# Patient Record
Sex: FEMALE | Race: WHITE | NOT HISPANIC OR LATINO | Employment: UNEMPLOYED | URBAN - METROPOLITAN AREA
[De-identification: names, ages, dates, MRNs, and addresses within clinical notes are randomized per-mention and may not be internally consistent; named-entity substitution may affect disease eponyms.]

---

## 2019-08-06 ENCOUNTER — OFFICE VISIT CONVERTED (OUTPATIENT)
Dept: ORTHOPEDIC SURGERY | Facility: CLINIC | Age: 44
End: 2019-08-06
Attending: ORTHOPAEDIC SURGERY

## 2019-08-27 ENCOUNTER — OFFICE VISIT CONVERTED (OUTPATIENT)
Dept: ORTHOPEDIC SURGERY | Facility: CLINIC | Age: 44
End: 2019-08-27
Attending: ORTHOPAEDIC SURGERY

## 2021-05-15 VITALS — BODY MASS INDEX: 39.31 KG/M2 | WEIGHT: 250.5 LBS | HEIGHT: 67 IN | OXYGEN SATURATION: 99 % | HEART RATE: 121 BPM

## 2021-05-15 VITALS — BODY MASS INDEX: 38.84 KG/M2 | OXYGEN SATURATION: 98 % | WEIGHT: 247.5 LBS | HEART RATE: 115 BPM | HEIGHT: 67 IN

## 2023-02-01 NOTE — PROGRESS NOTES
"Chief Complaint  Gastritis     Lilian Pelaez is a 47 y.o. female who presents to [facility]- [facility] on referral from Shelly Cash MD for a gastroenterology evaluation of gastritis       History of Present Illness  New patient presents to the office for gastritis. Patient previously seeing Dr. Jeronimo for upper GI symptoms who concluded patient had opioid induced gastroparesis, prescribed Reglan. Patient struggles with chronic back pain. She struggles with heartburn, nausea, bloating, abdominal cramping, and constipation. Heartburn is mostly controlled with Protonix 40mg, Carafate QID, and Pepcid 40mg at night. Since starting Reglan her heartburn as improved. Patient has bowel movement about 2 times a week despite taking Colace. Denies melena and hematochezia. Denies family history of colon cancer.     CT abdomen 07/26/2022 - evidence of cholecystectomy, moderate stool burden in right colon    EGD 07/05/2022 by Dr. Jeronimo - mild reflux esophagitis, retained food in stomach, nonerosive gastritis, and normal duodenum. Biopsies consistent with reactive gastropathy    Social History     Social History Narrative   • Not on file       Immunization:  Immunization History   Administered Date(s) Administered   • COVID-19 (PFIZER) PURPLE CAP 08/19/2021, 09/09/2021        Objective     Vital Signs:   /85 (BP Location: Left arm, Patient Position: Sitting, Cuff Size: Adult)   Pulse 83   Ht 170.2 cm (67.01\")   Wt (!) 140 kg (309 lb 9.6 oz)   SpO2 97%   BMI 48.48 kg/m²       Physical Exam  Constitutional:       Appearance: Normal appearance.   HENT:      Head: Normocephalic.   Cardiovascular:      Rate and Rhythm: Normal rate and regular rhythm.      Heart sounds: Normal heart sounds.   Pulmonary:      Effort: Pulmonary effort is normal.      Breath sounds: Normal breath sounds.   Abdominal:      General: Bowel sounds are normal.      Palpations: Abdomen is soft.   Skin:     General: " Skin is warm and dry.   Neurological:      Mental Status: She is alert and oriented to person, place, and time. Mental status is at baseline.   Psychiatric:         Mood and Affect: Mood normal.         Behavior: Behavior normal.         Thought Content: Thought content normal.         Judgment: Judgment normal.         Result Review :                   Assessment and Plan    Diagnoses and all orders for this visit:    1. Gastroesophageal reflux disease, unspecified whether esophagitis present (Primary)  -     NM Gastric Emptying; Future    2. Nausea  -     NM Gastric Emptying; Future    3. Epigastric pain    4. Bloating  -     NM Gastric Emptying; Future    5. Constipation, unspecified constipation type    6. Gastroparesis  -     Ambulatory Referral to Gastroenterology    Other orders  -     PEG 3350-KCl-NaBcb-NaCl-NaSulf (Golytely) 227.1 g pack; Take 4,000 mL by mouth 1 (One) Time for 1 dose. Take as directed by the office for colon prep  Dispense: 1 each; Refill: 0  -     linaclotide (Linzess) 72 MCG capsule capsule; Take 1 capsule by mouth Every Morning Before Breakfast for 90 days.  Dispense: 90 capsule; Refill: 1    Gastric emptying study  Referral to motility clinic for gastroparesis. Gastroparesis information and diet provided in AVS.  Trial of Linzess 72, samples provided.   Cardiac clearance from Dr. Tommy Marie   COLONOSCOPY Surgical Risk and Benefits: Possible risk/complications, benefits, and alternatives to surgical or invasive procedure have been explained to patient and/or legal guardian. Risks include bleeding, infection, and perforation. Patient has been evaluated and can tolerate anesthesia and/or sedation. Risk, benefits, and alternatives to anesthesia and sedation have been explained to patient and/or legal guardian.     Follow Up   No follow-ups on file.  Patient was given instructions and counseling regarding her condition or for health maintenance advice. Please see specific information  pulled into the AVS if appropriate.

## 2023-02-02 ENCOUNTER — OFFICE VISIT (OUTPATIENT)
Dept: GASTROENTEROLOGY | Facility: CLINIC | Age: 48
End: 2023-02-02
Payer: COMMERCIAL

## 2023-02-02 ENCOUNTER — PREP FOR SURGERY (OUTPATIENT)
Dept: OTHER | Facility: HOSPITAL | Age: 48
End: 2023-02-02
Payer: COMMERCIAL

## 2023-02-02 VITALS
HEART RATE: 83 BPM | SYSTOLIC BLOOD PRESSURE: 148 MMHG | DIASTOLIC BLOOD PRESSURE: 85 MMHG | HEIGHT: 67 IN | BODY MASS INDEX: 45.99 KG/M2 | WEIGHT: 293 LBS | OXYGEN SATURATION: 97 %

## 2023-02-02 DIAGNOSIS — R10.13 EPIGASTRIC PAIN: ICD-10-CM

## 2023-02-02 DIAGNOSIS — R14.0 BLOATING: ICD-10-CM

## 2023-02-02 DIAGNOSIS — R11.0 NAUSEA: ICD-10-CM

## 2023-02-02 DIAGNOSIS — K21.9 GASTROESOPHAGEAL REFLUX DISEASE, UNSPECIFIED WHETHER ESOPHAGITIS PRESENT: Primary | ICD-10-CM

## 2023-02-02 DIAGNOSIS — K59.00 CONSTIPATION, UNSPECIFIED CONSTIPATION TYPE: ICD-10-CM

## 2023-02-02 DIAGNOSIS — K59.00 CONSTIPATION, UNSPECIFIED CONSTIPATION TYPE: Primary | ICD-10-CM

## 2023-02-02 DIAGNOSIS — K31.84 GASTROPARESIS: ICD-10-CM

## 2023-02-02 PROCEDURE — 99204 OFFICE O/P NEW MOD 45 MIN: CPT

## 2023-02-02 RX ORDER — PSEUDOEPHEDRINE HCL 30 MG
TABLET ORAL
COMMUNITY

## 2023-02-02 RX ORDER — SUCRALFATE 1 G/1
TABLET ORAL
COMMUNITY

## 2023-02-02 RX ORDER — FUROSEMIDE 20 MG/1
1 TABLET ORAL
COMMUNITY

## 2023-02-02 RX ORDER — SERTRALINE HYDROCHLORIDE 100 MG/1
TABLET, FILM COATED ORAL
COMMUNITY

## 2023-02-02 RX ORDER — ALPRAZOLAM 0.5 MG/1
TABLET ORAL
COMMUNITY

## 2023-02-02 RX ORDER — LOSARTAN POTASSIUM 100 MG/1
TABLET ORAL
COMMUNITY

## 2023-02-02 RX ORDER — METOPROLOL SUCCINATE 25 MG/1
TABLET, EXTENDED RELEASE ORAL
COMMUNITY

## 2023-02-02 RX ORDER — MEDROXYPROGESTERONE ACETATE 150 MG/ML
INJECTION, SUSPENSION INTRAMUSCULAR
COMMUNITY

## 2023-02-02 RX ORDER — AMLODIPINE BESYLATE 10 MG/1
TABLET ORAL
COMMUNITY

## 2023-02-02 RX ORDER — PANTOPRAZOLE SODIUM 40 MG/1
TABLET, DELAYED RELEASE ORAL
COMMUNITY

## 2023-02-02 RX ORDER — ALLOPURINOL 100 MG/1
TABLET ORAL
COMMUNITY

## 2023-02-02 RX ORDER — LAMOTRIGINE 25 MG/1
TABLET ORAL
COMMUNITY

## 2023-02-02 RX ORDER — METOCLOPRAMIDE 10 MG/1
TABLET ORAL
COMMUNITY

## 2023-02-02 RX ORDER — POLYETHYLENE GLYCOL 3350, SODIUM SULFATE ANHYDROUS, SODIUM BICARBONATE, SODIUM CHLORIDE, POTASSIUM CHLORIDE 227.1; 21.5; 6.36; 5.53; .754 G/L; G/L; G/L; G/L; G/L
4000 POWDER, FOR SOLUTION ORAL ONCE
Qty: 1 EACH | Refills: 0 | Status: SHIPPED | OUTPATIENT
Start: 2023-02-02 | End: 2023-02-02

## 2023-02-02 RX ORDER — KETOROLAC TROMETHAMINE 10 MG/1
TABLET, FILM COATED ORAL
COMMUNITY

## 2023-02-02 RX ORDER — HYDROCODONE BITARTRATE AND ACETAMINOPHEN 7.5; 325 MG/1; MG/1
TABLET ORAL
COMMUNITY

## 2023-02-02 NOTE — PATIENT INSTRUCTIONS
Gastroparesis  Gastroparesis is a condition in which food takes longer than normal to empty from the stomach. This condition is also known as delayed gastric emptying. It is usually a long-term (chronic) condition.  There is no cure, but there are treatments and things that you can do at home to help relieve symptoms. Treating the underlying condition that causes gastroparesis can also help relieve symptoms.  What are the causes?  In many cases, the cause of this condition is not known. Possible causes include:  A hormone (endocrine) disorder, such as hypothyroidism or diabetes.  A nervous system disease, such as Parkinson's disease or multiple sclerosis.  Cancer, infection, or surgery that affects the stomach or vagus nerve. The vagus nerve runs from your chest, through your neck, and to the lower part of your brain.  A connective tissue disorder, such as scleroderma.  Certain medicines.  What increases the risk?  You are more likely to develop this condition if:  You have certain disorders or diseases. These may include:  An endocrine disorder.  An eating disorder.  Amyloidosis.  Scleroderma.  Parkinson's disease.  Multiple sclerosis.  Cancer or infection of the stomach or the vagus nerve.  You have had surgery on your stomach or vagus nerve.  You take certain medicines.  You are female.  What are the signs or symptoms?  Symptoms of this condition include:  Feeling full after eating very little or a loss of appetite.  Nausea, vomiting, or heartburn.  Bloating of your abdomen.  Inconsistent blood sugar (glucose) levels on blood tests.  Unexplained weight loss.  Acid from the stomach coming up into the esophagus (gastroesophageal reflux).  Sudden tightening (spasm) of the stomach, which can be painful.  Symptoms may come and go. Some people may not notice any symptoms.  How is this diagnosed?  This condition is diagnosed with tests, such as:  Tests that check how long it takes food to move through the stomach and  intestines. These tests include:  Upper gastrointestinal (GI) series. For this test, you drink a liquid that shows up well on X-rays, and then X-rays are taken of your intestines.  Gastric emptying scintigraphy. For this test, you eat food that contains a small amount of radioactive material, and then scans are taken.  Wireless capsule GI monitoring system. For this test, you swallow a pill (capsule) that records information about how foods and fluid move through your stomach.  Gastric manometry. For this test, a tube is passed down your throat and into your stomach to measure electrical and muscular activity.  Endoscopy. For this test, a long, thin tube with a camera and light on the end is passed down your throat and into your stomach to check for problems in your stomach lining.  Ultrasound. This test uses sound waves to create images of the inside of your body. This can help rule out gallbladder disease or pancreatitis as a cause of your symptoms.  How is this treated?  There is no cure for this condition, but treatment and home care may relieve symptoms. Treatment may include:  Treating the underlying cause.  Managing your symptoms by making changes to your diet and exercise habits.  Taking medicines to control nausea and vomiting and to stimulate stomach muscles.  Getting food through a feeding tube in the hospital. This may be done in severe cases.  Having surgery to insert a device called a gastric electrical stimulator into your body. This device helps improve stomach emptying and control nausea and vomiting.  Follow these instructions at home:  Take over-the-counter and prescription medicines only as told by your health care provider.  Follow instructions from your health care provider about eating or drinking restrictions. Your health care provider may recommend that you:  Eat smaller meals more often.  Eat low-fat foods.  Eat low-fiber forms of high-fiber foods. For example, eat cooked vegetables instead  of raw vegetables.  Have only liquid foods instead of solid foods. Liquid foods are easier to digest.  Drink enough fluid to keep your urine pale yellow.  Exercise as often as told by your health care provider.  Keep all follow-up visits. This is important.  Contact a health care provider if you:  Notice that your symptoms do not improve with treatment.  Have new symptoms.  Get help right away if you:  Have severe pain in your abdomen that does not improve with treatment.  Have nausea that is severe or does not go away.  Vomit every time you drink fluids.  Summary  Gastroparesis is a long-term (chronic) condition in which food takes longer than normal to empty from the stomach.  Symptoms include nausea, vomiting, heartburn, bloating of your abdomen, and loss of appetite.  Eating smaller portions, low-fat foods, and low-fiber forms of high-fiber foods may help you manage your symptoms.  Get help right away if you have severe pain in your abdomen.  This information is not intended to replace advice given to you by your health care provider. Make sure you discuss any questions you have with your health care provider.  Document Revised: 04/26/2021 Document Reviewed: 04/26/2021  [email or web address] Patient Education © 2022 [employer]

## 2023-02-03 ENCOUNTER — TELEPHONE (OUTPATIENT)
Dept: GASTROENTEROLOGY | Facility: CLINIC | Age: 48
End: 2023-02-03
Payer: COMMERCIAL

## 2023-02-08 ENCOUNTER — PATIENT ROUNDING (BHMG ONLY) (OUTPATIENT)
Dept: GASTROENTEROLOGY | Facility: CLINIC | Age: 48
End: 2023-02-08
Payer: COMMERCIAL

## 2023-02-08 NOTE — PROGRESS NOTES
2/8/2023      Hello, may I speak with Lilian Pelaez     My name is Iris Wright, Clinical Coordinator   . I am calling from [facility]. I show that you had a recent visit with CHANTELLE Hull.    Before we get started may I verify your date of birth? 1975    I am calling to officially welcome you to our practice and ask about your recent visit. Is this a good time to talk? No, no answer, unable to leave message    Tell me about your visit with us. What things went well?         We're always looking for ways to make our patients' experiences even better. Do you have recommendations on ways we may improve?    Overall were you satisfied with your first visit to our practice?    I appreciate you taking the time to speak with me today. Is there anything else I can do for you?    I am glad to hear that you had a very good visit and I appreciate you taking the time to provide feedback on this call. We would greatly appreciate you filling out a survey if you receive one in the mail, email or text. This is a great opportunity to provide any additional feedback that you may think of after this call as well.       Thank you, and have a great day.

## 2023-02-21 ENCOUNTER — HOSPITAL ENCOUNTER (OUTPATIENT)
Dept: NUCLEAR MEDICINE | Facility: HOSPITAL | Age: 48
Discharge: HOME | End: 2023-02-21
Payer: COMMERCIAL

## 2023-02-21 DIAGNOSIS — R11.0 NAUSEA: ICD-10-CM

## 2023-02-21 DIAGNOSIS — K21.9 GASTROESOPHAGEAL REFLUX DISEASE, UNSPECIFIED WHETHER ESOPHAGITIS PRESENT: ICD-10-CM

## 2023-02-21 DIAGNOSIS — R14.0 BLOATING: ICD-10-CM

## 2023-02-21 PROCEDURE — A9541 TC99M SULFUR COLLOID: HCPCS

## 2023-02-21 PROCEDURE — 78264 GASTRIC EMPTYING IMG STUDY: CPT

## 2023-02-21 PROCEDURE — 0 TECHNETIUM SULFUR COLLOID

## 2023-02-21 RX ADMIN — TECHNETIUM TC 99M SULFUR COLLOID 1 DOSE: KIT at 07:45

## 2023-03-03 ENCOUNTER — TELEPHONE (OUTPATIENT)
Dept: GASTROENTEROLOGY | Facility: CLINIC | Age: 48
End: 2023-03-03
Payer: COMMERCIAL

## 2023-03-03 DIAGNOSIS — R10.11 RUQ PAIN: Primary | ICD-10-CM

## 2023-03-03 NOTE — TELEPHONE ENCOUNTER
S/w patient. Patient aware of results. Patient stated after eating she still having a lot of abdominal pain, nausea and bloating. Patient is taking relgan currently. Patient also complaining of linzess, states it is making her have 1 bowel movement a day and it is diarrhea.  Patient currently has a referral to motility clinic. S/w maite at motility clinic , she stated she will call the patient today to schedule her    ----- Message from CHANTELLE Martinez sent at 2/21/2023  2:33 PM EST -----  Normal gastric emptying.

## 2023-03-09 NOTE — TELEPHONE ENCOUNTER
If Linzess if causing too much diarrhea then we can switch to Amitiza 8mcg BID instead. If patient is still taking Reglan then GES not truly reflective of delay. Plan to proceed with referral to Motility Clinic. Please provide low FODMAP diet to patient for bloating symptoms. Continue small frequent meals, eat low-fat foods, and utilize meal replacement shakes that are low in sugar.

## 2023-03-13 NOTE — TELEPHONE ENCOUNTER
S/w patient. Patient has a phone call visit with motility clinic 03/22/2023 per patient. Patient stated she takes the linzess in the mornings and 20 minutes after her first meal she has diarrhea. Patient feels as if when she needs to go it completely empties her and makes her feel fatigue and sometimes dehydrated, this usually happens twice a day. Patient is currently still taking regaln twice a day, but had held it for 48 hours prior to GES. Patient stated she is having RUQ pain, it is daily and it comes with a burning sensation. Patient denies vomiting but has a lot of nausea and vomiting. I have [facility] patient [facility]

## 2023-03-13 NOTE — TELEPHONE ENCOUNTER
Called patient to check on SX'S and see if she wanted to switch to amitiza and to check on to see if patient has been scheduled with motility clinic. Patient did not answer. Left  and call back number

## 2023-03-14 RX ORDER — HYOSCYAMINE SULFATE 0.125 MG
0.12 TABLET ORAL
Qty: 120 TABLET | Refills: 3 | Status: SHIPPED | OUTPATIENT
Start: 2023-03-14

## 2023-03-14 NOTE — TELEPHONE ENCOUNTER
Patient can discontinue Linzess if she feel that this is too strong. Use Miralax and titrate as needed to provide a more regular bowel movement. I have ordered abdominal ultrasound due to RUQ pain. Trial of Levsin sent to pharmacy for management of RUQ pain.

## 2023-03-20 NOTE — TELEPHONE ENCOUNTER
S/w patient, patient would like to continue linzess. Patient aware of levsin and ultra sound ordered. Will call us with any questions or concerns.

## 2023-04-03 NOTE — PRE-PROCEDURE INSTRUCTIONS
Arrival-time of 0930.    Must have a  for transportation home post-procedure.    Education provided on laxative administration; bowel prep to be taken in two doses.  Reviewed diet instructions for day prior to procedure.  Only plain, unflavored water after midnight until two hours prior to arrival-time.    Bring all home medications to the hospital on the morning of the procedure.  Do not take any morning medications on the day of the procedure.    Resume medications as prescribed post-procedure.     Patient verbalized understanding of instructions.    Bel Elizalde RN

## 2023-04-04 ENCOUNTER — ANESTHESIA EVENT (OUTPATIENT)
Dept: GASTROENTEROLOGY | Facility: HOSPITAL | Age: 48
End: 2023-04-04
Payer: COMMERCIAL

## 2023-04-04 ENCOUNTER — ANESTHESIA (OUTPATIENT)
Dept: GASTROENTEROLOGY | Facility: HOSPITAL | Age: 48
End: 2023-04-04
Payer: COMMERCIAL

## 2023-04-04 ENCOUNTER — PREP FOR SURGERY (OUTPATIENT)
Dept: OTHER | Facility: HOSPITAL | Age: 48
End: 2023-04-04
Payer: COMMERCIAL

## 2023-04-04 ENCOUNTER — TELEPHONE (OUTPATIENT)
Dept: GASTROENTEROLOGY | Facility: CLINIC | Age: 48
End: 2023-04-04
Payer: COMMERCIAL

## 2023-04-04 ENCOUNTER — HOSPITAL ENCOUNTER (OUTPATIENT)
Facility: HOSPITAL | Age: 48
Setting detail: HOSPITAL OUTPATIENT SURGERY
Discharge: HOME | End: 2023-04-04
Attending: INTERNAL MEDICINE | Admitting: INTERNAL MEDICINE
Payer: COMMERCIAL

## 2023-04-04 VITALS
SYSTOLIC BLOOD PRESSURE: 101 MMHG | HEART RATE: 71 BPM | WEIGHT: 293 LBS | OXYGEN SATURATION: 98 % | TEMPERATURE: 98 F | RESPIRATION RATE: 16 BRPM | DIASTOLIC BLOOD PRESSURE: 67 MMHG | BODY MASS INDEX: 49.16 KG/M2

## 2023-04-04 DIAGNOSIS — K63.89 MASS OF COLON: Primary | ICD-10-CM

## 2023-04-04 DIAGNOSIS — K59.00 CONSTIPATION, UNSPECIFIED CONSTIPATION TYPE: ICD-10-CM

## 2023-04-04 DIAGNOSIS — C18.6 ADENOCARCINOMA OF DESCENDING COLON: Primary | ICD-10-CM

## 2023-04-04 DIAGNOSIS — K63.89 MASS OF COLON: ICD-10-CM

## 2023-04-04 LAB — B-HCG UR QL: NEGATIVE

## 2023-04-04 PROCEDURE — 45380 COLONOSCOPY AND BIOPSY: CPT | Performed by: INTERNAL MEDICINE

## 2023-04-04 PROCEDURE — 81025 URINE PREGNANCY TEST: CPT | Performed by: INTERNAL MEDICINE

## 2023-04-04 PROCEDURE — 25010000002 PROPOFOL 10 MG/ML EMULSION: Performed by: NURSE ANESTHETIST, CERTIFIED REGISTERED

## 2023-04-04 PROCEDURE — 25010000002 FENTANYL CITRATE (PF) 50 MCG/ML SOLUTION: Performed by: NURSE ANESTHETIST, CERTIFIED REGISTERED

## 2023-04-04 PROCEDURE — 45385 COLONOSCOPY W/LESION REMOVAL: CPT | Performed by: INTERNAL MEDICINE

## 2023-04-04 PROCEDURE — 88305 TISSUE EXAM BY PATHOLOGIST: CPT | Performed by: INTERNAL MEDICINE

## 2023-04-04 RX ORDER — PROPOFOL 10 MG/ML
VIAL (ML) INTRAVENOUS AS NEEDED
Status: DISCONTINUED | OUTPATIENT
Start: 2023-04-04 | End: 2023-04-04 | Stop reason: SURG

## 2023-04-04 RX ORDER — SODIUM CHLORIDE, SODIUM LACTATE, POTASSIUM CHLORIDE, CALCIUM CHLORIDE 600; 310; 30; 20 MG/100ML; MG/100ML; MG/100ML; MG/100ML
30 INJECTION, SOLUTION INTRAVENOUS CONTINUOUS
Status: DISCONTINUED | OUTPATIENT
Start: 2023-04-04 | End: 2023-04-04 | Stop reason: HOSPADM

## 2023-04-04 RX ORDER — FENTANYL CITRATE 50 UG/ML
INJECTION, SOLUTION INTRAMUSCULAR; INTRAVENOUS AS NEEDED
Status: DISCONTINUED | OUTPATIENT
Start: 2023-04-04 | End: 2023-04-04 | Stop reason: SURG

## 2023-04-04 RX ADMIN — FENTANYL CITRATE 50 MCG: 50 INJECTION, SOLUTION INTRAMUSCULAR; INTRAVENOUS at 11:03

## 2023-04-04 RX ADMIN — SODIUM CHLORIDE, POTASSIUM CHLORIDE, SODIUM LACTATE AND CALCIUM CHLORIDE 30 ML/HR: 600; 310; 30; 20 INJECTION, SOLUTION INTRAVENOUS at 10:31

## 2023-04-04 RX ADMIN — PROPOFOL 400 MG: 10 INJECTION, EMULSION INTRAVENOUS at 10:55

## 2023-04-04 RX ADMIN — FENTANYL CITRATE 50 MCG: 50 INJECTION, SOLUTION INTRAMUSCULAR; INTRAVENOUS at 10:58

## 2023-04-04 RX ADMIN — PROPOFOL 200 MCG/KG/MIN: 10 INJECTION, EMULSION INTRAVENOUS at 10:55

## 2023-04-04 NOTE — ANESTHESIA PREPROCEDURE EVALUATION
Anesthesia Evaluation     Patient summary reviewed and Nursing notes reviewed   no history of anesthetic complications:  NPO Solid Status: > 8 hours  NPO Liquid Status: > 2 hours           Airway   Mallampati: III  TM distance: >3 FB  Neck ROM: full  No difficulty expected  Dental      Pulmonary - negative pulmonary ROS and normal exam    breath sounds clear to auscultation  Cardiovascular - normal exam  Exercise tolerance: good (4-7 METS)    Rhythm: regular  Rate: normal    (+) hypertension,       Neuro/Psych- negative ROS  GI/Hepatic/Renal/Endo    (+) obesity,  GERD,  renal disease stones,     Musculoskeletal (-) negative ROS    Abdominal    Substance History - negative use     OB/GYN negative ob/gyn ROS         Other - negative ROS       ROS/Med Hx Other: PAT Nursing Notes unavailable.                   Anesthesia Plan    ASA 3     general and MAC     (Patient understands anesthesia not responsible for dental damage.)  intravenous induction     Anesthetic plan, risks, benefits, and alternatives have been provided, discussed and informed consent has been obtained with: patient.    Use of blood products discussed with patient .   Plan discussed with CRNA.        CODE STATUS:

## 2023-04-04 NOTE — H&P
Pre Procedure History & Physical    Chief Complaint:   Change in bowel habits    Subjective     HPI:   Change in bowel habits    Past Medical History:   Past Medical History:   Diagnosis Date   • GERD (gastroesophageal reflux disease)    • High blood pressure    • Kidney stones        Past Surgical History:  Past Surgical History:   Procedure Laterality Date   • KNEE SURGERY     • MASTECTOMY     • TONSILLECTOMY     • UPPER GASTROINTESTINAL ENDOSCOPY         Family History:  Family History   Problem Relation Age of Onset   • Colon cancer Neg Hx        Social History:   reports that she has never smoked. She has never been exposed to tobacco smoke. She has never used smokeless tobacco. She reports that she does not drink alcohol and does not use drugs.    Medications:   Medications Prior to Admission   Medication Sig Dispense Refill Last Dose   • allopurinol (ZYLOPRIM) 100 MG tablet allopurinol 100 mg tablet   Take 1 tablet every day by oral route for 90 days.      • ALPRAZolam (XANAX) 0.5 MG tablet alprazolam 0.5 mg tablet   Take 1 tablet twice a day by oral route for 30 days.      • amLODIPine (NORVASC) 10 MG tablet amlodipine 10 mg tablet   TAKE ONE TABLET BY MOUTH DAILY      • docusate sodium 100 MG capsule docusate sodium 100 mg capsule   TAKE ONE CAPSULE BY MOUTH DAILY      • estradiol cypionate (DEPO-ESTRADIOL) 5 MG/ML injection Inject  into the appropriate muscle as directed by prescriber.      • furosemide (LASIX) 20 MG tablet Take 1 tablet by mouth.      • HYDROcodone-acetaminophen (NORCO) 7.5-325 MG per tablet hydrocodone 7.5 mg-acetaminophen 325 mg tablet   Take by oral route for 30 days.      • hyoscyamine (ANASPAZ,LEVSIN) 0.125 MG tablet Take 1 tablet by mouth 4 (Four) Times a Day With Meals & at Bedtime. 120 tablet 3    • ketorolac (TORADOL) 10 MG tablet ketorolac 10 mg tablet   TAKE ONE TABLET BY MOUTH EVERY 6 HOURS      • lamoTRIgine (LaMICtal) 25 MG tablet lamotrigine 25 mg tablet      • linaclotide  (Linzess) 72 MCG capsule capsule Take 1 capsule by mouth Every Morning Before Breakfast for 90 days. 90 capsule 1    • losartan (COZAAR) 100 MG tablet losartan 100 mg tablet   TAKE ONE TABLET BY MOUTH DAILY      • medroxyPROGESTERone (DEPO-PROVERA) 150 MG/ML injection medroxyprogesterone 150 mg/mL intramuscular suspension   Inject 1 mL every 3 months by intramuscular route.      • metoclopramide (REGLAN) 10 MG tablet metoclopramide 10 mg tablet   Take by oral route for 30 days.      • metoprolol succinate XL (TOPROL-XL) 25 MG 24 hr tablet metoprolol succinate ER 25 mg tablet,extended release 24 hr   Take 1 tablet every day by oral route for 90 days.      • Mirabegron ER (MYRBETRIQ) 50 MG tablet sustained-release 24 hour 24 hr tablet Myrbetriq 50 mg tablet,extended release   Take by oral route for 90 days.      • pantoprazole (PROTONIX) 40 MG EC tablet pantoprazole 40 mg tablet,delayed release   Take by oral route for 30 days.      • sertraline (ZOLOFT) 100 MG tablet sertraline 100 mg tablet   Take 1 tablet every day by oral route for 30 days.      • sucralfate (CARAFATE) 1 g tablet sucralfate 1 gram tablet   TAKE ONE TABLET BY MOUTH Two TIMES A DAY          Allergies:  Hydromorphone, Morphine, Oxybutynin, Oxycodone-acetaminophen, Promethazine, Acetaminophen, Oxycodone, and Penicillins        Objective     Weight (!) 142 kg (313 lb 15 oz).    Physical Exam   Constitutional: Pt is oriented to person, place, and time and well-developed, well-nourished, and in no distress.   Mouth/Throat: Oropharynx is clear and moist.   Neck: Normal range of motion.   Cardiovascular: Normal rate, regular rhythm and normal heart sounds.    Pulmonary/Chest: Effort normal and breath sounds normal.   Abdominal: Soft. Nontender  Skin: Skin is warm and dry.   Psychiatric: Mood, memory, affect and judgment normal.     Assessment & Plan     Diagnosis:  Change in bowel habits    Anticipated Surgical Procedure:  Colonoscopy    The risks,  benefits, and alternatives of this procedure have been discussed with the patient or the responsible party- the patient understands and agrees to proceed.

## 2023-04-04 NOTE — ANESTHESIA POSTPROCEDURE EVALUATION
Patient: Lilian Pelaez    Procedure Summary     Date: 04/04/23 Room / Location: [facility] ENDOSCOPY 5 / [facility] ENDOSCOPY    Anesthesia Start: 1050 Anesthesia Stop: 1127    Procedure: COLONOSCOPY WITH POLYPECTOMY/SNARE/BIOPSIES/TATTOOING DESCENDING COLON MASS Diagnosis:       Constipation, unspecified constipation type      (Constipation, unspecified constipation type [K59.00])    Surgeons: Deniz Dowd MD Provider: Lacey Walton MD    Anesthesia Type: general, MAC ASA Status: 3          Anesthesia Type: general, MAC    Vitals  Vitals Value Taken Time   /67 04/04/23 1141   Temp 36.7 °C (98 °F) 04/04/23 1126   Pulse 80 04/04/23 1145   Resp 16 04/04/23 1141   SpO2 97 % 04/04/23 1145   Vitals shown include unvalidated device data.        Post Anesthesia Care and Evaluation    Patient location during evaluation: bedside  Patient participation: complete - patient participated  Level of consciousness: awake  Pain management: adequate    Airway patency: patent  PONV Status: none  Cardiovascular status: acceptable and stable  Respiratory status: acceptable  Hydration status: acceptable    Comments: An Anesthesiologist personally participated in the most demanding procedures (including induction and emergence if applicable) in the anesthesia plan, monitored the course of anesthesia administration at frequent intervals and remained physically present and available for immediate diagnosis and treatment of emergencies.

## 2023-04-05 NOTE — TELEPHONE ENCOUNTER
CT scheduled on 4/11/2023 @ [facility].   Spoke to pt; Educated on CT, Contrast and Referrals Placed. Educated pt on repeat Colonoscopy scheduled on 4/12/2023 and extended bowel prep with Miralax q day. Verified understanding.   Pt plans to  contrast from office on 4/10/2023. Pt has my direct line to contact for any issues or concerns.

## 2023-04-05 NOTE — TELEPHONE ENCOUNTER
Per Dr. Dowd Schedule Pt for CT Abd/Pelvis & CT Chest. Referral to [facility] Gen Surg Dr. Liu and [facility].

## 2023-04-11 ENCOUNTER — HOSPITAL ENCOUNTER (OUTPATIENT)
Dept: CT IMAGING | Facility: HOSPITAL | Age: 48
Discharge: HOME | End: 2023-04-11
Admitting: INTERNAL MEDICINE
Payer: COMMERCIAL

## 2023-04-11 DIAGNOSIS — K63.89 MASS OF COLON: ICD-10-CM

## 2023-04-11 DIAGNOSIS — C18.6 ADENOCARCINOMA OF DESCENDING COLON: ICD-10-CM

## 2023-04-11 LAB
CREAT BLDA-MCNC: 0.8 MG/DL
EGFRCR SERPLBLD CKD-EPI 2021: 91.6 ML/MIN/1.73

## 2023-04-11 PROCEDURE — 74177 CT ABD & PELVIS W/CONTRAST: CPT

## 2023-04-11 PROCEDURE — 82565 ASSAY OF CREATININE: CPT

## 2023-04-11 PROCEDURE — 25510000001 IOPAMIDOL PER 1 ML: Performed by: INTERNAL MEDICINE

## 2023-04-11 PROCEDURE — 71260 CT THORAX DX C+: CPT

## 2023-04-11 RX ADMIN — IOPAMIDOL 100 ML: 755 INJECTION, SOLUTION INTRAVENOUS at 14:48

## 2023-04-11 NOTE — PRE-PROCEDURE INSTRUCTIONS
Arrival-time of 0830 (agreed to come in earlier to cover for a cancellation).    Must have a  for transportation home post-procedure.    Education provided on laxative administration; bowel prep to be taken in two doses.  Reviewed diet instructions for day prior to procedure.  Only plain, unflavored water after midnight until two hours prior to arrival-time.  Confirmed new time to take second dose of bowel prep.    Do not take any morning medications on the day of the procedure.  Instead bring all prescribed medications and inhalers to the hospital on the morning of the procedure.     Patient verbalized understanding of instructions.    Bel Elizalde RN

## 2023-04-12 ENCOUNTER — HOSPITAL ENCOUNTER (OUTPATIENT)
Facility: HOSPITAL | Age: 48
Setting detail: HOSPITAL OUTPATIENT SURGERY
Discharge: HOME | End: 2023-04-12
Attending: INTERNAL MEDICINE | Admitting: INTERNAL MEDICINE
Payer: COMMERCIAL

## 2023-04-12 ENCOUNTER — ANESTHESIA (OUTPATIENT)
Dept: GASTROENTEROLOGY | Facility: HOSPITAL | Age: 48
End: 2023-04-12
Payer: COMMERCIAL

## 2023-04-12 ENCOUNTER — ANESTHESIA EVENT (OUTPATIENT)
Dept: GASTROENTEROLOGY | Facility: HOSPITAL | Age: 48
End: 2023-04-12
Payer: COMMERCIAL

## 2023-04-12 VITALS
BODY MASS INDEX: 44.41 KG/M2 | TEMPERATURE: 97.6 F | HEIGHT: 68 IN | WEIGHT: 293 LBS | DIASTOLIC BLOOD PRESSURE: 92 MMHG | RESPIRATION RATE: 14 BRPM | SYSTOLIC BLOOD PRESSURE: 132 MMHG | OXYGEN SATURATION: 100 % | HEART RATE: 89 BPM

## 2023-04-12 DIAGNOSIS — K63.89 MASS OF COLON: ICD-10-CM

## 2023-04-12 LAB — B-HCG UR QL: NEGATIVE

## 2023-04-12 PROCEDURE — 81025 URINE PREGNANCY TEST: CPT | Performed by: INTERNAL MEDICINE

## 2023-04-12 PROCEDURE — 25010000002 PROPOFOL 10 MG/ML EMULSION: Performed by: NURSE ANESTHETIST, CERTIFIED REGISTERED

## 2023-04-12 PROCEDURE — 88305 TISSUE EXAM BY PATHOLOGIST: CPT | Performed by: INTERNAL MEDICINE

## 2023-04-12 PROCEDURE — 45385 COLONOSCOPY W/LESION REMOVAL: CPT | Performed by: INTERNAL MEDICINE

## 2023-04-12 RX ORDER — PROPOFOL 10 MG/ML
VIAL (ML) INTRAVENOUS AS NEEDED
Status: DISCONTINUED | OUTPATIENT
Start: 2023-04-12 | End: 2023-04-12 | Stop reason: SURG

## 2023-04-12 RX ORDER — LIDOCAINE HYDROCHLORIDE 20 MG/ML
INJECTION, SOLUTION EPIDURAL; INFILTRATION; INTRACAUDAL; PERINEURAL AS NEEDED
Status: DISCONTINUED | OUTPATIENT
Start: 2023-04-12 | End: 2023-04-12 | Stop reason: SURG

## 2023-04-12 RX ORDER — SODIUM CHLORIDE, SODIUM LACTATE, POTASSIUM CHLORIDE, CALCIUM CHLORIDE 600; 310; 30; 20 MG/100ML; MG/100ML; MG/100ML; MG/100ML
30 INJECTION, SOLUTION INTRAVENOUS CONTINUOUS
Status: DISCONTINUED | OUTPATIENT
Start: 2023-04-12 | End: 2023-04-12 | Stop reason: HOSPADM

## 2023-04-12 RX ADMIN — SODIUM CHLORIDE, POTASSIUM CHLORIDE, SODIUM LACTATE AND CALCIUM CHLORIDE 30 ML/HR: 600; 310; 30; 20 INJECTION, SOLUTION INTRAVENOUS at 08:59

## 2023-04-12 RX ADMIN — PROPOFOL 150 MCG/KG/MIN: 10 INJECTION, EMULSION INTRAVENOUS at 09:28

## 2023-04-12 RX ADMIN — PROPOFOL 150 MG: 10 INJECTION, EMULSION INTRAVENOUS at 09:29

## 2023-04-12 RX ADMIN — PROPOFOL 50 MG: 10 INJECTION, EMULSION INTRAVENOUS at 09:37

## 2023-04-12 RX ADMIN — LIDOCAINE HYDROCHLORIDE 100 MG: 20 INJECTION, SOLUTION EPIDURAL; INFILTRATION; INTRACAUDAL; PERINEURAL at 09:28

## 2023-04-12 NOTE — ANESTHESIA PREPROCEDURE EVALUATION
Anesthesia Evaluation     Patient summary reviewed and Nursing notes reviewed                Airway   Mallampati: I  TM distance: >3 FB  Neck ROM: full  No difficulty expected  Dental      Pulmonary - negative pulmonary ROS and normal exam    breath sounds clear to auscultation  Cardiovascular - normal exam    Rhythm: regular  Rate: normal    (+) hypertension,       Neuro/Psych- negative ROS  GI/Hepatic/Renal/Endo    (+) morbid obesity, GERD,  renal disease,     Musculoskeletal (-) negative ROS    Abdominal    Substance History - negative use     OB/GYN negative ob/gyn ROS         Other                        Anesthesia Plan    ASA 3     general     intravenous induction     Anesthetic plan, risks, benefits, and alternatives have been provided, discussed and informed consent has been obtained with: patient.        CODE STATUS:

## 2023-04-12 NOTE — ANESTHESIA POSTPROCEDURE EVALUATION
Patient: Lilian Pelaez    Procedure Summary     Date: 04/12/23 Room / Location: [facility] ENDOSCOPY 4 / [facility] ENDOSCOPY    Anesthesia Start: 0925 Anesthesia Stop: 0952    Procedure: COLONOSCOPY with cold snare Diagnosis:       Mass of colon      (Mass of colon [K63.89])    Surgeons: Deniz Dowd MD Provider: Nash Andre MD    Anesthesia Type: general ASA Status: 3          Anesthesia Type: general    Vitals  Vitals Value Taken Time   /72 04/12/23 1017   Temp 36.4 °C (97.6 °F) 04/12/23 1010   Pulse 74 04/12/23 1018   Resp 14 04/12/23 1015   SpO2 99 % 04/12/23 1018   Vitals shown include unvalidated device data.        Post Anesthesia Care and Evaluation    Patient location during evaluation: bedside  Patient participation: complete - patient participated  Level of consciousness: awake  Pain management: adequate    Airway patency: patent  PONV Status: none  Cardiovascular status: acceptable and stable  Respiratory status: acceptable  Hydration status: acceptable    Comments: An Anesthesiologist personally participated in the most demanding procedures (including induction and emergence if applicable) in the anesthesia plan, monitored the course of anesthesia administration at frequent intervals and remained physically present and available for immediate diagnosis and treatment of emergencies.

## 2023-04-12 NOTE — H&P
Pre Procedure History & Physical    Chief Complaint:   Patient recently had a colonoscopy which showed a descending colon mass which was malignant but patient prep was poor    Subjective     HPI:   As above    Past Medical History:   Past Medical History:   Diagnosis Date   • Anxiety    • Depression    • GERD (gastroesophageal reflux disease)    • High blood pressure    • Kidney stones        Past Surgical History:  Past Surgical History:   Procedure Laterality Date   • CHOLECYSTECTOMY     • COLONOSCOPY N/A 04/04/2023    Procedure: COLONOSCOPY WITH POLYPECTOMY/SNARE/BIOPSIES/TATTOOING DESCENDING COLON MASS;  Surgeon: Deniz Dowd MD;  Location: [facility] ENDOSCOPY;  Service: Gastroenterology;  Laterality: N/A;  INCOMPLETE COLONOSCOPY  POOR BOWEL PREP  COLON POLYP  COLON MASS  DIVERTICULOSIS   • KNEE SURGERY     • MASTECTOMY      melonoma   • TONSILLECTOMY     • UPPER GASTROINTESTINAL ENDOSCOPY         Family History:  Family History   Problem Relation Age of Onset   • Colon cancer Neg Hx        Social History:   reports that she has never smoked. She has never been exposed to tobacco smoke. She has never used smokeless tobacco. She reports that she does not drink alcohol and does not use drugs.    Medications:   Medications Prior to Admission   Medication Sig Dispense Refill Last Dose   • allopurinol (ZYLOPRIM) 100 MG tablet allopurinol 100 mg tablet   Take 1 tablet every day by oral route for 90 days.   4/11/2023   • ALPRAZolam (XANAX) 0.5 MG tablet alprazolam 0.5 mg tablet   Take 1 tablet twice a day by oral route for 30 days.   Past Week   • amLODIPine (NORVASC) 10 MG tablet amlodipine 10 mg tablet   TAKE ONE TABLET BY MOUTH DAILY   4/11/2023   • docusate sodium 100 MG capsule docusate sodium 100 mg capsule   TAKE ONE CAPSULE BY MOUTH DAILY   4/11/2023   • furosemide (LASIX) 20 MG tablet Take 1 tablet by mouth.   4/11/2023   • HYDROcodone-acetaminophen (NORCO) 7.5-325 MG per tablet hydrocodone 7.5  mg-acetaminophen 325 mg tablet   Take by oral route for 30 days.   Past Week   • hyoscyamine (ANASPAZ,LEVSIN) 0.125 MG tablet Take 1 tablet by mouth 4 (Four) Times a Day With Meals & at Bedtime. 120 tablet 3 4/11/2023   • ketorolac (TORADOL) 10 MG tablet ketorolac 10 mg tablet   TAKE ONE TABLET BY MOUTH EVERY 6 HOURS   Past Week   • lamoTRIgine (LaMICtal) 25 MG tablet lamotrigine 25 mg tablet   4/11/2023   • linaclotide (Linzess) 72 MCG capsule capsule Take 1 capsule by mouth Every Morning Before Breakfast for 90 days. 90 capsule 1 4/11/2023   • losartan (COZAAR) 100 MG tablet losartan 100 mg tablet   TAKE ONE TABLET BY MOUTH DAILY   4/11/2023   • metoclopramide (REGLAN) 10 MG tablet metoclopramide 10 mg tablet   Take by oral route for 30 days.   4/11/2023   • metoprolol succinate XL (TOPROL-XL) 25 MG 24 hr tablet metoprolol succinate ER 25 mg tablet,extended release 24 hr   Take 1 tablet every day by oral route for 90 days.   4/11/2023   • Mirabegron ER (MYRBETRIQ) 50 MG tablet sustained-release 24 hour 24 hr tablet Myrbetriq 50 mg tablet,extended release   Take by oral route for 90 days.   4/11/2023   • pantoprazole (PROTONIX) 40 MG EC tablet pantoprazole 40 mg tablet,delayed release   Take by oral route for 30 days.   4/11/2023   • sertraline (ZOLOFT) 100 MG tablet sertraline 100 mg tablet   Take 1 tablet every day by oral route for 30 days.   Past Week   • sucralfate (CARAFATE) 1 g tablet sucralfate 1 gram tablet   TAKE ONE TABLET BY MOUTH Two TIMES A DAY   4/11/2023   • vitamin D3 125 MCG (5000 UT) capsule capsule Take 2,000 Units by mouth Daily.   4/11/2023   • estradiol cypionate (DEPO-ESTRADIOL) 5 MG/ML injection Inject  into the appropriate muscle as directed by prescriber.   More than a month   • medroxyPROGESTERone (DEPO-PROVERA) 150 MG/ML injection medroxyprogesterone 150 mg/mL intramuscular suspension   Inject 1 mL every 3 months by intramuscular route.   More than a month  "      Allergies:  Hydromorphone, Morphine, Oxybutynin, Oxycodone-acetaminophen, Promethazine, Acetaminophen, Oxycodone, and Penicillins        Objective     Blood pressure 159/80, pulse 100, temperature 98.5 °F (36.9 °C), temperature source Temporal, resp. rate 20, height 172.7 cm (68\"), weight (!) 139 kg (306 lb 7 oz), SpO2 96 %.    Physical Exam   Constitutional: Pt is oriented to person, place, and time and well-developed, well-nourished, and in no distress.   Mouth/Throat: Oropharynx is clear and moist.   Neck: Normal range of motion.   Cardiovascular: Normal rate, regular rhythm and normal heart sounds.    Pulmonary/Chest: Effort normal and breath sounds normal.   Abdominal: Soft. Nontender  Skin: Skin is warm and dry.   Psychiatric: Mood, memory, affect and judgment normal.     Assessment & Plan     Diagnosis:  Rule out any other lesions  in addition to what was seen on last recent colonoscopy    Anticipated Surgical Procedure:  Colonoscopy    The risks, benefits, and alternatives of this procedure have been discussed with the patient or the responsible party- the patient understands and agrees to proceed.            "

## 2023-04-13 ENCOUNTER — DOCUMENTATION (OUTPATIENT)
Dept: ONCOLOGY | Facility: HOSPITAL | Age: 48
End: 2023-04-13
Payer: COMMERCIAL

## 2023-04-13 ENCOUNTER — CONSULT (OUTPATIENT)
Dept: ONCOLOGY | Facility: HOSPITAL | Age: 48
End: 2023-04-13
Payer: COMMERCIAL

## 2023-04-13 VITALS
SYSTOLIC BLOOD PRESSURE: 148 MMHG | DIASTOLIC BLOOD PRESSURE: 87 MMHG | TEMPERATURE: 97.7 F | RESPIRATION RATE: 18 BRPM | OXYGEN SATURATION: 97 % | WEIGHT: 293 LBS | BODY MASS INDEX: 47.03 KG/M2 | HEART RATE: 93 BPM

## 2023-04-13 DIAGNOSIS — K63.89 MASS OF COLON: Primary | ICD-10-CM

## 2023-04-13 PROBLEM — R00.2 PALPITATIONS: Status: ACTIVE | Noted: 2020-03-10

## 2023-04-13 PROBLEM — N39.46 MIXED STRESS AND URGE URINARY INCONTINENCE: Status: ACTIVE | Noted: 2018-01-22

## 2023-04-13 PROBLEM — M54.12 CERVICAL RADICULOPATHY: Status: ACTIVE | Noted: 2018-08-29

## 2023-04-13 PROBLEM — R82.994 HYPERCALCIURIA: Status: ACTIVE | Noted: 2023-02-01

## 2023-04-13 PROBLEM — M10.9 GOUT: Status: ACTIVE | Noted: 2020-03-10

## 2023-04-13 PROBLEM — E66.01 MORBID OBESITY: Status: ACTIVE | Noted: 2018-01-22

## 2023-04-13 PROBLEM — M50.30 DDD (DEGENERATIVE DISC DISEASE), CERVICAL: Status: ACTIVE | Noted: 2017-09-27

## 2023-04-13 PROBLEM — I10 HYPERTENSION: Status: ACTIVE | Noted: 2020-03-10

## 2023-04-13 PROBLEM — I10 BENIGN ESSENTIAL HYPERTENSION: Status: ACTIVE | Noted: 2018-01-22

## 2023-04-13 PROBLEM — J30.2 SEASONAL ALLERGIC RHINITIS: Status: ACTIVE | Noted: 2023-04-13

## 2023-04-13 PROBLEM — R94.31 ELECTROCARDIOGRAM ABNORMAL: Status: ACTIVE | Noted: 2020-03-10

## 2023-04-13 PROBLEM — IMO0002 ULCERATIVE LESION: Status: ACTIVE | Noted: 2023-04-13

## 2023-04-13 PROBLEM — R06.02 SHORTNESS OF BREATH: Status: ACTIVE | Noted: 2023-04-13

## 2023-04-13 PROBLEM — R07.9 CHEST PAIN: Status: ACTIVE | Noted: 2020-03-10

## 2023-04-13 PROBLEM — R60.0 EDEMA OF LOWER EXTREMITY: Status: ACTIVE | Noted: 2023-03-07

## 2023-04-13 PROBLEM — C18.6: Status: ACTIVE | Noted: 2023-04-11

## 2023-04-13 PROBLEM — R42 DIZZINESS: Status: ACTIVE | Noted: 2020-03-10

## 2023-04-13 PROBLEM — N28.9 KIDNEY DISEASE: Status: ACTIVE | Noted: 2023-04-13

## 2023-04-13 PROBLEM — K29.50 CHRONIC GASTRITIS: Status: ACTIVE | Noted: 2022-11-17

## 2023-04-13 PROBLEM — M15.9 GENERALIZED OSTEOARTHRITIS: Status: ACTIVE | Noted: 2018-01-22

## 2023-04-13 PROBLEM — G25.81 RESTLESS LEGS: Status: ACTIVE | Noted: 2021-10-05

## 2023-04-13 PROBLEM — M79.89 LIMB SWELLING: Status: ACTIVE | Noted: 2023-04-13

## 2023-04-13 PROBLEM — R60.9 EDEMA: Status: ACTIVE | Noted: 2023-03-07

## 2023-04-13 PROBLEM — M51.26 DISPLACEMENT OF LUMBAR INTERVERTEBRAL DISC WITHOUT MYELOPATHY: Status: ACTIVE | Noted: 2018-01-22

## 2023-04-13 PROBLEM — G89.4 CHRONIC PAIN DISORDER: Status: ACTIVE | Noted: 2018-05-31

## 2023-04-13 PROBLEM — R68.89 INFLUENZA-LIKE SYMPTOMS: Status: ACTIVE | Noted: 2022-11-29

## 2023-04-13 PROBLEM — N32.9 BLADDER DISORDER: Status: ACTIVE | Noted: 2023-04-13

## 2023-04-13 PROBLEM — K31.84 GASTROPARESIS: Status: ACTIVE | Noted: 2022-07-06

## 2023-04-13 PROBLEM — C80.1 CANCER: Status: ACTIVE | Noted: 2023-04-13

## 2023-04-13 PROBLEM — M19.90 ARTHRITIS: Status: ACTIVE | Noted: 2023-04-13

## 2023-04-13 LAB
CYTO UR: NORMAL
CYTO UR: NORMAL
LAB AP CASE REPORT: NORMAL
LAB AP CASE REPORT: NORMAL
LAB AP CLINICAL INFORMATION: NORMAL
LAB AP CLINICAL INFORMATION: NORMAL
Lab: NORMAL
PATH REPORT.ADDENDUM SPEC: NORMAL
PATH REPORT.FINAL DX SPEC: NORMAL
PATH REPORT.FINAL DX SPEC: NORMAL
PATH REPORT.GROSS SPEC: NORMAL
PATH REPORT.GROSS SPEC: NORMAL

## 2023-04-13 PROCEDURE — 99204 OFFICE O/P NEW MOD 45 MIN: CPT | Performed by: INTERNAL MEDICINE

## 2023-04-13 PROCEDURE — G0463 HOSPITAL OUTPT CLINIC VISIT: HCPCS | Performed by: INTERNAL MEDICINE

## 2023-04-13 PROCEDURE — 3079F DIAST BP 80-89 MM HG: CPT | Performed by: INTERNAL MEDICINE

## 2023-04-13 PROCEDURE — 1125F AMNT PAIN NOTED PAIN PRSNT: CPT | Performed by: INTERNAL MEDICINE

## 2023-04-13 PROCEDURE — 3077F SYST BP >= 140 MM HG: CPT | Performed by: INTERNAL MEDICINE

## 2023-04-13 RX ORDER — ROPINIROLE 1 MG/1
TABLET, FILM COATED ORAL
COMMUNITY
Start: 2023-03-13

## 2023-04-13 RX ORDER — POLYETHYLENE GLYCOL-3350 AND ELECTROLYTES 236; 6.74; 5.86; 2.97; 22.74 G/274.31G; G/274.31G; G/274.31G; G/274.31G; G/274.31G
POWDER, FOR SOLUTION ORAL
COMMUNITY

## 2023-04-13 RX ORDER — MEDROXYPROGESTERONE ACETATE 150 MG/ML
1 INJECTION, SUSPENSION INTRAMUSCULAR
COMMUNITY

## 2023-04-13 RX ORDER — HYDROCHLOROTHIAZIDE 25 MG/1
TABLET ORAL
COMMUNITY

## 2023-04-13 NOTE — PROGRESS NOTES
Chief Complaint/Care Team   Adenocarcinoma of Descending Colon     Deniz Dowd MD Stephens, Cassandra, MD    History of Present Illness     Diagnosis: Colon Adenocarcinoma  from C-scope on 4/2023, [id number]    Current Treatment: Work-up in process  Previous Treatment: None    Lilian Pelaez is a 47 y.o. female who presents to [facility]EMATOLOGY [facility] ONCOLOGY for discussion of newly diagnosed colon adenocarcinoma seen on biopsy of colon mass in the descending colon during colonoscopy performed in April 2023.     Staging scans revealed:  CT abdomen/pelvis without any metastatic disease in abdomen/pelvis  CT chest no evidence of metastatic disease in chest.    Patient with upcoming appointment with general surgeon Dr. Liu on 4/17/2023 for consideration of resection of colon mass.    Patient is a former smoker.   Patient reports family history of several relatives with other forms of cancer cancer on her mother side of the family, but denies any known history of colon cancer in her family.    Patient reports noticing some blood mixed in with stool prior to cancer diagnosis.  She denies any other B symptoms today.    Review of Systems   Constitutional: Positive for fatigue.   Gastrointestinal: Positive for abdominal pain.   Psychiatric/Behavioral: The patient is nervous/anxious.    All other systems reviewed and are negative.       Oncology/Hematology History    No history exists.       Objective     Vitals:    04/13/23 0958   BP: 148/87   Pulse: 93   Resp: 18   Temp: 97.7 °F (36.5 °C)   TempSrc: Temporal   SpO2: 97%   Weight: (!) 140 kg (309 lb 4.9 oz)   PainSc:   5   PainLoc: Abdomen     ECOG score: 0         PHQ-9 Total Score:         Physical Exam  Exam conducted with a chaperone present.   Constitutional:       General: She is not in acute distress.     Appearance: Normal appearance.   HENT:      Head: Normocephalic and atraumatic.   Eyes:      Extraocular Movements: Extraocular movements  intact.      Conjunctiva/sclera: Conjunctivae normal.   Cardiovascular:      Pulses: Normal pulses.      Heart sounds: Normal heart sounds.   Pulmonary:      Effort: Pulmonary effort is normal.      Breath sounds: Normal breath sounds. No rhonchi or rales.   Abdominal:      General: Bowel sounds are normal. There is no distension.      Palpations: Abdomen is soft. There is no mass.      Tenderness: There is no abdominal tenderness.   Musculoskeletal:      Cervical back: Normal range of motion and neck supple.   Skin:     General: Skin is warm and dry.   Neurological:      Mental Status: She is oriented to person, place, and time.           Past Medical History     Past Medical History:   Diagnosis Date   • Anxiety    • Depression    • GERD (gastroesophageal reflux disease)    • High blood pressure    • Kidney stones      Current Outpatient Medications on File Prior to Visit   Medication Sig Dispense Refill   • allopurinol (ZYLOPRIM) 100 MG tablet allopurinol 100 mg tablet   Take 1 tablet every day by oral route for 90 days.     • ALPRAZolam (XANAX) 0.5 MG tablet alprazolam 0.5 mg tablet   Take 1 tablet twice a day by oral route for 30 days.     • amLODIPine (NORVASC) 10 MG tablet amlodipine 10 mg tablet   TAKE ONE TABLET BY MOUTH DAILY     • docusate sodium 100 MG capsule docusate sodium 100 mg capsule   TAKE ONE CAPSULE BY MOUTH DAILY     • estradiol cypionate (DEPO-ESTRADIOL) 5 MG/ML injection Inject  into the appropriate muscle as directed by prescriber.     • furosemide (LASIX) 20 MG tablet Take 1 tablet by mouth.     • hydroCHLOROthiazide (HYDRODIURIL) 25 MG tablet hydrochlorothiazide 25 mg tablet   Take 1 tablet every day by oral route for 90 days.     • HYDROcodone-acetaminophen (NORCO) 7.5-325 MG per tablet hydrocodone 7.5 mg-acetaminophen 325 mg tablet   Take by oral route for 30 days.     • hyoscyamine (ANASPAZ,LEVSIN) 0.125 MG tablet Take 1 tablet by mouth 4 (Four) Times a Day With Meals & at Bedtime. 120  tablet 3   • ketorolac (TORADOL) 10 MG tablet ketorolac 10 mg tablet   TAKE ONE TABLET BY MOUTH EVERY 6 HOURS     • lamoTRIgine (LaMICtal) 25 MG tablet lamotrigine 25 mg tablet     • linaclotide (Linzess) 72 MCG capsule capsule Take 1 capsule by mouth Every Morning Before Breakfast for 90 days. 90 capsule 1   • losartan (COZAAR) 100 MG tablet losartan 100 mg tablet   TAKE ONE TABLET BY MOUTH DAILY     • medroxyPROGESTERone (DEPO-PROVERA) 150 MG/ML injection medroxyprogesterone 150 mg/mL intramuscular suspension   Inject 1 mL every 3 months by intramuscular route.     • medroxyPROGESTERone (DEPO-PROVERA) 150 MG/ML injection 1 mL.     • metoclopramide (REGLAN) 10 MG tablet metoclopramide 10 mg tablet   Take by oral route for 30 days.     • metoprolol succinate XL (TOPROL-XL) 25 MG 24 hr tablet metoprolol succinate ER 25 mg tablet,extended release 24 hr   Take 1 tablet every day by oral route for 90 days.     • Mirabegron ER (MYRBETRIQ) 50 MG tablet sustained-release 24 hour 24 hr tablet Myrbetriq 50 mg tablet,extended release   Take by oral route for 90 days.     • pantoprazole (PROTONIX) 40 MG EC tablet pantoprazole 40 mg tablet,delayed release   Take by oral route for 30 days.     • polyethylene glycol (GaviLyte-G) 236 g solution GaviLyte-G 236 gram-22.74 gram-6.74 gram-5.86 gram oral solution     • rOPINIRole (REQUIP) 1 MG tablet      • sertraline (ZOLOFT) 100 MG tablet sertraline 100 mg tablet   Take 1 tablet every day by oral route for 30 days.     • sucralfate (CARAFATE) 1 g tablet sucralfate 1 gram tablet   TAKE ONE TABLET BY MOUTH Two TIMES A DAY     • vitamin D3 125 MCG (5000 UT) capsule capsule Take 2,000 Units by mouth Daily.       Current Facility-Administered Medications on File Prior to Visit   Medication Dose Route Frequency Provider Last Rate Last Admin   • [DISCONTINUED] lactated ringers infusion  30 mL/hr Intravenous Continuous Nash Andre MD 30 mL/hr at 04/12/23 0925 Currently Infusing at  04/12/23 0925      Allergies   Allergen Reactions   • Hydromorphone GI Intolerance, Hives and Itching     Cant take in higher dose than 7.5   • Morphine Hives   • Oxybutynin Angioedema and Swelling     Other reaction(s): Angioedema  Other reaction(s): Angioedema  Other reaction(s): Angioedema  Other reaction(s): Angioedema     • Oxycodone-Acetaminophen Nausea Only   • Promethazine Nausea Only and Other (See Comments)   • Acetaminophen Nausea Only   • Oxycodone Nausea Only and Other (See Comments)   • Penicillins Rash     Past Surgical History:   Procedure Laterality Date   • CHOLECYSTECTOMY     • COLONOSCOPY N/A 04/04/2023    Procedure: COLONOSCOPY WITH POLYPECTOMY/SNARE/BIOPSIES/TATTOOING DESCENDING COLON MASS;  Surgeon: Deniz Dowd MD;  Location: [facility] ENDOSCOPY;  Service: Gastroenterology;  Laterality: N/A;  INCOMPLETE COLONOSCOPY  POOR BOWEL PREP  COLON POLYP  COLON MASS  DIVERTICULOSIS   • COLONOSCOPY N/A 4/12/2023    Procedure: COLONOSCOPY with cold snare;  Surgeon: Deniz Dowd MD;  Location: [facility] ENDOSCOPY;  Service: Gastroenterology;  Laterality: N/A;  colon polyps, diverticulosis, colon mass, hemorrhoids     • KNEE SURGERY     • MASTECTOMY      melonoma   • TONSILLECTOMY     • UPPER GASTROINTESTINAL ENDOSCOPY       Social History     Socioeconomic History   • Marital status:    Tobacco Use   • Smoking status: Never     Passive exposure: Never   • Smokeless tobacco: Never   Vaping Use   • Vaping Use: Never used   Substance and Sexual Activity   • Alcohol use: Never   • Drug use: Never   • Sexual activity: Defer     Family History   Problem Relation Age of Onset   • Colon cancer Neg Hx        Results     Result Review   The following data was reviewed by: Brian Marroquin MD on 04/13/2023:  No results found for: HGB, HCT, MCV, PLT, WBC, NEUTROABS, LYMPHSABS, MONOSABS, EOSABS, BASOSABS  Lab Results   Component Value Date    CREATININE 0.80 04/11/2023     No results found for: MG, PHOS,  FREET4, TSH    Case Report   Surgical Pathology Report                         Case: [id number]                                   Authorizing Provider:  Deniz Dowd MD    Collected:           04/12/2023 09:41 AM           Ordering Location:     [facility] Received:            04/12/2023 11:41 AM                                  SUITES                                                                        Pathologist:           Niesha Torres DO                                                        Specimens:   1) - Large Intestine, Cecum, cecal polyp cold snare                                                  2) - Large Intestine, Sigmoid Colon, sigmoid colon polyp cold snare                        Clinical Information    Mass of colon   Final Diagnosis   1. Cecum polyp, biopsy:                            - Tubular adenoma        2. Sigmoid colon polyp, biopsy:                            - Tubular adenoma    Electronically signed by Niesha Torres DO on 4/13/2023 at 0840     Surgical Pathology Report                         Case: [id number]                                   Authorizing Provider:  Deniz Dowd MD    Collected:           04/04/2023 11:15 AM           Ordering Location:     [facility] Received:            04/04/2023 11:59 AM                                  SUITES                                                                        Pathologist:           Jennifer Mike MD                                                      Specimens:   1) - Large Intestine, Transverse Colon, TRANSVERSE COLON POLYP                                       2) - Large Intestine, Left / Descending Colon, DESCENDING COLON BIOPSIES                   Clinical Information    Constipation, unspecified constipation type   Final Diagnosis   1. Transverse colon polyp, biopsy:               - Fragments of tubular adenoma        2.  Descending colon, biopsy:                -Adenocarcinoma, moderately to poorly differentiated     Comment: Per policy, reflex testing has been ordered, and the results will be separately reported.     REMARKS: The above positive (malignant) diagnosis was called to April in Dr. Dowd's office at 09:09 EDT on 4/5/2023 by diane.          Electronically signed by Jennifer Mike MD on 4/5/2023 at 0935         CT Chest With Contrast Diagnostic    Result Date: 4/12/2023    1. No evidence of metastatic disease within the chest.  No acute cardiopulmonary disease identified.     KAREN MADRID MD       Electronically Signed and Approved By: KAREN MADRID MD on 4/12/2023 at 9:55             CT Abdomen Pelvis With Contrast    Result Date: 4/12/2023    1. No definite metastatic disease seen within the abdomen or pelvis. 2. Status post cholecystectomy 3. Non-obstructing right renal stone     KAREN MADRID MD       Electronically Signed and Approved By: KAREN MADRID MD on 4/12/2023 at 10:01             CT Chest With Contrast Diagnostic    Result Date: 4/12/2023  Impression:   1. No evidence of metastatic disease within the chest.  No acute cardiopulmonary disease identified.     KAREN MDARID MD       Electronically Signed and Approved By: KAREN MADRID MD on 4/12/2023 at 9:55             CT Abdomen Pelvis With Contrast    Result Date: 4/12/2023  Impression:   1. No definite metastatic disease seen within the abdomen or pelvis. 2. Status post cholecystectomy 3. Non-obstructing right renal stone     KAREN MADRID MD       Electronically Signed and Approved By: KAREN MADRID MD on 4/12/2023 at 10:01               Assessment & Plan     Diagnoses and all orders for this visit:    1. Mass of colon (Primary)  -     Ambulatory Referral to ONC Social Work  -     Ambulatory Referral to Genetic Counseling/Testing        Lilian Pelaez is a 47 y.o. female who presents to [facility]AT[facility] ONCOLOGY  for discussion of newly diagnosed colon  adenocarcinoma seen on biopsy of colon mass in the descending colon during colonoscopy performed in April 2023.  Reviewed imaging and pathology report with patient and family today.  Shared that given CT chest and CT abdomen did not reveal any evidence of metastatic disease recommend urgent evaluation by general surgeon to perform resection of colon mass to determine pathological stage of patient.  Discussed that if patient has evidence of lymph node invasion and other high risk features will likely offer systemic chemotherapy for either 3 or 6 months.  If patient determined to be stage I would likely observe patient for disease recurrence.    Patient has appointment already scheduled with Dr. Liu with general surgery on 4/17/2023.    Given the stress of this new cancer diagnosis recommend patient continue to follow-up with her therapist and also I will place referral to  Jacquelyn at our office who also spoke with patient today to assess if there are other concerns we could address to help decrease her stress level.    Plan of patient follow-up after surgery in approximately 3 to 4 weeks to discuss final pathological stage and treatment at that time.    Patient verbalized understanding and agreement plan.    Please note that portions of this note were completed with a voice recognition program.    Electronically signed by Brian Marroquin MD, 04/13/23, 12:02 PM EDT.          Follow Up     I spent 30 minutes caring for Lilian on this date of service. This time includes time spent by me in the following activities:preparing for the visit, reviewing tests, obtaining and/or reviewing a separately obtained history, performing a medically appropriate examination and/or evaluation , counseling and educating the patient/family/caregiver, ordering medications, tests, or procedures, documenting information in the medical record and care coordination.    This is an acute or chronic illness that poses a threat to life  or bodily function. The above treatment plan involves a high risk of complications and/or mortality of patient management.    The patient was seen and examined. Work by the provider also included review and/or ordering of lab tests, review and/or ordering of radiology tests, review and/or ordering of medicine tests, discussion with other physicians or providers, independent review of data, obtaining old records, review/summation of old records, and/or other review.    I have reviewed the family history, social history, and past medical history for this patient. Previous information and data has been reviewed and updated as needed. I have reviewed and verified the chief complaint, history, and other documentation. The patient was interviewed and examined in the clinic and the chart reviewed. The previous observations, recommendations, and conclusions were reviewed including those of other providers.     The plan was discussed with the patient and/or family. The patient was given time to ask questions and these questions were answered. At the conclusion of their visit they had no additional questions or concerns and all questions were answered to their satisfaction.    Patient was given instructions and counseling regarding her condition or for health maintenance advice. Please see specific information pulled into the AVS if appropriate.

## 2023-04-13 NOTE — PROGRESS NOTES
Reason for Referral: Rounding with patients in Med Onc clinic with high distress    Diagnosis: colon cancer    Distress Score: 5 indicated for pain, sleep, fatigue, memory or concentration, changes in eating, worry or anxiety, work, finances, insurance, transportation, and treatment decisions    Location of Distress Screening: MED ONC    PHQ Score: Not completed on this patient. Patient is under the care of a behavioral health professional in her community.    Current Treatment Plan: surgery and then will develop treatment plan    Previous Cancer TX: Patient stated this is her first cancer diagnosis.    Mental Status: Patient was very pleasant and easy to engage. Patient stated she has anxiety and depression that she is treated by an [facility] in her hometown. Patient stated the medications were helping but she had many thoughts of what would happen to her kids and how would her family move forward. Patient stated she has no thoughts of suicide or homicide as she is here today to fight for her life. OSW encouraged patient to communicate with her behavioral health provider that she has a cancer diagnosis in case she needs meds adjusted as she progresses through her treatment plan. Patient stated she is on behavioral health medications xanax, Lamictal, and zoloft. OSW provided emotional support by active listening, providing empathy, and normalizing and validating patient's thoughts and feelings. Patient expressed appreciation for OSW meeting with her today and supporting her.     Mental Health Treatment: Patient stated she is under the care of [facility] in [place]. Patient stated she takes medications that seem to help her a lot with depression and anxiety.     Substance Use/Tobacco Use: Patient denied any use of illegal substances, alcohol, or tobacco products.     Spirituality: Patient stated she prays and attends [employer].    Hobbies: Patient stated she enjoys being with her children, grandchildren, and  .    Support systems: Patient stated she has her , daughter, 3 sons (2 sons are underage 14 and 12), best friend, and cousin who lives with her all in her support system.     Residential status/Who lives in the home: Patient lives with her two younger sons,  and has a cousin who lives with her.     Transportation: Patient stated she plans to drive herself due to most of her support system works.     Financial Concerns: Patient stated her  works and she has SSI with [facility] Medicaid. Patient stated at this time she is able to meet her basic needs. Patient stated she had asked about receiving her treatment at [place] if she needed chemo but likes her doctor her at [facility]. OSW was granted permission to apply to [employer] and [employer] gas cards.     Home Care Needs: No in home care needs at this time.    Advanced Directive/Living Will: None at this time.    Insurance: [employer] Medicaid    Resources/Referrals: OSW provided her business card and an overview of oncology social work services. OSW received permission to apply to [employer] for transportation assistance and [employer] foundation gas cards.       Additional Comment: Patient's 14 year old son has behavioral health concerns. OSW discussed with patient ways to assist her children with calming their anxiety and worries. Patient acknowledged the children are more clinging and want to sleep next to her. Patient stated she has her older son in therapy and notified his provider. OSW encouraged patient to monitor her youngest son in case he needs to see a provider for counseling as she goes through treatment.

## 2023-04-16 NOTE — PROGRESS NOTES
Chief Complaint:  Mass    Primary Care Provider: Shelly Cash MD    Referring Provider: Deniz Dowd MD    History of Present Illness  Lliian Pelaez is a 47 y.o. female referred by Deniz Dowd MD after a colonoscopy was done on 4/4/2023 and a mass was identified at the descending colon.  The mass was biopsied and pathology showed adenocarcinoma, moderately to poorly differentiated.  Colon was tattooed just distal to the location of the mass.  The adequacy of the prep was not sufficient so the patient had a follow-up colonoscopy on 4/12/2023 and no additional concerning findings were identified.  CT chest and CT abdomen pelvis were done on 4/12/2023 and there was no evidence of metastatic disease in the chest, abdomen, or pelvis.  Patient was seen by Dr. Marroquin with medical oncology on 4/13/23.  Patient is scheduled to follow-up with Dr. Marroquin sometime after surgery to discuss final pathological strange and additional treatment that may be necessary.  Surgical history includes a tubal ligation.  Some type of complication occurred that the patient could not describe very well but resulted in the patient having a laparotomy shortly thereafter.  This was many years ago.  Patient has also had laparoscopic gallbladder removal.    Allergies: Hydromorphone, Morphine, Oxybutynin, Oxycodone-acetaminophen, Promethazine, Acetaminophen, Oxycodone, and Penicillins    Outpatient Medications Marked as Taking for the 4/17/23 encounter (Office Visit) with Rolando Liu MD   Medication Sig Dispense Refill   • allopurinol (ZYLOPRIM) 100 MG tablet allopurinol 100 mg tablet   Take 1 tablet every day by oral route for 90 days.     • ALPRAZolam (XANAX) 0.5 MG tablet alprazolam 0.5 mg tablet   Take 1 tablet twice a day by oral route for 30 days.     • amLODIPine (NORVASC) 10 MG tablet amlodipine 10 mg tablet   TAKE ONE TABLET BY MOUTH DAILY     • docusate sodium 100 MG capsule docusate sodium 100 mg capsule   TAKE  ONE CAPSULE BY MOUTH DAILY     • estradiol cypionate (DEPO-ESTRADIOL) 5 MG/ML injection Inject  into the appropriate muscle as directed by prescriber.     • furosemide (LASIX) 20 MG tablet Take 1 tablet by mouth.     • hydroCHLOROthiazide (HYDRODIURIL) 25 MG tablet hydrochlorothiazide 25 mg tablet   Take 1 tablet every day by oral route for 90 days.     • HYDROcodone-acetaminophen (NORCO) 7.5-325 MG per tablet hydrocodone 7.5 mg-acetaminophen 325 mg tablet   Take by oral route for 30 days.     • hyoscyamine (ANASPAZ,LEVSIN) 0.125 MG tablet Take 1 tablet by mouth 4 (Four) Times a Day With Meals & at Bedtime. 120 tablet 3   • ketorolac (TORADOL) 10 MG tablet ketorolac 10 mg tablet   TAKE ONE TABLET BY MOUTH EVERY 6 HOURS     • lamoTRIgine (LaMICtal) 25 MG tablet lamotrigine 25 mg tablet     • linaclotide (Linzess) 72 MCG capsule capsule Take 1 capsule by mouth Every Morning Before Breakfast for 90 days. 90 capsule 1   • losartan (COZAAR) 100 MG tablet losartan 100 mg tablet   TAKE ONE TABLET BY MOUTH DAILY     • medroxyPROGESTERone (DEPO-PROVERA) 150 MG/ML injection medroxyprogesterone 150 mg/mL intramuscular suspension   Inject 1 mL every 3 months by intramuscular route.     • medroxyPROGESTERone (DEPO-PROVERA) 150 MG/ML injection 1 mL.     • metoclopramide (REGLAN) 10 MG tablet metoclopramide 10 mg tablet   Take by oral route for 30 days.     • metoprolol succinate XL (TOPROL-XL) 25 MG 24 hr tablet metoprolol succinate ER 25 mg tablet,extended release 24 hr   Take 1 tablet every day by oral route for 90 days.     • Mirabegron ER (MYRBETRIQ) 50 MG tablet sustained-release 24 hour 24 hr tablet Myrbetriq 50 mg tablet,extended release   Take by oral route for 90 days.     • pantoprazole (PROTONIX) 40 MG EC tablet pantoprazole 40 mg tablet,delayed release   Take by oral route for 30 days.     • polyethylene glycol (GaviLyte-G) 236 g solution GaviLyte-G 236 gram-22.74 gram-6.74 gram-5.86 gram oral solution     •  "rOPINIRole (REQUIP) 1 MG tablet      • sertraline (ZOLOFT) 100 MG tablet sertraline 100 mg tablet   Take 1 tablet every day by oral route for 30 days.     • sucralfate (CARAFATE) 1 g tablet sucralfate 1 gram tablet   TAKE ONE TABLET BY MOUTH Two TIMES A DAY     • vitamin D3 125 MCG (5000 UT) capsule capsule Take 2,000 Units by mouth Daily.         Past Medical History:   • Anxiety   • Asthma    Bronchitis   • Colon cancer   • Depression   • Diverticulitis of colon    Scope was done in [facility]   • GERD (gastroesophageal reflux disease)   • High blood pressure   • Kidney stones        Past Surgical History:   • BREAST SURGERY    Removed 8lbs of tissue   • CHOLECYSTECTOMY   • COLONOSCOPY    Procedure: COLONOSCOPY WITH POLYPECTOMY/SNARE/BIOPSIES/TATTOOING DESCENDING COLON MASS;  Surgeon: Deniz Dowd MD;  Location: [facility] ENDOSCOPY;  Service: Gastroenterology;  Laterality: N/A;  INCOMPLETE COLONOSCOPY  POOR BOWEL PREP  COLON POLYP  COLON MASS  DIVERTICULOSIS   • COLONOSCOPY    Procedure: COLONOSCOPY with cold snare;  Surgeon: Deniz Dowd MD;  Location: [facility] ENDOSCOPY;  Service: Gastroenterology;  Laterality: N/A;  colon polyps, diverticulosis, colon mass, hemorrhoids     • FRACTURE SURGERY    Left knee   • KNEE SURGERY   • MASTECTOMY    melonoma   • REDUCTION MAMMAPLASTY    8lbs of tissue was removed   • TONSILLECTOMY   • UPPER GASTROINTESTINAL ENDOSCOPY       Family History:   Family History   Problem Relation Age of Onset   • Colon cancer Neg Hx         Social History:  Social History     Tobacco Use   • Smoking status: Never     Passive exposure: Never   • Smokeless tobacco: Never   Substance Use Topics   • Alcohol use: Never       Objective     Vital Signs:  Resp 16   Ht 172.7 cm (68\")   Wt (!) 139 kg (307 lb)   BMI 46.68 kg/m²   • Constitutional: alert, no acute distress, reliable historian  • HENT:  NCAT, no visible deformities or lesions  • Eyes:  sclerae clear, conjunctivae clear, " EOMI  • Neck:  normal appearance, no masses, trachea midline  • Respiratory:  breathing not labored, respiratory effort appears normal  • Cardiovascular:  heart regular rate  • Abdomen:  nondistended    • Skin and subcutaneous tissue:  no visible concerning rashes or lesions, no jaundice  • Musculoskeletal: moving all extremities symmetrically and purposefully  • Neurologic:  no obvious motor or sensory deficits, normal gait, able to stand without difficulty, cerebellar function without any obvious abnormalities, alert & oriented x 3, speech clear  • Psychiatric:  judgment and insight intact, mood normal, affect appropriate, cooperative      Assessment:  Diagnoses and all orders for this visit:    1. Malignant neoplasm of descending colon (Primary)        Plan:  Robotic left colectomy    Discussion: Indications, options, risks, benefits, and expected outcomes of planned surgery were discussed with the patient and she agrees to proceed.    Rolando Liu MD  04/17/2023    Electronically signed by Rolando Liu MD, 04/16/23, 3:14 PM EDT.

## 2023-04-16 NOTE — H&P (VIEW-ONLY)
Chief Complaint:  Mass    Primary Care Provider: Shelly Cash MD    Referring Provider: Deniz Dowd MD    History of Present Illness  Lilian Pelaez is a 47 y.o. female referred by Deniz Dowd MD after a colonoscopy was done on 4/4/2023 and a mass was identified at the descending colon.  The mass was biopsied and pathology showed adenocarcinoma, moderately to poorly differentiated.  Colon was tattooed just distal to the location of the mass.  The adequacy of the prep was not sufficient so the patient had a follow-up colonoscopy on 4/12/2023 and no additional concerning findings were identified.  CT chest and CT abdomen pelvis were done on 4/12/2023 and there was no evidence of metastatic disease in the chest, abdomen, or pelvis.  Patient was seen by Dr. Marroquin with medical oncology on 4/13/23.  Patient is scheduled to follow-up with Dr. Marroquin sometime after surgery to discuss final pathological strange and additional treatment that may be necessary.  Surgical history includes a tubal ligation.  Some type of complication occurred that the patient could not describe very well but resulted in the patient having a laparotomy shortly thereafter.  This was many years ago.  Patient has also had laparoscopic gallbladder removal.    Allergies: Hydromorphone, Morphine, Oxybutynin, Oxycodone-acetaminophen, Promethazine, Acetaminophen, Oxycodone, and Penicillins    Outpatient Medications Marked as Taking for the 4/17/23 encounter (Office Visit) with Rolando Liu MD   Medication Sig Dispense Refill   • allopurinol (ZYLOPRIM) 100 MG tablet allopurinol 100 mg tablet   Take 1 tablet every day by oral route for 90 days.     • ALPRAZolam (XANAX) 0.5 MG tablet alprazolam 0.5 mg tablet   Take 1 tablet twice a day by oral route for 30 days.     • amLODIPine (NORVASC) 10 MG tablet amlodipine 10 mg tablet   TAKE ONE TABLET BY MOUTH DAILY     • docusate sodium 100 MG capsule docusate sodium 100 mg capsule   TAKE  ONE CAPSULE BY MOUTH DAILY     • estradiol cypionate (DEPO-ESTRADIOL) 5 MG/ML injection Inject  into the appropriate muscle as directed by prescriber.     • furosemide (LASIX) 20 MG tablet Take 1 tablet by mouth.     • hydroCHLOROthiazide (HYDRODIURIL) 25 MG tablet hydrochlorothiazide 25 mg tablet   Take 1 tablet every day by oral route for 90 days.     • HYDROcodone-acetaminophen (NORCO) 7.5-325 MG per tablet hydrocodone 7.5 mg-acetaminophen 325 mg tablet   Take by oral route for 30 days.     • hyoscyamine (ANASPAZ,LEVSIN) 0.125 MG tablet Take 1 tablet by mouth 4 (Four) Times a Day With Meals & at Bedtime. 120 tablet 3   • ketorolac (TORADOL) 10 MG tablet ketorolac 10 mg tablet   TAKE ONE TABLET BY MOUTH EVERY 6 HOURS     • lamoTRIgine (LaMICtal) 25 MG tablet lamotrigine 25 mg tablet     • linaclotide (Linzess) 72 MCG capsule capsule Take 1 capsule by mouth Every Morning Before Breakfast for 90 days. 90 capsule 1   • losartan (COZAAR) 100 MG tablet losartan 100 mg tablet   TAKE ONE TABLET BY MOUTH DAILY     • medroxyPROGESTERone (DEPO-PROVERA) 150 MG/ML injection medroxyprogesterone 150 mg/mL intramuscular suspension   Inject 1 mL every 3 months by intramuscular route.     • medroxyPROGESTERone (DEPO-PROVERA) 150 MG/ML injection 1 mL.     • metoclopramide (REGLAN) 10 MG tablet metoclopramide 10 mg tablet   Take by oral route for 30 days.     • metoprolol succinate XL (TOPROL-XL) 25 MG 24 hr tablet metoprolol succinate ER 25 mg tablet,extended release 24 hr   Take 1 tablet every day by oral route for 90 days.     • Mirabegron ER (MYRBETRIQ) 50 MG tablet sustained-release 24 hour 24 hr tablet Myrbetriq 50 mg tablet,extended release   Take by oral route for 90 days.     • pantoprazole (PROTONIX) 40 MG EC tablet pantoprazole 40 mg tablet,delayed release   Take by oral route for 30 days.     • polyethylene glycol (GaviLyte-G) 236 g solution GaviLyte-G 236 gram-22.74 gram-6.74 gram-5.86 gram oral solution     •  "rOPINIRole (REQUIP) 1 MG tablet      • sertraline (ZOLOFT) 100 MG tablet sertraline 100 mg tablet   Take 1 tablet every day by oral route for 30 days.     • sucralfate (CARAFATE) 1 g tablet sucralfate 1 gram tablet   TAKE ONE TABLET BY MOUTH Two TIMES A DAY     • vitamin D3 125 MCG (5000 UT) capsule capsule Take 2,000 Units by mouth Daily.         Past Medical History:   • Anxiety   • Asthma    Bronchitis   • Colon cancer   • Depression   • Diverticulitis of colon    Scope was done in [facility]   • GERD (gastroesophageal reflux disease)   • High blood pressure   • Kidney stones        Past Surgical History:   • BREAST SURGERY    Removed 8lbs of tissue   • CHOLECYSTECTOMY   • COLONOSCOPY    Procedure: COLONOSCOPY WITH POLYPECTOMY/SNARE/BIOPSIES/TATTOOING DESCENDING COLON MASS;  Surgeon: Deniz Dowd MD;  Location: [facility] ENDOSCOPY;  Service: Gastroenterology;  Laterality: N/A;  INCOMPLETE COLONOSCOPY  POOR BOWEL PREP  COLON POLYP  COLON MASS  DIVERTICULOSIS   • COLONOSCOPY    Procedure: COLONOSCOPY with cold snare;  Surgeon: Deniz Dowd MD;  Location: [facility] ENDOSCOPY;  Service: Gastroenterology;  Laterality: N/A;  colon polyps, diverticulosis, colon mass, hemorrhoids     • FRACTURE SURGERY    Left knee   • KNEE SURGERY   • MASTECTOMY    melonoma   • REDUCTION MAMMAPLASTY    8lbs of tissue was removed   • TONSILLECTOMY   • UPPER GASTROINTESTINAL ENDOSCOPY       Family History:   Family History   Problem Relation Age of Onset   • Colon cancer Neg Hx         Social History:  Social History     Tobacco Use   • Smoking status: Never     Passive exposure: Never   • Smokeless tobacco: Never   Substance Use Topics   • Alcohol use: Never       Objective     Vital Signs:  Resp 16   Ht 172.7 cm (68\")   Wt (!) 139 kg (307 lb)   BMI 46.68 kg/m²   • Constitutional: alert, no acute distress, reliable historian  • HENT:  NCAT, no visible deformities or lesions  • Eyes:  sclerae clear, conjunctivae clear, " EOMI  • Neck:  normal appearance, no masses, trachea midline  • Respiratory:  breathing not labored, respiratory effort appears normal  • Cardiovascular:  heart regular rate  • Abdomen:  nondistended    • Skin and subcutaneous tissue:  no visible concerning rashes or lesions, no jaundice  • Musculoskeletal: moving all extremities symmetrically and purposefully  • Neurologic:  no obvious motor or sensory deficits, normal gait, able to stand without difficulty, cerebellar function without any obvious abnormalities, alert & oriented x 3, speech clear  • Psychiatric:  judgment and insight intact, mood normal, affect appropriate, cooperative      Assessment:  Diagnoses and all orders for this visit:    1. Malignant neoplasm of descending colon (Primary)        Plan:  Robotic left colectomy    Discussion: Indications, options, risks, benefits, and expected outcomes of planned surgery were discussed with the patient and she agrees to proceed.    Rolando Liu MD  04/17/2023    Electronically signed by Rolando Liu MD, 04/16/23, 3:14 PM EDT.

## 2023-04-17 ENCOUNTER — PREP FOR SURGERY (OUTPATIENT)
Dept: OTHER | Facility: HOSPITAL | Age: 48
End: 2023-04-17
Payer: COMMERCIAL

## 2023-04-17 ENCOUNTER — OFFICE VISIT (OUTPATIENT)
Dept: SURGERY | Facility: CLINIC | Age: 48
End: 2023-04-17
Payer: COMMERCIAL

## 2023-04-17 VITALS — RESPIRATION RATE: 16 BRPM | WEIGHT: 293 LBS | BODY MASS INDEX: 44.41 KG/M2 | HEIGHT: 68 IN

## 2023-04-17 DIAGNOSIS — C18.6 MALIGNANT NEOPLASM OF DESCENDING COLON: Primary | ICD-10-CM

## 2023-04-17 PROBLEM — C18.9 COLON CANCER: Status: ACTIVE | Noted: 2023-04-17

## 2023-04-17 PROCEDURE — 99203 OFFICE O/P NEW LOW 30 MIN: CPT | Performed by: SURGERY

## 2023-04-17 PROCEDURE — 1159F MED LIST DOCD IN RCRD: CPT | Performed by: SURGERY

## 2023-04-17 PROCEDURE — 1160F RVW MEDS BY RX/DR IN RCRD: CPT | Performed by: SURGERY

## 2023-04-17 RX ORDER — ALVIMOPAN 12 MG/1
12 CAPSULE ORAL
OUTPATIENT
Start: 2023-04-17 | End: 2023-04-24

## 2023-04-17 RX ORDER — METRONIDAZOLE 500 MG/100ML
500 INJECTION, SOLUTION INTRAVENOUS ONCE
OUTPATIENT
Start: 2023-04-17 | End: 2023-04-17

## 2023-04-17 RX ORDER — CEFAZOLIN SODIUM 2 G/100ML
2 INJECTION, SOLUTION INTRAVENOUS ONCE
OUTPATIENT
Start: 2023-04-17 | End: 2023-04-17

## 2023-04-17 RX ORDER — SODIUM CHLORIDE, SODIUM LACTATE, POTASSIUM CHLORIDE, CALCIUM CHLORIDE 600; 310; 30; 20 MG/100ML; MG/100ML; MG/100ML; MG/100ML
50 INJECTION, SOLUTION INTRAVENOUS CONTINUOUS
OUTPATIENT
Start: 2023-04-17

## 2023-04-17 RX ORDER — ENOXAPARIN SODIUM 100 MG/ML
40 INJECTION SUBCUTANEOUS
OUTPATIENT
Start: 2023-04-17

## 2023-04-26 ENCOUNTER — PRE-ADMISSION TESTING (OUTPATIENT)
Dept: PREADMISSION TESTING | Facility: HOSPITAL | Age: 48
End: 2023-04-26
Payer: COMMERCIAL

## 2023-04-26 VITALS
HEIGHT: 68 IN | RESPIRATION RATE: 18 BRPM | HEART RATE: 77 BPM | TEMPERATURE: 99.2 F | OXYGEN SATURATION: 96 % | WEIGHT: 293 LBS | DIASTOLIC BLOOD PRESSURE: 92 MMHG | BODY MASS INDEX: 44.41 KG/M2 | SYSTOLIC BLOOD PRESSURE: 140 MMHG

## 2023-04-26 DIAGNOSIS — Z01.818 ENCOUNTER FOR PREADMISSION TESTING: Primary | ICD-10-CM

## 2023-04-26 LAB
ABO GROUP BLD: NORMAL
ALBUMIN SERPL-MCNC: 4.4 G/DL (ref 3.5–5.2)
ALBUMIN/GLOB SERPL: 1.3 G/DL
ALP SERPL-CCNC: 92 U/L (ref 39–117)
ALT SERPL W P-5'-P-CCNC: 19 U/L (ref 1–33)
ANION GAP SERPL CALCULATED.3IONS-SCNC: 11.4 MMOL/L (ref 5–15)
AST SERPL-CCNC: 17 U/L (ref 1–32)
BASOPHILS # BLD AUTO: 0.04 10*3/MM3 (ref 0–0.2)
BASOPHILS NFR BLD AUTO: 0.5 % (ref 0–1.5)
BILIRUB SERPL-MCNC: 0.5 MG/DL (ref 0–1.2)
BUN SERPL-MCNC: 16 MG/DL (ref 6–20)
BUN/CREAT SERPL: 21.9 (ref 7–25)
CALCIUM SPEC-SCNC: 9.6 MG/DL (ref 8.6–10.5)
CHLORIDE SERPL-SCNC: 107 MMOL/L (ref 98–107)
CO2 SERPL-SCNC: 20.6 MMOL/L (ref 22–29)
CREAT SERPL-MCNC: 0.73 MG/DL (ref 0.57–1)
DEPRECATED RDW RBC AUTO: 36.2 FL (ref 37–54)
EGFRCR SERPLBLD CKD-EPI 2021: 102.2 ML/MIN/1.73
EOSINOPHIL # BLD AUTO: 0.1 10*3/MM3 (ref 0–0.4)
EOSINOPHIL NFR BLD AUTO: 1.2 % (ref 0.3–6.2)
ERYTHROCYTE [DISTWIDTH] IN BLOOD BY AUTOMATED COUNT: 11.9 % (ref 12.3–15.4)
GLOBULIN UR ELPH-MCNC: 3.4 GM/DL
GLUCOSE SERPL-MCNC: 101 MG/DL (ref 65–99)
HCT VFR BLD AUTO: 43.6 % (ref 34–46.6)
HGB BLD-MCNC: 15.7 G/DL (ref 12–15.9)
IMM GRANULOCYTES # BLD AUTO: 0.03 10*3/MM3 (ref 0–0.05)
IMM GRANULOCYTES NFR BLD AUTO: 0.4 % (ref 0–0.5)
LYMPHOCYTES # BLD AUTO: 2.45 10*3/MM3 (ref 0.7–3.1)
LYMPHOCYTES NFR BLD AUTO: 28.7 % (ref 19.6–45.3)
MCH RBC QN AUTO: 29.8 PG (ref 26.6–33)
MCHC RBC AUTO-ENTMCNC: 36 G/DL (ref 31.5–35.7)
MCV RBC AUTO: 82.9 FL (ref 79–97)
MONOCYTES # BLD AUTO: 0.69 10*3/MM3 (ref 0.1–0.9)
MONOCYTES NFR BLD AUTO: 8.1 % (ref 5–12)
NEUTROPHILS NFR BLD AUTO: 5.22 10*3/MM3 (ref 1.7–7)
NEUTROPHILS NFR BLD AUTO: 61.1 % (ref 42.7–76)
NRBC BLD AUTO-RTO: 0 /100 WBC (ref 0–0.2)
PLATELET # BLD AUTO: 251 10*3/MM3 (ref 140–450)
PMV BLD AUTO: 9.8 FL (ref 6–12)
POTASSIUM SERPL-SCNC: 4.2 MMOL/L (ref 3.5–5.2)
PROT SERPL-MCNC: 7.8 G/DL (ref 6–8.5)
QT INTERVAL: 369 MS
RBC # BLD AUTO: 5.26 10*6/MM3 (ref 3.77–5.28)
RH BLD: POSITIVE
SODIUM SERPL-SCNC: 139 MMOL/L (ref 136–145)
WBC NRBC COR # BLD: 8.53 10*3/MM3 (ref 3.4–10.8)

## 2023-04-26 PROCEDURE — 36415 COLL VENOUS BLD VENIPUNCTURE: CPT

## 2023-04-26 PROCEDURE — 86901 BLOOD TYPING SEROLOGIC RH(D): CPT

## 2023-04-26 PROCEDURE — 85025 COMPLETE CBC W/AUTO DIFF WBC: CPT

## 2023-04-26 PROCEDURE — 86900 BLOOD TYPING SEROLOGIC ABO: CPT

## 2023-04-26 PROCEDURE — 80053 COMPREHEN METABOLIC PANEL: CPT

## 2023-04-26 PROCEDURE — 93005 ELECTROCARDIOGRAM TRACING: CPT

## 2023-04-26 NOTE — DISCHARGE INSTRUCTIONS
IMPORTANT INSTRUCTIONS - PRE-ADMISSION TESTING  DO NOT EAT OR CHEW anything after midnight the night before your procedure.    You may have CLEAR liquids up to 3 hours prior to ARRIVAL time.   Take the following medications the morning of your procedure with JUST A SIP OF WATER:  Metoprolol, Alprazolam, Sertraline, Sucralfate, Pantoprazole, Allopurinol    DO NOT BRING your medications to the hospital with you, UNLESS something has changed since your PRE-Admission Testing appointment.  Hold all vitamins, supplements, and NSAIDS (Non- steroidal anti-inflammatory meds) for one week prior to surgery (you MAY take Tylenol or Acetaminophen).  Make sure you have a ride home and have someone who will stay with you the day of your procedure after you go home.  If you have any questions, please call your Pre-Admission Testing Nurse, Doris at [phone].   You will receive a call the day before surgery with an arrival time.                Clear Liquid Diet        Find out when you need to start a clear liquid diet.   Think of “clear liquids” as anything you could read a newspaper through. This includes things like water, broth, sports drinks, or tea WITHOUT any kind of milk or cream.           Once you are told to start a clear liquid diet, only drink these things until 2 hours before arrival to the hospital or when the hospital says to stop. Total volume limitation: 8 oz.       Clear liquids you CAN drink:   Water   Clear broth: beef, chicken, vegetable, or bone broth with nothing in it   Gatorade   Lemonade or Remi-aid   Soda   Tea, coffee (NO cream or honey)   Jell-O (without fruit)   Popsicles (without fruit or cream)   Italian ices   Juice without pulp: apple, white, grape   You may use salt, pepper, and sugar    Do NOT drink:   Milk or cream   Soy milk, almond milk, coconut milk, or other non-dairy drinks and   creamers   Milkshakes or smoothies   Tomato juice   Orange juice   Grapefruit juice   Cream soups or any  other than broth         Clear Liquid Diet:  Do NOT eat any solid food.  Do NOT eat or suck on mints or candy.  Do NOT chew gum.  Do NOT drink thick liquids like milk or juice with pulp in it.  Do NOT add milk, cream, or anything like soy milk or almond milk to coffee or tea.

## 2023-04-28 ENCOUNTER — TELEPHONE (OUTPATIENT)
Dept: GASTROENTEROLOGY | Facility: CLINIC | Age: 48
End: 2023-04-28
Payer: COMMERCIAL

## 2023-04-28 NOTE — TELEPHONE ENCOUNTER
Called pt to follow up on overdue results, no answer but left detailed message okay per vr for pt to call office.

## 2023-05-01 ENCOUNTER — ANESTHESIA EVENT (OUTPATIENT)
Dept: PERIOP | Facility: HOSPITAL | Age: 48
DRG: 331 | End: 2023-05-01
Payer: COMMERCIAL

## 2023-05-02 ENCOUNTER — TELEPHONE (OUTPATIENT)
Dept: SURGERY | Facility: CLINIC | Age: 48
End: 2023-05-02
Payer: COMMERCIAL

## 2023-05-02 NOTE — TELEPHONE ENCOUNTER
Okay, I put in a modified case request order.  (Attached to Liu's orders) are you able to call OR scheduling so that they can make sure I am on the board and she gets on my schedule?  I put the procedure as cystoscopy, left ureteral injection, possible stent insertion.    Thank you

## 2023-05-02 NOTE — TELEPHONE ENCOUNTER
Dr. Liu needs ICG L ureter for upcoming Robotic L Colectomy on 5-5-23. This is scheduled as a first case. Thank you.

## 2023-05-03 NOTE — TELEPHONE ENCOUNTER
I called and spoke to Arcelia in surgery scheduling, she said she can see where Dr. Gallegos is going to do her surgery first.

## 2023-05-05 ENCOUNTER — ANESTHESIA (OUTPATIENT)
Dept: PERIOP | Facility: HOSPITAL | Age: 48
DRG: 331 | End: 2023-05-05
Payer: COMMERCIAL

## 2023-05-05 ENCOUNTER — HOSPITAL ENCOUNTER (INPATIENT)
Facility: HOSPITAL | Age: 48
LOS: 4 days | Discharge: HOME | DRG: 331 | End: 2023-05-09
Attending: SURGERY | Admitting: SURGERY
Payer: COMMERCIAL

## 2023-05-05 DIAGNOSIS — M25.50 CHRONIC JOINT PAIN: ICD-10-CM

## 2023-05-05 DIAGNOSIS — R26.2 DIFFICULTY WALKING: ICD-10-CM

## 2023-05-05 DIAGNOSIS — M50.30 DDD (DEGENERATIVE DISC DISEASE), CERVICAL: ICD-10-CM

## 2023-05-05 DIAGNOSIS — M51.26 DISPLACEMENT OF LUMBAR INTERVERTEBRAL DISC WITHOUT MYELOPATHY: ICD-10-CM

## 2023-05-05 DIAGNOSIS — G89.29 CHRONIC JOINT PAIN: ICD-10-CM

## 2023-05-05 DIAGNOSIS — E66.01 MORBID OBESITY: ICD-10-CM

## 2023-05-05 DIAGNOSIS — Z90.49 S/P LEFT COLECTOMY: Primary | ICD-10-CM

## 2023-05-05 DIAGNOSIS — C18.6 MALIGNANT NEOPLASM OF DESCENDING COLON: ICD-10-CM

## 2023-05-05 DIAGNOSIS — Z78.9 DECREASED ACTIVITIES OF DAILY LIVING (ADL): ICD-10-CM

## 2023-05-05 PROBLEM — C18.9 COLON CANCER: Status: RESOLVED | Noted: 2023-04-17 | Resolved: 2023-05-05

## 2023-05-05 LAB
ABO GROUP BLD: NORMAL
B-HCG UR QL: NEGATIVE
BLD GP AB SCN SERPL QL: NEGATIVE
RH BLD: POSITIVE
T&S EXPIRATION DATE: NORMAL

## 2023-05-05 PROCEDURE — 25010000002 CEFAZOLIN IN DEXTROSE 2-4 GM/100ML-% SOLUTION: Performed by: SURGERY

## 2023-05-05 PROCEDURE — 25010000002 MIDAZOLAM PER 1MG: Performed by: ANESTHESIOLOGY

## 2023-05-05 PROCEDURE — 25010000002 ONDANSETRON PER 1 MG: Performed by: NURSE ANESTHETIST, CERTIFIED REGISTERED

## 2023-05-05 PROCEDURE — 86850 RBC ANTIBODY SCREEN: CPT | Performed by: SURGERY

## 2023-05-05 PROCEDURE — 25010000002 FENTANYL CITRATE (PF) 50 MCG/ML SOLUTION: Performed by: NURSE ANESTHETIST, CERTIFIED REGISTERED

## 2023-05-05 PROCEDURE — 25010000002 KETOROLAC TROMETHAMINE PER 15 MG: Performed by: ANESTHESIOLOGY

## 2023-05-05 PROCEDURE — 88309 TISSUE EXAM BY PATHOLOGIST: CPT | Performed by: SURGERY

## 2023-05-05 PROCEDURE — 25010000002 PROPOFOL 10 MG/ML EMULSION: Performed by: NURSE ANESTHETIST, CERTIFIED REGISTERED

## 2023-05-05 PROCEDURE — 44204 LAPARO PARTIAL COLECTOMY: CPT | Performed by: SPECIALIST/TECHNOLOGIST, OTHER

## 2023-05-05 PROCEDURE — 86900 BLOOD TYPING SEROLOGIC ABO: CPT | Performed by: SURGERY

## 2023-05-05 PROCEDURE — 25010000002 SUGAMMADEX 200 MG/2ML SOLUTION: Performed by: NURSE ANESTHETIST, CERTIFIED REGISTERED

## 2023-05-05 PROCEDURE — 86901 BLOOD TYPING SEROLOGIC RH(D): CPT | Performed by: SURGERY

## 2023-05-05 PROCEDURE — 81025 URINE PREGNANCY TEST: CPT | Performed by: ANESTHESIOLOGY

## 2023-05-05 PROCEDURE — 25010000002 DEXAMETHASONE PER 1 MG: Performed by: NURSE ANESTHETIST, CERTIFIED REGISTERED

## 2023-05-05 PROCEDURE — 25010000002 FENTANYL CITRATE (PF) 50 MCG/ML SOLUTION: Performed by: ANESTHESIOLOGY

## 2023-05-05 PROCEDURE — C1758 CATHETER, URETERAL: HCPCS | Performed by: SURGERY

## 2023-05-05 PROCEDURE — 44213 LAP MOBIL SPLENIC FL ADD-ON: CPT | Performed by: SPECIALIST/TECHNOLOGIST, OTHER

## 2023-05-05 PROCEDURE — 0DTM0ZZ RESECTION OF DESCENDING COLON, OPEN APPROACH: ICD-10-PCS | Performed by: SURGERY

## 2023-05-05 PROCEDURE — 25010000002 KETOROLAC TROMETHAMINE PER 15 MG: Performed by: NURSE PRACTITIONER

## 2023-05-05 PROCEDURE — 25010000002 CEFOXITIN PER 1 G: Performed by: SURGERY

## 2023-05-05 PROCEDURE — 25010000002 INDOCYANINE GREEN 25 MG RECONSTITUTED SOLUTION: Performed by: UROLOGY

## 2023-05-05 PROCEDURE — 0DBL0ZZ EXCISION OF TRANSVERSE COLON, OPEN APPROACH: ICD-10-PCS | Performed by: SURGERY

## 2023-05-05 PROCEDURE — 44213 LAP MOBIL SPLENIC FL ADD-ON: CPT | Performed by: SURGERY

## 2023-05-05 PROCEDURE — S2900 ROBOTIC SURGICAL SYSTEM: HCPCS | Performed by: SPECIALIST/TECHNOLOGIST, OTHER

## 2023-05-05 PROCEDURE — S2900 ROBOTIC SURGICAL SYSTEM: HCPCS | Performed by: SURGERY

## 2023-05-05 PROCEDURE — 44204 LAPARO PARTIAL COLECTOMY: CPT | Performed by: SURGERY

## 2023-05-05 PROCEDURE — 52005 CYSTO W/URTRL CATHJ: CPT | Performed by: UROLOGY

## 2023-05-05 PROCEDURE — 0T778DZ DILATION OF LEFT URETER WITH INTRALUMINAL DEVICE, VIA NATURAL OR ARTIFICIAL OPENING ENDOSCOPIC: ICD-10-PCS | Performed by: UROLOGY

## 2023-05-05 PROCEDURE — 0 MEPERIDINE PER 100 MG: Performed by: NURSE ANESTHETIST, CERTIFIED REGISTERED

## 2023-05-05 PROCEDURE — 25010000002 ENOXAPARIN PER 10 MG: Performed by: SURGERY

## 2023-05-05 DEVICE — STAPLER 60 RELOAD BLUE
Type: IMPLANTABLE DEVICE | Site: ABDOMEN | Status: FUNCTIONAL
Brand: SUREFORM

## 2023-05-05 DEVICE — ABSORBABLE WOUND CLOSURE DEVICE
Type: IMPLANTABLE DEVICE | Site: ABDOMEN | Status: FUNCTIONAL
Brand: V-LOC 180

## 2023-05-05 RX ORDER — NALOXONE HCL 0.4 MG/ML
0.4 VIAL (ML) INJECTION
Status: CANCELLED | OUTPATIENT
Start: 2023-05-05

## 2023-05-05 RX ORDER — AMLODIPINE BESYLATE 5 MG/1
10 TABLET ORAL NIGHTLY
Status: DISCONTINUED | OUTPATIENT
Start: 2023-05-05 | End: 2023-05-09 | Stop reason: HOSPADM

## 2023-05-05 RX ORDER — PROPOFOL 10 MG/ML
VIAL (ML) INTRAVENOUS AS NEEDED
Status: DISCONTINUED | OUTPATIENT
Start: 2023-05-05 | End: 2023-05-05 | Stop reason: SURG

## 2023-05-05 RX ORDER — ONDANSETRON 4 MG/1
4 TABLET, FILM COATED ORAL EVERY 6 HOURS PRN
Status: DISCONTINUED | OUTPATIENT
Start: 2023-05-05 | End: 2023-05-09 | Stop reason: HOSPADM

## 2023-05-05 RX ORDER — KETOROLAC TROMETHAMINE 30 MG/ML
30 INJECTION, SOLUTION INTRAMUSCULAR; INTRAVENOUS ONCE
Status: COMPLETED | OUTPATIENT
Start: 2023-05-05 | End: 2023-05-05

## 2023-05-05 RX ORDER — ONDANSETRON 2 MG/ML
4 INJECTION INTRAMUSCULAR; INTRAVENOUS ONCE AS NEEDED
Status: DISCONTINUED | OUTPATIENT
Start: 2023-05-05 | End: 2023-05-05

## 2023-05-05 RX ORDER — MAGNESIUM HYDROXIDE 1200 MG/15ML
LIQUID ORAL AS NEEDED
Status: DISCONTINUED | OUTPATIENT
Start: 2023-05-05 | End: 2023-05-05 | Stop reason: HOSPADM

## 2023-05-05 RX ORDER — ALVIMOPAN 12 MG/1
12 CAPSULE ORAL
Status: COMPLETED | OUTPATIENT
Start: 2023-05-05 | End: 2023-05-05

## 2023-05-05 RX ORDER — ROPINIROLE 1 MG/1
1 TABLET, FILM COATED ORAL NIGHTLY
Status: DISCONTINUED | OUTPATIENT
Start: 2023-05-05 | End: 2023-05-09 | Stop reason: HOSPADM

## 2023-05-05 RX ORDER — PROCHLORPERAZINE EDISYLATE 5 MG/ML
5 INJECTION INTRAMUSCULAR; INTRAVENOUS EVERY 6 HOURS PRN
Status: DISCONTINUED | OUTPATIENT
Start: 2023-05-05 | End: 2023-05-05 | Stop reason: SDUPTHER

## 2023-05-05 RX ORDER — BUPIVACAINE HYDROCHLORIDE 2.5 MG/ML
INJECTION, SOLUTION EPIDURAL; INFILTRATION; INTRACAUDAL AS NEEDED
Status: DISCONTINUED | OUTPATIENT
Start: 2023-05-05 | End: 2023-05-05 | Stop reason: HOSPADM

## 2023-05-05 RX ORDER — METRONIDAZOLE 500 MG/100ML
500 INJECTION, SOLUTION INTRAVENOUS ONCE
Status: COMPLETED | OUTPATIENT
Start: 2023-05-05 | End: 2023-05-05

## 2023-05-05 RX ORDER — HYDROCODONE BITARTRATE AND ACETAMINOPHEN 5; 325 MG/1; MG/1
1 TABLET ORAL ONCE AS NEEDED
Status: COMPLETED | OUTPATIENT
Start: 2023-05-05 | End: 2023-05-05

## 2023-05-05 RX ORDER — ONDANSETRON 2 MG/ML
INJECTION INTRAMUSCULAR; INTRAVENOUS AS NEEDED
Status: DISCONTINUED | OUTPATIENT
Start: 2023-05-05 | End: 2023-05-05 | Stop reason: SURG

## 2023-05-05 RX ORDER — PANTOPRAZOLE SODIUM 40 MG/1
40 TABLET, DELAYED RELEASE ORAL 2 TIMES DAILY PRN
Status: DISCONTINUED | OUTPATIENT
Start: 2023-05-05 | End: 2023-05-08

## 2023-05-05 RX ORDER — DIPHENHYDRAMINE HCL 25 MG
25 CAPSULE ORAL EVERY 8 HOURS PRN
Status: DISCONTINUED | OUTPATIENT
Start: 2023-05-05 | End: 2023-05-09 | Stop reason: HOSPADM

## 2023-05-05 RX ORDER — FENTANYL CITRATE 50 UG/ML
INJECTION, SOLUTION INTRAMUSCULAR; INTRAVENOUS AS NEEDED
Status: DISCONTINUED | OUTPATIENT
Start: 2023-05-05 | End: 2023-05-05 | Stop reason: SURG

## 2023-05-05 RX ORDER — ENOXAPARIN SODIUM 100 MG/ML
40 INJECTION SUBCUTANEOUS
Status: COMPLETED | OUTPATIENT
Start: 2023-05-05 | End: 2023-05-05

## 2023-05-05 RX ORDER — KETOROLAC TROMETHAMINE 30 MG/ML
15 INJECTION, SOLUTION INTRAMUSCULAR; INTRAVENOUS EVERY 6 HOURS PRN
Status: DISCONTINUED | OUTPATIENT
Start: 2023-05-05 | End: 2023-05-08

## 2023-05-05 RX ORDER — ENOXAPARIN SODIUM 100 MG/ML
40 INJECTION SUBCUTANEOUS DAILY
Status: DISCONTINUED | OUTPATIENT
Start: 2023-05-06 | End: 2023-05-09 | Stop reason: HOSPADM

## 2023-05-05 RX ORDER — MIRABEGRON 25 MG/1
25 TABLET, FILM COATED, EXTENDED RELEASE ORAL EVERY EVENING
COMMUNITY
Start: 2023-04-26

## 2023-05-05 RX ORDER — ENOXAPARIN SODIUM 100 MG/ML
40 INJECTION SUBCUTANEOUS EVERY 24 HOURS
Status: DISCONTINUED | OUTPATIENT
Start: 2023-05-05 | End: 2023-05-05 | Stop reason: SDUPTHER

## 2023-05-05 RX ORDER — FENTANYL CITRATE 50 UG/ML
25 INJECTION, SOLUTION INTRAMUSCULAR; INTRAVENOUS EVERY 4 HOURS PRN
Status: DISCONTINUED | OUTPATIENT
Start: 2023-05-05 | End: 2023-05-05

## 2023-05-05 RX ORDER — PHENYLEPHRINE HCL IN 0.9% NACL 1 MG/10 ML
SYRINGE (ML) INTRAVENOUS AS NEEDED
Status: DISCONTINUED | OUTPATIENT
Start: 2023-05-05 | End: 2023-05-05 | Stop reason: SURG

## 2023-05-05 RX ORDER — LIDOCAINE HYDROCHLORIDE 20 MG/ML
INJECTION, SOLUTION EPIDURAL; INFILTRATION; INTRACAUDAL; PERINEURAL AS NEEDED
Status: DISCONTINUED | OUTPATIENT
Start: 2023-05-05 | End: 2023-05-05 | Stop reason: SURG

## 2023-05-05 RX ORDER — ACETAMINOPHEN 500 MG
500 TABLET ORAL EVERY 4 HOURS PRN
Status: DISCONTINUED | OUTPATIENT
Start: 2023-05-05 | End: 2023-05-09 | Stop reason: HOSPADM

## 2023-05-05 RX ORDER — ESMOLOL HYDROCHLORIDE 10 MG/ML
INJECTION INTRAVENOUS AS NEEDED
Status: DISCONTINUED | OUTPATIENT
Start: 2023-05-05 | End: 2023-05-05 | Stop reason: SURG

## 2023-05-05 RX ORDER — ONDANSETRON 2 MG/ML
4 INJECTION INTRAMUSCULAR; INTRAVENOUS EVERY 6 HOURS PRN
Status: DISCONTINUED | OUTPATIENT
Start: 2023-05-05 | End: 2023-05-09 | Stop reason: HOSPADM

## 2023-05-05 RX ORDER — INDOCYANINE GREEN AND WATER 25 MG
KIT INJECTION AS NEEDED
Status: DISCONTINUED | OUTPATIENT
Start: 2023-05-05 | End: 2023-05-05 | Stop reason: HOSPADM

## 2023-05-05 RX ORDER — METRONIDAZOLE 500 MG/100ML
500 INJECTION, SOLUTION INTRAVENOUS EVERY 8 HOURS
Status: DISCONTINUED | OUTPATIENT
Start: 2023-05-05 | End: 2023-05-06

## 2023-05-05 RX ORDER — HYDROCODONE BITARTRATE AND ACETAMINOPHEN 7.5; 325 MG/1; MG/1
1 TABLET ORAL EVERY 4 HOURS PRN
Status: DISCONTINUED | OUTPATIENT
Start: 2023-05-05 | End: 2023-05-05 | Stop reason: SDUPTHER

## 2023-05-05 RX ORDER — HYDROCODONE BITARTRATE AND ACETAMINOPHEN 7.5; 325 MG/1; MG/1
1 TABLET ORAL EVERY 4 HOURS PRN
Status: DISCONTINUED | OUTPATIENT
Start: 2023-05-05 | End: 2023-05-09 | Stop reason: HOSPADM

## 2023-05-05 RX ORDER — FENTANYL CITRATE 50 UG/ML
50 INJECTION, SOLUTION INTRAMUSCULAR; INTRAVENOUS
Status: DISCONTINUED | OUTPATIENT
Start: 2023-05-05 | End: 2023-05-05

## 2023-05-05 RX ORDER — PROCHLORPERAZINE EDISYLATE 5 MG/ML
5 INJECTION INTRAMUSCULAR; INTRAVENOUS EVERY 6 HOURS PRN
Status: DISCONTINUED | OUTPATIENT
Start: 2023-05-05 | End: 2023-05-09 | Stop reason: HOSPADM

## 2023-05-05 RX ORDER — PROMETHAZINE HYDROCHLORIDE 12.5 MG/1
25 TABLET ORAL ONCE AS NEEDED
Status: DISCONTINUED | OUTPATIENT
Start: 2023-05-05 | End: 2023-05-05

## 2023-05-05 RX ORDER — SODIUM CHLORIDE, SODIUM LACTATE, POTASSIUM CHLORIDE, CALCIUM CHLORIDE 600; 310; 30; 20 MG/100ML; MG/100ML; MG/100ML; MG/100ML
50 INJECTION, SOLUTION INTRAVENOUS CONTINUOUS
Status: DISCONTINUED | OUTPATIENT
Start: 2023-05-05 | End: 2023-05-08

## 2023-05-05 RX ORDER — FENTANYL CITRATE 50 UG/ML
25 INJECTION, SOLUTION INTRAMUSCULAR; INTRAVENOUS
Status: DISCONTINUED | OUTPATIENT
Start: 2023-05-05 | End: 2023-05-05 | Stop reason: SDUPTHER

## 2023-05-05 RX ORDER — CEFAZOLIN SODIUM 2 G/100ML
2 INJECTION, SOLUTION INTRAVENOUS ONCE
Status: COMPLETED | OUTPATIENT
Start: 2023-05-05 | End: 2023-05-05

## 2023-05-05 RX ORDER — SODIUM CHLORIDE 9 MG/ML
INJECTION, SOLUTION INTRAVENOUS CONTINUOUS PRN
Status: DISCONTINUED | OUTPATIENT
Start: 2023-05-05 | End: 2023-05-05 | Stop reason: SURG

## 2023-05-05 RX ORDER — ALBUTEROL SULFATE 2.5 MG/3ML
SOLUTION RESPIRATORY (INHALATION) AS NEEDED
Status: DISCONTINUED | OUTPATIENT
Start: 2023-05-05 | End: 2023-05-05 | Stop reason: SURG

## 2023-05-05 RX ORDER — SODIUM CHLORIDE, SODIUM LACTATE, POTASSIUM CHLORIDE, CALCIUM CHLORIDE 600; 310; 30; 20 MG/100ML; MG/100ML; MG/100ML; MG/100ML
50 INJECTION, SOLUTION INTRAVENOUS CONTINUOUS
Status: DISCONTINUED | OUTPATIENT
Start: 2023-05-05 | End: 2023-05-05

## 2023-05-05 RX ORDER — SODIUM CHLORIDE, SODIUM LACTATE, POTASSIUM CHLORIDE, CALCIUM CHLORIDE 600; 310; 30; 20 MG/100ML; MG/100ML; MG/100ML; MG/100ML
9 INJECTION, SOLUTION INTRAVENOUS CONTINUOUS PRN
Status: DISCONTINUED | OUTPATIENT
Start: 2023-05-05 | End: 2023-05-05

## 2023-05-05 RX ORDER — DEXMEDETOMIDINE HYDROCHLORIDE 100 UG/ML
INJECTION, SOLUTION INTRAVENOUS AS NEEDED
Status: DISCONTINUED | OUTPATIENT
Start: 2023-05-05 | End: 2023-05-05 | Stop reason: SURG

## 2023-05-05 RX ORDER — PROMETHAZINE HYDROCHLORIDE 25 MG/1
25 SUPPOSITORY RECTAL ONCE AS NEEDED
Status: DISCONTINUED | OUTPATIENT
Start: 2023-05-05 | End: 2023-05-05

## 2023-05-05 RX ORDER — MIDAZOLAM HYDROCHLORIDE 2 MG/2ML
2 INJECTION, SOLUTION INTRAMUSCULAR; INTRAVENOUS ONCE
Status: COMPLETED | OUTPATIENT
Start: 2023-05-05 | End: 2023-05-05

## 2023-05-05 RX ORDER — PANTOPRAZOLE SODIUM 40 MG/10ML
40 INJECTION, POWDER, LYOPHILIZED, FOR SOLUTION INTRAVENOUS
Status: DISCONTINUED | OUTPATIENT
Start: 2023-05-06 | End: 2023-05-09 | Stop reason: HOSPADM

## 2023-05-05 RX ORDER — ONDANSETRON 2 MG/ML
4 INJECTION INTRAMUSCULAR; INTRAVENOUS EVERY 6 HOURS PRN
Status: DISCONTINUED | OUTPATIENT
Start: 2023-05-05 | End: 2023-05-05 | Stop reason: SDUPTHER

## 2023-05-05 RX ORDER — ROCURONIUM BROMIDE 10 MG/ML
INJECTION, SOLUTION INTRAVENOUS AS NEEDED
Status: DISCONTINUED | OUTPATIENT
Start: 2023-05-05 | End: 2023-05-05 | Stop reason: SURG

## 2023-05-05 RX ORDER — KETOROLAC TROMETHAMINE 15 MG/ML
15 INJECTION, SOLUTION INTRAMUSCULAR; INTRAVENOUS EVERY 6 HOURS
Status: DISPENSED | OUTPATIENT
Start: 2023-05-05 | End: 2023-05-07

## 2023-05-05 RX ORDER — DEXAMETHASONE SODIUM PHOSPHATE 4 MG/ML
INJECTION, SOLUTION INTRA-ARTICULAR; INTRALESIONAL; INTRAMUSCULAR; INTRAVENOUS; SOFT TISSUE AS NEEDED
Status: DISCONTINUED | OUTPATIENT
Start: 2023-05-05 | End: 2023-05-05 | Stop reason: SURG

## 2023-05-05 RX ORDER — SODIUM CHLORIDE, SODIUM LACTATE, POTASSIUM CHLORIDE, CALCIUM CHLORIDE 600; 310; 30; 20 MG/100ML; MG/100ML; MG/100ML; MG/100ML
100 INJECTION, SOLUTION INTRAVENOUS CONTINUOUS
Status: DISCONTINUED | OUTPATIENT
Start: 2023-05-05 | End: 2023-05-05 | Stop reason: SDUPTHER

## 2023-05-05 RX ORDER — SUCCINYLCHOLINE/SOD CL,ISO/PF 100 MG/5ML
SYRINGE (ML) INTRAVENOUS AS NEEDED
Status: DISCONTINUED | OUTPATIENT
Start: 2023-05-05 | End: 2023-05-05 | Stop reason: SURG

## 2023-05-05 RX ORDER — MEPERIDINE HYDROCHLORIDE 25 MG/ML
12.5 INJECTION INTRAMUSCULAR; INTRAVENOUS; SUBCUTANEOUS
Status: DISCONTINUED | OUTPATIENT
Start: 2023-05-05 | End: 2023-05-05

## 2023-05-05 RX ORDER — HYOSCYAMINE SULFATE 0.125 MG
0.12 TABLET ORAL 4 TIMES DAILY
COMMUNITY

## 2023-05-05 RX ORDER — METOPROLOL TARTRATE 5 MG/5ML
INJECTION INTRAVENOUS AS NEEDED
Status: DISCONTINUED | OUTPATIENT
Start: 2023-05-05 | End: 2023-05-05 | Stop reason: SURG

## 2023-05-05 RX ADMIN — DEXMEDETOMIDINE HYDROCHLORIDE 10 MCG: 100 INJECTION, SOLUTION, CONCENTRATE INTRAVENOUS at 12:19

## 2023-05-05 RX ADMIN — ONDANSETRON 4 MG: 2 INJECTION INTRAMUSCULAR; INTRAVENOUS at 14:47

## 2023-05-05 RX ADMIN — SODIUM CHLORIDE 2 G: 900 INJECTION INTRAVENOUS at 21:12

## 2023-05-05 RX ADMIN — ROCURONIUM BROMIDE 30 MG: 10 INJECTION, SOLUTION INTRAVENOUS at 09:46

## 2023-05-05 RX ADMIN — FENTANYL CITRATE 25 MCG: 50 INJECTION, SOLUTION INTRAMUSCULAR; INTRAVENOUS at 10:47

## 2023-05-05 RX ADMIN — ROCURONIUM BROMIDE 30 MG: 10 INJECTION, SOLUTION INTRAVENOUS at 10:45

## 2023-05-05 RX ADMIN — ESMOLOL HYDROCHLORIDE 10 MG: 10 INJECTION INTRAVENOUS at 09:53

## 2023-05-05 RX ADMIN — FENTANYL CITRATE 50 MCG: 50 INJECTION, SOLUTION INTRAMUSCULAR; INTRAVENOUS at 16:47

## 2023-05-05 RX ADMIN — ROPINIROLE HYDROCHLORIDE 1 MG: 1 TABLET, FILM COATED ORAL at 21:28

## 2023-05-05 RX ADMIN — CEFAZOLIN SODIUM 2 G: 2 INJECTION, SOLUTION INTRAVENOUS at 07:45

## 2023-05-05 RX ADMIN — FENTANYL CITRATE 50 MCG: 50 INJECTION, SOLUTION INTRAMUSCULAR; INTRAVENOUS at 07:52

## 2023-05-05 RX ADMIN — METRONIDAZOLE 500 MG: 500 INJECTION, SOLUTION INTRAVENOUS at 07:46

## 2023-05-05 RX ADMIN — FENTANYL CITRATE 50 MCG: 50 INJECTION, SOLUTION INTRAMUSCULAR; INTRAVENOUS at 07:50

## 2023-05-05 RX ADMIN — SODIUM CHLORIDE, POTASSIUM CHLORIDE, SODIUM LACTATE AND CALCIUM CHLORIDE 100 ML/HR: 600; 310; 30; 20 INJECTION, SOLUTION INTRAVENOUS at 23:41

## 2023-05-05 RX ADMIN — FENTANYL CITRATE 50 MCG: 50 INJECTION, SOLUTION INTRAMUSCULAR; INTRAVENOUS at 09:57

## 2023-05-05 RX ADMIN — ROCURONIUM BROMIDE 30 MG: 10 INJECTION, SOLUTION INTRAVENOUS at 13:28

## 2023-05-05 RX ADMIN — ONDANSETRON 4 MG: 2 INJECTION INTRAMUSCULAR; INTRAVENOUS at 11:01

## 2023-05-05 RX ADMIN — DEXMEDETOMIDINE HYDROCHLORIDE 10 MCG: 100 INJECTION, SOLUTION, CONCENTRATE INTRAVENOUS at 14:49

## 2023-05-05 RX ADMIN — ROCURONIUM BROMIDE 20 MG: 10 INJECTION, SOLUTION INTRAVENOUS at 08:48

## 2023-05-05 RX ADMIN — SODIUM CHLORIDE, POTASSIUM CHLORIDE, SODIUM LACTATE AND CALCIUM CHLORIDE 100 ML/HR: 600; 310; 30; 20 INJECTION, SOLUTION INTRAVENOUS at 18:31

## 2023-05-05 RX ADMIN — Medication 100 MG: at 07:52

## 2023-05-05 RX ADMIN — Medication 100 MCG: at 08:15

## 2023-05-05 RX ADMIN — Medication 200 MCG: at 08:20

## 2023-05-05 RX ADMIN — DEXMEDETOMIDINE HYDROCHLORIDE 10 MCG: 100 INJECTION, SOLUTION, CONCENTRATE INTRAVENOUS at 14:25

## 2023-05-05 RX ADMIN — DEXAMETHASONE SODIUM PHOSPHATE 8 MG: 4 INJECTION, SOLUTION INTRA-ARTICULAR; INTRALESIONAL; INTRAMUSCULAR; INTRAVENOUS; SOFT TISSUE at 08:26

## 2023-05-05 RX ADMIN — Medication 100 MCG: at 09:00

## 2023-05-05 RX ADMIN — ALBUTEROL SULFATE 2.5 MG: 2.5 SOLUTION RESPIRATORY (INHALATION) at 08:35

## 2023-05-05 RX ADMIN — FENTANYL CITRATE 50 MCG: 50 INJECTION, SOLUTION INTRAMUSCULAR; INTRAVENOUS at 12:06

## 2023-05-05 RX ADMIN — Medication 200 MCG: at 08:05

## 2023-05-05 RX ADMIN — PROPOFOL 250 MG: 10 INJECTION, EMULSION INTRAVENOUS at 07:52

## 2023-05-05 RX ADMIN — FENTANYL CITRATE 25 MCG: 50 INJECTION, SOLUTION INTRAMUSCULAR; INTRAVENOUS at 15:21

## 2023-05-05 RX ADMIN — MIDAZOLAM HYDROCHLORIDE 2 MG: 1 INJECTION, SOLUTION INTRAMUSCULAR; INTRAVENOUS at 07:15

## 2023-05-05 RX ADMIN — FENTANYL CITRATE 50 MCG: 50 INJECTION, SOLUTION INTRAMUSCULAR; INTRAVENOUS at 16:19

## 2023-05-05 RX ADMIN — METOPROLOL TARTRATE 1 MG: 5 INJECTION INTRAVENOUS at 10:37

## 2023-05-05 RX ADMIN — Medication 200 MCG: at 08:00

## 2023-05-05 RX ADMIN — DEXMEDETOMIDINE HYDROCHLORIDE 10 MCG: 100 INJECTION, SOLUTION, CONCENTRATE INTRAVENOUS at 13:35

## 2023-05-05 RX ADMIN — SODIUM CHLORIDE, POTASSIUM CHLORIDE, SODIUM LACTATE AND CALCIUM CHLORIDE: 600; 310; 30; 20 INJECTION, SOLUTION INTRAVENOUS at 10:40

## 2023-05-05 RX ADMIN — ROCURONIUM BROMIDE 10 MG: 10 INJECTION, SOLUTION INTRAVENOUS at 07:52

## 2023-05-05 RX ADMIN — Medication 100 MCG: at 09:25

## 2023-05-05 RX ADMIN — AMLODIPINE BESYLATE 10 MG: 5 TABLET ORAL at 21:28

## 2023-05-05 RX ADMIN — SODIUM CHLORIDE: 9 INJECTION, SOLUTION INTRAVENOUS at 07:52

## 2023-05-05 RX ADMIN — Medication 200 MCG: at 09:15

## 2023-05-05 RX ADMIN — DEXMEDETOMIDINE HYDROCHLORIDE 10 MCG: 100 INJECTION, SOLUTION, CONCENTRATE INTRAVENOUS at 08:30

## 2023-05-05 RX ADMIN — DEXMEDETOMIDINE HYDROCHLORIDE 10 MCG: 100 INJECTION, SOLUTION, CONCENTRATE INTRAVENOUS at 07:46

## 2023-05-05 RX ADMIN — KETOROLAC TROMETHAMINE 30 MG: 30 INJECTION, SOLUTION INTRAMUSCULAR; INTRAVENOUS at 16:21

## 2023-05-05 RX ADMIN — CEFAZOLIN SODIUM 2 G: 2 INJECTION, SOLUTION INTRAVENOUS at 11:56

## 2023-05-05 RX ADMIN — ESMOLOL HYDROCHLORIDE 10 MG: 10 INJECTION INTRAVENOUS at 10:23

## 2023-05-05 RX ADMIN — ROCURONIUM BROMIDE 20 MG: 10 INJECTION, SOLUTION INTRAVENOUS at 11:56

## 2023-05-05 RX ADMIN — DEXMEDETOMIDINE HYDROCHLORIDE 10 MCG: 100 INJECTION, SOLUTION, CONCENTRATE INTRAVENOUS at 10:31

## 2023-05-05 RX ADMIN — DEXMEDETOMIDINE HYDROCHLORIDE 10 MCG: 100 INJECTION, SOLUTION, CONCENTRATE INTRAVENOUS at 09:00

## 2023-05-05 RX ADMIN — DEXMEDETOMIDINE HYDROCHLORIDE 10 MCG: 100 INJECTION, SOLUTION, CONCENTRATE INTRAVENOUS at 09:55

## 2023-05-05 RX ADMIN — METOPROLOL TARTRATE 1 MG: 5 INJECTION INTRAVENOUS at 10:25

## 2023-05-05 RX ADMIN — ESMOLOL HYDROCHLORIDE 10 MG: 10 INJECTION INTRAVENOUS at 09:57

## 2023-05-05 RX ADMIN — ALVIMOPAN 12 MG: 12 CAPSULE ORAL at 06:41

## 2023-05-05 RX ADMIN — SODIUM CHLORIDE, POTASSIUM CHLORIDE, SODIUM LACTATE AND CALCIUM CHLORIDE: 600; 310; 30; 20 INJECTION, SOLUTION INTRAVENOUS at 15:11

## 2023-05-05 RX ADMIN — ALBUTEROL SULFATE 2.5 MG: 2.5 SOLUTION RESPIRATORY (INHALATION) at 14:56

## 2023-05-05 RX ADMIN — DEXMEDETOMIDINE HYDROCHLORIDE 10 MCG: 100 INJECTION, SOLUTION, CONCENTRATE INTRAVENOUS at 08:55

## 2023-05-05 RX ADMIN — LIDOCAINE HYDROCHLORIDE 100 MG: 20 INJECTION, SOLUTION EPIDURAL; INFILTRATION; INTRACAUDAL; PERINEURAL at 07:51

## 2023-05-05 RX ADMIN — KETOROLAC TROMETHAMINE 15 MG: 15 INJECTION, SOLUTION INTRAMUSCULAR; INTRAVENOUS at 21:28

## 2023-05-05 RX ADMIN — SODIUM CHLORIDE, POTASSIUM CHLORIDE, SODIUM LACTATE AND CALCIUM CHLORIDE 9 ML/HR: 600; 310; 30; 20 INJECTION, SOLUTION INTRAVENOUS at 06:41

## 2023-05-05 RX ADMIN — ROCURONIUM BROMIDE 50 MG: 10 INJECTION, SOLUTION INTRAVENOUS at 07:57

## 2023-05-05 RX ADMIN — Medication 200 MCG: at 10:17

## 2023-05-05 RX ADMIN — ROCURONIUM BROMIDE 30 MG: 10 INJECTION, SOLUTION INTRAVENOUS at 14:33

## 2023-05-05 RX ADMIN — HYDROCODONE BITARTRATE AND ACETAMINOPHEN 1 TABLET: 5; 325 TABLET ORAL at 16:34

## 2023-05-05 RX ADMIN — FENTANYL CITRATE 50 MCG: 50 INJECTION, SOLUTION INTRAMUSCULAR; INTRAVENOUS at 10:23

## 2023-05-05 RX ADMIN — HYDROCODONE BITARTRATE AND ACETAMINOPHEN 1 TABLET: 7.5; 325 TABLET ORAL at 22:24

## 2023-05-05 RX ADMIN — METRONIDAZOLE 500 MG: 5 INJECTION, SOLUTION INTRAVENOUS at 20:17

## 2023-05-05 RX ADMIN — Medication 200 MCG: at 10:15

## 2023-05-05 RX ADMIN — ESMOLOL HYDROCHLORIDE 10 MG: 10 INJECTION INTRAVENOUS at 10:58

## 2023-05-05 RX ADMIN — MEPERIDINE HYDROCHLORIDE 12.5 MG: 25 INJECTION INTRAMUSCULAR; INTRAVENOUS; SUBCUTANEOUS at 15:47

## 2023-05-05 RX ADMIN — ROCURONIUM BROMIDE 10 MG: 10 INJECTION, SOLUTION INTRAVENOUS at 11:25

## 2023-05-05 RX ADMIN — DEXMEDETOMIDINE HYDROCHLORIDE 10 MCG: 100 INJECTION, SOLUTION, CONCENTRATE INTRAVENOUS at 12:15

## 2023-05-05 RX ADMIN — SUGAMMADEX 200 MG: 100 INJECTION, SOLUTION INTRAVENOUS at 15:09

## 2023-05-05 RX ADMIN — Medication 200 MCG: at 13:55

## 2023-05-05 RX ADMIN — SODIUM CHLORIDE, POTASSIUM CHLORIDE, SODIUM LACTATE AND CALCIUM CHLORIDE: 600; 310; 30; 20 INJECTION, SOLUTION INTRAVENOUS at 14:33

## 2023-05-05 RX ADMIN — DEXMEDETOMIDINE HYDROCHLORIDE 10 MCG: 100 INJECTION, SOLUTION, CONCENTRATE INTRAVENOUS at 10:40

## 2023-05-05 RX ADMIN — DICLOFENAC SODIUM 4 G: 10 GEL TOPICAL at 21:13

## 2023-05-05 RX ADMIN — DEXMEDETOMIDINE HYDROCHLORIDE 10 MCG: 100 INJECTION, SOLUTION, CONCENTRATE INTRAVENOUS at 07:50

## 2023-05-05 RX ADMIN — ENOXAPARIN SODIUM 40 MG: 100 INJECTION SUBCUTANEOUS at 07:10

## 2023-05-05 NOTE — ANESTHESIA POSTPROCEDURE EVALUATION
Patient: Lilian Pelaez    Procedure Summary     Date: 05/05/23 Room / Location: [facility] OR  / [facility]    Anesthesia Start: 0745 Anesthesia Stop: 1546    Procedures:       RESECTION OF DESCENDING COLON AND SPLENIC FLEXURE TAKEDOWN WITH DAVINCI ROBOT (Left: Abdomen)      Cystoscopy, left ureteral injection, (Left) Diagnosis:       Malignant neoplasm of descending colon      (Malignant neoplasm of descending colon [C18.6])    Surgeons: Rolando Liu MD; Manjula Randolph MD Provider: Reyes, Mirabelle, DO    Anesthesia Type: general ASA Status: 3          Anesthesia Type: general    Vitals  Vitals Value Taken Time   /67 05/05/23 1600   Temp     Pulse 104 05/05/23 1604   Resp     SpO2 100 % 05/05/23 1604   Vitals shown include unvalidated device data.        Post Anesthesia Care and Evaluation    Patient location during evaluation: bedside  Patient participation: complete - patient participated  Level of consciousness: awake and alert  Pain score: 4  Pain management: satisfactory to patient    Airway patency: patent  Anesthetic complications: No anesthetic complications  PONV Status: none  Cardiovascular status: acceptable  Respiratory status: acceptable  Hydration status: acceptable    Comments: An Anesthesiologist personally participated in the most demanding procedures (including induction and emergence if applicable) in the anesthesia plan, monitored the course of anesthesia administration at frequent intervals and remained physically present and available for immediate diagnosis and treatment of emergencies.

## 2023-05-05 NOTE — PROGRESS NOTES
Plan to assist Dr. Liu in OR via cystoscopy, left ureteral injection, possible stent insertion.  This was discussed with patient including the risks, benefits, and alternatives of this portion of the procedure.  Risks discussed included bleeding, infection, and need for further urologic work-up and procedures contingent upon findings.  Aware Figueroa catheter will be placed and that catheter is managed by the primary surgeon.  All questions addressed.  Patient is agreeable.

## 2023-05-05 NOTE — OP NOTE
Preoperative diagnosis  Colon cancer    Postoperative diagnosis  Colon cancer    Procedure performed  Cystoscopy, left ureteral catheter placement, ureteral injection    Attending surgeon  Manjula Randolph MD     Anesthesia  General    EBL  0 mL    Complications  None    Specimen  None    Findings  Cystoscopy without abnormality; left Pollack ureteral catheter placed, injection of 10 mL ICG  16 [place] catheter inserted without difficulty    Indications  47 y.o. female agreed to undergo the above named procedure after discussion of the alternatives, risks and benefits.   Informed consent was obtained.      Procedure  After informed consent was obtained.  The patient was taken back to the operating suite where general anesthesia was administered.  Once patient was adequately anesthetized she was placed in the dorsolithotomy position.  Her genitals were prepped and draped in the normal sterile fashion.  A rigid cystoscope was inserted gently into the urethral meatus and passed atraumatically into the bladder.  Pan cystoscopy was performed and a 360 degree manner.  No abnormalities were noted.  There were no diverticula, trabeculations, lesions, or tumors.  Patient had bilateral orthotopic ureters.    The left ureteral orifice was identified and a 5 Moz[place]ican Pollack ureteral catheter was placed until gentle resistance was met.  Approximately 10 cc (indocyanine green) ICG was injected through the ureteral catheter and allowed to dwell for approximately 2 minutes.  Ureteral catheter was then flushed with approximately 3 cc flushed with normal saline.  The Pollick was noted to be patent.  Pollick was then removed, ICG noted to be effluxing from orifice.  Cystoscope was removed.  Figueroa catheter was inserted successfully into the bladder with yield of clear irrigant and then secured. This portion of this procedure was then concluded and the case was passed to the general surgery service, Dr. Liu.  Please refer to  subsequent op note for further details.          Signed:  Manjula Randolph MD  05/05/23  08:40 EDT

## 2023-05-05 NOTE — PAYOR COMM NOTE
Lilian Pelaez (47 y.o. Female)      OP NOTES BELOW:      DOS: 2023     AUTH: [id number]    PATIENT INFORMATION  Name:  Lilian Pelaez  MRN#:     [id number]  :  1975     INSURANCE MEMBER ID:     [id number]        ADMISSION INFORMATION  CLASS: Inpatient   DOS:  2023      CURRENT ATTENDING PROVIDER INFORMATION  Name/NPI Rolando Liu MD [[id number]]  Phone  Phone: [phone]      RENDERING FACILITY  Name:  [facility]   NPI:  [id number]  TID:  [id number]  Address:      [facility] [address] [place] [address]  Phone  [phone]      CASE MANAGEMENT CONTACT INFORMATION  Phone:      [phone]  Fax:           [phone]      HOSPITAL PROBLEM LIST and ADMISSION DIAGNOSES    Problems Addressed this Visit        Other    Malignant neoplasm of descending colon    Relevant Medications    ceFAZolin in dextrose (ANCEF) IVPB solution 2 g (Completed)    metroNIDAZOLE (FLAGYL) IVPB 500 mg (Completed)    alvimopan (ENTEREG) capsule 12 mg (Completed)    Enoxaparin Sodium (LOVENOX) syringe 40 mg (Completed)    Other Relevant Orders    Tissue Pathology Exam   Diagnoses       Codes Comments    Malignant neoplasm of descending colon     ICD-10-CM: C18.6  ICD-9-CM: 153.2                OP NOTES BELOW:     DOS: 2023    AUTH: [id number]    Clinical OP NOTES below:     Date of Birth   1975    Social Security Number       Address   [address] [place] [address]    Home Phone   [phone]    MRN   [id number]       Jain   Presybeterian    Marital Status                               Admission Date   23    Admission Type   Elective    Admitting Provider   Rolando Liu MD    Attending Provider   Rolando Liu MD    Department, Room/Bed   [facility] 5TH FLOOR SURGICAL TELEMETRY UNIT, 515/1       Discharge Date       Discharge Disposition       Discharge Destination                               Attending Provider: Rolando Liu MD    Allergies:  "Hydromorphone, Morphine, Oxybutynin, Oxycodone-acetaminophen, Penicillins, Promethazine, Tylenol With Codeine #3 [Acetaminophen-codeine]    Isolation: None   Infection: None   Code Status: CPR    Ht: 172.7 cm (68\")   Wt: 139 kg (306 lb 14.1 oz)    Admission Cmt: None   Principal Problem: Colon cancer [C18.9]                 Active Insurance as of 5/5/2023     Primary Coverage     Payor Plan Insurance Group Employer/Plan Group    [employer] [place] [employer] [place]ICA[place]      Payor Plan Address Payor Plan Phone Number Payor Plan Fax Number Effective Dates    [address] [address] [phone]  11/21/2022 - None Entered    [facility] [address]       Subscriber Name Subscriber Birth Date Member ID       LILIAN PELAEZ 1975 [id number]                 Emergency Contacts      (Rel.) Home Phone Work Phone Mobile Phone    DORIE KHAN (Daughter) -- -- [phone]            [place]: NPI [id number] Tax ID [id number]     History & Physical      Rolando Liu MD at 05/05/23 0708          H&P reviewed. The patient was examined and there are no changes to the H&P.          Electronically signed by Rolando Liu MD at 05/05/23 0708   Source Note          Chief Complaint:  Mass    Primary Care Provider: Shelly Cash MD    Referring Provider: Deniz oDwd MD    History of Present Illness  Lilian Pelaez is a 47 y.o. female referred by Deniz Dowd MD after a colonoscopy was done on 4/4/2023 and a mass was identified at the descending colon.  The mass was biopsied and pathology showed adenocarcinoma, moderately to poorly differentiated.  Colon was tattooed just distal to the location of the mass.  The adequacy of the prep was not sufficient so the patient had a follow-up colonoscopy on 4/12/2023 and no additional concerning findings were identified.  CT chest and CT abdomen pelvis were done on 4/12/2023 and there was no evidence of metastatic disease in the chest, abdomen, or pelvis.  Patient was seen by Dr." Cara with medical oncology on 4/13/23.  Patient is scheduled to follow-up with Dr. Marroquin sometime after surgery to discuss final pathological strange and additional treatment that may be necessary.  Surgical history includes a tubal ligation.  Some type of complication occurred that the patient could not describe very well but resulted in the patient having a laparotomy shortly thereafter.  This was many years ago.  Patient has also had laparoscopic gallbladder removal.    Allergies: Hydromorphone, Morphine, Oxybutynin, Oxycodone-acetaminophen, Promethazine, Acetaminophen, Oxycodone, and Penicillins    Outpatient Medications Marked as Taking for the 4/17/23 encounter (Office Visit) with Rolando Liu MD   Medication Sig Dispense Refill   • allopurinol (ZYLOPRIM) 100 MG tablet allopurinol 100 mg tablet   Take 1 tablet every day by oral route for 90 days.     • ALPRAZolam (XANAX) 0.5 MG tablet alprazolam 0.5 mg tablet   Take 1 tablet twice a day by oral route for 30 days.     • amLODIPine (NORVASC) 10 MG tablet amlodipine 10 mg tablet   TAKE ONE TABLET BY MOUTH DAILY     • docusate sodium 100 MG capsule docusate sodium 100 mg capsule   TAKE ONE CAPSULE BY MOUTH DAILY     • estradiol cypionate (DEPO-ESTRADIOL) 5 MG/ML injection Inject  into the appropriate muscle as directed by prescriber.     • furosemide (LASIX) 20 MG tablet Take 1 tablet by mouth.     • hydroCHLOROthiazide (HYDRODIURIL) 25 MG tablet hydrochlorothiazide 25 mg tablet   Take 1 tablet every day by oral route for 90 days.     • HYDROcodone-acetaminophen (NORCO) 7.5-325 MG per tablet hydrocodone 7.5 mg-acetaminophen 325 mg tablet   Take by oral route for 30 days.     • hyoscyamine (ANASPAZ,LEVSIN) 0.125 MG tablet Take 1 tablet by mouth 4 (Four) Times a Day With Meals & at Bedtime. 120 tablet 3   • ketorolac (TORADOL) 10 MG tablet ketorolac 10 mg tablet   TAKE ONE TABLET BY MOUTH EVERY 6 HOURS     • lamoTRIgine (LaMICtal) 25 MG tablet lamotrigine 25  mg tablet     • linaclotide (Linzess) 72 MCG capsule capsule Take 1 capsule by mouth Every Morning Before Breakfast for 90 days. 90 capsule 1   • losartan (COZAAR) 100 MG tablet losartan 100 mg tablet   TAKE ONE TABLET BY MOUTH DAILY     • medroxyPROGESTERone (DEPO-PROVERA) 150 MG/ML injection medroxyprogesterone 150 mg/mL intramuscular suspension   Inject 1 mL every 3 months by intramuscular route.     • medroxyPROGESTERone (DEPO-PROVERA) 150 MG/ML injection 1 mL.     • metoclopramide (REGLAN) 10 MG tablet metoclopramide 10 mg tablet   Take by oral route for 30 days.     • metoprolol succinate XL (TOPROL-XL) 25 MG 24 hr tablet metoprolol succinate ER 25 mg tablet,extended release 24 hr   Take 1 tablet every day by oral route for 90 days.     • Mirabegron ER (MYRBETRIQ) 50 MG tablet sustained-release 24 hour 24 hr tablet Myrbetriq 50 mg tablet,extended release   Take by oral route for 90 days.     • pantoprazole (PROTONIX) 40 MG EC tablet pantoprazole 40 mg tablet,delayed release   Take by oral route for 30 days.     • polyethylene glycol (GaviLyte-G) 236 g solution GaviLyte-G 236 gram-22.74 gram-6.74 gram-5.86 gram oral solution     • rOPINIRole (REQUIP) 1 MG tablet      • sertraline (ZOLOFT) 100 MG tablet sertraline 100 mg tablet   Take 1 tablet every day by oral route for 30 days.     • sucralfate (CARAFATE) 1 g tablet sucralfate 1 gram tablet   TAKE ONE TABLET BY MOUTH Two TIMES A DAY     • vitamin D3 125 MCG (5000 UT) capsule capsule Take 2,000 Units by mouth Daily.         Past Medical History:   • Anxiety   • Asthma    Bronchitis   • Colon cancer   • Depression   • Diverticulitis of colon    Scope was done in [facility]   • GERD (gastroesophageal reflux disease)   • High blood pressure   • Kidney stones        Past Surgical History:   • BREAST SURGERY    Removed 8lbs of tissue   • CHOLECYSTECTOMY   • COLONOSCOPY    Procedure: COLONOSCOPY WITH POLYPECTOMY/SNARE/BIOPSIES/TATTOOING DESCENDING COLON MASS;   "Surgeon: Deniz Dowd MD;  Location: [facility] ENDOSCOPY;  Service: Gastroenterology;  Laterality: N/A;  INCOMPLETE COLONOSCOPY  POOR BOWEL PREP  COLON POLYP  COLON MASS  DIVERTICULOSIS   • COLONOSCOPY    Procedure: COLONOSCOPY with cold snare;  Surgeon: Deniz Dowd MD;  Location: [facility] ENDOSCOPY;  Service: Gastroenterology;  Laterality: N/A;  colon polyps, diverticulosis, colon mass, hemorrhoids     • FRACTURE SURGERY    Left knee   • KNEE SURGERY   • MASTECTOMY    melonoma   • REDUCTION MAMMAPLASTY    8lbs of tissue was removed   • TONSILLECTOMY   • UPPER GASTROINTESTINAL ENDOSCOPY       Family History:   Family History   Problem Relation Age of Onset   • Colon cancer Neg Hx         Social History:  Social History     Tobacco Use   • Smoking status: Never     Passive exposure: Never   • Smokeless tobacco: Never   Substance Use Topics   • Alcohol use: Never       Objective      Vital Signs:  Resp 16   Ht 172.7 cm (68\")   Wt (!) 139 kg (307 lb)   BMI 46.68 kg/m²   • Constitutional: alert, no acute distress, reliable historian  • HENT:  NCAT, no visible deformities or lesions  • Eyes:  sclerae clear, conjunctivae clear, EOMI  • Neck:  normal appearance, no masses, trachea midline  • Respiratory:  breathing not labored, respiratory effort appears normal  • Cardiovascular:  heart regular rate  • Abdomen:  nondistended    • Skin and subcutaneous tissue:  no visible concerning rashes or lesions, no jaundice  • Musculoskeletal: moving all extremities symmetrically and purposefully  • Neurologic:  no obvious motor or sensory deficits, normal gait, able to stand without difficulty, cerebellar function without any obvious abnormalities, alert & oriented x 3, speech clear  • Psychiatric:  judgment and insight intact, mood normal, affect appropriate, cooperative      Assessment:  Diagnoses and all orders for this visit:    1. Malignant neoplasm of descending colon (Primary)        Plan:  Robotic left " colectomy    Discussion: Indications, options, risks, benefits, and expected outcomes of planned surgery were discussed with the patient and she agrees to proceed.    Rolando Liu MD  04/17/2023    Electronically signed by Rolando Liu MD, 04/16/23, 3:14 PM EDT.        Electronically signed by Rolando Liu MD at 04/17/23 6415             Rolando Liu MD at 04/17/23 1315          Chief Complaint:  Mass    Primary Care Provider: Shelly Cash MD    Referring Provider: Deniz Dowd MD    History of Present Illness  Lilian Pelaez is a 47 y.o. female referred by Deniz Dowd MD after a colonoscopy was done on 4/4/2023 and a mass was identified at the descending colon.  The mass was biopsied and pathology showed adenocarcinoma, moderately to poorly differentiated.  Colon was tattooed just distal to the location of the mass.  The adequacy of the prep was not sufficient so the patient had a follow-up colonoscopy on 4/12/2023 and no additional concerning findings were identified.  CT chest and CT abdomen pelvis were done on 4/12/2023 and there was no evidence of metastatic disease in the chest, abdomen, or pelvis.  Patient was seen by Dr. Marroquin with medical oncology on 4/13/23.  Patient is scheduled to follow-up with Dr. Marroquin sometime after surgery to discuss final pathological strange and additional treatment that may be necessary.  Surgical history includes a tubal ligation.  Some type of complication occurred that the patient could not describe very well but resulted in the patient having a laparotomy shortly thereafter.  This was many years ago.  Patient has also had laparoscopic gallbladder removal.    Allergies: Hydromorphone, Morphine, Oxybutynin, Oxycodone-acetaminophen, Promethazine, Acetaminophen, Oxycodone, and Penicillins    Outpatient Medications Marked as Taking for the 4/17/23 encounter (Office Visit) with Rolando Liu MD   Medication Sig Dispense Refill   • allopurinol  (ZYLOPRIM) 100 MG tablet allopurinol 100 mg tablet   Take 1 tablet every day by oral route for 90 days.     • ALPRAZolam (XANAX) 0.5 MG tablet alprazolam 0.5 mg tablet   Take 1 tablet twice a day by oral route for 30 days.     • amLODIPine (NORVASC) 10 MG tablet amlodipine 10 mg tablet   TAKE ONE TABLET BY MOUTH DAILY     • docusate sodium 100 MG capsule docusate sodium 100 mg capsule   TAKE ONE CAPSULE BY MOUTH DAILY     • estradiol cypionate (DEPO-ESTRADIOL) 5 MG/ML injection Inject  into the appropriate muscle as directed by prescriber.     • furosemide (LASIX) 20 MG tablet Take 1 tablet by mouth.     • hydroCHLOROthiazide (HYDRODIURIL) 25 MG tablet hydrochlorothiazide 25 mg tablet   Take 1 tablet every day by oral route for 90 days.     • HYDROcodone-acetaminophen (NORCO) 7.5-325 MG per tablet hydrocodone 7.5 mg-acetaminophen 325 mg tablet   Take by oral route for 30 days.     • hyoscyamine (ANASPAZ,LEVSIN) 0.125 MG tablet Take 1 tablet by mouth 4 (Four) Times a Day With Meals & at Bedtime. 120 tablet 3   • ketorolac (TORADOL) 10 MG tablet ketorolac 10 mg tablet   TAKE ONE TABLET BY MOUTH EVERY 6 HOURS     • lamoTRIgine (LaMICtal) 25 MG tablet lamotrigine 25 mg tablet     • linaclotide (Linzess) 72 MCG capsule capsule Take 1 capsule by mouth Every Morning Before Breakfast for 90 days. 90 capsule 1   • losartan (COZAAR) 100 MG tablet losartan 100 mg tablet   TAKE ONE TABLET BY MOUTH DAILY     • medroxyPROGESTERone (DEPO-PROVERA) 150 MG/ML injection medroxyprogesterone 150 mg/mL intramuscular suspension   Inject 1 mL every 3 months by intramuscular route.     • medroxyPROGESTERone (DEPO-PROVERA) 150 MG/ML injection 1 mL.     • metoclopramide (REGLAN) 10 MG tablet metoclopramide 10 mg tablet   Take by oral route for 30 days.     • metoprolol succinate XL (TOPROL-XL) 25 MG 24 hr tablet metoprolol succinate ER 25 mg tablet,extended release 24 hr   Take 1 tablet every day by oral route for 90 days.     • Mirabegron ER  (MYRBETRIQ) 50 MG tablet sustained-release 24 hour 24 hr tablet Myrbetriq 50 mg tablet,extended release   Take by oral route for 90 days.     • pantoprazole (PROTONIX) 40 MG EC tablet pantoprazole 40 mg tablet,delayed release   Take by oral route for 30 days.     • polyethylene glycol (GaviLyte-G) 236 g solution GaviLyte-G 236 gram-22.74 gram-6.74 gram-5.86 gram oral solution     • rOPINIRole (REQUIP) 1 MG tablet      • sertraline (ZOLOFT) 100 MG tablet sertraline 100 mg tablet   Take 1 tablet every day by oral route for 30 days.     • sucralfate (CARAFATE) 1 g tablet sucralfate 1 gram tablet   TAKE ONE TABLET BY MOUTH Two TIMES A DAY     • vitamin D3 125 MCG (5000 UT) capsule capsule Take 2,000 Units by mouth Daily.         Past Medical History:   • Anxiety   • Asthma    Bronchitis   • Colon cancer   • Depression   • Diverticulitis of colon    Scope was done in [facility]   • GERD (gastroesophageal reflux disease)   • High blood pressure   • Kidney stones        Past Surgical History:   • BREAST SURGERY    Removed 8lbs of tissue   • CHOLECYSTECTOMY   • COLONOSCOPY    Procedure: COLONOSCOPY WITH POLYPECTOMY/SNARE/BIOPSIES/TATTOOING DESCENDING COLON MASS;  Surgeon: Deniz Dowd MD;  Location: [facility] ENDOSCOPY;  Service: Gastroenterology;  Laterality: N/A;  INCOMPLETE COLONOSCOPY  POOR BOWEL PREP  COLON POLYP  COLON MASS  DIVERTICULOSIS   • COLONOSCOPY    Procedure: COLONOSCOPY with cold snare;  Surgeon: Deniz Dowd MD;  Location: [facility] ENDOSCOPY;  Service: Gastroenterology;  Laterality: N/A;  colon polyps, diverticulosis, colon mass, hemorrhoids     • FRACTURE SURGERY    Left knee   • KNEE SURGERY   • MASTECTOMY    melonoma   • REDUCTION MAMMAPLASTY    8lbs of tissue was removed   • TONSILLECTOMY   • UPPER GASTROINTESTINAL ENDOSCOPY       Family History:   Family History   Problem Relation Age of Onset   • Colon cancer Neg Hx         Social History:  Social History     Tobacco Use   • Smoking status:  "Never     Passive exposure: Never   • Smokeless tobacco: Never   Substance Use Topics   • Alcohol use: Never       Objective      Vital Signs:  Resp 16   Ht 172.7 cm (68\")   Wt (!) 139 kg (307 lb)   BMI 46.68 kg/m²   • Constitutional: alert, no acute distress, reliable historian  • HENT:  NCAT, no visible deformities or lesions  • Eyes:  sclerae clear, conjunctivae clear, EOMI  • Neck:  normal appearance, no masses, trachea midline  • Respiratory:  breathing not labored, respiratory effort appears normal  • Cardiovascular:  heart regular rate  • Abdomen:  nondistended    • Skin and subcutaneous tissue:  no visible concerning rashes or lesions, no jaundice  • Musculoskeletal: moving all extremities symmetrically and purposefully  • Neurologic:  no obvious motor or sensory deficits, normal gait, able to stand without difficulty, cerebellar function without any obvious abnormalities, alert & oriented x 3, speech clear  • Psychiatric:  judgment and insight intact, mood normal, affect appropriate, cooperative      Assessment:  Diagnoses and all orders for this visit:    1. Malignant neoplasm of descending colon (Primary)        Plan:  Robotic left colectomy    Discussion: Indications, options, risks, benefits, and expected outcomes of planned surgery were discussed with the patient and she agrees to proceed.    Rolando Liu MD  04/17/2023    Electronically signed by Rolando Liu MD, 04/16/23, 3:14 PM EDT.        Electronically signed by Rolando Liu MD at 04/17/23 1659          Operative/Procedure Notes (last 24 hours)      Rolando Liu MD at 05/05/23 0816          Operative Report    Patient Name:  Lilian Pelaez  YOB: 1975    Date of Surgery:  5/5/2023     Pre-op Diagnosis:   Malignant neoplasm of descending colon [C18.6]       Post-op Diagnosis:   Post-Op Diagnosis Codes:     * Malignant neoplasm of descending colon [C18.6]    Procedure(s):  RESECTION OF DESCENDING COLON and SPLENIC " FLEXURE TAKEDOWN WITH DAVINCI ROBOT  Cystoscopy, left ureteral injection (see Dr. Leyva's separate note)    Staff:  Surgeon(s):  Rolando Liu MD O'Bryan, Brittany, MD    Assistant: Ike Peraza CSA    Anesthesia: General    Estimated Blood Loss: 50 mL    Complications:  None    Drains:  None    Packing:  None    Implants:    Implant Name Type Inv. Item Serial No.  Lot No. LRB No. Used Action   DEV CLS WND VLOC/180 ELLA ABS 1/2CIR SZ3/0 17MM 15CM GRN - [id number] Implant DEV CLS WND VLOC/180 ELLA ABS 1/2CIR SZ3/0 17MM 15CM GRN  COVIDIEN [id number] Left 2 Implanted   RELOAD STPLR SUREFORM 60 DAVINCI/X/XI 6ROW 3.5 MORENITA 1P/U - [id number] Implant RELOAD STPLR SUREFORM 60 DAVINCI/X/XI 6ROW 3.5 MORENITA 1P/U  INTUITIVE SURGICAL [id number] Left 2 Implanted   RELOAD STPLR SUREFORM 60 DAVINCI/X/XI 6ROW 3.5 MORENITA 1P/U - [id number] Implant RELOAD STPLR SUREFORM 60 DAVINCI/X/XI 6ROW 3.5 MORENITA 1P/U  INTUITIVE SURGICAL [id number] Left 1 Implanted       Specimen:          Specimens     ID Source Type Tests Collected By Collected At Frozen?    A Large Intestine, Left / Descending Colon Tissue · TISSUE PATHOLOGY EXAM   Rolando Liu MD 5/5/23 6453     Description: left colon          Colon Resection  Operation performed with curative intent Yes   Tumor Location (select all that apply) Descending colon   Extent of colon and vascular resection  (select all that apply) Splenic flexure resection - middle and ascending left colic        Indications: 47-year-old female who recently had a colonoscopy and colon cancer was identified at the descending colon.  The colon was tattooed just distal to the cancer.  Metastatic work-up was negative.     Findings: Tattooed area of the colon was at about the mid descending colon.  Splenic flexure takedown was required to complete the procedure.  Partial colectomy was performed with appropriate margins proximal and distal to the tattooed area.  Mesentery was divided at the left branch  of the middle colic artery and at ascending left colic artery.    Side-to-side antiperistaltic stapled anastomosis performed with the common enterotomy closed with staples.     Description of Procedure: The patient was taken the operating placed supine on the procedure table.  Timeout was performed.  General esthesia was administered.  The abdomen and perineum was prepped and draped in usual fashion.  Dr. Randolph injected ICG into the left ureter.  See her separate note for details.  A Figueroa catheter was placed.  A Veress needle was used at the left upper quadrant to establish pneumoperitoneum and then three 8 mm robotic cannulas, 1 12mm robotic cannula, and one 8 mm accessory cannula was placed in a line at the right abdomen.    Robotic arms were docked but there was difficulty rotating the boom and targeting the robot.  Ultimately, a phone call was made to the intuitive rep and the situation was troubleshooting over the telephone without any complication but this did delay the procedure by about 45 minutes.  Once the aforementioned issue was resolved, the robotic arms were docked and the robotic instruments were inserted after the patient was positioned head down rotated toward the right side.  The small bowel was mobilized to the patient's left side.  The tattooed area of the colon was identified at approximately the sigmoid colon.    There was a large amount of intra-abdominal fat and the greater omentum was very large in a technically difficult surgery.    The transverse colon was lifted toward the anterior abdominal wall and then a retroperitoneal plane was developed under the inferior mesenteric vein at the ligament of Treitz.  The inferior mesenteric vein was divided with the vessel sealer and then the plane was further developed working superiorly and staying anterior to the pancreas until the lesser sac was reached.  Plane was then developed laterally toward the splenic flexure and the descending colon.  I  then identified the takeoff of the inferior mesenteric artery and the colon mesentery and developed a retroperitoneal plane here working laterally and superiorly until the ascending branch of the left colic artery was identified and it was divided at its takeoff using the vessel sealer.  ICG green was used multiple times to look for the ureter.  It was identified and protected from harm.  The retroperitoneal plane continued until I reached the previously dissected area superiorly that was developed under the IMV.  The greater omentum was divided at an area chosen for proximal division and this was at about the transverse colon.  I then divided the gastrocolic attachments entering the lesser sac from above the transverse colon and then working toward the splenic flexure releasing the attachments of the splenic flexure of the colon.  I then divided the distal mesentery the descending colon just distal to the tattoo as the area for distal division of the colon.  I then took down the lateral attachments of the descending colon working superiorly and then took down additional attachments to the splenic flexure.  This was very difficult all throughout the procedure because of the large amount of intra-abdominal fat and difficulty keeping the small bowel mobilized to the right side of the abdomen.  Once the attachments of the targeted segment of the colon and the mesentery of the targeted segment of the colon were divided, ICG green was used to check for blood cyst flow at the nose and proximal and distal margins and blood flow was sufficient and a robotic stapler was then used to divide the colon proximally at the mid transverse colon and distally at the descending colon.  There were few additional attachments of the targeted segment of colon which I released and then the specimen was completely freed and moved to the right lower quadrant.  I then released the greater omentum from the transverse colon beginning at the  staple line and approximately about 10 cm to provide additional length for the transverse colon staple line to reach to the mid descending colon staple line for anastomosis.  I also freed some lateral attachments of the main descending colon to further mobilize the distal staple line.  After this was done, the proximal and distal staple lines reached without undue tension and the blood supply was noted to be adequate.  This was checked again with ICG.  A side-to-side antiperistaltic anastomosis was then created using the stapler to create the anastomosis and 2 layers of absorbable V-Loc suture to close the common enterotomy.  There was minimal blood loss during the procedure.  I checked to ensure hemostasis.  Sponge needle and instrument counts were verified as correct x2.  The robotic arms were undocked and the robot was moved away from the operating table.  Pneumoperitoneum was released.  The 12 mm robotic cannula site was then extended to accommodate an Diego wound retractor.  The Diego wound retractor was placed and then the specimen was removed from the peritoneal cavity and sent to pathology.  The fascial incision at the specimen extraction site was closed with running PDS suture.  The skin incisions were all then closed with buried absorbable suture followed by appropriate dressings.  Patient did very well during the procedure.  She was transported to the recovery area in stable condition.  There were no complications.  Sponge, needl,e and instrument counts were verified as correct again at the end of the procedure.    Assistant: Ike Peraza CSA  was responsible for performing the following activities: Closing and Placing Dressing, docking robot and exchanging robotic instruments, and his skilled assistance was necessary for the success of this case.    Rolando Liu MD     Date: 5/5/2023  Time: 17:25 EDT    Electronically signed by Rolando Liu MD, 05/05/23, 5:25 PM EDT.                 Electronically signed by Rolando Liu MD at 05/05/23 5104     Manjula Randolph MD at 05/05/23 0917        Preoperative diagnosis  Colon cancer    Postoperative diagnosis  Colon cancer    Procedure performed  Cystoscopy, left ureteral catheter placement, ureteral injection    Attending surgeon  Manjula Randolph MD     Anesthesia  General    EBL  0 mL    Complications  None    Specimen  None    Findings  Cystoscopy without abnormality; left Pollack ureteral catheter placed, injection of 10 mL ICG  16 Libyan catheter inserted without difficulty    Indications  47 y.o. female agreed to undergo the above named procedure after discussion of the alternatives, risks and benefits.   Informed consent was obtained.      Procedure  After informed consent was obtained.  The patient was taken back to the operating suite where general anesthesia was administered.  Once patient was adequately anesthetized she was placed in the dorsolithotomy position.  Her genitals were prepped and draped in the normal sterile fashion.  A rigid cystoscope was inserted gently into the urethral meatus and passed atraumatically into the bladder.  Pan cystoscopy was performed and a 360 degree manner.  No abnormalities were noted.  There were no diverticula, trabeculations, lesions, or tumors.  Patient had bilateral orthotopic ureters.    The left ureteral orifice was identified and a 5 Libyan Pollack ureteral catheter was placed until gentle resistance was met.  Approximately 10 cc (indocyanine green) ICG was injected through the ureteral catheter and allowed to dwell for approximately 2 minutes.  Ureteral catheter was then flushed with approximately 3 cc flushed with normal saline.  The Pollick was noted to be patent.  Pollick was then removed, ICG noted to be effluxing from orifice.  Cystoscope was removed.  Figueroa catheter was inserted successfully into the bladder with yield of clear irrigant and then secured. This portion of this  procedure was then concluded and the case was passed to the general surgery service, Dr. Liu.  Please refer to subsequent op note for further details.          Signed:  Manjula Randolph MD  05/05/23  08:40 EDT        Electronically signed by Manjula Randolph MD at 05/05/23 2733

## 2023-05-05 NOTE — ANESTHESIA PREPROCEDURE EVALUATION
Anesthesia Evaluation     Patient summary reviewed and Nursing notes reviewed   no history of anesthetic complications:  NPO Solid Status: > 8 hours  NPO Liquid Status: > 2 hours           Airway   Mallampati: III  TM distance: >3 FB  No difficulty expected  Comment: Neck ROM less with flexion  Dental - normal exam     Pulmonary - normal exam    breath sounds clear to auscultation  (+) asthma,shortness of breath,   Cardiovascular - normal exam  Exercise tolerance: poor (<4 METS)    ECG reviewed  Patient on routine beta blocker and Beta blocker given within 24 hours of surgery  Rhythm: regular  Rate: normal    (+) hypertension well controlled 2 medications or greater, VERMA,     ROS comment: Sinus rhythm  Inferior infarct, old  Consider anterior infarct  Nonspecific T abnormalities, lateral leads    Neuro/Psych  (+) headaches, dizziness/light headedness, numbness, psychiatric history Anxiety and Depression,    GI/Hepatic/Renal/Endo    (+) morbid obesity, GERD,  renal disease stones,     Musculoskeletal     (+) back pain, neck pain,       ROS comment: DDD  Abdominal    Substance History - negative use     OB/GYN negative ob/gyn ROS         Other   arthritis,    history of cancer (colon mass) active    ROS/Med Hx Other: <4METS, SOAE, HX ASTHMA, PALPITATIONS, CARDS OV 5/2/22 HOLTER SB WITH PAUSES, ECHO EF 55-60%, MILD TO MOD LT ATRIAL ENLARGEMENT. KT                   Anesthesia Plan    ASA 3     general     (Patient understands anesthesia not responsible for dental damage.)  intravenous induction     Anesthetic plan, risks, benefits, and alternatives have been provided, discussed and informed consent has been obtained with: patient.  Pre-procedure education provided  Use of blood products discussed with patient  Consented to blood products.   Plan discussed with CRNA.        CODE STATUS:

## 2023-05-06 PROBLEM — G89.29 CHRONIC JOINT PAIN: Status: ACTIVE | Noted: 2023-05-06

## 2023-05-06 PROBLEM — M25.50 CHRONIC JOINT PAIN: Status: RESOLVED | Noted: 2023-05-06 | Resolved: 2023-05-06

## 2023-05-06 PROBLEM — M25.50 CHRONIC JOINT PAIN: Status: ACTIVE | Noted: 2023-05-06

## 2023-05-06 PROBLEM — G89.29 CHRONIC JOINT PAIN: Status: RESOLVED | Noted: 2023-05-06 | Resolved: 2023-05-06

## 2023-05-06 PROBLEM — C18.6 MALIGNANT NEOPLASM OF DESCENDING COLON: Status: ACTIVE | Noted: 2023-05-06

## 2023-05-06 PROBLEM — Z90.49 S/P LEFT COLECTOMY: Status: ACTIVE | Noted: 2023-05-06

## 2023-05-06 LAB
ANION GAP SERPL CALCULATED.3IONS-SCNC: 7.8 MMOL/L (ref 5–15)
BASOPHILS # BLD AUTO: 0.02 10*3/MM3 (ref 0–0.2)
BASOPHILS NFR BLD AUTO: 0.2 % (ref 0–1.5)
BUN SERPL-MCNC: 10 MG/DL (ref 6–20)
BUN/CREAT SERPL: 12.8 (ref 7–25)
CALCIUM SPEC-SCNC: 9 MG/DL (ref 8.6–10.5)
CHLORIDE SERPL-SCNC: 107 MMOL/L (ref 98–107)
CO2 SERPL-SCNC: 24.2 MMOL/L (ref 22–29)
CREAT SERPL-MCNC: 0.78 MG/DL (ref 0.57–1)
DEPRECATED RDW RBC AUTO: 36.3 FL (ref 37–54)
EGFRCR SERPLBLD CKD-EPI 2021: 94.4 ML/MIN/1.73
EOSINOPHIL # BLD AUTO: 0 10*3/MM3 (ref 0–0.4)
EOSINOPHIL NFR BLD AUTO: 0 % (ref 0.3–6.2)
ERYTHROCYTE [DISTWIDTH] IN BLOOD BY AUTOMATED COUNT: 12 % (ref 12.3–15.4)
GLUCOSE SERPL-MCNC: 112 MG/DL (ref 65–99)
HCT VFR BLD AUTO: 35.2 % (ref 34–46.6)
HGB BLD-MCNC: 12.5 G/DL (ref 12–15.9)
IMM GRANULOCYTES # BLD AUTO: 0.04 10*3/MM3 (ref 0–0.05)
IMM GRANULOCYTES NFR BLD AUTO: 0.4 % (ref 0–0.5)
LYMPHOCYTES # BLD AUTO: 1.26 10*3/MM3 (ref 0.7–3.1)
LYMPHOCYTES NFR BLD AUTO: 11.3 % (ref 19.6–45.3)
MAGNESIUM SERPL-MCNC: 1.8 MG/DL (ref 1.6–2.6)
MCH RBC QN AUTO: 29.6 PG (ref 26.6–33)
MCHC RBC AUTO-ENTMCNC: 35.5 G/DL (ref 31.5–35.7)
MCV RBC AUTO: 83.2 FL (ref 79–97)
MONOCYTES # BLD AUTO: 1.12 10*3/MM3 (ref 0.1–0.9)
MONOCYTES NFR BLD AUTO: 10 % (ref 5–12)
NEUTROPHILS NFR BLD AUTO: 78.1 % (ref 42.7–76)
NEUTROPHILS NFR BLD AUTO: 8.71 10*3/MM3 (ref 1.7–7)
NRBC BLD AUTO-RTO: 0 /100 WBC (ref 0–0.2)
PHOSPHATE SERPL-MCNC: 3.4 MG/DL (ref 2.5–4.5)
PLATELET # BLD AUTO: 179 10*3/MM3 (ref 140–450)
PMV BLD AUTO: 9.9 FL (ref 6–12)
POTASSIUM SERPL-SCNC: 4.1 MMOL/L (ref 3.5–5.2)
RBC # BLD AUTO: 4.23 10*6/MM3 (ref 3.77–5.28)
SODIUM SERPL-SCNC: 139 MMOL/L (ref 136–145)
WBC NRBC COR # BLD: 11.15 10*3/MM3 (ref 3.4–10.8)

## 2023-05-06 PROCEDURE — 84100 ASSAY OF PHOSPHORUS: CPT | Performed by: SURGERY

## 2023-05-06 PROCEDURE — 25010000002 ENOXAPARIN PER 10 MG: Performed by: NURSE PRACTITIONER

## 2023-05-06 PROCEDURE — 94799 UNLISTED PULMONARY SVC/PX: CPT

## 2023-05-06 PROCEDURE — 25010000002 CEFOXITIN PER 1 G: Performed by: SURGERY

## 2023-05-06 PROCEDURE — 25010000002 KETOROLAC TROMETHAMINE PER 15 MG: Performed by: SURGERY

## 2023-05-06 PROCEDURE — 25010000002 KETOROLAC TROMETHAMINE PER 15 MG: Performed by: NURSE PRACTITIONER

## 2023-05-06 PROCEDURE — 83735 ASSAY OF MAGNESIUM: CPT | Performed by: SURGERY

## 2023-05-06 PROCEDURE — 80048 BASIC METABOLIC PNL TOTAL CA: CPT | Performed by: SURGERY

## 2023-05-06 PROCEDURE — 99024 POSTOP FOLLOW-UP VISIT: CPT | Performed by: SURGERY

## 2023-05-06 PROCEDURE — 85025 COMPLETE CBC W/AUTO DIFF WBC: CPT | Performed by: NURSE PRACTITIONER

## 2023-05-06 RX ORDER — METRONIDAZOLE 500 MG/100ML
500 INJECTION, SOLUTION INTRAVENOUS EVERY 8 HOURS
Status: COMPLETED | OUTPATIENT
Start: 2023-05-06 | End: 2023-05-08

## 2023-05-06 RX ORDER — LOSARTAN POTASSIUM 50 MG/1
100 TABLET ORAL
Status: DISCONTINUED | OUTPATIENT
Start: 2023-05-06 | End: 2023-05-09 | Stop reason: HOSPADM

## 2023-05-06 RX ADMIN — METRONIDAZOLE 500 MG: 5 INJECTION, SOLUTION INTRAVENOUS at 11:41

## 2023-05-06 RX ADMIN — KETOROLAC TROMETHAMINE 15 MG: 15 INJECTION, SOLUTION INTRAMUSCULAR; INTRAVENOUS at 15:06

## 2023-05-06 RX ADMIN — METRONIDAZOLE 500 MG: 5 INJECTION, SOLUTION INTRAVENOUS at 03:50

## 2023-05-06 RX ADMIN — SODIUM CHLORIDE, POTASSIUM CHLORIDE, SODIUM LACTATE AND CALCIUM CHLORIDE 100 ML/HR: 600; 310; 30; 20 INJECTION, SOLUTION INTRAVENOUS at 10:34

## 2023-05-06 RX ADMIN — SODIUM CHLORIDE 2 G: 900 INJECTION INTRAVENOUS at 05:09

## 2023-05-06 RX ADMIN — SODIUM CHLORIDE 2 G: 900 INJECTION INTRAVENOUS at 12:23

## 2023-05-06 RX ADMIN — HYDROCODONE BITARTRATE AND ACETAMINOPHEN 1 TABLET: 7.5; 325 TABLET ORAL at 11:41

## 2023-05-06 RX ADMIN — METRONIDAZOLE 500 MG: 5 INJECTION, SOLUTION INTRAVENOUS at 20:19

## 2023-05-06 RX ADMIN — KETOROLAC TROMETHAMINE 15 MG: 30 INJECTION, SOLUTION INTRAMUSCULAR; INTRAVENOUS at 21:05

## 2023-05-06 RX ADMIN — SODIUM CHLORIDE, POTASSIUM CHLORIDE, SODIUM LACTATE AND CALCIUM CHLORIDE 100 ML/HR: 600; 310; 30; 20 INJECTION, SOLUTION INTRAVENOUS at 21:00

## 2023-05-06 RX ADMIN — PANTOPRAZOLE SODIUM 40 MG: 40 INJECTION, POWDER, FOR SOLUTION INTRAVENOUS at 05:09

## 2023-05-06 RX ADMIN — KETOROLAC TROMETHAMINE 15 MG: 15 INJECTION, SOLUTION INTRAMUSCULAR; INTRAVENOUS at 09:00

## 2023-05-06 RX ADMIN — DICLOFENAC SODIUM 4 G: 10 GEL TOPICAL at 04:02

## 2023-05-06 RX ADMIN — ENOXAPARIN SODIUM 40 MG: 100 INJECTION SUBCUTANEOUS at 08:49

## 2023-05-06 RX ADMIN — KETOROLAC TROMETHAMINE 15 MG: 15 INJECTION, SOLUTION INTRAMUSCULAR; INTRAVENOUS at 03:41

## 2023-05-06 RX ADMIN — HYDROCODONE BITARTRATE AND ACETAMINOPHEN 1 TABLET: 7.5; 325 TABLET ORAL at 20:27

## 2023-05-06 RX ADMIN — ROPINIROLE HYDROCHLORIDE 1 MG: 1 TABLET, FILM COATED ORAL at 20:18

## 2023-05-06 RX ADMIN — LOSARTAN POTASSIUM 100 MG: 50 TABLET, FILM COATED ORAL at 10:33

## 2023-05-06 RX ADMIN — HYDROCODONE BITARTRATE AND ACETAMINOPHEN 1 TABLET: 7.5; 325 TABLET ORAL at 04:02

## 2023-05-06 RX ADMIN — DICLOFENAC SODIUM 4 G: 10 GEL TOPICAL at 20:21

## 2023-05-06 RX ADMIN — AMLODIPINE BESYLATE 10 MG: 5 TABLET ORAL at 20:18

## 2023-05-06 NOTE — PLAN OF CARE
Goal Outcome Evaluation:  Plan of Care Reviewed With: patient        Progress: no change  Outcome Evaluation: Medicated patient with PRN [place] and scheduled Toradol. Voltaren applied as needed per MAR. Sites dry and clean. Vital signs stable, will continue to monitor.

## 2023-05-06 NOTE — PLAN OF CARE
Problem: Adult Inpatient Plan of Care  Goal: Plan of Care Review  Outcome: Ongoing, Progressing  Flowsheets (Taken 5/6/2023 1544)  Plan of Care Reviewed With: patient  Outcome Evaluation: VSS. Pt able to ambulate in room with SBA. Figueroa pulled this morning, pt voiding. Pt medicated for pain, see MAR. Family involved in care.  Goal: Patient-Specific Goal (Individualized)  Outcome: Ongoing, Progressing  Goal: Absence of Hospital-Acquired Illness or Injury  Outcome: Ongoing, Progressing  Intervention: Identify and Manage Fall Risk  Description: Perform standard risk assessment on admission using a validated tool or comprehensive approach appropriate to the patient; reassess fall risk frequently, with change in status or transfer to another level of care.  Communicate fall injury risk to interprofessional healthcare team.  Determine need for increased observation, equipment and environmental modification, such as low bed, signage and supportive, nonskid footwear.  Adjust safety measures to individual developmental age, stage and identified risk factors.  Reinforce the importance of safety and physical activity with patient and family.  Perform regular intentional rounding to assess need for position change, pain assessment and personal needs, including assistance with toileting.  Recent Flowsheet Documentation  Taken 5/6/2023 0800 by Rosa Elam RNA  Safety Promotion/Fall Prevention: safety round/check completed  Intervention: Prevent Skin Injury  Description: Perform a screening for skin injury risk, such as pressure or moisture associated skin damage on admission and at regular intervals throughout hospital stay.  Keep all areas of skin (especially folds) clean and dry.  Maintain adequate skin hydration.  Relieve and redistribute pressure and protect bony prominences; implement measures based on patient-specific risk factors.  Match turning and repositioning schedule to clinical condition.  Encourage weight  shift frequently; assist with reposition if unable to complete independently.  Float heels off bed; avoid pressure on the Achilles tendon.  Keep skin free from extended contact with medical devices.  Encourage functional activity and mobility, as early as tolerated.  Use aids (e.g., slide boards, mechanical lift) during transfer.  Recent Flowsheet Documentation  Taken 5/6/2023 0800 by Rosa Elam RNA  Body Position: position changed independently  Intervention: Prevent and Manage VTE (Venous Thromboembolism) Risk  Description: Assess for VTE (venous thromboembolism) risk.  Encourage and assist with early ambulation.  Initiate and maintain compression or other therapy, as indicated, based on identified risk in accordance with organizational protocol and provider order.  Encourage both active and passive leg exercises while in bed, if unable to ambulate.  Recent Flowsheet Documentation  Taken 5/6/2023 0800 by Rosa Elam, RNA  Activity Management: up in chair  Range of Motion: active ROM (range of motion) encouraged  Goal: Optimal Comfort and Wellbeing  Outcome: Ongoing, Progressing  Intervention: Monitor Pain and Promote Comfort  Description: Assess pain level, treatment efficacy and patient response at regular intervals using a consistent pain scale.  Consider the presence and impact of preexisting chronic pain.  Encourage patient and caregiver involvement in pain assessment, interventions and safety measures.  Recent Flowsheet Documentation  Taken 5/6/2023 0800 by Rosa Elam RNA  Pain Management Interventions:   see MAR   care clustered  Intervention: Provide Person-Centered Care  Description: Use a family-focused approach to care.  Develop trust and rapport by proactively providing information, encouraging questions, addressing concerns and offering reassurance.  Acknowledge emotional response to hospitalization.  Recognize and utilize personal coping strategies.  Honor spiritual and cultural  preferences.  Recent Flowsheet Documentation  Taken 5/6/2023 0800 by Rosa Elam RNA  Trust Relationship/Rapport:   care explained   choices provided   emotional support provided   empathic listening provided   questions encouraged   questions answered   reassurance provided   thoughts/feelings acknowledged  Goal: Readiness for Transition of Care  Outcome: Ongoing, Progressing   Goal Outcome Evaluation:  Plan of Care Reviewed With: patient           Outcome Evaluation: VSS. Pt able to ambulate in room with SBA. Figueroa pulled this morning, pt voiding. Pt medicated for pain, see MAR. Family involved in care.

## 2023-05-06 NOTE — PROGRESS NOTES
[facility]     Surgery Progress Note    Patient Name: Lilian Pelaez  :    1975  MRN:    [id number]  Date of admission:  2023  Length of Stay: 1 days    Subjective   No much abd pain.   No n/v.  No flatus.  Only complaint is right hip and right shoulder pain.  These are chronic issues and patient thinks was exacerbated from positioning during surgery and sleeping in hospital bed.    Objective       Current Diet:    Dietary Orders (From admission, onward)     Start     Ordered    23 0856  Diet: Liquid Diets; Clear Liquid; Texture: Regular Texture (IDDSI 7); Fluid Consistency: Thin (IDDSI 0)  Diet Effective Now        References:    Diet Order Crosswalk   Question Answer Comment   Diets: Liquid Diets    Liquid Diet: Clear Liquid    Texture: Regular Texture (IDDSI 7)    Fluid Consistency: Thin (IDDSI 0)        23 0855                Vitals:   Temp:  [97 °F (36.1 °C)-99.4 °F (37.4 °C)] 98.4 °F (36.9 °C)  Heart Rate:  [] 100  Resp:  [18-26] 18  BP: (116-173)/(40-88) 173/86  Flow (L/min):  [2-8] 2  FiO2 (%):  [0.6 %-0.8 %] 0.6 %  Physical Exam   • Constitutional: alert, no acute distress, sitting up in chair  • Respiratory:  breathing not labored, respiratory effort appears normal  • Cardiovascular:  heart regular rate and rhythm  • Abdomen:  soft, nondistended, approp tenderness  • Skin and subcutaneous tissue:  Warm and dry  • Musculoskeletal: moving all extremities symmetrically and purposefully  • Neurologic:  no obvious motor or sensory deficits, alert & oriented x 3, speech clear    LABS:  CBC        2023    10:40 2023    04:55   CBC   WBC 8.53   11.15     RBC 5.26   4.23     Hemoglobin 15.7   12.5     Hematocrit 43.6   35.2     MCV 82.9   83.2     MCH 29.8   29.6     MCHC 36.0   35.5     RDW 11.9   12.0     Platelets 251   179       CMP        2023    14:47 2023    10:40 2023    04:55   CMP   Glucose  101   112     BUN  16   10     Creatinine 0.80   0.73   0.78      EGFR 91.6   102.2   94.4     Sodium  139   139     Potassium  4.2   4.1     Chloride  107   107     Calcium  9.6   9.0     Total Protein  7.8      Albumin  4.4      Globulin  3.4      Total Bilirubin  0.5      Alkaline Phosphatase  92      AST (SGOT)  17      ALT (SGPT)  19      Albumin/Globulin Ratio  1.3      BUN/Creatinine Ratio  21.9   12.8     Anion Gap  11.4   7.8           Lab Results (last 24 hours)     Magnesium [[id number]]  (Normal) Collected: 05/06/23 0455     Magnesium 1.8 mg/dL     Phosphorus [[id number]]  (Normal) Collected: 05/06/23 0455     Phosphorus 3.4 mg/dL             Assessment / Plan   Assessment:   s/p Robotic left colectomy 5/5/23  Chronic hip and shoulder pain  HTN    Plan:    d/c dupont  clear liq diet  Ambulate  PT/OT  Add a few home meds for HTN and chronic joint pain & use heating pad as well     Electronically signed by Rolando Liu MD, 05/06/23, 9:30 AM EDT.

## 2023-05-07 LAB
ANION GAP SERPL CALCULATED.3IONS-SCNC: 9.3 MMOL/L (ref 5–15)
BUN SERPL-MCNC: 8 MG/DL (ref 6–20)
BUN/CREAT SERPL: 12.9 (ref 7–25)
CALCIUM SPEC-SCNC: 8.8 MG/DL (ref 8.6–10.5)
CHLORIDE SERPL-SCNC: 107 MMOL/L (ref 98–107)
CO2 SERPL-SCNC: 22.7 MMOL/L (ref 22–29)
CREAT SERPL-MCNC: 0.62 MG/DL (ref 0.57–1)
DEPRECATED RDW RBC AUTO: 37.2 FL (ref 37–54)
EGFRCR SERPLBLD CKD-EPI 2021: 110.7 ML/MIN/1.73
ERYTHROCYTE [DISTWIDTH] IN BLOOD BY AUTOMATED COUNT: 12 % (ref 12.3–15.4)
GLUCOSE SERPL-MCNC: 96 MG/DL (ref 65–99)
HCT VFR BLD AUTO: 35.7 % (ref 34–46.6)
HGB BLD-MCNC: 12.3 G/DL (ref 12–15.9)
MAGNESIUM SERPL-MCNC: 1.8 MG/DL (ref 1.6–2.6)
MCH RBC QN AUTO: 29.5 PG (ref 26.6–33)
MCHC RBC AUTO-ENTMCNC: 34.5 G/DL (ref 31.5–35.7)
MCV RBC AUTO: 85.6 FL (ref 79–97)
PHOSPHATE SERPL-MCNC: 1.6 MG/DL (ref 2.5–4.5)
PLATELET # BLD AUTO: 159 10*3/MM3 (ref 140–450)
PMV BLD AUTO: 10.1 FL (ref 6–12)
POTASSIUM SERPL-SCNC: 3.6 MMOL/L (ref 3.5–5.2)
RBC # BLD AUTO: 4.17 10*6/MM3 (ref 3.77–5.28)
SODIUM SERPL-SCNC: 139 MMOL/L (ref 136–145)
WBC NRBC COR # BLD: 10.18 10*3/MM3 (ref 3.4–10.8)

## 2023-05-07 PROCEDURE — 84100 ASSAY OF PHOSPHORUS: CPT | Performed by: SURGERY

## 2023-05-07 PROCEDURE — 25010000002 ENOXAPARIN PER 10 MG: Performed by: NURSE PRACTITIONER

## 2023-05-07 PROCEDURE — 25010000002 KETOROLAC TROMETHAMINE PER 15 MG: Performed by: NURSE PRACTITIONER

## 2023-05-07 PROCEDURE — 80048 BASIC METABOLIC PNL TOTAL CA: CPT | Performed by: SURGERY

## 2023-05-07 PROCEDURE — 94799 UNLISTED PULMONARY SVC/PX: CPT

## 2023-05-07 PROCEDURE — 97165 OT EVAL LOW COMPLEX 30 MIN: CPT

## 2023-05-07 PROCEDURE — 25010000002 KETOROLAC TROMETHAMINE PER 15 MG: Performed by: SURGERY

## 2023-05-07 PROCEDURE — 99024 POSTOP FOLLOW-UP VISIT: CPT | Performed by: SURGERY

## 2023-05-07 PROCEDURE — 85027 COMPLETE CBC AUTOMATED: CPT | Performed by: SURGERY

## 2023-05-07 PROCEDURE — 83735 ASSAY OF MAGNESIUM: CPT | Performed by: SURGERY

## 2023-05-07 RX ORDER — SODIUM PHOSPHATE IN D5W 15MMOL/250
15 PLASTIC BAG, INJECTION (ML) INTRAVENOUS ONCE
Status: COMPLETED | OUTPATIENT
Start: 2023-05-07 | End: 2023-05-07

## 2023-05-07 RX ADMIN — KETOROLAC TROMETHAMINE 15 MG: 15 INJECTION, SOLUTION INTRAMUSCULAR; INTRAVENOUS at 03:09

## 2023-05-07 RX ADMIN — METRONIDAZOLE 500 MG: 5 INJECTION, SOLUTION INTRAVENOUS at 11:14

## 2023-05-07 RX ADMIN — METRONIDAZOLE 500 MG: 5 INJECTION, SOLUTION INTRAVENOUS at 03:09

## 2023-05-07 RX ADMIN — METRONIDAZOLE 500 MG: 5 INJECTION, SOLUTION INTRAVENOUS at 20:58

## 2023-05-07 RX ADMIN — ENOXAPARIN SODIUM 40 MG: 100 INJECTION SUBCUTANEOUS at 09:04

## 2023-05-07 RX ADMIN — LOSARTAN POTASSIUM 100 MG: 50 TABLET, FILM COATED ORAL at 09:04

## 2023-05-07 RX ADMIN — ROPINIROLE HYDROCHLORIDE 1 MG: 1 TABLET, FILM COATED ORAL at 20:59

## 2023-05-07 RX ADMIN — PANTOPRAZOLE SODIUM 40 MG: 40 INJECTION, POWDER, FOR SOLUTION INTRAVENOUS at 06:06

## 2023-05-07 RX ADMIN — KETOROLAC TROMETHAMINE 15 MG: 15 INJECTION, SOLUTION INTRAMUSCULAR; INTRAVENOUS at 15:02

## 2023-05-07 RX ADMIN — AMLODIPINE BESYLATE 10 MG: 5 TABLET ORAL at 20:59

## 2023-05-07 RX ADMIN — KETOROLAC TROMETHAMINE 15 MG: 30 INJECTION, SOLUTION INTRAMUSCULAR; INTRAVENOUS at 20:58

## 2023-05-07 RX ADMIN — SODIUM CHLORIDE, POTASSIUM CHLORIDE, SODIUM LACTATE AND CALCIUM CHLORIDE 100 ML/HR: 600; 310; 30; 20 INJECTION, SOLUTION INTRAVENOUS at 06:06

## 2023-05-07 RX ADMIN — KETOROLAC TROMETHAMINE 15 MG: 15 INJECTION, SOLUTION INTRAMUSCULAR; INTRAVENOUS at 09:04

## 2023-05-07 RX ADMIN — SODIUM PHOSPHATE, MONOBASIC, MONOHYDRATE 15 MMOL: 276; 142 INJECTION, SOLUTION INTRAVENOUS at 11:58

## 2023-05-07 NOTE — PLAN OF CARE
Problem: Adult Inpatient Plan of Care  Goal: Plan of Care Review  Outcome: Ongoing, Progressing  Flowsheets (Taken 5/7/2023 1516)  Plan of Care Reviewed With: patient  Outcome Evaluation: VSS. Pt pain controlled with scheduled toradol. IV abx. Sodium phosphate adminstered, per order. Pt ambulated in room with SBA, pt states she has had 2 very small BM and is passing flatus. Family involved in care.  Goal: Patient-Specific Goal (Individualized)  Outcome: Ongoing, Progressing  Goal: Absence of Hospital-Acquired Illness or Injury  Outcome: Ongoing, Progressing  Intervention: Identify and Manage Fall Risk  Description: Perform standard risk assessment on admission using a validated tool or comprehensive approach appropriate to the patient; reassess fall risk frequently, with change in status or transfer to another level of care.  Communicate fall injury risk to interprofessional healthcare team.  Determine need for increased observation, equipment and environmental modification, such as low bed, signage and supportive, nonskid footwear.  Adjust safety measures to individual developmental age, stage and identified risk factors.  Reinforce the importance of safety and physical activity with patient and family.  Perform regular intentional rounding to assess need for position change, pain assessment and personal needs, including assistance with toileting.  Recent Flowsheet Documentation  Taken 5/7/2023 0715 by Rosa Elam RNA  Safety Promotion/Fall Prevention: safety round/check completed  Intervention: Prevent Skin Injury  Description: Perform a screening for skin injury risk, such as pressure or moisture associated skin damage on admission and at regular intervals throughout hospital stay.  Keep all areas of skin (especially folds) clean and dry.  Maintain adequate skin hydration.  Relieve and redistribute pressure and protect bony prominences; implement measures based on patient-specific risk factors.  Match  turning and repositioning schedule to clinical condition.  Encourage weight shift frequently; assist with reposition if unable to complete independently.  Float heels off bed; avoid pressure on the Achilles tendon.  Keep skin free from extended contact with medical devices.  Encourage functional activity and mobility, as early as tolerated.  Use aids (e.g., slide boards, mechanical lift) during transfer.  Recent Flowsheet Documentation  Taken 5/7/2023 0715 by Rosa Elam, RNA  Body Position: position changed independently  Intervention: Prevent and Manage VTE (Venous Thromboembolism) Risk  Description: Assess for VTE (venous thromboembolism) risk.  Encourage and assist with early ambulation.  Initiate and maintain compression or other therapy, as indicated, based on identified risk in accordance with organizational protocol and provider order.  Encourage both active and passive leg exercises while in bed, if unable to ambulate.  Recent Flowsheet Documentation  Taken 5/7/2023 0715 by Rosa Elam, RNA  Range of Motion: active ROM (range of motion) encouraged  Goal: Optimal Comfort and Wellbeing  Outcome: Ongoing, Progressing  Intervention: Provide Person-Centered Care  Description: Use a family-focused approach to care.  Develop trust and rapport by proactively providing information, encouraging questions, addressing concerns and offering reassurance.  Acknowledge emotional response to hospitalization.  Recognize and utilize personal coping strategies.  Honor spiritual and cultural preferences.  Recent Flowsheet Documentation  Taken 5/7/2023 0715 by Rosa Elam, RNA  Trust Relationship/Rapport:   care explained   choices provided   emotional support provided   empathic listening provided   questions answered   questions encouraged   reassurance provided   thoughts/feelings acknowledged  Goal: Readiness for Transition of Care  Outcome: Ongoing, Progressing   Goal Outcome Evaluation:  Plan of Care Reviewed  With: patient           Outcome Evaluation: VSS. Pt pain controlled with scheduled toradol. IV abx. Sodium phosphate adminstered, per order. Pt ambulated in room with SBA, pt states she has had 2 very small BM and is passing flatus. Family involved in care.

## 2023-05-07 NOTE — PROGRESS NOTES
[facility]     Surgery Progress Note    Patient Name: Lilian Pelaez  :    1975  MRN:    [id number]  Date of admission:  2023  Length of Stay: 2 days    Subjective   Patient feels better than she did yesterday.  She has passing some gas.  She has some incisional pain but her main issue is still related to her chronic hip and back pain.  She has been walking in the room.  No nausea or vomiting.  No concerns reported by nursing staff.    Objective       Current Diet:    Dietary Orders (From admission, onward)     Start     Ordered    23 0856  Diet: Liquid Diets; Clear Liquid; Texture: Regular Texture (IDDSI 7); Fluid Consistency: Thin (IDDSI 0)  Diet Effective Now        References:    Diet Order Crosswalk   Question Answer Comment   Diets: Liquid Diets    Liquid Diet: Clear Liquid    Texture: Regular Texture (IDDSI 7)    Fluid Consistency: Thin (IDDSI 0)        23 0855                Vitals:   Temp:  [97.9 °F (36.6 °C)-99.2 °F (37.3 °C)] 98.3 °F (36.8 °C)  Heart Rate:  [82-95] 95  Resp:  [18] 18  BP: (100-154)/(56-81) 126/81  Physical Exam   • Constitutional: alert, no acute distress, sitting up in a chair  • Respiratory:  breathing not labored, respiratory effort appears normal  • Cardiovascular:  heart regular rate and rhythm  • Abdomen:  soft, nondistended, approp tenderness, incisions without any signs infection  • Musculoskeletal: moving all extremities symmetrically and purposefully  • Neurologic:  no obvious motor or sensory deficits, alert & oriented, speech clear  • Psychiatric:  mood normal      LABS:  CBC        2023    10:40 2023    04:55 2023    04:13   CBC   WBC 8.53   11.15   10.18     RBC 5.26   4.23   4.17     Hemoglobin 15.7   12.5   12.3     Hematocrit 43.6   35.2   35.7     MCV 82.9   83.2   85.6     MCH 29.8   29.6   29.5     MCHC 36.0   35.5   34.5     RDW 11.9   12.0   12.0     Platelets 251   179   159       CMP        2023    10:40 2023    04:55  5/7/2023    04:13   CMP   Glucose 101   112   96     BUN 16   10   8     Creatinine 0.73   0.78   0.62     EGFR 102.2   94.4   110.7     Sodium 139   139   139     Potassium 4.2   4.1   3.6     Chloride 107   107   107     Calcium 9.6   9.0   8.8     Total Protein 7.8       Albumin 4.4       Globulin 3.4       Total Bilirubin 0.5       Alkaline Phosphatase 92       AST (SGOT) 17       ALT (SGPT) 19       Albumin/Globulin Ratio 1.3       BUN/Creatinine Ratio 21.9   12.8   12.9     Anion Gap 11.4   7.8   9.3         Assessment / Plan   Assessment:   s/p Robotic left colectomy 5/5/23  Chronic hip and shoulder pain  HTN  Satisfactory postop progress thus far     Plan:    Start full liquids tonight if continues to do well  Ambulate  PT/OT  Appropriate home meds for HTN and chronic joint pain.  Heating pad ordered as well.     Electronically signed by Rolando Liu MD, 05/07/23, 11:09 AM EDT.

## 2023-05-07 NOTE — THERAPY EVALUATION
Patient Name: Lilian Pelaez  : 1975    MRN: [id number]                              Today's Date: 2023       Admit Date: 2023    Visit Dx:     ICD-10-CM ICD-9-CM   1. Decreased activities of daily living (ADL)  Z78.9 V49.89   2. Malignant neoplasm of descending colon  C18.6 153.2     Patient Active Problem List   Diagnosis   • Constipation   • Mass of colon   • Arthritis   • Benign essential hypertension   • Cancer   • Cervical radiculopathy   • Chest pain   • Chronic gastritis   • Chronic pain disorder   • Chronic post-traumatic stress disorder   • DDD (degenerative disc disease), cervical   • Displacement of lumbar intervertebral disc without myelopathy   • Dizziness   • Edema   • Edema of lower extremity   • Electrocardiogram abnormal   • Bladder disorder   • Gastroparesis   • Generalized osteoarthritis   • Gout   • Hypercalciuria   • Hypertension   • Influenza-like symptoms   • Kidney disease   • Limb swelling   • Migraine without aura   • Mild recurrent major depression   • Mixed stress and urge urinary incontinence   • Morbid obesity   • Palpitations   • Primary adenocarcinoma of descending colon   • Restless legs   • Seasonal allergic rhinitis   • Shortness of breath   • Ulcerative lesion   • S/P left colectomy   • Malignant neoplasm of descending colon     Past Medical History:   Diagnosis Date   • Anesthesia     B/P bottomed out   • Anxiety    • Asthma     seasonal   • Bilateral shoulder pain    • Colon cancer 2023   • DDD (degenerative disc disease), cervical    • DDD (degenerative disc disease), lumbar    • Depression    • Diverticulitis of colon     Scope was done in [facility]   • GERD (gastroesophageal reflux disease)    • Hypertension    • Kidney stones    • Melanoma     removed   • Prolapse of female bladder    • SOBOE (shortness of breath on exertion)      Past Surgical History:   Procedure Laterality Date   • BREAST SURGERY      Removed 8lbs of tissue, 2016   •  CHOLECYSTECTOMY     • COLONOSCOPY N/A 04/04/2023    Procedure: COLONOSCOPY WITH POLYPECTOMY/SNARE/BIOPSIES/TATTOOING DESCENDING COLON MASS;  Surgeon: Deniz Dowd MD;  Location: [facility] ENDOSCOPY;  Service: Gastroenterology;  Laterality: N/A;  INCOMPLETE COLONOSCOPY  POOR BOWEL PREP  COLON POLYP  COLON MASS  DIVERTICULOSIS   • COLONOSCOPY N/A 04/12/2023    Procedure: COLONOSCOPY with cold snare;  Surgeon: Deniz Dowd MD;  Location: [facility] ENDOSCOPY;  Service: Gastroenterology;  Laterality: N/A;  colon polyps, diverticulosis, colon mass, hemorrhoids     • FRACTURE SURGERY  2001    Left knee   • KNEE SURGERY Left     x4   • LAPAROSCOPIC TUBAL LIGATION  1995   • LAPAROSCOPIC TUBAL LIGATION      Reversed   • REDUCTION MAMMAPLASTY  2009    8lbs of tissue was removed   • TONSILLECTOMY     • UPPER GASTROINTESTINAL ENDOSCOPY        General Information     Row Name 05/07/23 1012          OT Time and Intention    Document Type evaluation  -     Mode of Treatment individual therapy;occupational therapy  -     Row Name 05/07/23 1012          General Information    Patient Profile Reviewed yes  -AC     Prior Level of Function independent:;all household mobility;community mobility;ADL's  patient states she used a walker or cane prn due to chronic hip pain, has walk in shower with seat and BSC.  (I) with adls.  -     Existing Precautions/Restrictions fall  abdominal surgery  -     Barriers to Rehab none identified  -     Row Name 05/07/23 1012          Occupational Profile    Reason for Services/Referral (Occupational Profile) Pt. is a 47year old female admitted for the above diagnosis status post cystoscopy on 5/5/2023. Pt. referred to OT services to assess independence with ADLs and adl transfers/fx'l mobility. No previous OT services for current condition.  -     Row Name 05/07/23 1012          Living Environment    People in Home child(neal), dependent;spouse  -     Row Name 05/07/23 1012           Cognition    Orientation Status (Cognition) oriented x 4  -AC     Row Name 05/07/23 1012          Safety Issues, Functional Mobility    Impairments Affecting Function (Mobility) balance;endurance/activity tolerance;pain  -           User Key  (r) = Recorded By, (t) = Taken By, (c) = Cosigned By    Initials Name Provider Type    Carolina Linton OT Occupational Therapist                 Mobility/ADL's     Row Name 05/07/23 1017          Bed Mobility    Bed Mobility bed mobility (all) activities;supine-sit-supine  -     All Activities, [place] (Bed Mobility) standby assist  -     Bed Mobility, Safety Issues decreased use of arms for pushing/pulling  -     Assistive Device (Bed Mobility) head of bed elevated;bed rails  -     Comment, (Bed Mobility) patient instructed in log roll as well.  -     Row Name 05/07/23 1017          Transfers    Transfers sit-stand transfer;bed-chair transfer  -     Row Name 05/07/23 1017          Bed-Chair Transfer    Bed-Chair [place] (Transfers) standby assist;contact guard;1 person assist  -AC     Comment, (Bed-Chair Transfer) us of IV pole  -     Row Name 05/07/23 1017          Sit-Stand Transfer    Sit-Stand [place] (Transfers) standby assist;contact guard;1 person assist  -AC     Comment, (Sit-Stand Transfer) use of IV pole  -AC     Row Name 05/07/23 1017          Activities of Daily Living    BADL Assessment/Intervention --  patient is setup for upper body bathing/dressing, setup for grooming, setup for self-feeding, mod A for lower body bahting/dressing, min A for toileting.  -           User Key  (r) = Recorded By, (t) = Taken By, (c) = Cosigned By    Initials Name Provider Type    Carolina Linton OT Occupational Therapist               Obj/Interventions     Row Name 05/07/23 1020          Sensory Assessment (Somatosensory)    Sensory Assessment (Somatosensory) UE sensation intact  -     Sensory Assessment patient complaining of numbness in BLE   -[facility] Name 05/07/23 1020          Vision Assessment/Intervention    Visual Impairment/Limitations WFL  -[facility] Name 05/07/23 1020          Range of Motion Comprehensive    General Range of Motion bilateral upper extremity ROM WNL  -[facility] Name 05/07/23 1020          Strength Comprehensive (MMT)    General Manual Muscle Testing (MMT) Assessment no strength deficits identified  -[facility] Name 05/07/23 1020          Motor Skills    Motor Skills coordination;functional endurance  -AC     Coordination WFL  -AC     Functional Endurance fair  -AC     Row Name 05/07/23 1020          Balance    Balance Assessment standing dynamic balance  -AC     Dynamic Standing Balance contact guard;standby assist;verbal cues;1-person assist  -AC     Position/Device Used, Standing Balance supported  -AC           User Key  (r) = Recorded By, (t) = Taken By, (c) = Cosigned By    Initials Name Provider Type    Carolina Linton OT Occupational Therapist               Goals/Plan     Row Name 05/07/23 1023          Transfer Goal 1 (OT)    Activity/Assistive Device (Transfer Goal 1, OT) transfers, all  -AC     [place] Level/Cues Needed (Transfer Goal 1, OT) modified independence  -AC     Time Frame (Transfer Goal 1, OT) long term goal (LTG);10 days  -AC     Row Name 05/07/23 1023          Bathing Goal 1 (OT)    Activity/Device (Bathing Goal 1, OT) bathing skills, all  -AC     [place] Level/Cues Needed (Bathing Goal 1, OT) modified independence  -AC     Time Frame (Bathing Goal 1, OT) long term goal (LTG);10 days  -AC     Row Name 05/07/23 1023          Dressing Goal 1 (OT)    Activity/Device (Dressing Goal 1, OT) dressing skills, all  -AC     [place]/Cues Needed (Dressing Goal 1, OT) modified independence  -AC     Time Frame (Dressing Goal 1, OT) long term goal (LTG);10 days  -AC     [facility] Name 05/07/23 1023          Toileting Goal 1 (OT)    Activity/Device (Toileting Goal 1, OT) toileting skills, all  -AC      [place] Level/Cues Needed (Toileting Goal 1, OT) modified independence  -AC     Time Frame (Toileting Goal 1, OT) long term goal (LTG);10 days  -     Row Name 05/07/23 1023          Grooming Goal 1 (OT)    Activity/Device (Grooming Goal 1, OT) grooming skills, all  -AC     [place] (Grooming Goal 1, OT) modified independence  -AC     Time Frame (Grooming Goal 1, OT) long term goal (LTG);10 days  -     Row Name 05/07/23 1023          Problem Specific Goal 1 (OT)    Problem Specific Goal 1 (OT) patient will improve endurance to fair plus for adls.  -AC     Time Frame (Problem Specific Goal 1, OT) long term goal (LTG);10 days  -     Row Name 05/07/23 1023          Therapy Assessment/Plan (OT)    Planned Therapy Interventions (OT) activity tolerance training;functional balance retraining;occupation/activity based interventions;patient/caregiver education/training;transfer/mobility retraining;ROM/therapeutic exercise;BADL retraining  -           User Key  (r) = Recorded By, (t) = Taken By, (c) = Cosigned By    Initials Name Provider Type    AC Carolina Keith OT Occupational Therapist               Clinical Impression     Row Name 05/07/23 1022          Pain Assessment    Pretreatment Pain Rating 1/10  -     Posttreatment Pain Rating 1/10  -     Pain Location - foot;abdomen  -     Row Name 05/07/23 1022          Plan of Care Review    Plan of Care Reviewed With patient  -     Progress no change  -     Outcome Evaluation Patient presents with limitations that impede his/her ability to perform ADLS. The skills of a therapist are necessary to maximize independence with ADLs.  Recommend home with assist following hospital discharge.  -     Row Name 05/07/23 1022          Therapy Assessment/Plan (OT)    Patient/Family Therapy Goal Statement (OT) none stated  -     Rehab Potential (OT) good, to achieve stated therapy goals  -     Criteria for Skilled Therapeutic Interventions Met (OT)  yes;skilled treatment is necessary;meets criteria  -     Therapy Frequency (OT) 5 times/wk  -     Row Name 05/07/23 1022          Therapy Plan Review/Discharge Plan (OT)    Equipment Needs Upon Discharge (OT) walker, rolling  -AC     Anticipated Discharge Disposition (OT) home with assist  -     Row Name 05/07/23 1022          Positioning and Restraints    Pre-Treatment Position in bed  -AC     Post Treatment Position chair  -AC     In Chair call light within reach;encouraged to call for assist;reclined  -           User Key  (r) = Recorded By, (t) = Taken By, (c) = Cosigned By    Initials Name Provider Type    AC Carolina Keith OT Occupational Therapist               Outcome Measures     Row Name 05/07/23 1024          How much help from another is currently needed...    Putting on and taking off regular lower body clothing? 3  -AC     Bathing (including washing, rinsing, and drying) 3  -AC     Toileting (which includes using toilet bed pan or urinal) 3  -AC     Putting on and taking off regular upper body clothing 4  -AC     Taking care of personal grooming (such as brushing teeth) 4  -AC     Eating meals 4  -AC     AM-PAC 6 Clicks Score (OT) 21  -AC     Row Name 05/07/23 1024          Functional Assessment    Outcome Measure Options AM-PAC 6 Clicks Daily Activity (OT);Optimal Instrument  -     Row Name 05/07/23 1024          Optimal Instrument    Optimal Instrument Optimal - 3  -AC     Bending/Stooping 3  -AC     Standing 2  -AC     Reaching 1  -AC     From the list, choose the 3 activities you would most like to be able to do without any difficulty Bending/stooping;Standing;Reaching  -AC     Total Score Optimal - 3 6  -AC           User Key  (r) = Recorded By, (t) = Taken By, (c) = Cosigned By    Initials Name Provider Type    Carolina Linton OT Occupational Therapist                Occupational Therapy Education     Title: PT OT SLP Therapies (Done)     Topic: Occupational Therapy (Done)     Point:  ADL training (Done)     Description:   Instruct learner(s) on proper safety adaptation and remediation techniques during self care or transfers.   Instruct in proper use of assistive devices.              Learning Progress Summary           Patient Acceptance, E, VU by  at 5/7/2023 1025                   Point: Home exercise program (Done)     Description:   Instruct learner(s) on appropriate technique for monitoring, assisting and/or progressing therapeutic exercises/activities.              Learning Progress Summary           Patient Acceptance, E, VU by  at 5/7/2023 1025                   Point: Precautions (Done)     Description:   Instruct learner(s) on prescribed precautions during self-care and functional transfers.              Learning Progress Summary           Patient Acceptance, E, VU by  at 5/7/2023 1025                   Point: Body mechanics (Done)     Description:   Instruct learner(s) on proper positioning and spine alignment during self-care, functional mobility activities and/or exercises.              Learning Progress Summary           Patient Acceptance, E, VU by  at 5/7/2023 1025                               User Key     Initials Effective Dates Name Provider Type Discipline     06/16/21 -  Carolina Keith OT Occupational Therapist OT              OT Recommendation and Plan  Planned Therapy Interventions (OT): activity tolerance training, functional balance retraining, occupation/activity based interventions, patient/caregiver education/training, transfer/mobility retraining, ROM/therapeutic exercise, BADL retraining  Therapy Frequency (OT): 5 times/wk  Plan of Care Review  Plan of Care Reviewed With: patient  Progress: no change  Outcome Evaluation: Patient presents with limitations that impede his/her ability to perform ADLS. The skills of a therapist are necessary to maximize independence with ADLs.  Recommend home with assist following hospital discharge.     Time Calculation:     Time Calculation- OT     Row Name 05/07/23 1026             Time Calculation- OT    OT Received On 05/07/23  -AC      OT Goal Re-Cert Due Date 05/16/23  -AC         Untimed Charges    OT Eval/Re-eval Minutes 32  -AC         Total Minutes    Untimed Charges Total Minutes 32  -AC       Total Minutes 32  -AC            User Key  (r) = Recorded By, (t) = Taken By, (c) = Cosigned By    Initials Name Provider Type    AC Carolina Keith OT Occupational Therapist              Therapy Charges for Today     Code Description Service Date Service Provider Modifiers Qty    [id number] HC OT EVAL LOW COMPLEXITY 3 5/7/2023 Carolina Keith OT GO 1               Carolina Keith OT  5/7/2023

## 2023-05-07 NOTE — PLAN OF CARE
Goal Outcome Evaluation:  Plan of Care Reviewed With: patient        Progress: no change  Outcome Evaluation: Patient presents with limitations that impede his/her ability to perform ADLS. The skills of a therapist are necessary to maximize independence with ADLs.  Recommend home with assist following hospital discharge.

## 2023-05-08 LAB
ANION GAP SERPL CALCULATED.3IONS-SCNC: 5.8 MMOL/L (ref 5–15)
BUN SERPL-MCNC: 7 MG/DL (ref 6–20)
BUN/CREAT SERPL: 11.5 (ref 7–25)
CALCIUM SPEC-SCNC: 8.9 MG/DL (ref 8.6–10.5)
CHLORIDE SERPL-SCNC: 108 MMOL/L (ref 98–107)
CO2 SERPL-SCNC: 25.2 MMOL/L (ref 22–29)
CREAT SERPL-MCNC: 0.61 MG/DL (ref 0.57–1)
EGFRCR SERPLBLD CKD-EPI 2021: 111.1 ML/MIN/1.73
GLUCOSE SERPL-MCNC: 97 MG/DL (ref 65–99)
MAGNESIUM SERPL-MCNC: 1.7 MG/DL (ref 1.6–2.6)
PHOSPHATE SERPL-MCNC: 3.3 MG/DL (ref 2.5–4.5)
POTASSIUM SERPL-SCNC: 3.6 MMOL/L (ref 3.5–5.2)
SODIUM SERPL-SCNC: 139 MMOL/L (ref 136–145)

## 2023-05-08 PROCEDURE — 25010000002 KETOROLAC TROMETHAMINE PER 15 MG: Performed by: SURGERY

## 2023-05-08 PROCEDURE — 94799 UNLISTED PULMONARY SVC/PX: CPT

## 2023-05-08 PROCEDURE — 84100 ASSAY OF PHOSPHORUS: CPT | Performed by: SURGERY

## 2023-05-08 PROCEDURE — 80048 BASIC METABOLIC PNL TOTAL CA: CPT | Performed by: SURGERY

## 2023-05-08 PROCEDURE — 83735 ASSAY OF MAGNESIUM: CPT | Performed by: SURGERY

## 2023-05-08 PROCEDURE — 25010000002 KETOROLAC TROMETHAMINE PER 15 MG: Performed by: NURSE PRACTITIONER

## 2023-05-08 PROCEDURE — 97161 PT EVAL LOW COMPLEX 20 MIN: CPT

## 2023-05-08 PROCEDURE — 99024 POSTOP FOLLOW-UP VISIT: CPT | Performed by: NURSE PRACTITIONER

## 2023-05-08 PROCEDURE — 25010000002 ENOXAPARIN PER 10 MG: Performed by: NURSE PRACTITIONER

## 2023-05-08 RX ORDER — KETOROLAC TROMETHAMINE 30 MG/ML
15 INJECTION, SOLUTION INTRAMUSCULAR; INTRAVENOUS EVERY 6 HOURS
Status: DISCONTINUED | OUTPATIENT
Start: 2023-05-08 | End: 2023-05-09 | Stop reason: HOSPADM

## 2023-05-08 RX ADMIN — KETOROLAC TROMETHAMINE 15 MG: 30 INJECTION, SOLUTION INTRAMUSCULAR; INTRAVENOUS at 10:53

## 2023-05-08 RX ADMIN — SODIUM CHLORIDE, POTASSIUM CHLORIDE, SODIUM LACTATE AND CALCIUM CHLORIDE 50 ML/HR: 600; 310; 30; 20 INJECTION, SOLUTION INTRAVENOUS at 04:02

## 2023-05-08 RX ADMIN — ACETAMINOPHEN 500 MG: 500 TABLET ORAL at 09:08

## 2023-05-08 RX ADMIN — METRONIDAZOLE 500 MG: 5 INJECTION, SOLUTION INTRAVENOUS at 11:02

## 2023-05-08 RX ADMIN — PANTOPRAZOLE SODIUM 40 MG: 40 INJECTION, POWDER, FOR SOLUTION INTRAVENOUS at 05:11

## 2023-05-08 RX ADMIN — LOSARTAN POTASSIUM 100 MG: 50 TABLET, FILM COATED ORAL at 09:08

## 2023-05-08 RX ADMIN — HYDROCODONE BITARTRATE AND ACETAMINOPHEN 1 TABLET: 7.5; 325 TABLET ORAL at 21:05

## 2023-05-08 RX ADMIN — AMLODIPINE BESYLATE 10 MG: 5 TABLET ORAL at 21:02

## 2023-05-08 RX ADMIN — KETOROLAC TROMETHAMINE 15 MG: 30 INJECTION, SOLUTION INTRAMUSCULAR; INTRAVENOUS at 04:01

## 2023-05-08 RX ADMIN — HYDROCODONE BITARTRATE AND ACETAMINOPHEN 1 TABLET: 7.5; 325 TABLET ORAL at 01:44

## 2023-05-08 RX ADMIN — ROPINIROLE HYDROCHLORIDE 1 MG: 1 TABLET, FILM COATED ORAL at 21:02

## 2023-05-08 RX ADMIN — ENOXAPARIN SODIUM 40 MG: 100 INJECTION SUBCUTANEOUS at 09:09

## 2023-05-08 RX ADMIN — KETOROLAC TROMETHAMINE 15 MG: 30 INJECTION, SOLUTION INTRAMUSCULAR; INTRAVENOUS at 17:56

## 2023-05-08 RX ADMIN — METRONIDAZOLE 500 MG: 5 INJECTION, SOLUTION INTRAVENOUS at 04:01

## 2023-05-08 RX ADMIN — MAGNESIUM HYDROXIDE 15 ML: 2400 SUSPENSION ORAL at 14:47

## 2023-05-08 NOTE — PROGRESS NOTES
"POST OP PROGRESS NOTE     Patient Name:  Lilian Pelaez  YOB: 1975  [id number]   LOS: 3 days   3 Days Post-Op  Patient Care Team:  Shelly Cash MD as PCP - General (Family Medicine)  Ramiro Marie MD as Consulting Physician (Cardiology)  Ya Preston APRN as Nurse Practitioner (Gastroenterology)  Rolando Liu MD as Consulting Physician (General Surgery)          Subjective     Interval History:  VSS, afebrile, reports some nausea after breakfast, reports increased incisional pain today , +Flatus and BM      Review of Systems:    All complete review of systems was performed and all are negative except what is documented in the HPI.       Objective     Constitutuional:  well nourished, no acute distress, appear stated age /67 (BP Location: Right arm, Patient Position: Lying)   Pulse 80   Temp 99 °F (37.2 °C) (Oral)   Resp 18   Ht 172.7 cm (68\")   Wt (!) 139 kg (306 lb 14.1 oz)   LMP 04/26/2023   SpO2 95%   BMI 46.66 kg/m²    Eyes:  anicteric sclerae, moist conjunctivae, no lid lag, PERRLA  ENMT:  oropharynx clear, moist mucous membranes, good dentition  Neck:   full ROM, trachea midline, no thyromegaly  Cardiovascular: RRR, S1 and S2 present, no MRG's, heart rate 80, no pedal edema  Respiratory: lungs CTA, respirations even and unlabored  GI:  Abdomen soft, approp tender, incision sites intact,  nondistended, no HSM     Skin:  warm and dry, normal turgor, no rashes  Psychiatric:  alert and oriented x3, intact judgement and insight, cooperative          Results Review:       I reviewed the patient's new clinical results including  CBC and BMP.     WBC   Date Value Ref Range Status   05/07/2023 10.18 3.40 - 10.80 10*3/mm3 Final     RBC   Date Value Ref Range Status   05/07/2023 4.17 3.77 - 5.28 10*6/mm3 Final     Hemoglobin   Date Value Ref Range Status   05/07/2023 12.3 12.0 - 15.9 g/dL Final     Hematocrit   Date Value Ref Range Status   05/07/2023 35.7 34.0 - " 46.6 % Final     MCV   Date Value Ref Range Status   05/07/2023 85.6 79.0 - 97.0 fL Final     MCH   Date Value Ref Range Status   05/07/2023 29.5 26.6 - 33.0 pg Final     MCHC   Date Value Ref Range Status   05/07/2023 34.5 31.5 - 35.7 g/dL Final     RDW   Date Value Ref Range Status   05/07/2023 12.0 (L) 12.3 - 15.4 % Final     RDW-SD   Date Value Ref Range Status   05/07/2023 37.2 37.0 - 54.0 fl Final     MPV   Date Value Ref Range Status   05/07/2023 10.1 6.0 - 12.0 fL Final     Platelets   Date Value Ref Range Status   05/07/2023 159 140 - 450 10*3/mm3 Final     Neutrophil %   Date Value Ref Range Status   05/06/2023 78.1 (H) 42.7 - 76.0 % Final     Lymphocyte %   Date Value Ref Range Status   05/06/2023 11.3 (L) 19.6 - 45.3 % Final     Monocyte %   Date Value Ref Range Status   05/06/2023 10.0 5.0 - 12.0 % Final     Eosinophil %   Date Value Ref Range Status   05/06/2023 0.0 (L) 0.3 - 6.2 % Final     Basophil %   Date Value Ref Range Status   05/06/2023 0.2 0.0 - 1.5 % Final     Immature Grans %   Date Value Ref Range Status   05/06/2023 0.4 0.0 - 0.5 % Final     Neutrophils, Absolute   Date Value Ref Range Status   05/06/2023 8.71 (H) 1.70 - 7.00 10*3/mm3 Final     Lymphocytes, Absolute   Date Value Ref Range Status   05/06/2023 1.26 0.70 - 3.10 10*3/mm3 Final     Monocytes, Absolute   Date Value Ref Range Status   05/06/2023 1.12 (H) 0.10 - 0.90 10*3/mm3 Final     Eosinophils, Absolute   Date Value Ref Range Status   05/06/2023 0.00 0.00 - 0.40 10*3/mm3 Final     Basophils, Absolute   Date Value Ref Range Status   05/06/2023 0.02 0.00 - 0.20 10*3/mm3 Final     Immature Grans, Absolute   Date Value Ref Range Status   05/06/2023 0.04 0.00 - 0.05 10*3/mm3 Final     nRBC   Date Value Ref Range Status   05/06/2023 0.0 0.0 - 0.2 /100 WBC Final         Basic Metabolic Panel    Sodium Sodium   Date Value Ref Range Status   05/08/2023 139 136 - 145 mmol/L Final   05/07/2023 139 136 - 145 mmol/L Final   05/06/2023 139  136 - 145 mmol/L Final      Potassium Potassium   Date Value Ref Range Status   05/08/2023 3.6 3.5 - 5.2 mmol/L Final   05/07/2023 3.6 3.5 - 5.2 mmol/L Final   05/06/2023 4.1 3.5 - 5.2 mmol/L Final      Chloride Chloride   Date Value Ref Range Status   05/08/2023 108 (H) 98 - 107 mmol/L Final   05/07/2023 107 98 - 107 mmol/L Final   05/06/2023 107 98 - 107 mmol/L Final      Bicarbonate No results found for: PLASMABICARB   BUN BUN   Date Value Ref Range Status   05/08/2023 7 6 - 20 mg/dL Final   05/07/2023 8 6 - 20 mg/dL Final   05/06/2023 10 6 - 20 mg/dL Final      Creatinine Creatinine   Date Value Ref Range Status   05/08/2023 0.61 0.57 - 1.00 mg/dL Final   05/07/2023 0.62 0.57 - 1.00 mg/dL Final   05/06/2023 0.78 0.57 - 1.00 mg/dL Final      Calcium Calcium   Date Value Ref Range Status   05/08/2023 8.9 8.6 - 10.5 mg/dL Final   05/07/2023 8.8 8.6 - 10.5 mg/dL Final   05/06/2023 9.0 8.6 - 10.5 mg/dL Final      Glucose      No components found for: GLUCOSE.*       Lab Results   Component Value Date    GLUCOSE 97 05/08/2023    BUN 7 05/08/2023    CREATININE 0.61 05/08/2023    EGFR 111.1 05/08/2023    BCR 11.5 05/08/2023    K 3.6 05/08/2023    CO2 25.2 05/08/2023    CALCIUM 8.9 05/08/2023    ALBUMIN 4.4 04/26/2023    BILITOT 0.5 04/26/2023    AST 17 04/26/2023    ALT 19 04/26/2023       IMAGING:  Imaging Results (Last 72 Hours)     ** No results found for the last 72 hours. **          Medications:    Current Facility-Administered Medications:   •  acetaminophen (TYLENOL) tablet 500 mg, 500 mg, Oral, Q4H PRN, Rolando Liu MD  •  amLODIPine (NORVASC) tablet 10 mg, 10 mg, Oral, Nightly, Rolando Liu MD, 10 mg at 05/07/23 2059  •  Diclofenac Sodium (VOLTAREN) 1 % gel 4 g, 4 g, Topical, 4x Daily PRN, Rolando Liu MD, 4 g at 05/06/23 2021  •  diphenhydrAMINE (BENADRYL) capsule 25 mg, 25 mg, Oral, Q8H PRN, Rolando Liu MD  •  Enoxaparin Sodium (LOVENOX) syringe 40 mg, 40 mg, Subcutaneous, Daily, [place],  Vani BERMUDEZ APRN, 40 mg at 05/07/23 0904  •  HYDROcodone-acetaminophen (NORCO) 7.5-325 MG per tablet 1 tablet, 1 tablet, Oral, Q4H PRN, Rolando Liu MD, 1 tablet at 05/08/23 0144  •  ketorolac (TORADOL) injection 15 mg, 15 mg, Intravenous, Q6H, Vani Harrison APRN  •  losartan (COZAAR) tablet 100 mg, 100 mg, Oral, Q24H, Rolando Liu MD, 100 mg at 05/07/23 0904  •  metoprolol tartrate (LOPRESSOR) injection 2.5 mg, 2.5 mg, Intravenous, TID PRN, Rolando Liu MD  •  metroNIDAZOLE (FLAGYL) IVPB 500 mg, 500 mg, Intravenous, Q8H, Rolando Liu MD, 500 mg at 05/08/23 0401  •  ondansetron (ZOFRAN) tablet 4 mg, 4 mg, Oral, Q6H PRN **OR** ondansetron (ZOFRAN) injection 4 mg, 4 mg, Intravenous, Q6H PRN, Vani Harrison APRYENI  •  pantoprazole (PROTONIX) injection 40 mg, 40 mg, Intravenous, Q AM, Vani Harrison APRN, 40 mg at 05/08/23 0511  •  prochlorperazine (COMPAZINE) injection 5 mg, 5 mg, Intravenous, Q6H PRN, Vani Harrison APRN  •  rOPINIRole (REQUIP) tablet 1 mg, 1 mg, Oral, Nightly, Rolando Liu MD, 1 mg at 05/07/23 2059    Assessment & Plan       S/P left colectomy    Malignant neoplasm of descending colon   Saline lock iv   May shower   Regular diet   Possible DC  Home tomorrow          Electronically signed by CHANTELLE Luis, 05/08/23, 9:02 AM EDT.

## 2023-05-08 NOTE — PLAN OF CARE
Goal Outcome Evaluation:  Plan of Care Reviewed With: patient           Outcome Evaluation: Pt is able to complete all transfers and ambulate ad kary in her room.She does not need inpatient PT services. She had a RW to use at home and will need a bariatric BSC.

## 2023-05-08 NOTE — SIGNIFICANT NOTE
05/08/23 0826   Plan   Plan Patient sitting up in recliner, reports lives with spouse and teenage sons.  Reports good support from spouse and adult daughter if needed.  Patient provides own IADL's.  Reports having a shower chair but will also need a BSC and new walker as her's is very old and unsure of it's location.  CM ordered DME through [employer].  Will continue to monitor for possible needs.  Family will provide transportation home.

## 2023-05-08 NOTE — PLAN OF CARE
Goal Outcome Evaluation:   Patient tolerating advancing diet to regular, VSS, pain well controlled per pt, ambulating, voiding and passing liquid stool.

## 2023-05-08 NOTE — THERAPY EVALUATION
Acute Care - Physical Therapy Initial Evaluation   Joanna     Patient Name: Lilian Pelaez  : 1975  MRN: [id number]  Today's Date: 2023      Visit Dx: Admit date: 2023     Referring Physician: Rolando Liu MD     Surgery Date:2023   Procedure(s) (LRB):  RESECTION OF DESCENDING COLON AND SPLENIC FLEXURE TAKEDOWN WITH DAVINCI ROBOT (Left)  Cystoscopy, left ureteral injection, (Left)         ICD-10-CM ICD-9-CM   1. Decreased activities of daily living (ADL)  Z78.9 V49.89   2. Malignant neoplasm of descending colon  C18.6 153.2   3. Difficulty walking  R26.2 719.7     Patient Active Problem List   Diagnosis   • Constipation   • Mass of colon   • Arthritis   • Benign essential hypertension   • Cancer   • Cervical radiculopathy   • Chest pain   • Chronic gastritis   • Chronic pain disorder   • Chronic post-traumatic stress disorder   • DDD (degenerative disc disease), cervical   • Displacement of lumbar intervertebral disc without myelopathy   • Dizziness   • Edema   • Edema of lower extremity   • Electrocardiogram abnormal   • Bladder disorder   • Gastroparesis   • Generalized osteoarthritis   • Gout   • Hypercalciuria   • Hypertension   • Influenza-like symptoms   • Kidney disease   • Limb swelling   • Migraine without aura   • Mild recurrent major depression   • Mixed stress and urge urinary incontinence   • Morbid obesity   • Palpitations   • Primary adenocarcinoma of descending colon   • Restless legs   • Seasonal allergic rhinitis   • Shortness of breath   • Ulcerative lesion   • S/P left colectomy   • Malignant neoplasm of descending colon     Past Medical History:   Diagnosis Date   • Anesthesia     B/P bottomed out   • Anxiety    • Asthma     seasonal   • Bilateral shoulder pain    • Colon cancer 2023   • DDD (degenerative disc disease), cervical    • DDD (degenerative disc disease), lumbar    • Depression    • Diverticulitis of colon     Scope was done in [facility]   • GERD  (gastroesophageal reflux disease)    • Hypertension    • Kidney stones    • Melanoma     removed   • Prolapse of female bladder    • SOBOE (shortness of breath on exertion)      Past Surgical History:   Procedure Laterality Date   • BREAST SURGERY  2016    Removed 8lbs of tissue, 2016   • CHOLECYSTECTOMY     • COLON RESECTION Left 5/5/2023    Procedure: RESECTION OF DESCENDING COLON AND SPLENIC FLEXURE TAKEDOWN WITH DAVINCI ROBOT;  Surgeon: Rolando Liu MD;  Location: [facility];  Service: Robotics - DaVinci;  Laterality: Left;   • COLONOSCOPY N/A 04/04/2023    Procedure: COLONOSCOPY WITH POLYPECTOMY/SNARE/BIOPSIES/TATTOOING DESCENDING COLON MASS;  Surgeon: Deniz Dowd MD;  Location: [facility] ENDOSCOPY;  Service: Gastroenterology;  Laterality: N/A;  INCOMPLETE COLONOSCOPY  POOR BOWEL PREP  COLON POLYP  COLON MASS  DIVERTICULOSIS   • COLONOSCOPY N/A 04/12/2023    Procedure: COLONOSCOPY with cold snare;  Surgeon: Deniz Dowd MD;  Location: [facility] ENDOSCOPY;  Service: Gastroenterology;  Laterality: N/A;  colon polyps, diverticulosis, colon mass, hemorrhoids     • CYSTOSCOPY W/ URETERAL STENT PLACEMENT Left 5/5/2023    Procedure: Cystoscopy, left ureteral injection,;  Surgeon: Manjula Randolph MD;  Location: [facility];  Service: Urology;  Laterality: Left;   • FRACTURE SURGERY  2001    Left knee   • KNEE SURGERY Left     x4   • LAPAROSCOPIC TUBAL LIGATION  1995   • LAPAROSCOPIC TUBAL LIGATION      Reversed   • REDUCTION MAMMAPLASTY  2009    8lbs of tissue was removed   • TONSILLECTOMY     • UPPER GASTROINTESTINAL ENDOSCOPY       PT Assessment (last 12 hours)     PT Evaluation and Treatment     Row Name 05/08/23 1200          Physical Therapy Time and Intention    Subjective Information complains of;pain  generalized body  -DP     Document Type evaluation  -DP     Mode of Treatment individual therapy;physical therapy  -DP     Patient Effort good  -DP     Row Name 05/08/23 1200           General Information    Patient Profile Reviewed yes  -DP     Patient Observations alert;cooperative;agree to therapy  -DP     Prior Level of Function independent:;gait;transfer;bed mobility;ADL's  -DP     Equipment Currently Used at Home cane, straight  Has a rolling walker to use if needed has a rolling walker to use as needed  -DP     Existing Precautions/Restrictions no known precautions/restrictions  -DP     Barriers to Rehab none identified  -DP     Row Name 05/08/23 1200          Living Environment    Current Living Arrangements home  -DP     Home Accessibility stairs to enter home  -DP     People in Home child(neal), dependent;spouse  -DP     Row Name 05/08/23 1200          Home Main Entrance    Number of Stairs, Main Entrance none  -DP     Row Name 05/08/23 1200          Range of Motion (ROM)    Range of Motion bilateral lower extremities;ROM is WFL  -DP     Row Name 05/08/23 1200          Strength (Manual Muscle Testing)    Strength (Manual Muscle Testing) bilateral lower extremities;strength is WFL  -DP     Row Name 05/08/23 1200          Bed Mobility    Bed Mobility bed mobility (all) activities  -DP     All Activities, [place] (Bed Mobility) independent  -DP     Row Name 05/08/23 1200          Transfers    Transfers sit-stand transfer  -DP     Row Name 05/08/23 1200          Sit-Stand Transfer    Sit-Stand [place] (Transfers) independent  -DP     Row Name 05/08/23 1200          Gait/Stairs (Locomotion)    Comment, (Gait/Stairs) Pt has been ambulating independently inthe hallway and in her room with RW  -DP     Row Name 05/08/23 1200          Balance    Dynamic Standing Balance independent  -DP     Row Name             Wound 05/05/23 0847 Right abdomen Incision    Wound - Properties Group Placement Date: 05/05/23  -AZ Placement Time: 0847 -AZ Present on Hospital Admission: N  -AZ Side: Right  -AZ Location: abdomen  -AZ Primary Wound Type: Incision  -AZ, 5 trocar sites     Retired Wound -  Properties Group Placement Date: 05/05/23  -AZ Placement Time: 0847  -AZ Present on Hospital Admission: N  -AZ Side: Right  -AZ Location: abdomen  -AZ Primary Wound Type: Incision  -AZ, 5 trocar sites     Retired Wound - Properties Group Date first assessed: 05/05/23  -AZ Time first assessed: 0847  -AZ Present on Hospital Admission: N  -AZ Side: Right  -AZ Location: abdomen  -AZ Primary Wound Type: Incision  -AZ, 5 trocar sites     Row Name             Wound 05/05/23 1457 Right lower abdomen Incision    Wound - Properties Group Placement Date: 05/05/23  -AZ Placement Time: 1457  -AZ Present on Hospital Admission: N  -AZ Side: Right  -AZ Orientation: lower  -AZ Location: abdomen  -AZ Primary Wound Type: Incision  -AZ, SPECIMEN PORT     Retired Wound - Properties Group Placement Date: 05/05/23  -AZ Placement Time: 1457  -AZ Present on Hospital Admission: N  -AZ Side: Right  -AZ Orientation: lower  -AZ Location: abdomen  -AZ Primary Wound Type: Incision  -AZ, SPECIMEN PORT     Retired Wound - Properties Group Date first assessed: 05/05/23  -AZ Time first assessed: 1457  -AZ Present on Hospital Admission: N  -AZ Side: Right  -AZ Location: abdomen  -AZ Primary Wound Type: Incision  -AZ, SPECIMEN PORT     Row Name 05/08/23 1200          Plan of Care Review    Plan of Care Reviewed With patient  -DP     Outcome Evaluation Pt is able to complete all transfers and ambulate ad kary in her room.She does not need inpatient PT services. She had a RW to use at home and will need a bariatric BSC.  -DP     Row Name 05/08/23 1200          Therapy Assessment/Plan (PT)    Criteria for Skilled Interventions Met (PT) no problems identified which require skilled intervention  -DP     Therapy Frequency (PT) evaluation only  -DP     Row Name 05/08/23 1200          PT Evaluation Complexity    History, PT Evaluation Complexity 1-2 personal factors and/or comorbidities  -DP     Examination of Body Systems (PT Eval Complexity) total of 3 or  more elements  -DP     Clinical Presentation (PT Evaluation Complexity) stable  -DP     Clinical Decision Making (PT Evaluation Complexity) low complexity  -DP     Overall Complexity (PT Evaluation Complexity) low complexity  -DP           User Key  (r) = Recorded By, (t) = Taken By, (c) = Cosigned By    Initials Name Provider Type    Meryl Mast, RN Registered Nurse    Jeffery Kelley, PT Physical Therapist                  PT Recommendation and Plan  Anticipated Discharge Disposition (PT): home with assist  Therapy Frequency (PT): evaluation only  Plan of Care Reviewed With: patient  Outcome Evaluation: Pt is able to complete all transfers and ambulate ad kary in her room.She does not need inpatient PT services. She had a RW to use at home and will need a bariatric BSC.   Outcome Measures     Row Name 05/08/23 1200             How much help from another person do you currently need...    Turning from your back to your side while in flat bed without using bedrails? 4  -DP      Moving from lying on back to sitting on the side of a flat bed without bedrails? 4  -DP      Moving to and from a bed to a chair (including a wheelchair)? 4  -DP      Standing up from a chair using your arms (e.g., wheelchair, bedside chair)? 4  -DP      Climbing 3-5 steps with a railing? 4  -DP      To walk in hospital room? 4  -DP      AM-PAC 6 Clicks Score (PT) 24  -DP         Functional Assessment    Outcome Measure Options AM-PAC 6 Clicks Basic Mobility (PT)  -DP            User Key  (r) = Recorded By, (t) = Taken By, (c) = Cosigned By    Initials Name Provider Type    Jeffery Kelley, PT Physical Therapist                 Time Calculation:    PT Charges     Row Name 05/08/23 1243             Time Calculation    PT Received On 05/08/23  -DP         Untimed Charges    PT Eval/Re-eval Minutes 38  -DP         Total Minutes    Untimed Charges Total Minutes 38  -DP       Total Minutes 38  -DP            User Key  (r) = Recorded By,  (t) = Taken By, (c) = Cosigned By    Initials Name Provider Type    DP Jeffery Martines, PT Physical Therapist              Therapy Charges for Today     Code Description Service Date Service Provider Modifiers Qty    33385330584 HC PT EVAL LOW COMPLEXITY 3 5/8/2023 Jeffery Martines, PT GP 1          PT G-Codes  Outcome Measure Options: AM-PAC 6 Clicks Basic Mobility (PT)  AM-PAC 6 Clicks Score (PT): 24  AM-PAC 6 Clicks Score (OT): 21    Jeffery Martines PT  5/8/2023

## 2023-05-09 ENCOUNTER — READMISSION MANAGEMENT (OUTPATIENT)
Dept: CALL CENTER | Facility: HOSPITAL | Age: 48
End: 2023-05-09
Payer: COMMERCIAL

## 2023-05-09 VITALS
HEART RATE: 80 BPM | HEIGHT: 68 IN | RESPIRATION RATE: 16 BRPM | DIASTOLIC BLOOD PRESSURE: 76 MMHG | TEMPERATURE: 98.3 F | OXYGEN SATURATION: 97 % | BODY MASS INDEX: 44.41 KG/M2 | WEIGHT: 293 LBS | SYSTOLIC BLOOD PRESSURE: 132 MMHG

## 2023-05-09 PROCEDURE — 99024 POSTOP FOLLOW-UP VISIT: CPT | Performed by: NURSE PRACTITIONER

## 2023-05-09 RX ORDER — HYDROCODONE BITARTRATE AND ACETAMINOPHEN 7.5; 325 MG/1; MG/1
1 TABLET ORAL EVERY 4 HOURS PRN
Qty: 18 TABLET | Refills: 0 | Status: SHIPPED | OUTPATIENT
Start: 2023-05-09 | End: 2023-05-12

## 2023-05-09 RX ADMIN — HYDROCODONE BITARTRATE AND ACETAMINOPHEN 1 TABLET: 7.5; 325 TABLET ORAL at 08:54

## 2023-05-09 RX ADMIN — LOSARTAN POTASSIUM 100 MG: 50 TABLET, FILM COATED ORAL at 08:54

## 2023-05-09 NOTE — PLAN OF CARE
Goal Outcome Evaluation:      Patient with loss of IV access overnight, attempted to restart x2 then patient refusal. MD aware, tolerating regular diet with no complaints of nausea, VSS, pain control per MAR. Possible DC home today. Ivania Bell RN

## 2023-05-09 NOTE — OUTREACH NOTE
Prep Survey    Flowsheet Row Responses   [facility] facility patient discharged from? [facility]   Is LACE score < 7 ? No   Eligibility Readm Mgmt   Discharge diagnosis S/P left colectomy   Does the patient have one of the following disease processes/diagnoses(primary or secondary)? General Surgery   Does the patient have Home health ordered? No   Is there a DME ordered? Yes   What DME was ordered? [facility]    Prep survey completed? Yes          Shiloh ELIAS - Registered Nurse

## 2023-05-09 NOTE — PLAN OF CARE
Goal Outcome Evaluation:  Plan of Care Reviewed With: patient        Progress: improving          VSS, room air.  Up adlib. C/o generalized abdominal pain, tolerable with PO pain medication per MAR. Passing gas, had BM today. Tolerating diet, no c/o nausea. Plan to d/c home today. Patient agreeable to plan. Education and written material provided. Will CTM and provide care.

## 2023-05-09 NOTE — PROGRESS NOTES
"POST OP PROGRESS NOTE     Patient Name:  Lilian Pelaez  YOB: 1975  [id number]   LOS: 4 days   4 Days Post-Op  Patient Care Team:  Shelly Cash MD as PCP - General (Family Medicine)  Ramiro Marie MD as Consulting Physician (Cardiology)  Ya Preston APRN as Nurse Practitioner (Gastroenterology)  Rolando Liu MD as Consulting Physician (General Surgery)          Subjective     Interval History:  VSS, afebrile, tolerating regular diet. +flatus and BMs, wants to go home today       Review of Systems:    All complete review of systems was performed and all are negative except what is documented in the HPI.       Objective     Constitutuional:  well nourished, no acute distress, appear stated age /76 (BP Location: Right arm, Patient Position: Lying)   Pulse 80   Temp 98.3 °F (36.8 °C) (Oral)   Resp 16   Ht 172.7 cm (68\")   Wt (!) 139 kg (306 lb 14.1 oz)   LMP 04/26/2023   SpO2 97%   BMI 46.66 kg/m²    Eyes:  anicteric sclerae, moist conjunctivae, no lid lag, PERRLA  ENMT:  oropharynx clear, moist mucous membranes, good dentition  Neck:   full ROM, trachea midline, no thyromegaly  Cardiovascular: RRR, S1 and S2 present, no MRG's, heart rate 80, no pedal edema  Respiratory: lungs CTA, respirations even and unlabored  GI:  Abdomen soft, appropriately tender, nondistended, no HSM     Skin:  warm and dry, normal turgor, no rashes  Psychiatric:  alert and oriented x3, intact judgement and insight, cooperative          Results Review:       I reviewed the patient's new clinical results including  No new labs.     WBC   Date Value Ref Range Status   05/07/2023 10.18 3.40 - 10.80 10*3/mm3 Final     RBC   Date Value Ref Range Status   05/07/2023 4.17 3.77 - 5.28 10*6/mm3 Final     Hemoglobin   Date Value Ref Range Status   05/07/2023 12.3 12.0 - 15.9 g/dL Final     Hematocrit   Date Value Ref Range Status   05/07/2023 35.7 34.0 - 46.6 % Final     MCV   Date Value Ref Range " Status   05/07/2023 85.6 79.0 - 97.0 fL Final     MCH   Date Value Ref Range Status   05/07/2023 29.5 26.6 - 33.0 pg Final     MCHC   Date Value Ref Range Status   05/07/2023 34.5 31.5 - 35.7 g/dL Final     RDW   Date Value Ref Range Status   05/07/2023 12.0 (L) 12.3 - 15.4 % Final     RDW-SD   Date Value Ref Range Status   05/07/2023 37.2 37.0 - 54.0 fl Final     MPV   Date Value Ref Range Status   05/07/2023 10.1 6.0 - 12.0 fL Final     Platelets   Date Value Ref Range Status   05/07/2023 159 140 - 450 10*3/mm3 Final         Basic Metabolic Panel    Sodium Sodium   Date Value Ref Range Status   05/08/2023 139 136 - 145 mmol/L Final   05/07/2023 139 136 - 145 mmol/L Final      Potassium Potassium   Date Value Ref Range Status   05/08/2023 3.6 3.5 - 5.2 mmol/L Final   05/07/2023 3.6 3.5 - 5.2 mmol/L Final      Chloride Chloride   Date Value Ref Range Status   05/08/2023 108 (H) 98 - 107 mmol/L Final   05/07/2023 107 98 - 107 mmol/L Final      Bicarbonate No results found for: PLASMABICARB   BUN BUN   Date Value Ref Range Status   05/08/2023 7 6 - 20 mg/dL Final   05/07/2023 8 6 - 20 mg/dL Final      Creatinine Creatinine   Date Value Ref Range Status   05/08/2023 0.61 0.57 - 1.00 mg/dL Final   05/07/2023 0.62 0.57 - 1.00 mg/dL Final      Calcium Calcium   Date Value Ref Range Status   05/08/2023 8.9 8.6 - 10.5 mg/dL Final   05/07/2023 8.8 8.6 - 10.5 mg/dL Final      Glucose      No components found for: GLUCOSE.*       Lab Results   Component Value Date    GLUCOSE 97 05/08/2023    BUN 7 05/08/2023    CREATININE 0.61 05/08/2023    EGFR 111.1 05/08/2023    BCR 11.5 05/08/2023    K 3.6 05/08/2023    CO2 25.2 05/08/2023    CALCIUM 8.9 05/08/2023    ALBUMIN 4.4 04/26/2023    BILITOT 0.5 04/26/2023    AST 17 04/26/2023    ALT 19 04/26/2023       IMAGING:  Imaging Results (Last 72 Hours)     ** No results found for the last 72 hours. **          Medications:    Current Facility-Administered Medications:   •  acetaminophen  (TYLENOL) tablet 500 mg, 500 mg, Oral, Q4H PRN, Rolando Liu MD, 500 mg at 05/08/23 0908  •  amLODIPine (NORVASC) tablet 10 mg, 10 mg, Oral, Nightly, Rolando Liu MD, 10 mg at 05/08/23 2102  •  Diclofenac Sodium (VOLTAREN) 1 % gel 4 g, 4 g, Topical, 4x Daily PRN, Rolando Liu MD, 4 g at 05/06/23 2021  •  diphenhydrAMINE (BENADRYL) capsule 25 mg, 25 mg, Oral, Q8H PRN, Rolando Liu MD  •  Enoxaparin Sodium (LOVENOX) syringe 40 mg, 40 mg, Subcutaneous, Daily, Vani Harrison, APRN, 40 mg at 05/08/23 0909  •  HYDROcodone-acetaminophen (NORCO) 7.5-325 MG per tablet 1 tablet, 1 tablet, Oral, Q4H PRN, Rolando Liu MD, 1 tablet at 05/09/23 0854  •  ketorolac (TORADOL) injection 15 mg, 15 mg, Intravenous, Q6H, Vani Harrison, APRN, 15 mg at 05/08/23 1756  •  losartan (COZAAR) tablet 100 mg, 100 mg, Oral, Q24H, Rolando Liu MD, 100 mg at 05/09/23 0854  •  metoprolol tartrate (LOPRESSOR) injection 2.5 mg, 2.5 mg, Intravenous, TID PRN, Rolando Liu MD  •  ondansetron (ZOFRAN) tablet 4 mg, 4 mg, Oral, Q6H PRN **OR** ondansetron (ZOFRAN) injection 4 mg, 4 mg, Intravenous, Q6H PRN, Vani Harrison, APRN  •  pantoprazole (PROTONIX) injection 40 mg, 40 mg, Intravenous, Q AM, Vani Harrison, APRN, 40 mg at 05/08/23 0511  •  prochlorperazine (COMPAZINE) injection 5 mg, 5 mg, Intravenous, Q6H PRN, Vani Harrison APRN  •  rOPINIRole (REQUIP) tablet 1 mg, 1 mg, Oral, Nightly, Rolando Liu MD, 1 mg at 05/08/23 2102    Assessment & Plan       S/P left colectomy    Malignant neoplasm of descending colon   DC home          Electronically signed by CHANTELLE Luis, 05/09/23, 9:42 AM EDT.

## 2023-05-09 NOTE — DISCHARGE SUMMARY
Date of Admission:  5/5/2023      Date of Discharge:  5/9/2023    Discharge Diagnosis:   Malignant neoplasm of descending colon [C18.6]  Colon cancer [C18.9]  Post-Op Diagnosis Codes:     * Malignant neoplasm of descending colon [C18.6]       Presenting Problem/History of Present Illness  Active Hospital Problems    Diagnosis  POA   • **S/P left colectomy [Z90.49]  Not Applicable   • Malignant neoplasm of descending colon [C18.6]  Yes      Resolved Hospital Problems    Diagnosis Date Resolved POA   • Chronic joint pain [M25.50, G89.29] 05/06/2023 Unknown   • Colon cancer [C18.9] 05/05/2023 Unknown        Hospital Course  Lilian Pelaez is a 47 y.o. female presented to [facility] on 5/5/2023 for a planned robotic left colectomy for colon cancer.  She was admitted after the procedure for routine postoperative care.  Upon discharge to home she is tolerating a regular diet, her pain is controlled, and she is having bowel movements.       Procedures Performed    Procedure(s):  RESECTION OF DESCENDING COLON AND SPLENIC FLEXURE TAKEDOWN WITH DAVINCI ROBOT  Cystoscopy, left ureteral injection,  -------------------       Consults:   Consults     No orders found from 4/6/2023 to 5/6/2023.          Condition on Discharge:  stable    Vital Signs  Temp:  [98.2 °F (36.8 °C)-98.6 °F (37 °C)] 98.3 °F (36.8 °C)  Heart Rate:  [73-92] 80  Resp:  [16] 16  BP: (121-137)/(66-92) 132/76    Physical Exam:     Discharge Disposition  Home or Self Care    Discharge Medications     Discharge Medications      Changes to Medications      Instructions Start Date   HYDROcodone-acetaminophen 7.5-325 MG per tablet  Commonly known as: NORCO  What changed: Another medication with the same name was added. Make sure you understand how and when to take each.   Take 1 tablet by mouth Every 8 (Eight) Hours As Needed.      HYDROcodone-acetaminophen 7.5-325 MG per tablet  Commonly known as: NORCO  What changed: You were already taking a medication with the same name,  and this prescription was added. Make sure you understand how and when to take each.   1 tablet, Oral, Every 4 Hours PRN         Continue These Medications      Instructions Start Date   allopurinol 100 MG tablet  Commonly known as: ZYLOPRIM   Take 1 tablet by mouth Daily.      ALPRAZolam 0.5 MG tablet  Commonly known as: XANAX   Take 1 tablet by mouth 3 (Three) Times a Day As Needed.      amLODIPine 10 MG tablet  Commonly known as: NORVASC   Take 1 tablet by mouth Every Night.      docusate sodium 100 MG capsule   Take 1 capsule by mouth Daily.      furosemide 20 MG tablet  Commonly known as: LASIX   1 tablet, Oral, Daily      hyoscyamine 0.125 MG tablet  Commonly known as: ANASPAZ,LEVSIN   0.125 mg, Oral, 4 Times Daily      ketorolac 10 MG tablet  Commonly known as: TORADOL   ketorolac 10 mg tablet   TAKE ONE TABLET BY MOUTH EVERY 6 HOURS      lamoTRIgine 25 MG tablet  Commonly known as: LaMICtal   Take 1 tablet by mouth 2 (Two) Times a Day. Only takes at night      losartan 100 MG tablet  Commonly known as: COZAAR   Take 1 tablet by mouth Every Night.      medroxyPROGESTERone 150 MG/ML injection  Commonly known as: DEPO-PROVERA   1 mL      metoclopramide 10 MG tablet  Commonly known as: REGLAN   Take 1 tablet by mouth 3 (Three) Times a Day.      metoprolol succinate XL 25 MG 24 hr tablet  Commonly known as: TOPROL-XL   Take 1 tablet by mouth Daily.      Myrbetriq 25 MG tablet sustained-release 24 hour 24 hr tablet  Generic drug: Mirabegron ER   25 mg, Oral, Every Evening      pantoprazole 40 MG EC tablet  Commonly known as: PROTONIX   Take 1 tablet by mouth 2 (Two) Times a Day.      rOPINIRole 1 MG tablet  Commonly known as: REQUIP   1 mg, Oral, Nightly      sertraline 100 MG tablet  Commonly known as: ZOLOFT   sertraline 100 mg tablet   Take 1 tablet every day by oral route for 30 days.      sucralfate 1 g tablet  Commonly known as: CARAFATE   Take 1 tablet by mouth 2 (Two) Times a Day.      vitamin D3 125 MCG  (5000 UT) capsule capsule   2,000 Units, Oral, Daily           Bed side commode for home as patient is confine to single room without a bathroom.   Discharge Diet:   Diet Instructions     Diet: Regular/House Diet; Regular Texture (IDDSI 7); Thin (IDDSI 0)      Discharge Diet: Regular/House Diet    Texture: Regular Texture (IDDSI 7)    Fluid Consistency: Thin (IDDSI 0)          Activity at Discharge:   Activity Instructions     Activity as Tolerated      Bathing Restrictions      Ok to shower, no swimming in lake/pool/hot tub, no tub bath for 2 weeks    Type of Restriction: Bathing    Bathing Restrictions: No Tub Bath    Driving Restrictions      Type of Restriction: Driving    Driving Restrictions: No Driving While Taking Narcotics    Gradually Increase Activity Until at Pre-Hospitalization Level      Lifting Restrictions      Type of Restriction: Lifting    Lifting Restrictions: Lifting Restriction (Indicate Limit)    Weight Limit (Pounds): 10    Length of Lifting Restriction: 4 weeks          Follow-up Appointments  Future Appointments   Date Time Provider Department Center   10/9/2023 10:00 AM Bessy Moore  [facility] None     Additional Instructions for the Follow-ups that You Need to Schedule     Call MD With Problems / Concerns   As directed      Instructions: call with any problems or concerns    Order Comments: Instructions: call with any problems or concerns          Discharge Follow-up with Specialty: Dr Liu in 2 weeks   As directed      Specialty: Dr Liu in 2 weeks               Test Results Pending at Discharge  Pending Labs     Order Current Status    Tissue Pathology Exam In process           CHANTELLE Luis  05/09/23  09:47 EDT          Electronically signed by CHANTELLE Luis, 05/09/23, 9:47 AM EDT.

## 2023-05-10 ENCOUNTER — READMISSION MANAGEMENT (OUTPATIENT)
Dept: CALL CENTER | Facility: HOSPITAL | Age: 48
End: 2023-05-10
Payer: COMMERCIAL

## 2023-05-10 NOTE — OUTREACH NOTE
General Surgery Week 1 Survey    Flowsheet Row Responses   [facility] patient discharged from? [facility]   Does the patient have one of the following disease processes/diagnoses(primary or secondary)? General Surgery   Week 1 attempt successful? Yes   Call start time 1226   Call end time 1230   Discharge diagnosis S/P left colectomy   Is patient permission given to speak with other caregiver? Yes   Person spoke with today (if not patient) and relationship Kristie   Meds reviewed with patient/caregiver? Yes   Is the patient having any side effects they believe may be caused by any medication additions or changes? No   Does the patient have all medications related to this admission filled (includes all antibiotics, pain medications, etc.) Yes   Is the patient taking all medications as directed (includes completed medication regime)? Yes   Does the patient have a follow up appointment scheduled with their surgeon? Yes   Has the patient kept scheduled appointments due by today? N/A   Has home health visited the patient within 72 hours of discharge? N/A   What DME was ordered? REJI MOSES    Has all DME been delivered? Yes   Psychosocial issues? No   Did the patient receive a copy of their discharge instructions? Yes   Nursing interventions Reviewed instructions with patient   What is the patient's perception of their health status since discharge? Improving   Nursing interventions Nurse provided patient education   Is the patient /caregiver able to teach back basic post-op care? Continue use of incentive spirometry at least 1 week post discharge, Practice 'cough and deep breath', Drive as instructed by MD in discharge instructions, Lifting as instructed by MD in discharge instructions, Keep incision areas clean,dry and protected   Is the patient/caregiver able to teach back signs and symptoms of incisional infection? Increased redness, swelling or pain at the incisonal site, Increased drainage or bleeding,  Incisional warmth, Pus or odor from incision, Fever   Is the patient/caregiver able to teach back steps to recovery at home? Make a list of questions for surgeon's appointment, Eat a well-balance diet, Weigh daily, Rest and rebuild strength, gradually increase activity, Set small, achievable goals for return to baseline health   If the patient is a current smoker, are they able to teach back resources for cessation? Not a smoker   Is the patient/caregiver able to teach back the hierarchy of who to call/visit for symptoms/problems? PCP, Specialist, Home health nurse, Urgent Care, ED, 911 Yes   Additional teach back comments She sees the surgeon on the 23rd. Encouraged PCP appt.   Week 1 call completed? Yes   Is the patient interested in additional calls from an ambulatory ?  NOTE:  applies to high risk patients requiring additional follow-up. No   Wrap up additional comments No issues or questions today, she got her boys up and dressed today she says.          Eliz MERCADO - Registered Nurse

## 2023-05-11 LAB
CYTO UR: NORMAL
LAB AP CASE REPORT: NORMAL
LAB AP CLINICAL INFORMATION: NORMAL
LAB AP SYNOPTIC CHECKLIST: NORMAL
PATH REPORT.FINAL DX SPEC: NORMAL
PATH REPORT.GROSS SPEC: NORMAL

## 2023-05-15 ENCOUNTER — TELEPHONE (OUTPATIENT)
Dept: SURGERY | Facility: CLINIC | Age: 48
End: 2023-05-15
Payer: COMMERCIAL

## 2023-05-15 NOTE — TELEPHONE ENCOUNTER
Provider:HARMON  Caller: CORY MONTEZ    Relationship to Patient: SELF    Phone Number: [phone]  PT CAN BE REACHED AT ANYTIME, IF NO ANSWER A DETAILED MSG CAN BE LEFT    Reason for Call: PT IS REQUESTIONG A SOONER APPT DUE TO SEEING RESULTS ON MYCHART      PT IS ASKING FOR DR ELMORE OR M.KATY TO CALL [facility] TO DISCUSS TEST RESULTS.

## 2023-05-16 ENCOUNTER — OFFICE VISIT (OUTPATIENT)
Dept: SURGERY | Facility: CLINIC | Age: 48
End: 2023-05-16
Payer: COMMERCIAL

## 2023-05-16 VITALS — RESPIRATION RATE: 16 BRPM | BODY MASS INDEX: 45.99 KG/M2 | WEIGHT: 293 LBS | HEIGHT: 67 IN

## 2023-05-16 DIAGNOSIS — C18.6 MALIGNANT NEOPLASM OF DESCENDING COLON: Primary | ICD-10-CM

## 2023-05-16 LAB — QT INTERVAL: 369 MS

## 2023-05-19 ENCOUNTER — READMISSION MANAGEMENT (OUTPATIENT)
Dept: CALL CENTER | Facility: HOSPITAL | Age: 48
End: 2023-05-19
Payer: COMMERCIAL

## 2023-05-19 NOTE — OUTREACH NOTE
General Surgery Week 2 Survey    Flowsheet Row Responses   [facility] patient discharged from? [facility]   Does the patient have one of the following disease processes/diagnoses(primary or secondary)? General Surgery   Week 2 attempt successful? Yes   Call start time 0849   Call end time 0854   Discharge diagnosis S/P left colectomy   Meds reviewed with patient/caregiver? Yes   Is the patient having any side effects they believe may be caused by any medication additions or changes? No   Does the patient have all medications related to this admission filled (includes all antibiotics, pain medications, etc.) Yes   Is the patient taking all medications as directed (includes completed medication regime)? Yes   Medication comments Pt only took Hyrdocodone for about 3 days post op   Does the patient have a follow up appointment scheduled with their surgeon? Yes  [Pt has been seen by surgery post op]   Has the patient kept scheduled appointments due by today? Yes   Comments Plans for Oncology appt on 5/31/23 and PAC insertion on 6/5/23   Has home health visited the patient within 72 hours of discharge? N/A   Has all DME been delivered? Yes   Psychosocial issues? No   Did the patient receive a copy of their discharge instructions? Yes   Nursing interventions Reviewed instructions with patient   What is the patient's perception of their health status since discharge? Improving  [Pt doing well with no siginificant pain, taking care of her family.  POC established with PAC to be inserted and chemo to be initiated.]   Nursing interventions Nurse provided patient education   Is the patient /caregiver able to teach back basic post-op care? No tub bath, swimming, or hot tub until instructed by MD, Keep incision areas clean,dry and protected, Lifting as instructed by MD in discharge instructions, Practice 'cough and deep breath'  [Reports she is under surgical precautions for 2 months]   Is the patient/caregiver able to teach  back signs and symptoms of incisional infection? Increased redness, swelling or pain at the incisonal site, Increased drainage or bleeding, Incisional warmth, Pus or odor from incision, Fever  [No issues with incisions]   Is the patient/caregiver able to teach back steps to recovery at home? Eat a well-balance diet, Rest and rebuild strength, gradually increase activity   If the patient is a current smoker, are they able to teach back resources for cessation? Not a smoker   Is the patient/caregiver able to teach back the hierarchy of who to call/visit for symptoms/problems? PCP, Specialist, Home health nurse, Urgent Care, ED, 911 Yes   Week 2 call completed? Yes          CORY ROSE - Registered Nurse

## 2023-05-23 ENCOUNTER — PREP FOR SURGERY (OUTPATIENT)
Dept: OTHER | Facility: HOSPITAL | Age: 48
End: 2023-05-23
Payer: COMMERCIAL

## 2023-05-23 DIAGNOSIS — C18.6 CANCER OF DESCENDING COLON METASTATIC TO INTRA-ABDOMINAL LYMPH NODE: ICD-10-CM

## 2023-05-23 DIAGNOSIS — C18.6 MALIGNANT NEOPLASM OF DESCENDING COLON: Primary | ICD-10-CM

## 2023-05-23 DIAGNOSIS — C77.2 CANCER OF DESCENDING COLON METASTATIC TO INTRA-ABDOMINAL LYMPH NODE: ICD-10-CM

## 2023-05-23 RX ORDER — CEFAZOLIN SODIUM IN 0.9 % NACL 3 G/100 ML
3 INTRAVENOUS SOLUTION, PIGGYBACK (ML) INTRAVENOUS
OUTPATIENT
Start: 2023-05-23

## 2023-05-23 NOTE — H&P
Chief Complaint:  No chief complaint on file.    Primary Care Provider: Shelly Cash MD    Referring Provider: No ref. provider found    History of Present Illness  Lilian Pelaez is a 47 y.o. female known to me as I performed robotic partial colectomy.  Thee lymph nodes were positive for cancer.  The patient is going to receive intravenous therapy and needs to have a portacatheter placed.    Allergies: Hydromorphone, Morphine, Oxybutynin, Oxycodone-acetaminophen, Penicillins, Promethazine, and Tylenol with codeine #3 [acetaminophen-codeine]    Home Medicines:    t allopurinol (ZYLOPRIM) 100 MG table allopurinol 100 mg tablet   Take 1 tablet every day by oral route for 90 days.      • ALPRAZolam (XANAX) 0.5 MG tablet alprazolam 0.5 mg tablet   Take 1 tablet twice a day by oral route for 30 days.       • amLODIPine (NORVASC) 10 MG tablet amlodipine 10 mg tablet   TAKE ONE TABLET BY MOUTH DAILY       • docusate sodium 100 MG capsule docusate sodium 100 mg capsule   TAKE ONE CAPSULE BY MOUTH DAILY       • estradiol cypionate (DEPO-ESTRADIOL) 5 MG/ML injection Inject  into the appropriate muscle as directed by prescriber.       • furosemide (LASIX) 20 MG tablet Take 1 tablet by mouth.       • hydroCHLOROthiazide (HYDRODIURIL) 25 MG tablet hydrochlorothiazide 25 mg tablet   Take 1 tablet every day by oral route for 90 days.       • HYDROcodone-acetaminophen (NORCO) 7.5-325 MG per tablet hydrocodone 7.5 mg-acetaminophen 325 mg tablet   Take by oral route for 30 days.       • hyoscyamine (ANASPAZ,LEVSIN) 0.125 MG tablet Take 1 tablet by mouth 4 (Four) Times a Day With Meals & at Bedtime. 120 tablet 3   • ketorolac (TORADOL) 10 MG tablet ketorolac 10 mg tablet   TAKE ONE TABLET BY MOUTH EVERY 6 HOURS       • lamoTRIgine (LaMICtal) 25 MG tablet lamotrigine 25 mg tablet       • linaclotide (Linzess) 72 MCG capsule capsule Take 1 capsule by mouth Every Morning Before Breakfast for 90 days. 90 capsule 1   • losartan  (COZAAR) 100 MG tablet losartan 100 mg tablet   TAKE ONE TABLET BY MOUTH DAILY       • medroxyPROGESTERone (DEPO-PROVERA) 150 MG/ML injection medroxyprogesterone 150 mg/mL intramuscular suspension   Inject 1 mL every 3 months by intramuscular route.       • medroxyPROGESTERone (DEPO-PROVERA) 150 MG/ML injection 1 mL.       • metoclopramide (REGLAN) 10 MG tablet metoclopramide 10 mg tablet   Take by oral route for 30 days.       • metoprolol succinate XL (TOPROL-XL) 25 MG 24 hr tablet metoprolol succinate ER 25 mg tablet,extended release 24 hr   Take 1 tablet every day by oral route for 90 days.       • Mirabegron ER (MYRBETRIQ) 50 MG tablet sustained-release 24 hour 24 hr tablet Myrbetriq 50 mg tablet,extended release   Take by oral route for 90 days.       • pantoprazole (PROTONIX) 40 MG EC tablet pantoprazole 40 mg tablet,delayed release   Take by oral route for 30 days.       • polyethylene glycol (GaviLyte-G) 236 g solution GaviLyte-G 236 gram-22.74 gram-6.74 gram-5.86 gram oral solution       • rOPINIRole (REQUIP) 1 MG tablet         • sertraline (ZOLOFT) 100 MG tablet sertraline 100 mg tablet   Take 1 tablet every day by oral route for 30 days.       • sucralfate (CARAFATE) 1 g tablet sucralfate 1 gram tablet   TAKE ONE TABLET BY MOUTH Two TIMES A DAY       • vitamin D3 125 MCG (5000 UT) capsule capsule Take 2,000 Units by mouth Daily.               Past Medical History:   • Anesthesia    B/P bottomed out   • Anxiety   • Asthma    seasonal   • Bilateral shoulder pain   • Colon cancer   • DDD (degenerative disc disease), cervical   • DDD (degenerative disc disease), lumbar   • Depression   • Diverticulitis of colon    Scope was done in [facility]   • GERD (gastroesophageal reflux disease)   • Hypertension   • Kidney stones   • Melanoma    removed   • Prolapse of female bladder   • SOBOE (shortness of breath on exertion)        Past Surgical History:   • BREAST SURGERY    Removed 8lbs of tissue, 2016   •  CHOLECYSTECTOMY   • COLON RESECTION    Procedure: RESECTION OF DESCENDING COLON AND SPLENIC FLEXURE TAKEDOWN WITH DAVINCI ROBOT;  Surgeon: oRlando Liu MD;  [place] [facility] [place] [facility] [place]  Service: Robotics - DaVinci;  Laterality: Left;   • COLONOSCOPY    Procedure: COLONOSCOPY WITH POLYPECTOMY/SNARE/BIOPSIES/TATTOOING DESCENDING COLON MASS;  Surgeon: Deniz Dowd MD;  Location: [facility] ENDOSCOPY;  Service: Gastroenterology;  Laterality: N/A;  INCOMPLETE COLONOSCOPY  POOR BOWEL PREP  COLON POLYP  COLON MASS  DIVERTICULOSIS   • COLONOSCOPY    Procedure: COLONOSCOPY with cold snare;  Surgeon: Deniz Dowd MD;  Location: [facility] ENDOSCOPY;  Service: Gastroenterology;  Laterality: N/A;  colon polyps, diverticulosis, colon mass, hemorrhoids     • CYSTOSCOPY W/ URETERAL STENT PLACEMENT    Procedure: Cystoscopy, left ureteral injection,;  Surgeon: Manjula Randolph MD;  [place] [facility] [place] [facility] [place]  Service: Urology;  Laterality: Left;   • FRACTURE SURGERY    Left knee   • KNEE SURGERY    x4   • LAPAROSCOPIC TUBAL LIGATION   • LAPAROSCOPIC TUBAL LIGATION    Reversed   • REDUCTION MAMMAPLASTY    8lbs of tissue was removed   • TONSILLECTOMY   • UPPER GASTROINTESTINAL ENDOSCOPY       Family History:   Family History   Problem Relation Age of Onset   • Colon cancer Neg Hx         Social History:  Social History     Tobacco Use   • Smoking status: Never     Passive exposure: Never   • Smokeless tobacco: Never   Substance Use Topics   • Alcohol use: Never       Objective     Vital Signs:  There were no vitals taken for this visit.  • Respiratory:  breathing not labored, respiratory effort appears normal  • Cardiovascular:  heart regular rate  • Skin and subcutaneous tissue:  Warm and dry  • Musculoskeletal: moving all extremities symmetrically and purposefully  • Neurologic:  no obvious motor or sensory deficits, speech clear      Assessment:  Metastatic colon cancer    Plan:  Port-a-catheter placement    Discussion:  Indications, options, risks, benefits, and expected outcomes of planned surgery were discussed with the patient and she agrees to proceed.    Rolando Liu MD  05/23/2023    Electronically signed by Rolando Liu MD, 05/23/23, 11:57 AM EDT.

## 2023-05-31 ENCOUNTER — DOCUMENTATION (OUTPATIENT)
Dept: ONCOLOGY | Facility: HOSPITAL | Age: 48
End: 2023-05-31

## 2023-05-31 ENCOUNTER — OFFICE VISIT (OUTPATIENT)
Dept: ONCOLOGY | Facility: HOSPITAL | Age: 48
End: 2023-05-31

## 2023-05-31 ENCOUNTER — LAB (OUTPATIENT)
Dept: ONCOLOGY | Facility: HOSPITAL | Age: 48
End: 2023-05-31

## 2023-05-31 ENCOUNTER — DOCUMENTATION (OUTPATIENT)
Dept: NUTRITION | Facility: HOSPITAL | Age: 48
End: 2023-05-31

## 2023-05-31 VITALS
HEIGHT: 67 IN | HEART RATE: 91 BPM | WEIGHT: 293 LBS | SYSTOLIC BLOOD PRESSURE: 123 MMHG | BODY MASS INDEX: 45.99 KG/M2 | DIASTOLIC BLOOD PRESSURE: 79 MMHG | OXYGEN SATURATION: 98 % | TEMPERATURE: 97.5 F | RESPIRATION RATE: 18 BRPM

## 2023-05-31 DIAGNOSIS — R63.4 UNINTENDED WEIGHT LOSS: Primary | ICD-10-CM

## 2023-05-31 DIAGNOSIS — C18.6 PRIMARY ADENOCARCINOMA OF DESCENDING COLON: Primary | ICD-10-CM

## 2023-05-31 DIAGNOSIS — C18.6 PRIMARY ADENOCARCINOMA OF DESCENDING COLON: ICD-10-CM

## 2023-05-31 LAB
ALBUMIN SERPL-MCNC: 4.6 G/DL (ref 3.5–5.2)
ALBUMIN/GLOB SERPL: 1.5 G/DL
ALP SERPL-CCNC: 109 U/L (ref 39–117)
ALT SERPL W P-5'-P-CCNC: 21 U/L (ref 1–33)
ANION GAP SERPL CALCULATED.3IONS-SCNC: 14 MMOL/L (ref 5–15)
AST SERPL-CCNC: 18 U/L (ref 1–32)
BASOPHILS # BLD AUTO: 0.05 10*3/MM3 (ref 0–0.2)
BASOPHILS NFR BLD AUTO: 0.6 % (ref 0–1.5)
BILIRUB SERPL-MCNC: 0.7 MG/DL (ref 0–1.2)
BUN SERPL-MCNC: 13 MG/DL (ref 6–20)
BUN/CREAT SERPL: 15.9 (ref 7–25)
CALCIUM SPEC-SCNC: 9.6 MG/DL (ref 8.6–10.5)
CEA SERPL-MCNC: 1.32 NG/ML
CHLORIDE SERPL-SCNC: 104 MMOL/L (ref 98–107)
CO2 SERPL-SCNC: 23 MMOL/L (ref 22–29)
CREAT SERPL-MCNC: 0.82 MG/DL (ref 0.57–1)
DEPRECATED RDW RBC AUTO: 39.8 FL (ref 37–54)
EGFRCR SERPLBLD CKD-EPI 2021: 88.9 ML/MIN/1.73
EOSINOPHIL # BLD AUTO: 0.26 10*3/MM3 (ref 0–0.4)
EOSINOPHIL NFR BLD AUTO: 3 % (ref 0.3–6.2)
ERYTHROCYTE [DISTWIDTH] IN BLOOD BY AUTOMATED COUNT: 12.7 % (ref 12.3–15.4)
FERRITIN SERPL-MCNC: 131.9 NG/ML (ref 13–150)
GLOBULIN UR ELPH-MCNC: 3 GM/DL
GLUCOSE SERPL-MCNC: 81 MG/DL (ref 65–99)
HCT VFR BLD AUTO: 45.4 % (ref 34–46.6)
HGB BLD-MCNC: 15.5 G/DL (ref 12–15.9)
IMM GRANULOCYTES # BLD AUTO: 0.03 10*3/MM3 (ref 0–0.05)
IMM GRANULOCYTES NFR BLD AUTO: 0.3 % (ref 0–0.5)
IRON 24H UR-MRATE: 77 MCG/DL (ref 37–145)
IRON SATN MFR SERPL: 19 % (ref 20–50)
LYMPHOCYTES # BLD AUTO: 2.84 10*3/MM3 (ref 0.7–3.1)
LYMPHOCYTES NFR BLD AUTO: 32.4 % (ref 19.6–45.3)
MCH RBC QN AUTO: 29.4 PG (ref 26.6–33)
MCHC RBC AUTO-ENTMCNC: 34.1 G/DL (ref 31.5–35.7)
MCV RBC AUTO: 86.1 FL (ref 79–97)
MONOCYTES # BLD AUTO: 0.77 10*3/MM3 (ref 0.1–0.9)
MONOCYTES NFR BLD AUTO: 8.8 % (ref 5–12)
NEUTROPHILS NFR BLD AUTO: 4.82 10*3/MM3 (ref 1.7–7)
NEUTROPHILS NFR BLD AUTO: 54.9 % (ref 42.7–76)
NRBC BLD AUTO-RTO: 0 /100 WBC (ref 0–0.2)
PLATELET # BLD AUTO: 294 10*3/MM3 (ref 140–450)
PMV BLD AUTO: 10 FL (ref 6–12)
POTASSIUM SERPL-SCNC: 3.7 MMOL/L (ref 3.5–5.2)
PROT SERPL-MCNC: 7.6 G/DL (ref 6–8.5)
RBC # BLD AUTO: 5.27 10*6/MM3 (ref 3.77–5.28)
SODIUM SERPL-SCNC: 141 MMOL/L (ref 136–145)
TIBC SERPL-MCNC: 407 MCG/DL (ref 298–536)
TRANSFERRIN SERPL-MCNC: 273 MG/DL (ref 200–360)
WBC NRBC COR # BLD: 8.77 10*3/MM3 (ref 3.4–10.8)

## 2023-05-31 PROCEDURE — 83540 ASSAY OF IRON: CPT

## 2023-05-31 PROCEDURE — 85025 COMPLETE CBC W/AUTO DIFF WBC: CPT

## 2023-05-31 PROCEDURE — 82378 CARCINOEMBRYONIC ANTIGEN: CPT

## 2023-05-31 PROCEDURE — G0463 HOSPITAL OUTPT CLINIC VISIT: HCPCS | Performed by: INTERNAL MEDICINE

## 2023-05-31 PROCEDURE — 82728 ASSAY OF FERRITIN: CPT

## 2023-05-31 PROCEDURE — 84466 ASSAY OF TRANSFERRIN: CPT

## 2023-05-31 PROCEDURE — 36415 COLL VENOUS BLD VENIPUNCTURE: CPT

## 2023-05-31 PROCEDURE — 80053 COMPREHEN METABOLIC PANEL: CPT

## 2023-05-31 NOTE — PROGRESS NOTES
Diagnosis: Colon Cancer    Reason for Referral: Clinical staff referred patient for transportation assistance.     Content of Visit: OSW met with patient to determine if she needed assistance with mode of transportation or assistance with gas. Patient stated she has people who can bring her but she needs gas money to put in her car for them to drive. OSW offered to apply for travel to [employer], and [facility] [employer] assistance. Patient granted permission for applications to be completed to Travel to [place] and [facility].  Patient stated she was talking with her behavioral health provider more and ready to start treating her cancer aggressively. Patient was appropriately tearful but less anxious than her initial visit. Patient's supportive  was present at today's visit.     Resources/Referrals Provided:  travel to [employer], and [facility] [employer] assistance

## 2023-05-31 NOTE — PROGRESS NOTES
Outpatient Nutrition Oncology Assessment    Patient Name: Lilian Pelaez  YOB: 1975  MRN: [id number]  Assessment Date: 5/31/2023     Recommendations: Ensure Enlive BID    CLINICAL NUTRITION ASSESSMENT  Dx: Colon Adenocarcinoma      Type of Cancer Treatment  (planning underway) - s/p left colectomy       CLINICAL NUTRITION ASSESSMENT      Reason for Assessment  Physician consult     H&P:    Past Medical History:   Diagnosis Date   • Anesthesia     B/P bottomed out   • Anxiety    • Asthma 1996    seasonal   • Bilateral shoulder pain    • Colon cancer 04/17/2023   • DDD (degenerative disc disease), cervical    • DDD (degenerative disc disease), lumbar    • Depression    • Diverticulitis of colon 2005    Scope was done in [facility]   • GERD (gastroesophageal reflux disease)    • Hypertension    • Kidney stones    • Melanoma     removed   • Prolapse of female bladder    • SOBOE (shortness of breath on exertion)         Current Problems:   Patient Active Problem List   Diagnosis Code   • Constipation K59.00   • Mass of colon K63.89   • Arthritis M19.90   • Benign essential hypertension I10   • Cancer C80.1   • Cervical radiculopathy M54.12   • Chest pain R07.9   • Chronic gastritis K29.50   • Chronic pain disorder G89.4   • Chronic post-traumatic stress disorder F43.12   • DDD (degenerative disc disease), cervical M50.30   • Displacement of lumbar intervertebral disc without myelopathy M51.26   • Dizziness R42   • Edema R60.9   • Edema of lower extremity R60.0   • Electrocardiogram abnormal R94.31   • Bladder disorder N32.9   • Gastroparesis K31.84   • Generalized osteoarthritis M15.9   • Gout M10.9   • Hypercalciuria R82.994   • Hypertension I10   • Influenza-like symptoms R68.89   • Kidney disease N28.9   • Limb swelling M79.89   • Migraine without aura G43.009   • Mild recurrent major depression F33.0   • Mixed stress and urge urinary incontinence N39.46   • Morbid obesity E66.01   • Palpitations R00.2   •  Primary adenocarcinoma of descending colon C18.6   • Restless legs G25.81   • Seasonal allergic rhinitis J30.2   • Shortness of breath R06.02   • Ulcerative lesion [id number]   • S/P left colectomy Z90.49   • Malignant neoplasm of descending colon C18.6         Anthropometrics     Row Name 05/31/23 1527          Anthropometrics    Weight for Calculation 135 kg (297 lb 9.9 oz)                 BMI kg/m2   There is no height or weight on file to calculate BMI.    Weight Hx  Wt Readings from Last 30 Encounters:   05/31/23 1351 135 kg (297 lb 13.5 oz)   05/16/23 0832 136 kg (300 lb)   05/05/23 0604 (!) 139 kg (306 lb 14.1 oz)   04/26/23 1131 (!) 140 kg (308 lb 3.3 oz)   04/17/23 1252 (!) 139 kg (307 lb)   04/13/23 0958 (!) 140 kg (309 lb 4.9 oz)   04/12/23 0825 (!) 139 kg (306 lb 7 oz)   04/04/23 0843 (!) 142 kg (313 lb 15 oz)   02/02/23 1004 (!) 140 kg (309 lb 9.6 oz)   08/27/19 0000 112 kg (247 lb 8 oz)   08/06/19 0000 114 kg (250 lb 8 oz)         Estimated/Assessed Needs - Anthropometrics     Row Name 05/31/23 1527          Anthropometrics    Weight for Calculation 135 kg (297 lb 9.9 oz)        Estimated/Assessed Needs    Additional Documentation KCAL/KG (Group);Protein Requirements (Group);Fluid Requirements (Group)        KCAL/KG    KCAL/KG 20 Kcal/Kg (kcal);25 Kcal/Kg (kcal)     20 Kcal/Kg (kcal) 2700     25 Kcal/Kg (kcal) 3375        Protein Requirements    Weight Used For Protein Calculations 61.4 kg (135 lb 5.8 oz)     Est Protein Requirement Amount (gms/kg) 2.0 gm protein     Estimated Protein Requirements (gms/day) 122.8                  Labs/Medications        Pertinent Labs Reviewed.         Invalid input(s): LABALBU, PROT      No results found for: COVID19  No results found for: HGBA1C      Pertinent Medications ALPRAZolam, HYDROcodone-acetaminophen, Mirabegron ER, allopurinol, amLODIPine, docusate sodium, furosemide, hyoscyamine, ketorolac, lamoTRIgine, losartan, medroxyPROGESTERone, metoclopramide,  metoprolol succinate XL, pantoprazole, rOPINIRole, sertraline, sucralfate, and vitamin D3     Physical Findings            Current Nutrition Orders & Evaluation of Intake       Oral Nutrition     Current PO Diet 2 meals/day, some high protein foods (eggs, chicken, some yogurt)   Supplement Equate brand of Ensure - reports supplement has 20gm of protein per bottle and 260kcal     Enteral Nutrition     Current Formula    Schedule      Parenteral Nutrition     Current Prescription    Schedule      Nutrition Diagnosis        Nutrition Dx Problem 1 Increased nutrient needs related to increased nutrient needs due to catabolic disease as evidenced by physiological causes increasing nutrient needs.       Nutrition Intervention       RD Action Nutritional Counseling  High Protein Food list provided  Rec: Ensure Enlive BID  Nutrition F/U      Monitor/Evaluation       Monitor Per oncology nutrition protocol.     Comments:  MD consult received to speak with pt s/p surgery, with increased protein needs to aid healing. Pt with UWL of 5% X 2 months. Pt reports she has been able to tolerate some foods s/p surgery, but surgical wound is still not healed. She was advised to consume Ensure / Boost BID with high protein to aid healing. Pt consumes 2 meals/day with some snacks; consumes moderate protein intake. Discussed with pt high protein foods to include throughout the day, including through ONS. Pt indicates ONS recommended by MD is expensive and difficult to afford. Inquired if insurance may provide coverage. Given pt's UWL, need for wound healing, and increased nutritional needs, would recommend Ensure Enlive BID as this is higher in protein as well as adequate in kcal to promote anabolic state. Will send referral to [facility]. Explained referral process to pt as well as expected time frame. Pt v/u. Provided oncology RD contact information and encouraged pt to reach out with any nutrition related questions or concerns.   Pt denies  any N/V/D/C.     Electronically signed by:  Yary López RD  05/31/23 15:28 EDT

## 2023-06-01 ENCOUNTER — DOCUMENTATION (OUTPATIENT)
Dept: NUTRITION | Facility: HOSPITAL | Age: 48
End: 2023-06-01

## 2023-06-01 NOTE — PRE-PROCEDURE INSTRUCTIONS
IMPORTANT INSTRUCTIONS - PRE-ADMISSION TESTING  1. DO NOT EAT OR CHEW anything after midnight the night before your procedure.    2. You may have CLEAR liquids up to _2 hours prior to ARRIVAL time.   3. Take the following medications the morning of your procedure with JUST A SIP OF WATER: REGLAN, METOPROLOL, HYOSCYAMINE, LAMICTAL, ZOLOFT, PANTOPRAZOLE, ALLOPURINOL, ALPRAZOLAM, AND NORCO  IF NEEDED.    4. DO NOT BRING your medications to the hospital with you, UNLESS something has changed since your PRE-Admission Testing appointment.  5. Hold all vitamins, supplements, and NSAIDS (Non- steroidal anti-inflammatory meds) for one week prior to surgery (you MAY take Tylenol or Acetaminophen).  6. If you are diabetic, check your blood sugar the morning of your procedure. If it is less than 70 or if you are feeling symptomatic, call the following number for further instructions: [phone]  7. Use your inhalers/nebulizers as usual, the morning of your procedure. BRING YOUR INHALERS with you.   8. Bring your CPAP or BIPAP to hospital, ONLY IF YOU WILL BE SPENDING THE NIGHT.   9. Make sure you have a ride home and have someone who will stay with you the day of your procedure after you go home.  10. If you have any questions, please call your Pre-Admission Testing Nurse, EMILY at [phone]_.   Per anesthesia request, do not smoke for 24 hours before your procedure or as instructed by your surgeon.  ••••••Clear Liquid Diet        Find out when you need to start a clear liquid diet.   Think of “clear liquids” as anything you could read a newspaper through. This includes things like water, broth, sports drinks, or tea WITHOUT any kind of milk or cream.           Once you are told to start a clear liquid diet, only drink these things until 2 hours before arrival to the hospital or when the hospital says to stop. Total volume limitation: 8 oz.       Clear liquids you CAN drink:   Water   Clear broth: beef, chicken,  vegetable, or bone broth with nothing in it   Gatorade   Lemonade or Remi-aid   Soda   Tea, coffee (NO cream or honey)   Jell-O (without fruit)   Popsicles (without fruit or cream)   Italian ices   Juice without pulp: apple, white, grape   You may use salt, pepper, and sugar    Do NOT drink:   Milk or cream   Soy milk, almond milk, coconut milk, or other non-dairy drinks and   creamers   Milkshakes or smoothies   Tomato juice   Orange juice   Grapefruit juice   Cream soups or any other than broth         Clear Liquid Diet:  Do NOT eat any solid food.  Do NOT eat or suck on mints or candy.  Do NOT chew gum.  Do NOT drink thick liquids like milk or juice with pulp in it.  Do NOT add milk, cream, or anything like soy milk or almond milk to coffee or tea.   11.

## 2023-06-01 NOTE — PROGRESS NOTES
Reason: ONS Referral    Vendor: [facility]    Note:   Faxed signed ONS referral / order to [facility]. Fax confirmation received. Will continue to f/u per protocol.     Electronically signed by:  Yary López RD  06/01/23 09:09 EDT

## 2023-06-02 ENCOUNTER — OFFICE VISIT (OUTPATIENT)
Dept: SURGERY | Facility: CLINIC | Age: 48
End: 2023-06-02

## 2023-06-02 ENCOUNTER — TELEPHONE (OUTPATIENT)
Dept: ONCOLOGY | Facility: HOSPITAL | Age: 48
End: 2023-06-02

## 2023-06-02 VITALS — BODY MASS INDEX: 45.99 KG/M2 | WEIGHT: 293 LBS | RESPIRATION RATE: 16 BRPM | HEIGHT: 67 IN

## 2023-06-02 DIAGNOSIS — Z09 ENCOUNTER FOR FOLLOW-UP: Primary | ICD-10-CM

## 2023-06-02 DIAGNOSIS — C18.6 PRIMARY ADENOCARCINOMA OF DESCENDING COLON: Primary | ICD-10-CM

## 2023-06-02 PROCEDURE — 99024 POSTOP FOLLOW-UP VISIT: CPT | Performed by: SURGERY

## 2023-06-02 RX ORDER — FERROUS SULFATE 325(65) MG
325 TABLET ORAL
Qty: 30 TABLET | Refills: 5 | Status: SHIPPED | OUTPATIENT
Start: 2023-06-02

## 2023-06-02 NOTE — TELEPHONE ENCOUNTER
----- Message from Brian Marroquin MD sent at 6/1/2023  8:06 AM EDT -----  Please inform pt that her CBC and CMP were normal, but iron studies show she is beginning to be iron deficient so I recommend she take oral iron tablet once daily, please prescribe ferrous sulfate 325mg PO QD ( it can can cause constipation and make her stools darker in color). Recommend she take daily stool softener if she develops constipation. Thanks.

## 2023-06-02 NOTE — PROGRESS NOTES
Patient is here because she has a possible seroma.  She had some brownish colored fluid drained from her incision.  She brought this to the attention of Dr. Marroquin and Dr. Marroquin mention to me and I had the patient scheduled to see me today.      Says the drainage has slowed down.  Any pain at the area has improved.  The area is the incision where the colon was removed from the patient's abdomen.  On exam, there is no erythema or any detectable abnormality.  There is no drainage from the wound today.    My assessment is the patient likely had a hematoma/seroma and has drained or is draining satisfactorily on its own and no further treatment or evaluation is necessary at this time.

## 2023-06-02 NOTE — TELEPHONE ENCOUNTER
Spoke with patient, advised that labs from most recent visit are normal, with the exception of her iron which she needs a supplement for. Instructed patient that script is being sent to [facility] on file. Advised that this supplement can cause constipation and to take a stool softener as needed, advised that it can also cause dark stools but they should not be black or tarry. Instructed to maintain all follow up appointments and contact the office with any questions or concerns. Patient verbalized understanding of all information discussed.

## 2023-06-05 ENCOUNTER — APPOINTMENT (OUTPATIENT)
Dept: GENERAL RADIOLOGY | Facility: HOSPITAL | Age: 48
End: 2023-06-05
Payer: COMMERCIAL

## 2023-06-05 ENCOUNTER — ANESTHESIA EVENT (OUTPATIENT)
Dept: PERIOP | Facility: HOSPITAL | Age: 48
End: 2023-06-05
Payer: COMMERCIAL

## 2023-06-05 ENCOUNTER — HOSPITAL ENCOUNTER (OUTPATIENT)
Facility: HOSPITAL | Age: 48
Setting detail: HOSPITAL OUTPATIENT SURGERY
Discharge: HOME | End: 2023-06-05
Attending: SURGERY | Admitting: SURGERY
Payer: COMMERCIAL

## 2023-06-05 ENCOUNTER — ANESTHESIA (OUTPATIENT)
Dept: PERIOP | Facility: HOSPITAL | Age: 48
End: 2023-06-05
Payer: COMMERCIAL

## 2023-06-05 VITALS
DIASTOLIC BLOOD PRESSURE: 73 MMHG | HEIGHT: 67 IN | TEMPERATURE: 97.3 F | WEIGHT: 293 LBS | SYSTOLIC BLOOD PRESSURE: 106 MMHG | OXYGEN SATURATION: 94 % | HEART RATE: 69 BPM | RESPIRATION RATE: 20 BRPM | BODY MASS INDEX: 45.99 KG/M2

## 2023-06-05 DIAGNOSIS — C18.6 MALIGNANT NEOPLASM OF DESCENDING COLON: Primary | ICD-10-CM

## 2023-06-05 DIAGNOSIS — C18.6 CANCER OF DESCENDING COLON METASTATIC TO INTRA-ABDOMINAL LYMPH NODE: ICD-10-CM

## 2023-06-05 DIAGNOSIS — C77.2 CANCER OF DESCENDING COLON METASTATIC TO INTRA-ABDOMINAL LYMPH NODE: ICD-10-CM

## 2023-06-05 LAB — B-HCG UR QL: NEGATIVE

## 2023-06-05 PROCEDURE — 25010000002 METOCLOPRAMIDE PER 10 MG: Performed by: ANESTHESIOLOGY

## 2023-06-05 PROCEDURE — 25010000002 HEPARIN (PORCINE) PER 1000 UNITS: Performed by: SURGERY

## 2023-06-05 PROCEDURE — 25010000002 DEXAMETHASONE PER 1 MG: Performed by: NURSE ANESTHETIST, CERTIFIED REGISTERED

## 2023-06-05 PROCEDURE — 25010000002 PROPOFOL 10 MG/ML EMULSION: Performed by: NURSE ANESTHETIST, CERTIFIED REGISTERED

## 2023-06-05 PROCEDURE — 25010000002 KETOROLAC TROMETHAMINE PER 15 MG: Performed by: ANESTHESIOLOGY

## 2023-06-05 PROCEDURE — 81025 URINE PREGNANCY TEST: CPT | Performed by: SURGERY

## 2023-06-05 PROCEDURE — 36561 INSERT TUNNELED CV CATH: CPT | Performed by: SURGERY

## 2023-06-05 PROCEDURE — 25010000002 FENTANYL CITRATE (PF) 50 MCG/ML SOLUTION: Performed by: NURSE ANESTHETIST, CERTIFIED REGISTERED

## 2023-06-05 PROCEDURE — 25010000002 ONDANSETRON PER 1 MG: Performed by: NURSE ANESTHETIST, CERTIFIED REGISTERED

## 2023-06-05 PROCEDURE — C1788 PORT, INDWELLING, IMP: HCPCS | Performed by: SURGERY

## 2023-06-05 PROCEDURE — 25010000002 MIDAZOLAM PER 1MG: Performed by: ANESTHESIOLOGY

## 2023-06-05 PROCEDURE — 25010000002 CEFAZOLIN PER 500 MG: Performed by: SURGERY

## 2023-06-05 PROCEDURE — 71045 X-RAY EXAM CHEST 1 VIEW: CPT

## 2023-06-05 PROCEDURE — 76000 FLUOROSCOPY <1 HR PHYS/QHP: CPT

## 2023-06-05 PROCEDURE — 77001 FLUOROGUIDE FOR VEIN DEVICE: CPT | Performed by: SURGERY

## 2023-06-05 DEVICE — PRT INTRO VASC/INTERV VORTEX FILL/HL DETACH/POLYURET/CATH 8F: Type: IMPLANTABLE DEVICE | Site: INTERNAL JUGULAR | Status: FUNCTIONAL

## 2023-06-05 RX ORDER — SODIUM CHLORIDE, SODIUM LACTATE, POTASSIUM CHLORIDE, CALCIUM CHLORIDE 600; 310; 30; 20 MG/100ML; MG/100ML; MG/100ML; MG/100ML
9 INJECTION, SOLUTION INTRAVENOUS CONTINUOUS PRN
Status: DISCONTINUED | OUTPATIENT
Start: 2023-06-05 | End: 2023-06-05 | Stop reason: HOSPADM

## 2023-06-05 RX ORDER — PROMETHAZINE HYDROCHLORIDE 12.5 MG/1
25 TABLET ORAL ONCE AS NEEDED
Status: DISCONTINUED | OUTPATIENT
Start: 2023-06-05 | End: 2023-06-05 | Stop reason: HOSPADM

## 2023-06-05 RX ORDER — PROPOFOL 10 MG/ML
VIAL (ML) INTRAVENOUS AS NEEDED
Status: DISCONTINUED | OUTPATIENT
Start: 2023-06-05 | End: 2023-06-05 | Stop reason: SURG

## 2023-06-05 RX ORDER — MAGNESIUM HYDROXIDE 1200 MG/15ML
LIQUID ORAL AS NEEDED
Status: DISCONTINUED | OUTPATIENT
Start: 2023-06-05 | End: 2023-06-05 | Stop reason: HOSPADM

## 2023-06-05 RX ORDER — KETOROLAC TROMETHAMINE 30 MG/ML
30 INJECTION, SOLUTION INTRAMUSCULAR; INTRAVENOUS ONCE
Status: COMPLETED | OUTPATIENT
Start: 2023-06-05 | End: 2023-06-05

## 2023-06-05 RX ORDER — LIDOCAINE HYDROCHLORIDE 20 MG/ML
INJECTION, SOLUTION EPIDURAL; INFILTRATION; INTRACAUDAL; PERINEURAL AS NEEDED
Status: DISCONTINUED | OUTPATIENT
Start: 2023-06-05 | End: 2023-06-05 | Stop reason: SURG

## 2023-06-05 RX ORDER — ONDANSETRON 2 MG/ML
INJECTION INTRAMUSCULAR; INTRAVENOUS AS NEEDED
Status: DISCONTINUED | OUTPATIENT
Start: 2023-06-05 | End: 2023-06-05 | Stop reason: SURG

## 2023-06-05 RX ORDER — HYDROCODONE BITARTRATE AND ACETAMINOPHEN 5; 325 MG/1; MG/1
1 TABLET ORAL ONCE AS NEEDED
Status: DISCONTINUED | OUTPATIENT
Start: 2023-06-05 | End: 2023-06-05 | Stop reason: HOSPADM

## 2023-06-05 RX ORDER — ACETAMINOPHEN 500 MG
1000 TABLET ORAL ONCE
Status: COMPLETED | OUTPATIENT
Start: 2023-06-05 | End: 2023-06-05

## 2023-06-05 RX ORDER — DEXAMETHASONE SODIUM PHOSPHATE 4 MG/ML
INJECTION, SOLUTION INTRA-ARTICULAR; INTRALESIONAL; INTRAMUSCULAR; INTRAVENOUS; SOFT TISSUE AS NEEDED
Status: DISCONTINUED | OUTPATIENT
Start: 2023-06-05 | End: 2023-06-05 | Stop reason: SURG

## 2023-06-05 RX ORDER — TRAMADOL HYDROCHLORIDE 50 MG/1
50 TABLET ORAL EVERY 6 HOURS PRN
Qty: 5 TABLET | Refills: 0 | Status: SHIPPED | OUTPATIENT
Start: 2023-06-05 | End: 2024-06-04

## 2023-06-05 RX ORDER — MEPERIDINE HYDROCHLORIDE 25 MG/ML
12.5 INJECTION INTRAMUSCULAR; INTRAVENOUS; SUBCUTANEOUS
Status: DISCONTINUED | OUTPATIENT
Start: 2023-06-05 | End: 2023-06-05 | Stop reason: HOSPADM

## 2023-06-05 RX ORDER — DEXMEDETOMIDINE HYDROCHLORIDE 100 UG/ML
INJECTION, SOLUTION INTRAVENOUS AS NEEDED
Status: DISCONTINUED | OUTPATIENT
Start: 2023-06-05 | End: 2023-06-05 | Stop reason: SURG

## 2023-06-05 RX ORDER — PROMETHAZINE HYDROCHLORIDE 25 MG/1
25 SUPPOSITORY RECTAL ONCE AS NEEDED
Status: DISCONTINUED | OUTPATIENT
Start: 2023-06-05 | End: 2023-06-05 | Stop reason: HOSPADM

## 2023-06-05 RX ORDER — BUPIVACAINE HYDROCHLORIDE 2.5 MG/ML
INJECTION, SOLUTION EPIDURAL; INFILTRATION; INTRACAUDAL AS NEEDED
Status: DISCONTINUED | OUTPATIENT
Start: 2023-06-05 | End: 2023-06-05 | Stop reason: HOSPADM

## 2023-06-05 RX ORDER — FENTANYL CITRATE 50 UG/ML
25 INJECTION, SOLUTION INTRAMUSCULAR; INTRAVENOUS
Status: DISCONTINUED | OUTPATIENT
Start: 2023-06-05 | End: 2023-06-05 | Stop reason: HOSPADM

## 2023-06-05 RX ORDER — ONDANSETRON 2 MG/ML
4 INJECTION INTRAMUSCULAR; INTRAVENOUS ONCE AS NEEDED
Status: DISCONTINUED | OUTPATIENT
Start: 2023-06-05 | End: 2023-06-05 | Stop reason: HOSPADM

## 2023-06-05 RX ORDER — METOCLOPRAMIDE HYDROCHLORIDE 5 MG/ML
10 INJECTION INTRAMUSCULAR; INTRAVENOUS ONCE AS NEEDED
Status: COMPLETED | OUTPATIENT
Start: 2023-06-05 | End: 2023-06-05

## 2023-06-05 RX ORDER — CEFAZOLIN SODIUM IN 0.9 % NACL 3 G/100 ML
3 INTRAVENOUS SOLUTION, PIGGYBACK (ML) INTRAVENOUS
Status: COMPLETED | OUTPATIENT
Start: 2023-06-05 | End: 2023-06-05

## 2023-06-05 RX ORDER — MIDAZOLAM HYDROCHLORIDE 2 MG/2ML
2 INJECTION, SOLUTION INTRAMUSCULAR; INTRAVENOUS ONCE
Status: COMPLETED | OUTPATIENT
Start: 2023-06-05 | End: 2023-06-05

## 2023-06-05 RX ADMIN — ONDANSETRON 4 MG: 2 INJECTION INTRAMUSCULAR; INTRAVENOUS at 11:08

## 2023-06-05 RX ADMIN — MIDAZOLAM HYDROCHLORIDE 2 MG: 1 INJECTION, SOLUTION INTRAMUSCULAR; INTRAVENOUS at 09:36

## 2023-06-05 RX ADMIN — ACETAMINOPHEN 1000 MG: 500 TABLET ORAL at 09:35

## 2023-06-05 RX ADMIN — FENTANYL CITRATE 25 MCG: 50 INJECTION, SOLUTION INTRAMUSCULAR; INTRAVENOUS at 10:18

## 2023-06-05 RX ADMIN — LIDOCAINE HYDROCHLORIDE 100 MG: 20 INJECTION, SOLUTION EPIDURAL; INFILTRATION; INTRACAUDAL; PERINEURAL at 09:52

## 2023-06-05 RX ADMIN — FENTANYL CITRATE 50 MCG: 50 INJECTION, SOLUTION INTRAMUSCULAR; INTRAVENOUS at 09:55

## 2023-06-05 RX ADMIN — KETOROLAC TROMETHAMINE 30 MG: 30 INJECTION, SOLUTION INTRAMUSCULAR; INTRAVENOUS at 11:28

## 2023-06-05 RX ADMIN — FENTANYL CITRATE 25 MCG: 50 INJECTION, SOLUTION INTRAMUSCULAR; INTRAVENOUS at 10:11

## 2023-06-05 RX ADMIN — FENTANYL CITRATE 25 MCG: 50 INJECTION, SOLUTION INTRAMUSCULAR; INTRAVENOUS at 11:10

## 2023-06-05 RX ADMIN — DEXMEDETOMIDINE HYDROCHLORIDE 10 MCG: 100 INJECTION, SOLUTION, CONCENTRATE INTRAVENOUS at 10:20

## 2023-06-05 RX ADMIN — Medication 3 G: at 09:43

## 2023-06-05 RX ADMIN — DEXAMETHASONE SODIUM PHOSPHATE 4 MG: 4 INJECTION, SOLUTION INTRA-ARTICULAR; INTRALESIONAL; INTRAMUSCULAR; INTRAVENOUS; SOFT TISSUE at 10:20

## 2023-06-05 RX ADMIN — DEXMEDETOMIDINE HYDROCHLORIDE 10 MCG: 100 INJECTION, SOLUTION, CONCENTRATE INTRAVENOUS at 10:11

## 2023-06-05 RX ADMIN — METOCLOPRAMIDE HYDROCHLORIDE 10 MG: 5 INJECTION INTRAMUSCULAR; INTRAVENOUS at 09:36

## 2023-06-05 RX ADMIN — DEXMEDETOMIDINE HYDROCHLORIDE 10 MCG: 100 INJECTION, SOLUTION, CONCENTRATE INTRAVENOUS at 09:47

## 2023-06-05 RX ADMIN — DEXMEDETOMIDINE HYDROCHLORIDE 10 MCG: 100 INJECTION, SOLUTION, CONCENTRATE INTRAVENOUS at 10:04

## 2023-06-05 RX ADMIN — LIDOCAINE HYDROCHLORIDE 300 MG: 20 INJECTION, SOLUTION EPIDURAL; INFILTRATION; INTRACAUDAL; PERINEURAL at 09:55

## 2023-06-05 RX ADMIN — DEXMEDETOMIDINE HYDROCHLORIDE 10 MCG: 100 INJECTION, SOLUTION, CONCENTRATE INTRAVENOUS at 09:55

## 2023-06-05 RX ADMIN — SODIUM CHLORIDE, POTASSIUM CHLORIDE, SODIUM LACTATE AND CALCIUM CHLORIDE 9 ML/HR: 600; 310; 30; 20 INJECTION, SOLUTION INTRAVENOUS at 09:35

## 2023-06-05 RX ADMIN — PROPOFOL 300 MG: 10 INJECTION, EMULSION INTRAVENOUS at 09:55

## 2023-06-05 RX ADMIN — ONDANSETRON 4 MG: 2 INJECTION INTRAMUSCULAR; INTRAVENOUS at 10:20

## 2023-06-05 NOTE — OP NOTE
INSERTION VENOUS ACCESS DEVICE  Procedure Report    Patient Name:  Lilian Pelaez  YOB: 1975    Date of Surgery:  6/5/2023     Pre-op Diagnosis:   Malignant neoplasm of descending colon [C18.6]    Pre-Op Diagnosis Codes:     * Malignant neoplasm of descending colon [C18.6]       Post-op Diagnosis:   Post-Op Diagnosis Codes:     * Malignant neoplasm of descending colon [C18.6]    Procedure:  Port-a-catheter placement (CPT 06738)    Staff:  Surgeon(s):  Rolando Liu MD    Anesthesia: Monitored Anesthesia Care    Estimated Blood Loss: Minimal    Complications:  None    Drains:  None    Packing:  None    Implants:    Implant Name Type Inv. Item Serial No.  Lot No. LRB No. Used Action   PRT INTRO VASC/INTERV VORTEX FILL/HL DETACH/POLYURET/CATH 8F - [id number] Implant PRT INTRO VASC/INTERV VORTEX FILL/HL DETACH/POLYURET/CATH 8F  [employer] [id number] Right 1 Implanted     Specimen: None    Indications:  See my preop H&P note.     Findings:  Angiodynamics Smartport placed via right internal jugular vein.    Description of Procedure: Patient was taken to the operating room and placed supine on the operating table.  Timeout verification was performed. MAC anesthesia was administered.  Patient was prepped and draped in usual fashion.  An empty syringe attached to the needle was used to make one pass under the right clavicle but I was unsuccessful at obtaining venous access so I moved to the right neck.  Using ultrasound, the right internal jugular vein was identified and then accessed with a needle.  A guidewire was placed through the needle and the needle was withdrawn.  Fluoroscopy was used to confirm the guidewire was positioned appropriately in the superior vena cava.  A subcutaneous pocket was created at the right upper chest to accommodate the port.  The inner dilator was placed inside of the tear-away sheath and both were placed over the guidewire into the right internal jugular vein.  The  inner guidewire and dilator were removed.  The catheter was placed through the tear-away sheath and the sheath was withdrawn.  The tunneler trocar was used to tunnel the catheter to the subcutaneous pocket.  Then under direct visualization with fluoroscopy, the catheter was pulled back until the tip was positioned appropriately in the superior vena cava.  The catheter was cut to the appropriate length and the locking cap was placed onto the catheter.   The catheter was connected to the port, the locking cap was secured, and the port was placed within the subcutaneous pocket.  I accessed the port with a Monte needle.  I could easily aspirate venous blood.  The port was then flushed with heparinized solution.  The wound was closed appropriately with buried absorbable suture followed by appropriate dressings.  No complications.  Sponge needle and instrument counts were correct.  Patient was transported to the recovery area in stable condition.      Rolando Liu MD     Date: 6/5/2023  Time: 10:37 EDT    Electronically signed by Rolando Liu MD, 06/05/23, 10:37 AM EDT.

## 2023-06-05 NOTE — DISCHARGE INSTRUCTIONS
.  Dr. Liu's Instructions          DIET  Resume your regular diet.     ACTIVITY & RETURN TO WORK  You are excused from work for one week but may return anytime before then should you feel able to do so.  Do not lift anything greater than 15 pounds with the arm on the same side the port was placed for one week.  After one week, you have no activity restrictions and may gradually increase your activities using common sense.     WOUND CARE & SHOWERING/BATHING  Your incisions are covered with a plastic type material that will flake off on its own in the next few weeks.  If the plastic type material has not flaked off on its own by 2 weeks after your surgery then use tweezers to pull it off.  You have sutures in your incisions but they are placed below the level of the skin and they will slowly dissolve (they do not need to be removed).  The skin around your incisions will likely have some bruising.  This is normal.  You can shower beginning tomorrow but wait two weeks after your surgery before taking any tub baths.  Also, do not get in hot tubs, swimming pools, or lake water for two weeks.     HOME MEDICATIONS  Resume all of your normal home medications.     PAIN CONTROL  You will receive a narcotic pain medicine prescription before you are discharged home.  Be sure to take the narcotic pain medication with some food so as not to upset your stomach.  Do not drive while you are taking the prescription pain medication.  Take Tylenol or Motrin for mild pain.     BOWEL MOVEMENTS  It is not unusual for narcotic pain medications to cause constipation.  Also, the medications and anesthesia you received for your surgery can have a constipating effect.  To help avoid constipation, drink at least four eight-ounce glasses of water each day and use over-the-counter laxatives/stool softeners (dulcolax, milk of magnesia, senokot, etc.).  I recommend drinking the aforementioned amount of water daily and taking 30 ml of milk of  magnesia two times each day while you are using the prescribed narcotic pain medication.  If your bowel movements become too loose or too frequent, then simply stop following these recommendations unless you start to feel constipated.     FOLLOW-UP VISIT & QUESTIONS/CONCERNS  Call Dr. Diane office at [phone] and schedule a follow-up appointment for about two weeks after your surgery date.  However, if you are doing fine and don´t have any concerns then simply cancel the follow-up appointment.  Should you have any questions or concerns, please call Dr. Diane office.

## 2023-06-05 NOTE — ANESTHESIA PREPROCEDURE EVALUATION
Anesthesia Evaluation     Patient summary reviewed and Nursing notes reviewed   no history of anesthetic complications:   NPO Solid Status: > 8 hours  NPO Liquid Status: > 2 hours           Airway   Mallampati: III  TM distance: >3 FB  Neck ROM: full  No difficulty expected  Dental      Pulmonary - normal exam    breath sounds clear to auscultation  (+) asthma,shortness of breath  Cardiovascular - normal exam  Exercise tolerance: good (4-7 METS)    Rhythm: regular  Rate: normal    (+) hypertension      Neuro/Psych  (+) headaches, dizziness/light headedness, numbness, psychiatric history  GI/Hepatic/Renal/Endo    (+) GERD well controlled, renal disease stones    Musculoskeletal     Abdominal    Substance History - negative use     OB/GYN negative ob/gyn ROS         Other   arthritis,   history of cancer active    ROS/Med Hx Other: >4METS, HX PALPITATIONS,HTN,SEASONAL ASTHMA,COLON CA (DX 5/23). PREV. CARDS CLEARANCE FOR COLON RESECTION. STATES B/P DROPPED DURING PROCEDURE. ANESTHESIA RECORD BOOKMARKED.  KT                   Anesthesia Plan    ASA 3     general     (Patient understands anesthesia not responsible for dental damage.)  intravenous induction     Anesthetic plan, risks, benefits, and alternatives have been provided, discussed and informed consent has been obtained with: patient.    Use of blood products discussed with patient .    Plan discussed with CRNA.      CODE STATUS:

## 2023-06-05 NOTE — ANESTHESIA POSTPROCEDURE EVALUATION
Patient: Lilian Pelaez    Procedure Summary       Date: 06/05/23 Room / Location: [facility]  /  [facility]    Anesthesia Start: 0943 Anesthesia Stop: 1045    Procedure: INSERTION VENOUS ACCESS DEVICE Diagnosis:       Malignant neoplasm of descending colon      (Malignant neoplasm of descending colon [C18.6])    Surgeons: Rolando Liu MD Provider: Aakash Thornton MD    Anesthesia Type: general ASA Status: 3            Anesthesia Type: general    Vitals  Vitals Value Taken Time   /67 06/05/23 1103   Temp 36.6 °C (97.8 °F) 06/05/23 1043   Pulse 65 06/05/23 1112   Resp 16 06/05/23 1055   SpO2 94 % 06/05/23 1112   Vitals shown include unvalidated device data.        Post Anesthesia Care and Evaluation    Patient location during evaluation: bedside  Patient participation: complete - patient participated  Level of consciousness: awake  Pain score: 2  Pain management: adequate    Airway patency: patent  PONV Status: none  Cardiovascular status: acceptable and stable  Respiratory status: acceptable  Hydration status: acceptable    Comments: An Anesthesiologist personally participated in the most demanding procedures (including induction and emergence if applicable) in the anesthesia plan, monitored the course of anesthesia administration at frequent intervals and remained physically present and available for immediate diagnosis and treatment of emergencies.

## 2023-06-08 ENCOUNTER — TELEPHONE (OUTPATIENT)
Dept: ONCOLOGY | Facility: HOSPITAL | Age: 48
End: 2023-06-08

## 2023-06-08 DIAGNOSIS — C18.6 MALIGNANT NEOPLASM OF DESCENDING COLON: Primary | ICD-10-CM

## 2023-06-08 NOTE — TELEPHONE ENCOUNTER
Caller: Lilian Pelaez    Relationship: Self    Best call back number: [phone]    What was the call regarding: LILIAN CALLED FOR AN UPDATE ON IF HER CHEMO TREATMENTS HAVE BEEN APPROVED BY HER INSURANCE.

## 2023-06-09 RX ORDER — ONDANSETRON HYDROCHLORIDE 8 MG/1
8 TABLET, FILM COATED ORAL 3 TIMES DAILY PRN
Qty: 30 TABLET | Refills: 5 | Status: SHIPPED | OUTPATIENT
Start: 2023-06-09

## 2023-06-13 ENCOUNTER — OFFICE VISIT (OUTPATIENT)
Dept: ONCOLOGY | Facility: HOSPITAL | Age: 48
End: 2023-06-13
Payer: COMMERCIAL

## 2023-06-13 VITALS
RESPIRATION RATE: 20 BRPM | TEMPERATURE: 98.4 F | DIASTOLIC BLOOD PRESSURE: 86 MMHG | OXYGEN SATURATION: 98 % | HEART RATE: 100 BPM | WEIGHT: 293 LBS | HEIGHT: 67 IN | BODY MASS INDEX: 45.99 KG/M2 | SYSTOLIC BLOOD PRESSURE: 142 MMHG

## 2023-06-13 DIAGNOSIS — Z71.9 ENCOUNTER FOR EDUCATION: ICD-10-CM

## 2023-06-13 DIAGNOSIS — C18.6 PRIMARY ADENOCARCINOMA OF DESCENDING COLON: Primary | ICD-10-CM

## 2023-06-13 PROBLEM — Z45.2 ENCOUNTER FOR ADJUSTMENT OR MANAGEMENT OF VASCULAR ACCESS DEVICE: Status: ACTIVE | Noted: 2023-06-13

## 2023-06-13 PROCEDURE — G0463 HOSPITAL OUTPT CLINIC VISIT: HCPCS | Performed by: NURSE PRACTITIONER

## 2023-06-13 RX ORDER — HEPARIN SODIUM (PORCINE) LOCK FLUSH IV SOLN 100 UNIT/ML 100 UNIT/ML
500 SOLUTION INTRAVENOUS AS NEEDED
Status: CANCELLED | OUTPATIENT
Start: 2023-06-13

## 2023-06-13 RX ORDER — SODIUM CHLORIDE 0.9 % (FLUSH) 0.9 %
20 SYRINGE (ML) INJECTION AS NEEDED
Status: CANCELLED | OUTPATIENT
Start: 2023-06-13

## 2023-06-13 NOTE — PROGRESS NOTES
CHEMOTHERAPY PREPARATION    Lilian Pelaez  [id number]  1975    Chief Complaint:   Chemo Education    History of present illness:  Lilian Pelaez is a 47 y.o. year old female who is here today for chemotherapy preparation and needs assessment.  The patient has been diagnosed with  stage III colon cancer and is scheduled to begin treatment with Oxaliplatin and 5 FU home infusion every 14 days x 12 cycles.     Oncology History:    Oncology/Hematology History   Malignant neoplasm of descending colon   5/6/2023 Initial Diagnosis    Malignant neoplasm of descending colon     5/31/2023 Cancer Staged    Staging form: Colon And Rectum, AJCC 8th Edition  - Pathologic: Stage IIIB (pT3, pN1b, cM0) - Signed by Brian Marroquin MD on 5/31/2023 6/14/2023 -  Chemotherapy    OP COLON mFOLFOX6 OXALIplatin / Leucovorin / Fluorouracil           The current medication list and allergy list were reviewed and reconciled.     Past Medical History, Past Surgical History, Social History, Family History have been reviewed and are without significant changes except as mentioned.    Review of Systems:    Review of Systems   Respiratory:  Positive for shortness of breath. Negative for chest tightness.    Cardiovascular:  Positive for chest pain (Port pain).   Gastrointestinal:  Positive for abdominal pain (5/10) and constipation.   All other systems reviewed and are negative.    Physical Exam:    Physical Exam     General: well appearing, in no acute distress  Cardiovascular: regular rate and rhythm, no murmurs noted  Lungs: clear to auscultation bilaterally, respirations even and unlabored  Extremities: no lower extremity edema  Skin: no rashes, lesions, bruising, or petechiae  Psych: Mood is stable    Vitals:    06/13/23 1457   BP: 142/86   Pulse: 100   Resp: 20   Temp: 98.4 °F (36.9 °C)   SpO2: 98%     Vitals:    06/13/23 1457   PainSc:   5   PainLoc: Abdomen          ECOG: {findings; ecog performance status:95563            NEEDS  ASSESSMENTS    VAD Assessment  The patient and I discussed planned intravenous chemotherapy as well as other IV treatments that are often needed throughout the course of treatment. These may include, but are not limited to blood transfusions, antibiotics, and IV hydration. The vasculature does not appear to be adequate for multiple peripheral IVs throughout their treatment course. Discussed risks and benefits of VADs. The patient would like to pursue Port-A-Cath insertion prior to initiation of treatment.       Palliative Care  The patient and I discussed palliative care services. Palliative care is not the same as Hospice care. This is specialized medical care for people living with serious illness with the goal of improving quality of life for the patient and their family. [facility] has partnered with [facility] [place] to offer our patients outpatient palliative care early along with their treatment to assist in coordination of care, symptom management, pain management, and medical decision making.  Oncology criteria for palliative care referral is not met at this time. The patient is not interested in a palliative care consultation.     Additional Referral needs  none      CHEMOTHERAPY EDUCATION    Booklets Given: Chemotherapy and You [x]  Eating Hints [x]    Sexuality/Fertility Books []      Chemotherapy/Biotherapy Education Sheets: (list all that apply)  nausea management, acid reflux management, diarrhea management, Cancer resourse contacts information, skin and mouth care, and vaccination information                                                                                                                                                                 Chemotherapy Regimen:   Treatment Plans       Name Type Plan Dates Plan Provider         Active    OP COLON mFOLFOX6 OXALIplatin / Leucovorin / Fluorouracil ONCOLOGY TREATMENT  5/30/2023 - Present Brian Marroquin MD                      TOPICS EDUCATION PROVIDED  COMMENTS   ANEMIA:  role of RBC, cause, s/s, ways to manage, role of transfusion [x]    THROMBOCYTOPENIA:  role of platelet, cause, s/s, ways to prevent bleeding, things to avoid, when to seek help [x]    NEUTROPENIA:  role of WBC, cause, infection precautions, s/s of infection, when to call MD [x]    NUTRITION & APPETITE CHANGES:  importance of maintaining healthy diet & weight, ways to manage to improve intake, dietary consult, exercise regimen [x]    DIARRHEA:  causes, s/s of dehydration, ways to manage, dietary changes, when to call MD [x]    CONSTIPATION:  causes, ways to manage, dietary changes, when to call MD [x]    NAUSEA & VOMITING:  cause, use of antiemetics, dietary changes, when to call MD [x]    MOUTH SORES:  causes, oral care, ways to manage [x]    ALOPECIA:  cause, ways to manage, resources [x]    INFERTILITY & SEXUALITY:  causes, fertility preservation options, sexuality changes, ways to manage, importance of birth control [x]    NERVOUS SYSTEM CHANGES:  causes, s/s, neuropathies, cognitive changes, ways to manage [x]    PAIN:  causes, ways to manage [x]    SKIN & NAIL CHANGES:  cause, s/s, ways to manage [x]    ORGAN TOXICITIES:  cause, s/s, need for diagnostic tests, labs, when to notify MD [x]    SURVIVORSHIP:  distress, distress assessment, secondary malignancies, early/late effects, follow-up, social issues, social support [x]    HOME CARE:  use of spill kits, storing of PO chemo, how to manage bodily fluids [x]    MISCELLANEOUS:  drug interactions, administration, vesicant, et [x]        Assessment and Plan:    Diagnoses and all orders for this visit:    1. Primary adenocarcinoma of descending colon (Primary)    2. Encounter for education    Other orders  -     SCANNED - LABS      Start treatment with cycle 1 on 6/14/2023 at 0800. Arrive at 0745.     Instructed to  Zofran from pharmacy.       The patient and I have reviewed their cancer diagnosis and scheduled treatment plan. Needs  assessment was completed including genetics, psychosocial needs, barriers to care, VAD evaluation, advanced care planning, and palliative care services. Referrals have been ordered as appropriate based upon our evaluation and patient desires.     Chemotherapy teaching was also completed today as documented above. Adequate time was given to answer all questions to her satisfaction. Patient and family are aware of their care team members and contact information if they have questions or problems throughout the treatment course. The patient is adequately prepared to begin treatment as scheduled.     Reviewed with patient education regarding EMLA cream, dexamethasone and Zofran prescriptions sent to pharmacy.       I advised the patient that she can take Tylenol as needed for aches/pains related to cancer/treatment.  I also advised that her can use Senokot or MiraLAX as needed for constipation or Imodium as needed for diarrhea.       I reviewed with the patient the care team members. I also reviewed the option of the urgent care clinic through our oncology office for evaluation and management of symptoms related to treatment.    I spent 40 minutes caring for Lilian on this date of service. This time includes time spent by me in the following activities: preparing for the visit, reviewing tests, obtaining and/or reviewing a separately obtained history, performing a medically appropriate examination and/or evaluation, counseling and educating the patient/family/caregiver, ordering medications, tests, or procedures, referring and communicating with other health care professionals, documenting information in the medical record, independently interpreting results and communicating that information with the patient/family/caregiver, and care coordination.     Mireya Lama, APRN

## 2023-06-14 ENCOUNTER — HOSPITAL ENCOUNTER (OUTPATIENT)
Dept: ONCOLOGY | Facility: HOSPITAL | Age: 48
Discharge: HOME | End: 2023-06-14
Admitting: INTERNAL MEDICINE
Payer: COMMERCIAL

## 2023-06-14 ENCOUNTER — DOCUMENTATION (OUTPATIENT)
Dept: ONCOLOGY | Facility: HOSPITAL | Age: 48
End: 2023-06-14
Payer: COMMERCIAL

## 2023-06-14 ENCOUNTER — OFFICE VISIT (OUTPATIENT)
Dept: ONCOLOGY | Facility: HOSPITAL | Age: 48
End: 2023-06-14
Payer: COMMERCIAL

## 2023-06-14 VITALS
SYSTOLIC BLOOD PRESSURE: 125 MMHG | WEIGHT: 293 LBS | OXYGEN SATURATION: 98 % | HEART RATE: 98 BPM | BODY MASS INDEX: 48.82 KG/M2 | RESPIRATION RATE: 18 BRPM | DIASTOLIC BLOOD PRESSURE: 79 MMHG | TEMPERATURE: 98 F | HEIGHT: 65 IN

## 2023-06-14 VITALS
HEIGHT: 65 IN | TEMPERATURE: 98 F | BODY MASS INDEX: 48.82 KG/M2 | WEIGHT: 293 LBS | RESPIRATION RATE: 18 BRPM | DIASTOLIC BLOOD PRESSURE: 79 MMHG | SYSTOLIC BLOOD PRESSURE: 125 MMHG | HEART RATE: 98 BPM

## 2023-06-14 DIAGNOSIS — C18.6 MALIGNANT NEOPLASM OF DESCENDING COLON: Primary | ICD-10-CM

## 2023-06-14 DIAGNOSIS — Z45.2 ENCOUNTER FOR ADJUSTMENT OR MANAGEMENT OF VASCULAR ACCESS DEVICE: ICD-10-CM

## 2023-06-14 LAB
ALBUMIN SERPL-MCNC: 4.1 G/DL (ref 3.5–5.2)
ALBUMIN/GLOB SERPL: 1.4 G/DL
ALP SERPL-CCNC: 85 U/L (ref 39–117)
ALT SERPL W P-5'-P-CCNC: 12 U/L (ref 1–33)
ANION GAP SERPL CALCULATED.3IONS-SCNC: 12.7 MMOL/L (ref 5–15)
AST SERPL-CCNC: 12 U/L (ref 1–32)
B-HCG UR QL: NEGATIVE
BASOPHILS # BLD AUTO: 0.02 10*3/MM3 (ref 0–0.2)
BASOPHILS NFR BLD AUTO: 0.2 % (ref 0–1.5)
BILIRUB SERPL-MCNC: 0.5 MG/DL (ref 0–1.2)
BUN SERPL-MCNC: 19 MG/DL (ref 6–20)
BUN/CREAT SERPL: 25.3 (ref 7–25)
CALCIUM SPEC-SCNC: 9.5 MG/DL (ref 8.6–10.5)
CHLORIDE SERPL-SCNC: 105 MMOL/L (ref 98–107)
CO2 SERPL-SCNC: 22.3 MMOL/L (ref 22–29)
CREAT SERPL-MCNC: 0.75 MG/DL (ref 0.57–1)
DEPRECATED RDW RBC AUTO: 38.8 FL (ref 37–54)
EGFRCR SERPLBLD CKD-EPI 2021: 99 ML/MIN/1.73
EOSINOPHIL # BLD AUTO: 0.17 10*3/MM3 (ref 0–0.4)
EOSINOPHIL NFR BLD AUTO: 2.1 % (ref 0.3–6.2)
ERYTHROCYTE [DISTWIDTH] IN BLOOD BY AUTOMATED COUNT: 12.1 % (ref 12.3–15.4)
GLOBULIN UR ELPH-MCNC: 2.9 GM/DL
GLUCOSE SERPL-MCNC: 113 MG/DL (ref 65–99)
HCT VFR BLD AUTO: 39.2 % (ref 34–46.6)
HGB BLD-MCNC: 13.3 G/DL (ref 12–15.9)
IMM GRANULOCYTES # BLD AUTO: 0.01 10*3/MM3 (ref 0–0.05)
IMM GRANULOCYTES NFR BLD AUTO: 0.1 % (ref 0–0.5)
LYMPHOCYTES # BLD AUTO: 2.05 10*3/MM3 (ref 0.7–3.1)
LYMPHOCYTES NFR BLD AUTO: 25 % (ref 19.6–45.3)
MCH RBC QN AUTO: 29.4 PG (ref 26.6–33)
MCHC RBC AUTO-ENTMCNC: 33.9 G/DL (ref 31.5–35.7)
MCV RBC AUTO: 86.5 FL (ref 79–97)
MONOCYTES # BLD AUTO: 0.95 10*3/MM3 (ref 0.1–0.9)
MONOCYTES NFR BLD AUTO: 11.6 % (ref 5–12)
NEUTROPHILS NFR BLD AUTO: 5 10*3/MM3 (ref 1.7–7)
NEUTROPHILS NFR BLD AUTO: 61 % (ref 42.7–76)
PLATELET # BLD AUTO: 232 10*3/MM3 (ref 140–450)
PMV BLD AUTO: 9.9 FL (ref 6–12)
POTASSIUM SERPL-SCNC: 3.7 MMOL/L (ref 3.5–5.2)
PROT SERPL-MCNC: 7 G/DL (ref 6–8.5)
RBC # BLD AUTO: 4.53 10*6/MM3 (ref 3.77–5.28)
SODIUM SERPL-SCNC: 140 MMOL/L (ref 136–145)
WBC NRBC COR # BLD: 8.2 10*3/MM3 (ref 3.4–10.8)

## 2023-06-14 PROCEDURE — 96411 CHEMO IV PUSH ADDL DRUG: CPT

## 2023-06-14 PROCEDURE — 25010000002 DEXAMETHASONE SODIUM PHOSPHATE 120 MG/30ML SOLUTION: Performed by: INTERNAL MEDICINE

## 2023-06-14 PROCEDURE — 96413 CHEMO IV INFUSION 1 HR: CPT

## 2023-06-14 PROCEDURE — 81025 URINE PREGNANCY TEST: CPT | Performed by: INTERNAL MEDICINE

## 2023-06-14 PROCEDURE — 96415 CHEMO IV INFUSION ADDL HR: CPT

## 2023-06-14 PROCEDURE — 96409 CHEMO IV PUSH SNGL DRUG: CPT

## 2023-06-14 PROCEDURE — G0498 CHEMO EXTEND IV INFUS W/PUMP: HCPCS

## 2023-06-14 PROCEDURE — G0463 HOSPITAL OUTPT CLINIC VISIT: HCPCS | Performed by: INTERNAL MEDICINE

## 2023-06-14 PROCEDURE — 80053 COMPREHEN METABOLIC PANEL: CPT | Performed by: INTERNAL MEDICINE

## 2023-06-14 PROCEDURE — 25010000002 OXALIPLATIN PER 0.5 MG: Performed by: INTERNAL MEDICINE

## 2023-06-14 PROCEDURE — 25010000002 LEUCOVORIN CALCIUM PER 50 MG: Performed by: INTERNAL MEDICINE

## 2023-06-14 PROCEDURE — 25010000002 PALONOSETRON PER 25 MCG: Performed by: INTERNAL MEDICINE

## 2023-06-14 PROCEDURE — 96375 TX/PRO/DX INJ NEW DRUG ADDON: CPT

## 2023-06-14 PROCEDURE — 25010000002 FLUOROURACIL PER 500 MG: Performed by: INTERNAL MEDICINE

## 2023-06-14 PROCEDURE — 96368 THER/DIAG CONCURRENT INF: CPT

## 2023-06-14 PROCEDURE — 85025 COMPLETE CBC W/AUTO DIFF WBC: CPT | Performed by: INTERNAL MEDICINE

## 2023-06-14 RX ORDER — SODIUM CHLORIDE 0.9 % (FLUSH) 0.9 %
20 SYRINGE (ML) INJECTION AS NEEDED
OUTPATIENT
Start: 2023-06-14

## 2023-06-14 RX ORDER — PALONOSETRON 0.05 MG/ML
0.25 INJECTION, SOLUTION INTRAVENOUS ONCE
Status: COMPLETED | OUTPATIENT
Start: 2023-06-14 | End: 2023-06-14

## 2023-06-14 RX ORDER — HEPARIN SODIUM (PORCINE) LOCK FLUSH IV SOLN 100 UNIT/ML 100 UNIT/ML
500 SOLUTION INTRAVENOUS AS NEEDED
OUTPATIENT
Start: 2023-06-14

## 2023-06-14 RX ORDER — HEPARIN SODIUM (PORCINE) LOCK FLUSH IV SOLN 100 UNIT/ML 100 UNIT/ML
500 SOLUTION INTRAVENOUS AS NEEDED
Status: DISCONTINUED | OUTPATIENT
Start: 2023-06-14 | End: 2023-06-15 | Stop reason: HOSPADM

## 2023-06-14 RX ORDER — FLUOROURACIL 50 MG/ML
950 INJECTION, SOLUTION INTRAVENOUS ONCE
Status: COMPLETED | OUTPATIENT
Start: 2023-06-14 | End: 2023-06-14

## 2023-06-14 RX ORDER — LIDOCAINE AND PRILOCAINE 25; 25 MG/G; MG/G
1 CREAM TOPICAL
Qty: 30 G | Refills: 1 | Status: SHIPPED | OUTPATIENT
Start: 2023-06-14

## 2023-06-14 RX ORDER — DEXTROSE MONOHYDRATE 50 MG/ML
250 INJECTION, SOLUTION INTRAVENOUS ONCE
Status: CANCELLED | OUTPATIENT
Start: 2023-06-14

## 2023-06-14 RX ORDER — DEXTROSE MONOHYDRATE 50 MG/ML
250 INJECTION, SOLUTION INTRAVENOUS ONCE
Status: COMPLETED | OUTPATIENT
Start: 2023-06-14 | End: 2023-06-14

## 2023-06-14 RX ORDER — FLUOROURACIL 50 MG/ML
400 INJECTION, SOLUTION INTRAVENOUS ONCE
Status: CANCELLED | OUTPATIENT
Start: 2023-06-14

## 2023-06-14 RX ORDER — DIPHENHYDRAMINE HYDROCHLORIDE 50 MG/ML
50 INJECTION INTRAMUSCULAR; INTRAVENOUS AS NEEDED
OUTPATIENT
Start: 2023-06-14

## 2023-06-14 RX ORDER — SODIUM CHLORIDE 0.9 % (FLUSH) 0.9 %
20 SYRINGE (ML) INJECTION AS NEEDED
Status: DISCONTINUED | OUTPATIENT
Start: 2023-06-14 | End: 2023-06-15 | Stop reason: HOSPADM

## 2023-06-14 RX ORDER — FAMOTIDINE 10 MG/ML
20 INJECTION, SOLUTION INTRAVENOUS AS NEEDED
OUTPATIENT
Start: 2023-06-14

## 2023-06-14 RX ORDER — PALONOSETRON 0.05 MG/ML
0.25 INJECTION, SOLUTION INTRAVENOUS ONCE
Status: CANCELLED | OUTPATIENT
Start: 2023-06-14

## 2023-06-14 RX ADMIN — PALONOSETRON 0.25 MG: 0.05 INJECTION, SOLUTION INTRAVENOUS at 09:55

## 2023-06-14 RX ADMIN — FLUOROURACIL 5750 MG: 50 INJECTION, SOLUTION INTRAVENOUS at 13:00

## 2023-06-14 RX ADMIN — DEXAMETHASONE SODIUM PHOSPHATE 12 MG: 4 INJECTION, SOLUTION INTRA-ARTICULAR; INTRALESIONAL; INTRAMUSCULAR; INTRAVENOUS; SOFT TISSUE at 09:57

## 2023-06-14 RX ADMIN — LEUCOVORIN CALCIUM 950 MG: 350 INJECTION, POWDER, LYOPHILIZED, FOR SOLUTION INTRAMUSCULAR; INTRAVENOUS at 10:34

## 2023-06-14 RX ADMIN — OXALIPLATIN 205 MG: 50 INJECTION, SOLUTION, CONCENTRATE INTRAVENOUS at 10:34

## 2023-06-14 RX ADMIN — FLUOROURACIL 950 MG: 50 INJECTION, SOLUTION INTRAVENOUS at 12:49

## 2023-06-14 RX ADMIN — DEXTROSE MONOHYDRATE 250 ML: 50 INJECTION, SOLUTION INTRAVENOUS at 09:51

## 2023-06-14 NOTE — PROGRESS NOTES
Chief Complaint/Care Team   Primary adenocarcinoma of descending colon    Shelly Cash MD Stephens, Cassandra, MD    History of Present Illness     Diagnosis: Colon Adenocarcinoma S/p Left colectomy on 5/5/23, stage after resection [id number], stage III    Current Treatment: FOLFOX IV [id number] x 6 months, cycle 1 on 6/14/2023    Previous Treatment: c-scope on 4/2023 biopsy revealed colon adenocarcinoma    Lilian Pelaez is a 47 y.o. female who presents to [facility] for discussion of newly diagnosed colon adenocarcinoma seen on biopsy of colon mass in the descending colon during colonoscopy performed in April 2023.     Staging scans revealed on 4/11/2023:  CT abdomen/pelvis without any metastatic disease in abdomen/pelvis  CT chest no evidence of metastatic disease in chest.    Patient underwent robotic resection of descending colon and splenic flexure by Dr. Rolando Liu on 5/5/2023.    Patient is a former smoker.   Patient reports family history of several relatives with other forms of cancer cancer on her mother side of the family, but denies any known history of colon cancer in her family.    Patient reports noticing some blood mixed in with stool prior to cancer diagnosis.      Interval history: Patient here prior to cycle 1 of chemotherapy with FOLFOX. She reports feeling well, no report of fever, chills, SOA, cough, n/v/d. Pt has picked up zofran antiemetic.     Review of Systems   Constitutional:  Positive for fatigue.   Gastrointestinal:  Positive for abdominal pain.   Psychiatric/Behavioral:  The patient is nervous/anxious.    All other systems reviewed and are negative.     Oncology/Hematology History   Malignant neoplasm of descending colon   5/6/2023 Initial Diagnosis    Malignant neoplasm of descending colon     5/31/2023 Cancer Staged    Staging form: Colon And Rectum, AJCC 8th Edition  - Pathologic: Stage IIIB (pT3, pN1b, cM0) - Signed by Brian Marroquin MD  "on 5/31/2023 6/14/2023 -  Chemotherapy    OP COLON mFOLFOX6 OXALIplatin / Leucovorin / Fluorouracil           Objective     Vitals:    06/14/23 0806   BP: 125/79   Pulse: 98   Resp: 18   Temp: 98 °F (36.7 °C)   TempSrc: Temporal   SpO2: 98%   Weight: 134 kg (295 lb 6.7 oz)   Height: 164 cm (64.57\")   PainSc: Comment: NO VALUE     ECOG score: 0         PHQ-9 Total Score:         Physical Exam  Exam conducted with a chaperone present.   Constitutional:       General: She is not in acute distress.     Appearance: Normal appearance.   HENT:      Head: Normocephalic and atraumatic.   Eyes:      Extraocular Movements: Extraocular movements intact.      Conjunctiva/sclera: Conjunctivae normal.   Cardiovascular:      Pulses: Normal pulses.      Heart sounds: Normal heart sounds.      Comments: Chemo port in place, clean dry and intact  Pulmonary:      Effort: Pulmonary effort is normal.      Breath sounds: Normal breath sounds. No rhonchi or rales.   Abdominal:      General: Bowel sounds are normal.      Palpations: Abdomen is soft.      Comments: Well healed surgical incisions from abdominal surgery   Musculoskeletal:      Cervical back: Normal range of motion and neck supple.   Skin:     General: Skin is warm and dry.   Neurological:      Mental Status: She is oriented to person, place, and time.         Past Medical History     Past Medical History:   Diagnosis Date    Anesthesia     B/P bottomed out/UNSURE ON THE DATE    Anxiety     Asthma 1996    seasonal/NO INHALERS    Colon cancer 04/17/2023    DDD (degenerative disc disease), cervical     DDD (degenerative disc disease), lumbar     Depression     Diverticulitis of colon 2005    Scope was done in [facility]    GERD (gastroesophageal reflux disease)     Hypertension     Kidney stones     Melanoma     removed    Palpitation     FOLLOWS W/DR. ANTONIO  Q6 MONTHS, NO CP OR SOA    Prolapse of female bladder      Current Outpatient Medications on File Prior to Visit "   Medication Sig Dispense Refill    allopurinol (ZYLOPRIM) 100 MG tablet Take 1 tablet by mouth Daily.      ALPRAZolam (XANAX) 0.5 MG tablet Take 1 tablet by mouth 3 (Three) Times a Day As Needed.      amLODIPine (NORVASC) 10 MG tablet Take 1 tablet by mouth Every Night.      Diclofenac Sodium (VOLTAREN) 1 % gel gel Apply 4 g topically to the appropriate area as directed 4 (Four) Times a Day As Needed.      docusate sodium 100 MG capsule Take 1 capsule by mouth Daily.      ferrous sulfate 325 (65 FE) MG tablet Take 1 tablet by mouth Daily With Breakfast. 30 tablet 5    furosemide (LASIX) 20 MG tablet Take 1 tablet by mouth Daily.      HYDROcodone-acetaminophen (NORCO) 7.5-325 MG per tablet Take 1 tablet by mouth Every 8 (Eight) Hours As Needed.      hyoscyamine (ANASPAZ,LEVSIN) 0.125 MG tablet Take 1 tablet by mouth 4 (Four) Times a Day.      ketorolac (TORADOL) 10 MG tablet Take 1 tablet by mouth Every 6 (Six) Hours As Needed.      lamoTRIgine (LaMICtal) 25 MG tablet Take 1 tablet by mouth 2 (Two) Times a Day. Only takes at night      losartan (COZAAR) 100 MG tablet Take 1 tablet by mouth Every Night.      medroxyPROGESTERone (DEPO-PROVERA) 150 MG/ML injection 1 mL.      metoclopramide (REGLAN) 10 MG tablet Take 1 tablet by mouth 3 (Three) Times a Day.      metoprolol succinate XL (TOPROL-XL) 25 MG 24 hr tablet Take 1 tablet by mouth Daily.      Multiple Vitamins-Minerals (b complex-C-E-zinc) tablet Take 1 tablet by mouth Daily.      Myrbetriq 25 MG tablet sustained-release 24 hour 24 hr tablet Take 1 tablet by mouth Every Evening.      ondansetron (ZOFRAN) 8 MG tablet Take 1 tablet by mouth 3 (Three) Times a Day As Needed for Nausea or Vomiting. 30 tablet 5    pantoprazole (PROTONIX) 40 MG EC tablet Take 1 tablet by mouth 2 (Two) Times a Day.      rOPINIRole (REQUIP) 1 MG tablet Take 1 tablet by mouth Every Night.      sertraline (ZOLOFT) 100 MG tablet sertraline 100 mg tablet   Take 1 tablet every day by oral  route for 30 days.      sucralfate (CARAFATE) 1 g tablet Take 1 tablet by mouth 2 (Two) Times a Day.      traMADol (Ultram) 50 MG tablet Take 1 tablet by mouth Every 6 (Six) Hours As Needed for Severe Pain or Moderate Pain. 5 tablet 0    vitamin D3 125 MCG (5000 UT) capsule capsule Take 2,000 Units by mouth Daily.       Current Facility-Administered Medications on File Prior to Visit   Medication Dose Route Frequency Provider Last Rate Last Admin    heparin injection 500 Units  500 Units Intravenous PRN Brian Marroquin MD        sodium chloride 0.9 % flush 20 mL  20 mL Intravenous PRN Brian Marroquin MD          Allergies   Allergen Reactions    Hydromorphone Hives, Itching and GI Intolerance    Morphine Hives, Itching and Nausea And Vomiting    Oxybutynin Swelling and Angioedema           Oxycodone-Acetaminophen Itching and Nausea And Vomiting     Can not take any higher than 7.5    Penicillins Rash    Promethazine Nausea And Vomiting    Tylenol With Codeine #3 [Acetaminophen-Codeine] Itching and Nausea And Vomiting     Past Surgical History:   Procedure Laterality Date    BREAST SURGERY  2016    Removed 8lbs of tissue, 2016    CHOLECYSTECTOMY      COLON RESECTION Left 05/05/2023    Procedure: RESECTION OF DESCENDING COLON AND SPLENIC FLEXURE TAKEDOWN WITH DAVINCI ROBOT;  Surgeon: Rolando Liu MD;  Location: [facility] OR [facility];  Service: Robotics - DaVinci;  Laterality: Left;    COLONOSCOPY N/A 04/04/2023    Procedure: COLONOSCOPY WITH POLYPECTOMY/SNARE/BIOPSIES/TATTOOING DESCENDING COLON MASS;  Surgeon: Deniz Dowd MD;  Location: [facility] ENDOSCOPY;  Service: Gastroenterology;  Laterality: N/A;  INCOMPLETE COLONOSCOPY  POOR BOWEL PREP  COLON POLYP  COLON MASS  DIVERTICULOSIS    COLONOSCOPY N/A 04/12/2023    Procedure: COLONOSCOPY with cold snare;  Surgeon: Deniz Dowd MD;  Location: [facility] ENDOSCOPY;  Service: Gastroenterology;  Laterality: N/A;  colon polyps, diverticulosis, colon mass, hemorrhoids       CYSTOSCOPY W/ URETERAL STENT PLACEMENT Left 05/05/2023    Procedure: Cystoscopy, left ureteral injection,;  Surgeon: Manjula Randolph MD;  Location: [facility] OR [facility];  Service: Urology;  Laterality: Left;    FRACTURE SURGERY  2001    Left knee    KNEE SURGERY Left     x4    LAPAROSCOPIC TUBAL LIGATION  1995    LAPAROSCOPIC TUBAL LIGATION      Reversed    REDUCTION MAMMAPLASTY  2009    8lbs of tissue was removed    TONSILLECTOMY      UPPER GASTROINTESTINAL ENDOSCOPY      VENOUS ACCESS DEVICE (PORT) INSERTION N/A 6/5/2023    Procedure: INSERTION VENOUS ACCESS DEVICE;  Surgeon: Rolando Liu MD;  Location: [facility];  Service: General;  Laterality: N/A;     Social History     Socioeconomic History    Marital status: Other   Tobacco Use    Smoking status: Never     Passive exposure: Never    Smokeless tobacco: Never   Vaping Use    Vaping Use: Never used   Substance and Sexual Activity    Alcohol use: Never    Drug use: Never    Sexual activity: Defer     Family History   Problem Relation Age of Onset    Colon cancer Neg Hx        Results     Result Review   The following data was reviewed by: Brian Marroquin MD on 04/13/2023:  Lab Results   Component Value Date    HGB 13.3 06/14/2023    HCT 39.2 06/14/2023    MCV 86.5 06/14/2023     06/14/2023    WBC 8.20 06/14/2023    NEUTROABS 5.00 06/14/2023    LYMPHSABS 2.05 06/14/2023    MONOSABS 0.95 (H) 06/14/2023    EOSABS 0.17 06/14/2023    BASOSABS 0.02 06/14/2023     Lab Results   Component Value Date    GLUCOSE 113 (H) 06/14/2023    BUN 19 06/14/2023    CREATININE 0.75 06/14/2023     06/14/2023    K 3.7 06/14/2023     06/14/2023    CO2 22.3 06/14/2023    CALCIUM 9.5 06/14/2023    PROTEINTOT 7.0 06/14/2023    ALBUMIN 4.1 06/14/2023    BILITOT 0.5 06/14/2023    ALKPHOS 85 06/14/2023    AST 12 06/14/2023    ALT 12 06/14/2023     Lab Results   Component Value Date    MG 1.7 05/08/2023    PHOS 3.3 05/08/2023       Case Report   Surgical Pathology Report                          Case: [id number]                                   Authorizing Provider:  Deniz Dowd MD    Collected:           04/12/2023 09:41 AM           Ordering Location:     [facility] Received:            04/12/2023 11:41 AM                                  SUITES                                                                        Pathologist:           Niesha Torres DO                                                        Specimens:   1) - Large Intestine, Cecum, cecal polyp cold snare                                                  2) - Large Intestine, Sigmoid Colon, sigmoid colon polyp cold snare                        Clinical Information    Mass of colon   Final Diagnosis   1. Cecum polyp, biopsy:                            - Tubular adenoma        2. Sigmoid colon polyp, biopsy:                            - Tubular adenoma    Electronically signed by Niesha Torres DO on 4/13/2023 at 0840     Surgical Pathology Report                         Case: [id number]                                   Authorizing Provider:  Deniz Dowd MD    Collected:           04/04/2023 11:15 AM           Ordering Location:     [facility] Received:            04/04/2023 11:59 AM                                  SUITES                                                                        Pathologist:           Jennifer Mike MD                                                      Specimens:   1) - Large Intestine, Transverse Colon, TRANSVERSE COLON POLYP                                       2) - Large Intestine, Left / Descending Colon, DESCENDING COLON BIOPSIES                   Clinical Information    Constipation, unspecified constipation type   Final Diagnosis   1. Transverse colon polyp, biopsy:               - Fragments of tubular adenoma        2.  Descending colon, biopsy:               -Adenocarcinoma, moderately to poorly  differentiated     Comment: Per policy, reflex testing has been ordered, and the results will be separately reported.     REMARKS: The above positive (malignant) diagnosis was called to April in Dr. Dowd's office at 09:09 EDT on 4/5/2023 by bjs.          Electronically signed by Jennifer Mike MD on 4/5/2023 at 0984         Surgical Pathology Report                         Case: [id number]                                   Authorizing Provider:  Rolando Liu MD        Collected:           05/05/2023 03:06 PM           Ordering Location:     [facility]  Received:            05/08/2023 06:48 AM                                  OR                                                                            Pathologist:           Jt Florence MD                                                             Specimen:    Large Intestine, Left / Descending Colon, left colon                                       Clinical Information    Malignant neoplasm of descending colon   Final Diagnosis   Left colon, resection:               - Adenocarcinoma               - Three of thirty-three lymph nodes positive for carcinoma (3/33)               - See synoptic checklist   Electronically signed by Jt Florence MD on 5/11/2023 at 1508   Synoptic Checklist   COLON AND RECTUM: Resection, Including Transanal Disk Excision of Rectal Neoplasms  8th Edition - Protocol posted: 6/22/2022  COLON AND RECTUM: RESECTION - All Specimens  SPECIMEN   Procedure  Descending colon resection    TUMOR   Tumor Site  Descending colon    Histologic Type  Adenocarcinoma    Histologic Grade  G3, poorly differentiated    Tumor Size  Greatest dimension (Centimeters): 3.5 cm   Tumor Extent  Invades through muscularis propria into the pericolonic or perirectal tissue    Macroscopic Tumor Perforation  Not identified    Lymphovascular Invasion  Small vessel    Perineural Invasion  Present    Treatment Effect  No known presurgical  therapy    MARGINS   Margin Status for Invasive Carcinoma  All margins negative for invasive carcinoma    Closest Margin(s) to Invasive Carcinoma  Radial (circumferential) or mesenteric    Distance from Invasive Carcinoma to Closest Margin  3.5 cm   Margin Status for Non-Invasive Tumor  All margins negative for high-grade dysplasia / intramucosal carcinoma and low-grade dysplasia    REGIONAL LYMPH NODES   Regional Lymph Node Status  Tumor present in regional lymph node(s)    Number of Lymph Nodes with Tumor  3    Number of Lymph Nodes Examined  33    Tumor Deposits  Present    Number of Tumor Deposits  1    PATHOLOGIC STAGE CLASSIFICATION (pTNM, AJCC 8th Edition)   Reporting of pT, pN, and (when applicable) pM categories is based on information available to the pathologist at the time the report is issued. As per the AJCC (Chapter 1, 8th Ed.) it is the managing physician's responsibility to establish the final pathologic stage based upon all pertinent information, including but potentially not limited to this pathology report.   pT Category  pT3    pN Category  pN1b    ADDITIONAL FINDINGS   Additional Findings  None identified    .          XR Chest 1 View    Result Date: 6/5/2023  Impression:   1. Right IJ port with tip at the confluence of the brachiocephalic veins.       DENG SANCHES MD       Electronically Signed and Approved By: DENG SANCHES MD on 6/05/2023 at 11:06              Assessment & Plan     Diagnoses and all orders for this visit:    1. Malignant neoplasm of descending colon (Primary)  -     lidocaine-prilocaine (EMLA) 2.5-2.5 % cream; Apply 1 application topically to the appropriate area as directed Every 2 (Two) Hours As Needed for Mild Pain.  Dispense: 30 g; Refill: 1    Other orders  -     dextrose (D5W) 5 % infusion 250 mL  -     palonosetron (ALOXI) injection 0.25 mg  -     dexamethasone (DECADRON) 12 mg in sodium chloride 0.9 % IVPB  -     OXALIplatin (ELOXATIN) 205 mg in dextrose (D5W) 5 %  291 mL chemo IVPB  -     leucovorin 960 mg in dextrose (D5W) 5 % 346 mL IVPB  -     fluorouracil (ADRUCIL) chemo injection 960 mg  -     fluorouracil (ADRUCIL) 5,740 mg in sodium chloride 0.9 % 114.8 mL chemo infusion - FOR HOME USE  -     Hydrocortisone Sod Suc (PF) (Solu-CORTEF) injection 100 mg  -     diphenhydrAMINE (BENADRYL) injection 50 mg  -     famotidine (PEPCID) injection 20 mg        Lilian Pelaez is a 47 y.o. female who presents to [facility]EMATOLOG[facility] ONCOLOGY  for discussion of systemic therapy for stage III colon cancer, patient status post resection of descending colon splenic flexure by Dr. Rolando Liu on 5/5/2023.     Reviewed NCCN guidelines for her stage III cancer, discussed high risk features seen on pathology report and discussed CapeOx for 3 months versus FOLFOX for 3 to 6 months with patient and  today.  Patient agreed to plan to undergo FOLFOX IV chemotherapy every 2 weeks for 6 months.    Plan to rescan patient after 3 months of FOLFOX therapy.    Iron deficiency anemia from GI blood loss  -ordered CBC, CMP, CEA iron profile, and ferritin on 5/31/23, CBC was normal, CMP was normal, CEA was 1.32, Iron studies showed normal ferritin and iron sat of 19%, prescribed ferrous sulfate 325mg PO QD, will continue to monitor    Labs reviewed and plan to proceed with cycle 1 day 1 on today.    Plan patient follow-up in the next 1 to 2 weeks for cycle 2 day 1 of chemotherapy.    Patient verbalized understanding and agreement plan.    Please note that portions of this note were completed with a voice recognition program.    Electronically signed by Brian Marroquin MD, 06/14/23, 9:37 AM EDT.          Follow Up     I spent 60 minutes caring for Lilian on this date of service. This time includes time spent by me in the following activities:preparing for the visit, reviewing tests, obtaining and/or reviewing a separately obtained history, performing a medically appropriate  examination and/or evaluation , counseling and educating the patient/family/caregiver, ordering medications, tests, or procedures, documenting information in the medical record and care coordination.    This is an acute or chronic illness that poses a threat to life or bodily function. The above treatment plan involves a high risk of complications and/or mortality of patient management.    The patient was seen and examined. Work by the provider also included review and/or ordering of lab tests, review and/or ordering of radiology tests, review and/or ordering of medicine tests, discussion with other physicians or providers, independent review of data, obtaining old records, review/summation of old records, and/or other review.    I have reviewed the family history, social history, and past medical history for this patient. Previous information and data has been reviewed and updated as needed. I have reviewed and verified the chief complaint, history, and other documentation. The patient was interviewed and examined in the clinic and the chart reviewed. The previous observations, recommendations, and conclusions were reviewed including those of other providers.     The plan was discussed with the patient and/or family. The patient was given time to ask questions and these questions were answered. At the conclusion of their visit they had no additional questions or concerns and all questions were answered to their satisfaction.    Patient was given instructions and counseling regarding her condition or for health maintenance advice. Please see specific information pulled into the AVS if appropriate.

## 2023-06-14 NOTE — PROGRESS NOTES
Diagnosis: Colon Cancer    Reason for Referral: Patient needing assistance with application to [employer] organization.    Content of Visit: OSW met with patient at her infusion chair to review her application to [employer] and obtain her signature. Patient stated the application was correct and she signed the application. OSW emailed it to the [facility] through secure emails and a response was received that patient was approved $250 in gas assistance. Patient expressed appreciation for OSW's assistance.     Resources/Referrals Provided: [employer] [place]

## 2023-06-14 NOTE — THERAPY TREATMENT NOTE
"Presents for C1D1 of oxaliplatin, flurouracil, and leucovorin (mFOLFOX6).     134 kg (295 lb 3.1 oz)  164 cm (64.57\")  Body surface area is 2.32 meters squared.    Doses verified using today's parameters and are within acceptable limits of variation and rounding.     Labs reviewed and are within EPIC hold parameter limits to proceed.     Patient was educated on information about each medication including dose, route, frequency, and common adverse effects. Patient was provided both verbal and written counseling. UpToDate patient information printed and provided to patient and key information verbally highlighted including: Overview of regimen including but not limited to infusion times; recognition and management of allergic/infusion reactions; \"call your doctor right away\" parameters.     All of the patient's questions were answered and patient expressed verbal understanding  "

## 2023-06-15 ENCOUNTER — TELEPHONE (OUTPATIENT)
Dept: ONCOLOGY | Facility: HOSPITAL | Age: 48
End: 2023-06-15
Payer: COMMERCIAL

## 2023-06-15 PROCEDURE — 99285 EMERGENCY DEPT VISIT HI MDM: CPT

## 2023-06-16 ENCOUNTER — HOSPITAL ENCOUNTER (OUTPATIENT)
Facility: HOSPITAL | Age: 48
Setting detail: OBSERVATION
Discharge: HOME | End: 2023-06-18
Attending: EMERGENCY MEDICINE | Admitting: INTERNAL MEDICINE
Payer: COMMERCIAL

## 2023-06-16 ENCOUNTER — APPOINTMENT (OUTPATIENT)
Dept: CT IMAGING | Facility: HOSPITAL | Age: 48
End: 2023-06-16
Payer: COMMERCIAL

## 2023-06-16 ENCOUNTER — TELEPHONE (OUTPATIENT)
Dept: ONCOLOGY | Facility: HOSPITAL | Age: 48
End: 2023-06-16
Payer: COMMERCIAL

## 2023-06-16 DIAGNOSIS — C18.9 MALIGNANT NEOPLASM OF COLON, UNSPECIFIED PART OF COLON: ICD-10-CM

## 2023-06-16 DIAGNOSIS — K59.00 CONSTIPATION, UNSPECIFIED CONSTIPATION TYPE: ICD-10-CM

## 2023-06-16 DIAGNOSIS — R10.10 PAIN OF UPPER ABDOMEN: ICD-10-CM

## 2023-06-16 DIAGNOSIS — K66.8 PNEUMOPERITONEUM: Primary | ICD-10-CM

## 2023-06-16 LAB
ALBUMIN SERPL-MCNC: 4.2 G/DL (ref 3.5–5.2)
ALBUMIN/GLOB SERPL: 1.3 G/DL
ALP SERPL-CCNC: 100 U/L (ref 39–117)
ALT SERPL W P-5'-P-CCNC: 41 U/L (ref 1–33)
ANION GAP SERPL CALCULATED.3IONS-SCNC: 14.4 MMOL/L (ref 5–15)
AST SERPL-CCNC: 60 U/L (ref 1–32)
BASOPHILS # BLD AUTO: 0.05 10*3/MM3 (ref 0–0.2)
BASOPHILS NFR BLD AUTO: 0.4 % (ref 0–1.5)
BILIRUB SERPL-MCNC: 0.4 MG/DL (ref 0–1.2)
BILIRUB UR QL STRIP: NEGATIVE
BUN SERPL-MCNC: 24 MG/DL (ref 6–20)
BUN/CREAT SERPL: 29.6 (ref 7–25)
CALCIUM SPEC-SCNC: 9.6 MG/DL (ref 8.6–10.5)
CHLORIDE SERPL-SCNC: 103 MMOL/L (ref 98–107)
CLARITY UR: CLEAR
CO2 SERPL-SCNC: 20.6 MMOL/L (ref 22–29)
COLOR UR: YELLOW
CREAT SERPL-MCNC: 0.81 MG/DL (ref 0.57–1)
D-LACTATE SERPL-SCNC: 0.9 MMOL/L (ref 0.5–2)
DEPRECATED RDW RBC AUTO: 37.3 FL (ref 37–54)
EGFRCR SERPLBLD CKD-EPI 2021: 90.2 ML/MIN/1.73
EOSINOPHIL # BLD AUTO: 0.06 10*3/MM3 (ref 0–0.4)
EOSINOPHIL NFR BLD AUTO: 0.4 % (ref 0.3–6.2)
ERYTHROCYTE [DISTWIDTH] IN BLOOD BY AUTOMATED COUNT: 11.9 % (ref 12.3–15.4)
GLOBULIN UR ELPH-MCNC: 3.2 GM/DL
GLUCOSE SERPL-MCNC: 108 MG/DL (ref 65–99)
GLUCOSE UR STRIP-MCNC: NEGATIVE MG/DL
HCG INTACT+B SERPL-ACNC: <0.5 MIU/ML
HCT VFR BLD AUTO: 42.3 % (ref 34–46.6)
HGB BLD-MCNC: 14.8 G/DL (ref 12–15.9)
HGB UR QL STRIP.AUTO: NEGATIVE
HOLD SPECIMEN: NORMAL
HOLD SPECIMEN: NORMAL
IMM GRANULOCYTES # BLD AUTO: 0.04 10*3/MM3 (ref 0–0.05)
IMM GRANULOCYTES NFR BLD AUTO: 0.3 % (ref 0–0.5)
KETONES UR QL STRIP: NEGATIVE
LEUKOCYTE ESTERASE UR QL STRIP.AUTO: NEGATIVE
LIPASE SERPL-CCNC: 99 U/L (ref 13–60)
LYMPHOCYTES # BLD AUTO: 2.78 10*3/MM3 (ref 0.7–3.1)
LYMPHOCYTES NFR BLD AUTO: 20.7 % (ref 19.6–45.3)
MCH RBC QN AUTO: 30 PG (ref 26.6–33)
MCHC RBC AUTO-ENTMCNC: 35 G/DL (ref 31.5–35.7)
MCV RBC AUTO: 85.6 FL (ref 79–97)
MONOCYTES # BLD AUTO: 0.8 10*3/MM3 (ref 0.1–0.9)
MONOCYTES NFR BLD AUTO: 6 % (ref 5–12)
NEUTROPHILS NFR BLD AUTO: 72.2 % (ref 42.7–76)
NEUTROPHILS NFR BLD AUTO: 9.67 10*3/MM3 (ref 1.7–7)
NITRITE UR QL STRIP: NEGATIVE
NRBC BLD AUTO-RTO: 0 /100 WBC (ref 0–0.2)
PH UR STRIP.AUTO: 6 [PH] (ref 5–8)
PLATELET # BLD AUTO: 254 10*3/MM3 (ref 140–450)
PMV BLD AUTO: 9.8 FL (ref 6–12)
POTASSIUM SERPL-SCNC: 4.3 MMOL/L (ref 3.5–5.2)
PROT SERPL-MCNC: 7.4 G/DL (ref 6–8.5)
PROT UR QL STRIP: NEGATIVE
RBC # BLD AUTO: 4.94 10*6/MM3 (ref 3.77–5.28)
SODIUM SERPL-SCNC: 138 MMOL/L (ref 136–145)
SP GR UR STRIP: >1.03 (ref 1–1.03)
UROBILINOGEN UR QL STRIP: ABNORMAL
WBC NRBC COR # BLD: 13.4 10*3/MM3 (ref 3.4–10.8)
WHOLE BLOOD HOLD COAG: NORMAL
WHOLE BLOOD HOLD SPECIMEN: NORMAL

## 2023-06-16 PROCEDURE — 96375 TX/PRO/DX INJ NEW DRUG ADDON: CPT

## 2023-06-16 PROCEDURE — G0378 HOSPITAL OBSERVATION PER HR: HCPCS

## 2023-06-16 PROCEDURE — 96376 TX/PRO/DX INJ SAME DRUG ADON: CPT

## 2023-06-16 PROCEDURE — 25510000001 IOPAMIDOL PER 1 ML: Performed by: EMERGENCY MEDICINE

## 2023-06-16 PROCEDURE — 36415 COLL VENOUS BLD VENIPUNCTURE: CPT

## 2023-06-16 PROCEDURE — 81003 URINALYSIS AUTO W/O SCOPE: CPT | Performed by: EMERGENCY MEDICINE

## 2023-06-16 PROCEDURE — 96361 HYDRATE IV INFUSION ADD-ON: CPT

## 2023-06-16 PROCEDURE — 25010000002 DIPHENHYDRAMINE PER 50 MG: Performed by: NURSE PRACTITIONER

## 2023-06-16 PROCEDURE — 25010000002 KETOROLAC TROMETHAMINE PER 15 MG: Performed by: INTERNAL MEDICINE

## 2023-06-16 PROCEDURE — 25010000002 KETOROLAC TROMETHAMINE PER 15 MG: Performed by: NURSE PRACTITIONER

## 2023-06-16 PROCEDURE — 87040 BLOOD CULTURE FOR BACTERIA: CPT | Performed by: NURSE PRACTITIONER

## 2023-06-16 PROCEDURE — 96365 THER/PROPH/DIAG IV INF INIT: CPT

## 2023-06-16 PROCEDURE — 85025 COMPLETE CBC W/AUTO DIFF WBC: CPT

## 2023-06-16 PROCEDURE — 84702 CHORIONIC GONADOTROPIN TEST: CPT | Performed by: EMERGENCY MEDICINE

## 2023-06-16 PROCEDURE — 74177 CT ABD & PELVIS W/CONTRAST: CPT

## 2023-06-16 PROCEDURE — 83690 ASSAY OF LIPASE: CPT | Performed by: EMERGENCY MEDICINE

## 2023-06-16 PROCEDURE — 83605 ASSAY OF LACTIC ACID: CPT | Performed by: NURSE PRACTITIONER

## 2023-06-16 PROCEDURE — 25010000002 LEVOFLOXACIN PER 250 MG: Performed by: NURSE PRACTITIONER

## 2023-06-16 PROCEDURE — 25010000002 ONDANSETRON PER 1 MG: Performed by: INTERNAL MEDICINE

## 2023-06-16 PROCEDURE — 25010000002 METOCLOPRAMIDE PER 10 MG: Performed by: NURSE PRACTITIONER

## 2023-06-16 PROCEDURE — 80053 COMPREHEN METABOLIC PANEL: CPT | Performed by: EMERGENCY MEDICINE

## 2023-06-16 RX ORDER — DIPHENHYDRAMINE HYDROCHLORIDE 50 MG/ML
25 INJECTION INTRAMUSCULAR; INTRAVENOUS ONCE
Status: COMPLETED | OUTPATIENT
Start: 2023-06-16 | End: 2023-06-16

## 2023-06-16 RX ORDER — SODIUM CHLORIDE 9 MG/ML
40 INJECTION, SOLUTION INTRAVENOUS AS NEEDED
Status: DISCONTINUED | OUTPATIENT
Start: 2023-06-16 | End: 2023-06-18 | Stop reason: HOSPADM

## 2023-06-16 RX ORDER — AMOXICILLIN 250 MG
2 CAPSULE ORAL 2 TIMES DAILY
Status: DISCONTINUED | OUTPATIENT
Start: 2023-06-16 | End: 2023-06-18 | Stop reason: HOSPADM

## 2023-06-16 RX ORDER — SODIUM CHLORIDE 0.9 % (FLUSH) 0.9 %
10 SYRINGE (ML) INJECTION AS NEEDED
Status: DISCONTINUED | OUTPATIENT
Start: 2023-06-16 | End: 2023-06-18 | Stop reason: HOSPADM

## 2023-06-16 RX ORDER — LEVOFLOXACIN 5 MG/ML
750 INJECTION, SOLUTION INTRAVENOUS EVERY 24 HOURS
Status: DISCONTINUED | OUTPATIENT
Start: 2023-06-17 | End: 2023-06-18 | Stop reason: HOSPADM

## 2023-06-16 RX ORDER — MEDROXYPROGESTERONE ACETATE 10 MG/1
10 TABLET ORAL ONCE
Status: DISCONTINUED | OUTPATIENT
Start: 2023-06-16 | End: 2023-06-16

## 2023-06-16 RX ORDER — LAMOTRIGINE 25 MG/1
25 TABLET ORAL 2 TIMES DAILY
Status: DISCONTINUED | OUTPATIENT
Start: 2023-06-16 | End: 2023-06-18 | Stop reason: HOSPADM

## 2023-06-16 RX ORDER — BISACODYL 5 MG/1
5 TABLET, DELAYED RELEASE ORAL DAILY PRN
Status: DISCONTINUED | OUTPATIENT
Start: 2023-06-16 | End: 2023-06-18 | Stop reason: HOSPADM

## 2023-06-16 RX ORDER — POLYETHYLENE GLYCOL 3350 17 G/17G
17 POWDER, FOR SOLUTION ORAL DAILY PRN
Status: DISCONTINUED | OUTPATIENT
Start: 2023-06-16 | End: 2023-06-18 | Stop reason: HOSPADM

## 2023-06-16 RX ORDER — BISACODYL 10 MG
10 SUPPOSITORY, RECTAL RECTAL 2 TIMES DAILY
Status: DISCONTINUED | OUTPATIENT
Start: 2023-06-16 | End: 2023-06-18 | Stop reason: HOSPADM

## 2023-06-16 RX ORDER — ROPINIROLE 1 MG/1
1 TABLET, FILM COATED ORAL NIGHTLY
Status: DISCONTINUED | OUTPATIENT
Start: 2023-06-16 | End: 2023-06-18 | Stop reason: HOSPADM

## 2023-06-16 RX ORDER — METOCLOPRAMIDE HYDROCHLORIDE 5 MG/ML
10 INJECTION INTRAMUSCULAR; INTRAVENOUS ONCE
Status: COMPLETED | OUTPATIENT
Start: 2023-06-16 | End: 2023-06-16

## 2023-06-16 RX ORDER — METRONIDAZOLE 500 MG/100ML
500 INJECTION, SOLUTION INTRAVENOUS EVERY 8 HOURS
Status: DISCONTINUED | OUTPATIENT
Start: 2023-06-16 | End: 2023-06-18 | Stop reason: HOSPADM

## 2023-06-16 RX ORDER — ONDANSETRON 2 MG/ML
4 INJECTION INTRAMUSCULAR; INTRAVENOUS EVERY 6 HOURS PRN
Status: DISCONTINUED | OUTPATIENT
Start: 2023-06-16 | End: 2023-06-18 | Stop reason: HOSPADM

## 2023-06-16 RX ORDER — KETOROLAC TROMETHAMINE 15 MG/ML
15 INJECTION, SOLUTION INTRAMUSCULAR; INTRAVENOUS EVERY 6 HOURS PRN
Status: DISPENSED | OUTPATIENT
Start: 2023-06-16 | End: 2023-06-18

## 2023-06-16 RX ORDER — ALPRAZOLAM 0.25 MG/1
0.5 TABLET ORAL NIGHTLY PRN
Status: DISCONTINUED | OUTPATIENT
Start: 2023-06-16 | End: 2023-06-18 | Stop reason: HOSPADM

## 2023-06-16 RX ORDER — SODIUM CHLORIDE, SODIUM LACTATE, POTASSIUM CHLORIDE, CALCIUM CHLORIDE 600; 310; 30; 20 MG/100ML; MG/100ML; MG/100ML; MG/100ML
100 INJECTION, SOLUTION INTRAVENOUS CONTINUOUS
Status: DISCONTINUED | OUTPATIENT
Start: 2023-06-16 | End: 2023-06-18 | Stop reason: HOSPADM

## 2023-06-16 RX ORDER — BISACODYL 10 MG
10 SUPPOSITORY, RECTAL RECTAL DAILY PRN
Status: DISCONTINUED | OUTPATIENT
Start: 2023-06-16 | End: 2023-06-18 | Stop reason: HOSPADM

## 2023-06-16 RX ORDER — NITROGLYCERIN 0.4 MG/1
0.4 TABLET SUBLINGUAL
Status: DISCONTINUED | OUTPATIENT
Start: 2023-06-16 | End: 2023-06-18 | Stop reason: HOSPADM

## 2023-06-16 RX ORDER — LEUCOVORIN CALCIUM 5 MG/1
TABLET ORAL EVERY 6 HOURS
COMMUNITY

## 2023-06-16 RX ORDER — LEVOFLOXACIN 5 MG/ML
750 INJECTION, SOLUTION INTRAVENOUS ONCE
Status: COMPLETED | OUTPATIENT
Start: 2023-06-16 | End: 2023-06-16

## 2023-06-16 RX ORDER — SODIUM CHLORIDE 0.9 % (FLUSH) 0.9 %
10 SYRINGE (ML) INJECTION EVERY 12 HOURS SCHEDULED
Status: DISCONTINUED | OUTPATIENT
Start: 2023-06-16 | End: 2023-06-18 | Stop reason: HOSPADM

## 2023-06-16 RX ORDER — KETOROLAC TROMETHAMINE 30 MG/ML
30 INJECTION, SOLUTION INTRAMUSCULAR; INTRAVENOUS ONCE
Status: COMPLETED | OUTPATIENT
Start: 2023-06-16 | End: 2023-06-16

## 2023-06-16 RX ORDER — FAMOTIDINE 10 MG/ML
20 INJECTION, SOLUTION INTRAVENOUS EVERY 12 HOURS SCHEDULED
Status: DISCONTINUED | OUTPATIENT
Start: 2023-06-16 | End: 2023-06-18 | Stop reason: HOSPADM

## 2023-06-16 RX ORDER — SERTRALINE HYDROCHLORIDE 100 MG/1
100 TABLET, FILM COATED ORAL DAILY
Status: DISCONTINUED | OUTPATIENT
Start: 2023-06-16 | End: 2023-06-18 | Stop reason: HOSPADM

## 2023-06-16 RX ORDER — LIDOCAINE 4 G/G
1 PATCH TOPICAL DAILY
COMMUNITY

## 2023-06-16 RX ORDER — FLUOROURACIL 50 MG/G
1 CREAM TOPICAL 2 TIMES DAILY
COMMUNITY

## 2023-06-16 RX ORDER — METRONIDAZOLE 500 MG/100ML
500 INJECTION, SOLUTION INTRAVENOUS ONCE
Status: COMPLETED | OUTPATIENT
Start: 2023-06-16 | End: 2023-06-16

## 2023-06-16 RX ORDER — METOPROLOL SUCCINATE 25 MG/1
25 TABLET, EXTENDED RELEASE ORAL DAILY
Status: DISCONTINUED | OUTPATIENT
Start: 2023-06-16 | End: 2023-06-18 | Stop reason: HOSPADM

## 2023-06-16 RX ADMIN — METRONIDAZOLE 500 MG: 5 INJECTION, SOLUTION INTRAVENOUS at 17:42

## 2023-06-16 RX ADMIN — METRONIDAZOLE 500 MG: 5 INJECTION, SOLUTION INTRAVENOUS at 05:00

## 2023-06-16 RX ADMIN — KETOROLAC TROMETHAMINE 15 MG: 15 INJECTION, SOLUTION INTRAMUSCULAR; INTRAVENOUS at 07:20

## 2023-06-16 RX ADMIN — METOCLOPRAMIDE HYDROCHLORIDE 10 MG: 5 INJECTION INTRAMUSCULAR; INTRAVENOUS at 01:59

## 2023-06-16 RX ADMIN — KETOROLAC TROMETHAMINE 30 MG: 30 INJECTION, SOLUTION INTRAMUSCULAR; INTRAVENOUS at 01:58

## 2023-06-16 RX ADMIN — FAMOTIDINE 20 MG: 10 INJECTION, SOLUTION INTRAVENOUS at 09:52

## 2023-06-16 RX ADMIN — SODIUM CHLORIDE, POTASSIUM CHLORIDE, SODIUM LACTATE AND CALCIUM CHLORIDE 100 ML/HR: 600; 310; 30; 20 INJECTION, SOLUTION INTRAVENOUS at 07:21

## 2023-06-16 RX ADMIN — BISACODYL 10 MG: 10 SUPPOSITORY RECTAL at 21:39

## 2023-06-16 RX ADMIN — Medication 10 ML: at 09:53

## 2023-06-16 RX ADMIN — Medication 10 ML: at 22:34

## 2023-06-16 RX ADMIN — ONDANSETRON 4 MG: 2 INJECTION INTRAMUSCULAR; INTRAVENOUS at 07:20

## 2023-06-16 RX ADMIN — IOPAMIDOL 100 ML: 755 INJECTION, SOLUTION INTRAVENOUS at 01:14

## 2023-06-16 RX ADMIN — FAMOTIDINE 20 MG: 10 INJECTION, SOLUTION INTRAVENOUS at 21:08

## 2023-06-16 RX ADMIN — SODIUM CHLORIDE, POTASSIUM CHLORIDE, SODIUM LACTATE AND CALCIUM CHLORIDE 100 ML/HR: 600; 310; 30; 20 INJECTION, SOLUTION INTRAVENOUS at 17:42

## 2023-06-16 RX ADMIN — ONDANSETRON 4 MG: 2 INJECTION INTRAMUSCULAR; INTRAVENOUS at 17:42

## 2023-06-16 RX ADMIN — LEVOFLOXACIN 750 MG: 750 INJECTION, SOLUTION INTRAVENOUS at 05:43

## 2023-06-16 RX ADMIN — DIPHENHYDRAMINE HYDROCHLORIDE 25 MG: 50 INJECTION INTRAMUSCULAR; INTRAVENOUS at 01:59

## 2023-06-16 RX ADMIN — KETOROLAC TROMETHAMINE 15 MG: 15 INJECTION, SOLUTION INTRAMUSCULAR; INTRAVENOUS at 17:42

## 2023-06-16 RX ADMIN — SODIUM CHLORIDE 1000 ML: 9 INJECTION, SOLUTION INTRAVENOUS at 02:11

## 2023-06-16 RX ADMIN — BISACODYL 10 MG: 10 SUPPOSITORY RECTAL at 07:45

## 2023-06-16 NOTE — H&P
[facility] HISTORY AND PHYSICAL  Date: 2023   Patient Name: Lilian Pelaez  : 1975  MRN: [id number]  Primary Care Physician:  Shelly Cash MD  Date of admission: 2023    Subjective   Subjective     Chief Complaint: Abdominal pain    HPI:    Lilian Pelaez is a 47 y.o. female past medical history of adenocarcinoma of the colon stage III status post left colectomy on May 5 presents to the emergency department for evaluation of abdominal pain since yesterday afternoon.  She describes it as pressure and sharp, bilateral upper quadrants, radiates to her back, constant, no known aggravating or alleviating factors.  She denies any other fevers, chills and sweats, nausea, vomiting, chest pain or shortness of breath, palpitations, diarrhea constipation, dysuria, weakness, rash.  Work-up in the emergency department shows a slight leukocytosis with white blood cell count 13,000 but CT abdomen pelvis shows a moderate amount of pneumoperitoneum which could be related to recent surgery however due to the timing surgery is concerned after being called in the emergency department.  Surgery recommended admission and starting empiric antibiotics with Levaquin and Flagyl and they will evaluate.  Also noted resolving hematoma versus infected fluid in the right anterior lower abdominal upper pelvic wall.  The rest of her work-up unrevealing thus far.      Personal History     Past Medical History:  Past Medical History:   Diagnosis Date   • Anesthesia     B/P bottomed out/UNSURE ON THE DATE   • Anxiety    • Asthma     seasonal/NO INHALERS   • Colon cancer 2023   • DDD (degenerative disc disease), cervical    • DDD (degenerative disc disease), lumbar    • Depression    • Diverticulitis of colon     Scope was done in [facility]   • GERD (gastroesophageal reflux disease)    • Hypertension    • Kidney stones    • Melanoma     removed   • Palpitation     FOLLOWS W/DR. ANTONIO  Q6 MONTHS, NO  CP OR SOA   • Prolapse of female bladder          Past Surgical History:  Past Surgical History:   Procedure Laterality Date   • BREAST SURGERY  2016    Removed 8lbs of tissue, 2016   • CHOLECYSTECTOMY     • COLON RESECTION Left 05/05/2023    Procedure: RESECTION OF DESCENDING COLON AND SPLENIC FLEXURE TAKEDOWN WITH DAVINCI ROBOT;  Surgeon: Rolando Liu MD;  Location: [facility];  Service: Robotics - DaVinci;  Laterality: Left;   • COLONOSCOPY N/A 04/04/2023    Procedure: COLONOSCOPY WITH POLYPECTOMY/SNARE/BIOPSIES/TATTOOING DESCENDING COLON MASS;  Surgeon: Deniz Dowd MD;  Location: [facility] ENDOSCOPY;  Service: Gastroenterology;  Laterality: N/A;  INCOMPLETE COLONOSCOPY  POOR BOWEL PREP  COLON POLYP  COLON MASS  DIVERTICULOSIS   • COLONOSCOPY N/A 04/12/2023    Procedure: COLONOSCOPY with cold snare;  Surgeon: Deniz Dowd MD;  Location: [facility] ENDOSCOPY;  Service: Gastroenterology;  Laterality: N/A;  colon polyps, diverticulosis, colon mass, hemorrhoids     • CYSTOSCOPY W/ URETERAL STENT PLACEMENT Left 05/05/2023    Procedure: Cystoscopy, left ureteral injection,;  Surgeon: Manjula Randolph MD;  Location: [facility];  Service: Urology;  Laterality: Left;   • FRACTURE SURGERY  2001    Left knee   • KNEE SURGERY Left     x4   • LAPAROSCOPIC TUBAL LIGATION  1995   • LAPAROSCOPIC TUBAL LIGATION      Reversed   • REDUCTION MAMMAPLASTY  2009    8lbs of tissue was removed   • TONSILLECTOMY     • UPPER GASTROINTESTINAL ENDOSCOPY     • VENOUS ACCESS DEVICE (PORT) INSERTION N/A 6/5/2023    Procedure: INSERTION VENOUS ACCESS DEVICE;  Surgeon: Rolando Liu MD;  Location: [facility];  Service: General;  Laterality: N/A;         Family History:   Family History   Problem Relation Age of Onset   • Colon cancer Neg Hx          Social History:   Social History     Tobacco Use   • Smoking status: Never     Passive exposure: Never   • Smokeless tobacco: Never   Vaping Use   • Vaping Use: Never used    Substance Use Topics   • Alcohol use: Never   • Drug use: Never         Home Medications:  ALPRAZolam, Diclofenac Sodium, HYDROcodone-acetaminophen, Mirabegron ER, Oxaliplatin, allopurinol, amLODIPine, b complex-C-E-zinc, docusate sodium, ferrous sulfate, fluorouracil, furosemide, hyoscyamine, ketorolac, lamoTRIgine, leucovorin, lidocaine-prilocaine, losartan, medroxyPROGESTERone, metoclopramide, metoprolol succinate XL, ondansetron, pantoprazole, rOPINIRole, sertraline, sucralfate, traMADol, and vitamin D3    Allergies:  Allergies   Allergen Reactions   • Hydromorphone Hives, Itching and GI Intolerance   • Morphine Hives, Itching and Nausea And Vomiting   • Oxybutynin Swelling and Angioedema          • Oxycodone-Acetaminophen Itching and Nausea And Vomiting     Can not take any higher than 7.5   • Penicillins Rash   • Promethazine Nausea And Vomiting   • Tylenol With Codeine #3 [Acetaminophen-Codeine] Itching and Nausea And Vomiting       Review of Systems   All systems were reviewed and negative except for: Abdominal pain    Objective   Objective     Vitals:   Temp:  [98.3 °F (36.8 °C)] 98.3 °F (36.8 °C)  Heart Rate:  [69-77] 69  Resp:  [20] 20  BP: (137)/(96) 137/96    Physical Exam    Constitutional: Awake, alert, no acute distress   Eyes: Pupils equal, sclerae anicteric, no conjunctival injection   HENT: NCAT, mucous membranes moist   Neck: Supple, no thyromegaly, no lymphadenopathy, trachea midline   Respiratory: Clear to auscultation bilaterally, nonlabored respirations    Cardiovascular: RRR, no murmurs, rubs, or gallops, palpable pedal pulses bilaterally   Gastrointestinal: Positive bowel sounds, soft, nontender, nondistended   Musculoskeletal: No bilateral ankle edema, no clubbing or cyanosis to extremities   Psychiatric: Appropriate affect, cooperative   Neurologic: Oriented x 3, strength symmetric in all extremities, Cranial Nerves grossly intact to confrontation, speech clear   Skin: No rashes      Result Review    Result Review:  I have personally reviewed the results from the time of this admission to 6/16/2023 05:13 EDT and agree with these findings:  [x]  Laboratory  [x]  Microbiology  [x]  Radiology  []  EKG/Telemetry   []  Cardiology/Vascular   []  Pathology  []  Old records  []  Other:      Assessment & Plan   Assessment / Plan     Assessment/Plan:   • Pneumoperitoneum: Surgery called from the emergency department and recommend admission.  They recommend starting Levaquin and Flagyl which has been started in ER and will be continued.  Supportive care and pain control.  Keep patient n.p.o. with IV fluid.  Surgery to evaluate.  Serial labs and serial abdominal exams.  Monitor on telemetry.  • Colon adenocarcinoma stage III status post left colectomy May 5, 2023: Receiving chemotherapy infusions on outpatient basis.  Consider consulting patient's oncologist if plan to remain in-house after surgical evaluation.  • Hypertension: Resume home medications as appropriate      DVT prophylaxis:  SCDs    CODE STATUS:    Code Status (Patient has no pulse and is not breathing): CPR (Attempt to Resuscitate)  Medical Interventions (Patient has pulse or is breathing): Full Support      Admission Status:  I believe this patient meets observation status.    Electronically signed by Frank Bal Jr, MD, 06/16/23, 5:13 AM EDT.

## 2023-06-16 NOTE — CONSULTS
"Nutrition Services    Patient Name: Lilian Pelaez  YOB: 1975  MRN: [id number]  Admission date: 6/16/2023      CLINICAL NUTRITION ASSESSMENT      Reason for Assessment  Identified at risk by screening criteria, MST score 2+   H&P:    Past Medical History:   Diagnosis Date    Anesthesia     B/P bottomed out/UNSURE ON THE DATE    Anxiety     Asthma 1996    seasonal/NO INHALERS    Colon cancer 04/17/2023    DDD (degenerative disc disease), cervical     DDD (degenerative disc disease), lumbar     Depression     Diverticulitis of colon 2005    Scope was done in [facility]    GERD (gastroesophageal reflux disease)     Hypertension     Kidney stones     Melanoma     removed    Palpitation     FOLLOWS W/DR. ANTONIO  Q6 MONTHS, NO CP OR SOA    Prolapse of female bladder         Current Problems:   Active Hospital Problems    Diagnosis     **Pneumoperitoneum         Nutrition/Diet History         Narrative     RD consult triggered by MST 3 for 14-23 lb wt loss. Pt presented to ED with abdominal pain radiating into shoulder. S/P colon resection d/t colon cancer. Admitted with pneumoperitoneum.     Pt is being seen by Oncology RD. Over the past 4 months, pt with 4.2% decrease in weight, not clinically significant. Pt is strict NPO for surgical evaluation.    As diet advances, will continue with Oncology RD's nutrition intervention: Ensure Enlive BID.      Anthropometrics        Current Height, Weight Height: 162.6 cm (64\")  Weight: 134 kg (296 lb 4.8 oz)   Current BMI Body mass index is 50.86 kg/m².       Weight Hx  Wt Readings from Last 30 Encounters:   06/15/23 2357 134 kg (296 lb 4.8 oz)   06/14/23 0700 134 kg (295 lb 3.1 oz)   06/14/23 0806 134 kg (295 lb 6.7 oz)   06/13/23 1457 135 kg (297 lb 2.9 oz)   06/05/23 0917 135 kg (298 lb 4.5 oz)   06/02/23 1531 135 kg (298 lb)   05/31/23 1351 135 kg (297 lb 13.5 oz)   05/16/23 0832 136 kg (300 lb)   05/05/23 0604 (!) 139 kg (306 lb 14.1 oz)   04/26/23 1131 (!) 140 kg " (308 lb 3.3 oz)   04/17/23 1252 (!) 139 kg (307 lb)   04/13/23 0958 (!) 140 kg (309 lb 4.9 oz)   04/12/23 0825 (!) 139 kg (306 lb 7 oz)   04/04/23 0843 (!) 142 kg (313 lb 15 oz)   02/02/23 1004 (!) 140 kg (309 lb 9.6 oz)   08/27/19 0000 112 kg (247 lb 8 oz)   08/06/19 0000 114 kg (250 lb 8 oz)            Wt Change Observation -4.2% x 4 months, not clinically significant     Estimated/Assessed Needs       Energy Requirements 30-35 kcal/kg adj BW (75 kg)   EST Needs (kcal/day) 2036-1176 kcal       Protein Requirements 2.0 g/kg adj BW   EST Daily Needs (g/day) 150 g       Fluid Requirements 25-30 ml/kg adj BW    Estimated Needs (mL/day) 4030-0340 ml     Labs/Medications         Pertinent Labs Reviewed.   Results from last 7 days   Lab Units 06/16/23  0158 06/14/23  0813   SODIUM mmol/L 138 140   POTASSIUM mmol/L 4.3 3.7   CHLORIDE mmol/L 103 105   CO2 mmol/L 20.6* 22.3   BUN mg/dL 24* 19   CREATININE mg/dL 0.81 0.75   CALCIUM mg/dL 9.6 9.5   BILIRUBIN mg/dL 0.4 0.5   ALK PHOS U/L 100 85   ALT (SGPT) U/L 41* 12   AST (SGOT) U/L 60* 12   GLUCOSE mg/dL 108* 113*     Results from last 7 days   Lab Units 06/16/23  0017   HEMOGLOBIN g/dL 14.8   HEMATOCRIT % 42.3     No results found for: COVID19  No results found for: HGBA1C      Pertinent Medications Reviewed.     Current Nutrition Orders & Evaluation of Intake       Oral Nutrition     Current PO Diet NPO Diet NPO Type: Strict NPO   Supplement No active supplement orders       Malnutrition Severity Assessment                Nutrition Diagnosis         Nutrition Dx Problem 1 Inadequate energy Intake related to decreased ability to consume sufficient energy as evidenced by NPO.    Inadequate energy intake r/t increased nutrient needs d/t catabolic disease AEB physiological causes increasing nutrient needs.      Nutrition Intervention         Pt currently NPO.  As diet is advanced order Ensure Enlive BID (per Oncology RD)  Provides 700 kcal, 40 g pro     Medical Nutrition  Therapy/Nutrition Education          Learner     Readiness N/A  N/A     Method     Response N/A  N/A     Monitor/Evaluation        Monitor Per protocol, POC/GOC, Diet advancement     Nutrition Discharge Plan         To be determined     Electronically signed by:  Lidia Pantoja RD  06/16/23 09:08 EDT

## 2023-06-16 NOTE — Clinical Note
[facility] EMERGENCY ROOM  [address]  [place] [address]  Phone: [phone]    MARY ANNE OSULLIVAN accompanied Lilian Pelaez to the emergency department on 6/15/2023. They may return to work on 06/17/2023.        Thank you for choosing [facility].    Frank Mcconnell MD

## 2023-06-16 NOTE — ED PROVIDER NOTES
Time: 12:45 AM EDT  Date of encounter:  6/15/2023  Independent Historian/Clinical History and Information was obtained by:   Patient and Family  Chief Complaint: Abdominal pain    History is limited by: N/A    History of Present Illness:  Patient is a 47 y.o. year old female who presents to the emergency department for evaluation of upper abdominal pain that radiates up into her shoulders.  Pain started a few hours ago.  Patient had recent surgery for colon cancer where she had a resection by Dr. Liu.  Patient currently getting chemotherapy.  Patient also has a history of gastroparesis and diverticulitis.  Patient complains of nausea no active vomiting.  Patient states pain is sharp and stabbing and constant.  Patient has not tried anything for pain in the last few hours.  Patient has hydrocodone at home.  Patient has no fever.  Patient reports having problems with constipation since her most recent surgery for Port-A-Cath insertion on the fifth and has been taking senna and just started MiraLAX yesterday.  Patient denies dysuria.    HPI    Patient Care Team  Primary Care Provider: Shelly Cash MD    Past Medical History:     Allergies   Allergen Reactions    Hydromorphone Hives, Itching and GI Intolerance    Morphine Hives, Itching and Nausea And Vomiting    Oxybutynin Swelling and Angioedema           Oxycodone-Acetaminophen Itching and Nausea And Vomiting     Can not take any higher than 7.5    Penicillins Rash    Promethazine Nausea And Vomiting    Tylenol With Codeine #3 [Acetaminophen-Codeine] Itching and Nausea And Vomiting     Past Medical History:   Diagnosis Date    Anesthesia     B/P bottomed out/UNSURE ON THE DATE    Anxiety     Asthma 1996    seasonal/NO INHALERS    Colon cancer 04/17/2023    DDD (degenerative disc disease), cervical     DDD (degenerative disc disease), lumbar     Depression     Diverticulitis of colon 2005    Scope was done in [facility]    GERD (gastroesophageal reflux  disease)     Hypertension     Kidney stones     Melanoma     removed    Palpitation     FOLLOWS W/DR. ANTONIO  Q6 MONTHS, NO CP OR SOA    Prolapse of female bladder      Past Surgical History:   Procedure Laterality Date    BREAST SURGERY  2016    Removed 8lbs of tissue, 2016    CHOLECYSTECTOMY      COLON RESECTION Left 05/05/2023    Procedure: RESECTION OF DESCENDING COLON AND SPLENIC FLEXURE TAKEDOWN WITH DAVINCI ROBOT;  Surgeon: Rolando Liu MD;  Location: [facility] OR [facility];  Service: Robotics - DaVinci;  Laterality: Left;    COLONOSCOPY N/A 04/04/2023    Procedure: COLONOSCOPY WITH POLYPECTOMY/SNARE/BIOPSIES/TATTOOING DESCENDING COLON MASS;  Surgeon: Deniz Dowd MD;  Location: [facility] ENDOSCOPY;  Service: Gastroenterology;  Laterality: N/A;  INCOMPLETE COLONOSCOPY  POOR BOWEL PREP  COLON POLYP  COLON MASS  DIVERTICULOSIS    COLONOSCOPY N/A 04/12/2023    Procedure: COLONOSCOPY with cold snare;  Surgeon: Deniz Dowd MD;  Location: [facility] ENDOSCOPY;  Service: Gastroenterology;  Laterality: N/A;  colon polyps, diverticulosis, colon mass, hemorrhoids      CYSTOSCOPY W/ URETERAL STENT PLACEMENT Left 05/05/2023    Procedure: Cystoscopy, left ureteral injection,;  Surgeon: Manjula Randolph MD;  Location: [facility] OR [facility];  Service: Urology;  Laterality: Left;    FRACTURE SURGERY  2001    Left knee    KNEE SURGERY Left     x4    LAPAROSCOPIC TUBAL LIGATION  1995    LAPAROSCOPIC TUBAL LIGATION      Reversed    REDUCTION MAMMAPLASTY  2009    8lbs of tissue was removed    TONSILLECTOMY      UPPER GASTROINTESTINAL ENDOSCOPY      VENOUS ACCESS DEVICE (PORT) INSERTION N/A 6/5/2023    Procedure: INSERTION VENOUS ACCESS DEVICE;  Surgeon: Rolando Liu MD;  Location: [facility] OR [facility];  Service: General;  Laterality: N/A;     Family History   Problem Relation Age of Onset    Colon cancer Neg Hx        Home Medications:  Prior to Admission medications    Medication Sig Start Date End Date Taking? Authorizing Provider    allopurinol (ZYLOPRIM) 100 MG tablet Take 1 tablet by mouth Daily.    Char Flor MD   ALPRAZolam (XANAX) 0.5 MG tablet Take 1 tablet by mouth 3 (Three) Times a Day As Needed.    Char Flor MD   amLODIPine (NORVASC) 10 MG tablet Take 1 tablet by mouth Every Night.    Char Flor MD   Diclofenac Sodium (VOLTAREN) 1 % gel gel Apply 4 g topically to the appropriate area as directed 4 (Four) Times a Day As Needed.    Char Flor MD   docusate sodium 100 MG capsule Take 1 capsule by mouth Daily.    Char Flor MD   ferrous sulfate 325 (65 FE) MG tablet Take 1 tablet by mouth Daily With Breakfast. 6/2/23   Brian Marroquin MD   fluorouracil (EFUDEX) 5 % cream Apply 1 application topically to the appropriate area as directed 2 (Two) Times a Day.    Char Flor MD   furosemide (LASIX) 20 MG tablet Take 1 tablet by mouth Daily.    Char Flor MD   HYDROcodone-acetaminophen (NORCO) 7.5-325 MG per tablet Take 1 tablet by mouth Every 8 (Eight) Hours As Needed.    Char Flor MD   hyoscyamine (ANASPAZ,LEVSIN) 0.125 MG tablet Take 1 tablet by mouth 4 (Four) Times a Day.    Char Flor MD   ketorolac (TORADOL) 10 MG tablet Take 1 tablet by mouth Every 6 (Six) Hours As Needed.    Char Flor MD   lamoTRIgine (LaMICtal) 25 MG tablet Take 1 tablet by mouth 2 (Two) Times a Day. Only takes at night    Char Flor MD   leucovorin 5 MG tablet Take  by mouth Every 6 (Six) Hours.    Char Flor MD   lidocaine-prilocaine (EMLA) 2.5-2.5 % cream Apply 1 application topically to the appropriate area as directed Every 2 (Two) Hours As Needed for Mild Pain. 6/14/23   Brian Marroquin MD   losartan (COZAAR) 100 MG tablet Take 1 tablet by mouth Every Night.    Char Flor MD   medroxyPROGESTERone (DEPO-PROVERA) 150 MG/ML injection 1 mL.    Char Flor MD   metoclopramide (REGLAN) 10 MG tablet Take 1 tablet by  mouth 3 (Three) Times a Day.    Char Flor MD   metoprolol succinate XL (TOPROL-XL) 25 MG 24 hr tablet Take 1 tablet by mouth Daily.    Char Flor MD   Multiple Vitamins-Minerals (b complex-C-E-zinc) tablet Take 1 tablet by mouth Daily.    Char Flor MD   Myrbetriq 25 MG tablet sustained-release 24 hour 24 hr tablet Take 1 tablet by mouth Every Evening. 4/26/23   Char Flor MD   ondansetron (ZOFRAN) 8 MG tablet Take 1 tablet by mouth 3 (Three) Times a Day As Needed for Nausea or Vomiting. 6/9/23   Brian Marroquin MD   OXALIPLATIN IV Infuse  into a venous catheter.    Char Flor MD   pantoprazole (PROTONIX) 40 MG EC tablet Take 1 tablet by mouth 2 (Two) Times a Day.    Char Flor MD   rOPINIRole (REQUIP) 1 MG tablet Take 1 tablet by mouth Every Night. 3/13/23   Char Flor MD   sertraline (ZOLOFT) 100 MG tablet sertraline 100 mg tablet   Take 1 tablet every day by oral route for 30 days.    Char Flor MD   sucralfate (CARAFATE) 1 g tablet Take 1 tablet by mouth 2 (Two) Times a Day.    Char Flor MD   traMADol (Ultram) 50 MG tablet Take 1 tablet by mouth Every 6 (Six) Hours As Needed for Severe Pain or Moderate Pain. 6/5/23 6/4/24  Rolando Liu MD   vitamin D3 125 MCG (5000 UT) capsule capsule Take 2,000 Units by mouth Daily.    Char Flor MD        Social History:   Social History     Tobacco Use    Smoking status: Never     Passive exposure: Never    Smokeless tobacco: Never   Vaping Use    Vaping Use: Never used   Substance Use Topics    Alcohol use: Never    Drug use: Never         Review of Systems:  Review of Systems   Constitutional:  Negative for chills and fever.   HENT:  Negative for congestion, ear pain and sore throat.    Eyes:  Negative for pain.   Respiratory:  Negative for cough, chest tightness and shortness of breath.    Cardiovascular:  Negative for chest pain.   Gastrointestinal:  Positive  "for abdominal pain (Rating up into shoulder). Negative for diarrhea, nausea and vomiting.   Genitourinary:  Negative for dysuria, flank pain and hematuria.   Musculoskeletal:  Negative for back pain, joint swelling and neck pain.   Skin:  Negative for pallor.   Neurological:  Negative for seizures and headaches.   Hematological: Negative.    Psychiatric/Behavioral: Negative.     All other systems reviewed and are negative.     Physical Exam:  /69   Pulse 81   Temp 98.3 °F (36.8 °C) (Oral)   Resp 20   Ht 162.6 cm (64\")   Wt 134 kg (296 lb 4.8 oz)   SpO2 98%   BMI 50.86 kg/m²     Physical Exam  Vitals and nursing note reviewed.   Constitutional:       General: She is not in acute distress.     Appearance: Normal appearance. She is obese. She is not ill-appearing or toxic-appearing.   HENT:      Head: Normocephalic and atraumatic.      Mouth/Throat:      Mouth: Mucous membranes are moist.   Eyes:      General: No scleral icterus.  Cardiovascular:      Rate and Rhythm: Normal rate and regular rhythm.      Pulses: Normal pulses.      Heart sounds: Normal heart sounds.   Pulmonary:      Effort: Pulmonary effort is normal. No respiratory distress.      Breath sounds: Normal breath sounds.   Abdominal:      General: Bowel sounds are normal. There is no distension.      Palpations: Abdomen is soft.      Tenderness: There is no abdominal tenderness in the right upper quadrant, epigastric area and left upper quadrant. There is no right CVA tenderness or left CVA tenderness.      Hernia: No hernia is present.   Musculoskeletal:         General: Normal range of motion.      Cervical back: Normal range of motion and neck supple.   Skin:     General: Skin is warm and dry.      Comments: Surgical trochanter sites and right abdomen incision are practically healed.  No signs of infection or dehiscence   Neurological:      Mental Status: She is alert and oriented to person, place, and time. Mental status is at baseline. "   Psychiatric:         Mood and Affect: Mood normal.         Behavior: Behavior normal.            Procedures:  Procedures      Medical Decision Making:      Comorbidities that affect care:    Paresis and diverticulitis, Asthma, Cancer, Hypertension    External Notes reviewed:    Previous Admission Note: Previous admission notes were noted from 5 May and 5 June.  Patient had colon resection for colon cancer by Dr. Liu in May and had a Port-A-Cath placement on June 5.  She is currently being treated by Dr. Marroquin for oncology.  Patient has multiple oncology notes with infusion references      The following orders were placed and all results were independently analyzed by me:  Orders Placed This Encounter   Procedures    Blood Culture - Blood,    Blood Culture - Blood,    CT Abdomen Pelvis With Contrast    [place] Draw    Comprehensive Metabolic Panel    Lipase    Urinalysis With Microscopic If Indicated (No Culture) - Urine, Clean Catch    hCG, Quantitative, Pregnancy    CBC Auto Differential    Lactic Acid, Plasma    NPO Diet NPO Type: Strict NPO    Undress & Gown    Code Status and Medical Interventions:    Surgery (on-call MD unless specified)    Hospitalist (on-call MD unless specified)    Insert Peripheral IV    Initiate Observation Status    CBC & Differential    Green Top (Gel)    Lavender Top    Gold Top - SST    Light Blue Top       Medications Given in the Emergency Department:  Medications   sodium chloride 0.9 % flush 10 mL (has no administration in time range)   iopamidol (ISOVUE-370) 76 % injection 100 mL (100 mL Intravenous Given 6/16/23 0114)   ketorolac (TORADOL) injection 30 mg (30 mg Intravenous Given 6/16/23 0158)   metoclopramide (REGLAN) injection 10 mg (10 mg Intravenous Given 6/16/23 0159)   diphenhydrAMINE (BENADRYL) injection 25 mg (25 mg Intravenous Given 6/16/23 0159)   sodium chloride 0.9 % bolus 1,000 mL (0 mL Intravenous Stopped 6/16/23 0500)   levoFLOXacin (LEVAQUIN) 750 mg/150 mL  D5W (premix) (LEVAQUIN) 750 mg (750 mg Intravenous New Bag 6/16/23 0543)   metroNIDAZOLE (FLAGYL) IVPB 500 mg (0 mg Intravenous Stopped 6/16/23 0551)        ED Course:         Labs:    Lab Results (last 24 hours)       Procedure Component Value Units Date/Time    CBC & Differential [[id number]]  (Abnormal) Collected: 06/16/23 0017    Specimen: Blood Updated: 06/16/23 0043    Narrative:      The following orders were created for panel order CBC & Differential.  Procedure                               Abnormality         Status                     ---------                               -----------         ------                     CBC Auto Differential[[id number]]        Abnormal            Final result                 Please view results for these tests on the individual orders.    Lipase [[id number]]  (Abnormal) Collected: 06/16/23 0017    Specimen: Blood Updated: 06/16/23 0054     Lipase 99 U/L     hCG, Quantitative, Pregnancy [[id number]] Collected: 06/16/23 0017    Specimen: Blood Updated: 06/16/23 0053     HCG Quantitative <0.50 mIU/mL     Narrative:      HCG Ranges by Gestational Age    Females - non-pregnant premenopausal   </= 1mIU/mL HCG  Females - postmenopausal               </= 7mIU/mL HCG    3 Weeks       5.4   -      72 mIU/mL  4 Weeks      10.2   -     708 mIU/mL  5 Weeks       217   -   8,245 mIU/mL  6 Weeks       152   -  32,177 mIU/mL  7 Weeks     4,059   - 153,767 mIU/mL  8 Weeks    31,366   - 149,094 mIU/mL  9 Weeks    59,109   - 135,901 mIU/mL  10 Weeks   44,186   - 170,409 mIU/mL  12 Weeks   27,107   - 201,615 mIU/mL  14 Weeks   24,302   -  93,646 mIU/mL  15 Weeks   12,540   -  69,747 mIU/mL  16 Weeks    8,904   -  55,332 mIU/mL  17 Weeks    8,240   -  51,793 mIU/mL  18 Weeks    9,649   -  55,271 mIU/mL      CBC Auto Differential [[id number]]  (Abnormal) Collected: 06/16/23 0017    Specimen: Blood Updated: 06/16/23 0043     WBC 13.40 10*3/mm3      RBC 4.94 10*6/mm3      Hemoglobin 14.8 g/dL       Hematocrit 42.3 %      MCV 85.6 fL      MCH 30.0 pg      MCHC 35.0 g/dL      RDW 11.9 %      RDW-SD 37.3 fl      MPV 9.8 fL      Platelets 254 10*3/mm3      Neutrophil % 72.2 %      Lymphocyte % 20.7 %      Monocyte % 6.0 %      Eosinophil % 0.4 %      Basophil % 0.4 %      Immature Grans % 0.3 %      Neutrophils, Absolute 9.67 10*3/mm3      Lymphocytes, Absolute 2.78 10*3/mm3      Monocytes, Absolute 0.80 10*3/mm3      Eosinophils, Absolute 0.06 10*3/mm3      Basophils, Absolute 0.05 10*3/mm3      Immature Grans, Absolute 0.04 10*3/mm3      nRBC 0.0 /100 WBC     Urinalysis With Microscopic If Indicated (No Culture) - Urine, Clean Catch [[id number]]  (Abnormal) Collected: 06/16/23 0147    Specimen: Urine, Clean Catch Updated: 06/16/23 0158     Color, UA Yellow     Appearance, UA Clear     pH, UA 6.0     Specific Gravity, UA >1.030     Glucose, UA Negative     Ketones, UA Negative     Bilirubin, UA Negative     Blood, UA Negative     Protein, UA Negative     Leuk Esterase, UA Negative     Nitrite, UA Negative     Urobilinogen, UA 1.0 E.U./dL    Narrative:      Urine microscopic not indicated.    Comprehensive Metabolic Panel [[id number]]  (Abnormal) Collected: 06/16/23 0158    Specimen: Blood Updated: 06/16/23 0229     Glucose 108 mg/dL      BUN 24 mg/dL      Creatinine 0.81 mg/dL      Sodium 138 mmol/L      Potassium 4.3 mmol/L      Comment: Slight hemolysis detected by analyzer. Results may be affected.        Chloride 103 mmol/L      CO2 20.6 mmol/L      Calcium 9.6 mg/dL      Total Protein 7.4 g/dL      Albumin 4.2 g/dL      ALT (SGPT) 41 U/L      AST (SGOT) 60 U/L      Comment: Slight hemolysis detected by analyzer. Results may be affected.        Alkaline Phosphatase 100 U/L      Total Bilirubin 0.4 mg/dL      Globulin 3.2 gm/dL      A/G Ratio 1.3 g/dL      BUN/Creatinine Ratio 29.6     Anion Gap 14.4 mmol/L      eGFR 90.2 mL/min/1.73     Narrative:      GFR Normal >60  Chronic Kidney Disease <60  Kidney Failure  <15      Lactic Acid, Plasma [[id number]]  (Normal) Collected: 06/16/23 0454    Specimen: Blood Updated: 06/16/23 0534     Lactate 0.9 mmol/L     Blood Culture - Blood, Arm, Left [[id number]] Collected: 06/16/23 0454    Specimen: Blood from Arm, Left Updated: 06/16/23 0504    Blood Culture - Blood, Arm, Left [[id number]] Collected: 06/16/23 0454    Specimen: Blood from Arm, Left Updated: 06/16/23 0504             Imaging:    CT Abdomen Pelvis With Contrast    Result Date: 6/16/2023  PROCEDURE: CT ABDOMEN PELVIS W CONTRAST  COMPARISON: 4/11/2023.  INDICATIONS: PAIN ACROSS UPPER ABDOMEN; H/O GASTROPARESIS; H/O LEFT COLONIC RESECTION FOR STAGE III COLON CANCER; THE PT. IS CURRENTLY UNDERGOING CHEMOTHERAPY.  TECHNIQUE: After obtaining the patient's consent, 689 CT images were created with non-ionic intravenous contrast material.  No oral contrast agent was administered for the study.  PROTOCOL:   Standard CT imaging protocol performed.    RADIATION:   Total DLP: 2,297.4 mGy*cm.   Automated exposure control was utilized to minimize radiation dose. CONTRAST: 100 mL Isovue 370 I.V.  FINDINGS: Recent postoperative changes are suggested as with a partial left colectomy and an anastomosis involving the left colon.  Formed stool is seen throughout the colon, especially remaining left colon, suggesting fecal stasis (or retention) as with constipation.  There is a moderate amount of pneumoperitoneum, which is thought most likely to be related to the recent surgery; please correlate clinically.  There is minimal if any ascites.  No definite focal pericolonic fluid collection is seen to suggest abscess or hematoma.  Stranding in the mesenteric fat is present, especially within the pelvis, and is probably related to the recent surgery.  There is increased attenuation of the right anterior lower abdominal/pelvic wall, especially in the subcutaneous region, with a CT number of about 25 Hounsfield units on the initial phase of the study.   On the delayed phase, the CT number is about 23 Hounsfield units or less.  This finding is nonspecific.  It may represent a normal postoperative fluid collection, such as a seroma or (more likely) hematoma.  It does not contain gas.  There is no retained foreign body associated with the finding.  An infected fluid collection cannot be excluded.  The finding is estimated at 7.6 x 5.3 x 3.2 cm in transverse, anteroposterior (AP), and craniocaudal extent, respectively, as seen on image 80 of series 201, image 82 of series 301, image 27 of series 202, image 95 of series 203, and adjacent image.  The no definite subcutaneous emphysema is seen elsewhere.  No other abdominal or pelvic wall fluid collections are appreciated.  There may be mild subsegmental atelectasis in the lung bases.  Probably no acute infiltrate is seen.  There is diffuse hepatic steatosis with hepatomegaly.  The maximum craniocaudal dimension of the liver is about 23.1 cm.  No hepatic metastases are suggested.  No splenomegaly is seen.  The maximum craniocaudal dimension of the spleen is about 12 cm.  The patient has undergone cholecystectomy.  There are probably small benign renal cysts.  Nonobstructing nephrolithiasis is seen on the left with upper pole calyceal stones measuring about 3 mm in greatest diameter.  No definite right nephrolithiasis.  No hydronephrosis or ureterolithiasis.  There may be mild renal cortical scarring, especially on the left.  No acute pyelonephritis.  Degenerative changes are seen throughout the imaged spine.  There may be diffuse idiopathic skeletal hyperostosis (DISH).  Degenerative changes involve the hip joints and the bilateral sacroiliac joints.  No acute fracture.  No aggressive osseous lesion is suggested.  No acute appendicitis.  There are incidental pelvic phleboliths.        1. There has been interval partial left colectomy since the prior 4/11/2023 CT study.  2. Formed stool is seen within the colon.  There  may be fecal stasis (fecal retention) as with constipation.  Probably no fecal impaction.  No mechanical bowel obstruction is suspected. 3. There is a moderate amount of pneumoperitoneum, which is probably related to the recent surgery.  Please correlate with regard to the time frame of the surgery.  That the pneumoperitoneum may be associated with age-indeterminate bowel perforation or leak, especially at the anastomotic site, cannot be excluded entirely.  No focal sizable (drainable) intraperitoneal or retroperitoneal fluid collections are seen.  4. There is increased attenuation of the mesenteric fat, especially in the pelvis and in the left abdomen, probably related to the surgery.  5. There is a suspected resolving hematoma in the right anterior lower abdominal/upper pelvic wall (especially subcutaneous in location), which measures about 7.6 cm in greatest diameter.  An infected fluid collection cannot be excluded but is thought to be less likely.  6. No other acute findings are seen.  7. Please see above comments for further detail.   1.   Please note that portions of this note were completed with a voice recognition program.  THEODORA BENDER JR, MD       Electronically Signed and Approved By: THEODORA BENDER JR, MD on 6/16/2023 at 2:32                 Differential Diagnosis and Discussion:    Abdominal Pain: Based on the patient's signs and symptoms, I considered abdominal aortic aneurysm, small bowel obstruction, pancreatitis, acute cholecystitis, acute appendecitis, peptic ulcer disease, gastritis, colitis, endocrine disorders, irritable bowel syndrome and other differential diagnosis an etiology of the patient's abdominal pain.    All labs were reviewed and interpreted by me.  CT scan radiology impression was interpreted by me.    MDM  Number of Diagnoses or Management Options  Malignant neoplasm of colon, unspecified part of colon  Pain of upper abdomen  Pneumoperitoneum  Diagnosis management comments:  Patient presented to the emergency department with a complaint of upper abdominal pain radiating to her shoulders.  Patient states she has significant bowel medical history but that this pain was worse than a that she is ever felt.  CT findings did reveal constipation and moderate amount of pneumoperitoneum.  White blood cell count was 13,000.  The on-call surgeon was consulted since patient has been recently postop and she felt that the amount of pneumoperitoneum seemed abnormal due to the amount of time that has passed since the colon resection and she felt the patient should be admitted so that she can be evaluated.  Patient agreed with this plan.  Patient is currently resting comfortably after medication.  Vital signs are stable.  Patient has been admitted to the hospitalist       Amount and/or Complexity of Data Reviewed  Clinical lab tests: reviewed and ordered  Tests in the radiology section of CPT®: reviewed and ordered  Tests in the medicine section of CPT®: ordered and reviewed  Decide to obtain previous medical records or to obtain history from someone other than the patient: yes  Obtain history from someone other than the patient: yes (Family at bedside)  Review and summarize past medical records: yes (Previous admission notes from May 5 and June for fifth for reviewed.  Patient had colon resection on May 5)    Risk of Complications, Morbidity, and/or Mortality  Presenting problems: moderate  Diagnostic procedures: moderate  Management options: low    Patient Progress  Patient progress: stable         Patient Care Considerations:    CONSULT: I considered consulting patient's oncologist, however no current emergent need is noted in the emergency department.  Patient will be admitted and if oncology is needed can be consulted as inpatient      Consultants/Shared Management Plan:    Hospitalist: I have discussed the case with Dr. Bal who agrees to accept the patient for admission.  Consultant: I have  discussed the case with Dr. Andino the on-call surgeon who states admit the patient to medicine and start her on levaquin and Flagyl .  Surgery to consult    Social Determinants of Health:    Patient has presented with family members who are responsible, reliable and will ensure follow up care.      Disposition and Care Coordination:    Admit:   Through independent evaluation of the patient's history, physical, and imperical data, the patient meets criteria for observation/admission to the hospital.        Final diagnoses:   Pneumoperitoneum   Pain of upper abdomen   Malignant neoplasm of colon, unspecified part of colon   Constipation, unspecified constipation type        ED Disposition       ED Disposition   Decision to Admit    Condition   --    Comment   Level of Care: Telemetry [5]   Diagnosis: Pneumoperitoneum [[id number]]                 This medical record created using voice recognition software.             Opal Thomas, APRN  06/16/23 0615

## 2023-06-16 NOTE — PAYOR COMM NOTE
PATIENT INFORMATION  Name:  Lilian Pelaez  MRN#:     [id number]  :  1975     PRIMARY INSURANCE MEMBER ID:     [id number]        ADMISSION INFORMATION  CLASS: Observation   DOS:  2023        CURRENT ATTENDING PROVIDER INFORMATION  Name/NPI Jones Ramirez DO [[id number]]  Phone  Phone: [phone]        RENDERING FACILITY  Name:  [facility]   NPI:  [id number]  TID:  [id number]  Address:      [address] [place] [address]  Phone  [phone]        CASE MANAGEMENT CONTACT INFORMATION  Phone:      [phone]  Fax:           [phone]            HOSPITAL PROBLEM LIST and ADMISSION DIAGNOSES    Problems Addressed this Visit          Other    Constipation    * (Principal) Pneumoperitoneum - Primary     Other Visit Diagnoses       Pain of upper abdomen   (Active)      Malignant neoplasm of colon, unspecified part of colon        Relevant Medications    leucovorin 5 MG tablet    OXALIPLATIN IV          Diagnoses         Codes Comments    Pneumoperitoneum    -  Primary ICD-10-CM: K66.8  ICD-9-CM: 568.89     Pain of upper abdomen     ICD-10-CM: R10.10  ICD-9-CM: 789.09     Malignant neoplasm of colon, unspecified part of colon     ICD-10-CM: C18.9  ICD-9-CM: 153.9     Constipation, unspecified constipation type     ICD-10-CM: K59.00  ICD-9-CM: 564.00                    Lilian Pelaez (47 y.o. Female)       Date of Birth   1975    Social Security Number       Address   [address] [place] [address]    Home Phone   [phone]    MRN   [id number]       Zoroastrianism   None    Marital Status                               Admission Date   23    Admission Type   Emergency    Admitting Provider   Frank Bal Jr., MD    Attending Provider   Jones Ramirez DO    Department, Room/Bed   [facility] 4TH FLOOR MEDICAL TELEMETRY UNIT, 421/1       Discharge Date       Discharge Disposition       Discharge Destination                                  "Attending Provider: Jones Ramirez DO    Allergies: Hydromorphone, Morphine, Oxybutynin, Oxycodone-acetaminophen, Penicillins, Promethazine, Tylenol With Codeine #3 [Acetaminophen-codeine]    Isolation: None   Infection: None   Code Status: CPR    Ht: 162.6 cm (64\")   Wt: 134 kg (296 lb 4.8 oz)    Admission Cmt: None   Principal Problem: Pneumoperitoneum [K66.8]                   Active Insurance as of 2023       Primary Coverage       Payor Plan Insurance Group Employer/Plan Group    [employer] [place] [employer] [place]ICA[place]        Payor Plan Address Payor Plan Phone Number Payor Plan Fax Number Effective Dates    [address] [address] [phone]  2022 - None Entered    [facility] [id number]         Subscriber Name Subscriber Birth Date Member ID       LILIAN PELAEZ 1975 [id number]                     Emergency Contacts        (Rel.) Home Phone Work Phone Mobile Phone    DORIE KHAN (Daughter) -- -- [phone]    Ramu Pelaez (Spouse) [phone] -- [phone]                 History & Physical        Frank Bal Jr., MD at [address] [place] [address] HISTORY AND PHYSICAL  Date: 2023   Patient Name: Lilian Pelaez  : 1975  MRN: [id number]  Primary Care Physician:  Shelly Cash MD  Date of admission: 2023    Subjective    Subjective     Chief Complaint: Abdominal pain    HPI:    Lilian Pelaez is a 47 y.o. female past medical history of adenocarcinoma of the colon stage III status post left colectomy on May 5 presents to the emergency department for evaluation of abdominal pain since yesterday afternoon.  She describes it as pressure and sharp, bilateral upper quadrants, radiates to her back, constant, no known aggravating or alleviating factors.  She denies any other fevers, chills and sweats, nausea, vomiting, chest pain or shortness of breath, palpitations, diarrhea constipation, dysuria, weakness, rash.  Work-up in the emergency department shows a " slight leukocytosis with white blood cell count 13,000 but CT abdomen pelvis shows a moderate amount of pneumoperitoneum which could be related to recent surgery however due to the timing surgery is concerned after being called in the emergency department.  Surgery recommended admission and starting empiric antibiotics with Levaquin and Flagyl and they will evaluate.  Also noted resolving hematoma versus infected fluid in the right anterior lower abdominal upper pelvic wall.  The rest of her work-up unrevealing thus far.      Personal History     Past Medical History:  Past Medical History:   Diagnosis Date    Anesthesia     B/P bottomed out/UNSURE ON THE DATE    Anxiety     Asthma 1996    seasonal/NO INHALERS    Colon cancer 04/17/2023    DDD (degenerative disc disease), cervical     DDD (degenerative disc disease), lumbar     Depression     Diverticulitis of colon 2005    Scope was done in [facility]    GERD (gastroesophageal reflux disease)     Hypertension     Kidney stones     Melanoma     removed    Palpitation     FOLLOWS W/DR. ANTONIO  Q6 MONTHS, NO CP OR SOA    Prolapse of female bladder          Past Surgical History:  Past Surgical History:   Procedure Laterality Date    BREAST SURGERY  2016    Removed 8lbs of tissue, 2016    CHOLECYSTECTOMY      COLON RESECTION Left 05/05/2023    Procedure: RESECTION OF DESCENDING COLON AND SPLENIC FLEXURE TAKEDOWN WITH DAVINCI ROBOT;  Surgeon: Rolando Liu MD;  Location: [facility] OR [facility];  Service: Robotics - DaVinci;  Laterality: Left;    COLONOSCOPY N/A 04/04/2023    Procedure: COLONOSCOPY WITH POLYPECTOMY/SNARE/BIOPSIES/TATTOOING DESCENDING COLON MASS;  Surgeon: Deniz Dowd MD;  Location: [facility] ENDOSCOPY;  Service: Gastroenterology;  Laterality: N/A;  INCOMPLETE COLONOSCOPY  POOR BOWEL PREP  COLON POLYP  COLON MASS  DIVERTICULOSIS    COLONOSCOPY N/A 04/12/2023    Procedure: COLONOSCOPY with cold snare;  Surgeon: Deniz Dowd MD;  Location: [facility]  ENDOSCOPY;  Service: Gastroenterology;  Laterality: N/A;  colon polyps, diverticulosis, colon mass, hemorrhoids      CYSTOSCOPY W/ URETERAL STENT PLACEMENT Left 05/05/2023    Procedure: Cystoscopy, left ureteral injection,;  Surgeon: Manjula Randolph MD;  Location: [facility] OR [facility];  Service: Urology;  Laterality: Left;    FRACTURE SURGERY  2001    Left knee    KNEE SURGERY Left     x4    LAPAROSCOPIC TUBAL LIGATION  1995    LAPAROSCOPIC TUBAL LIGATION      Reversed    REDUCTION MAMMAPLASTY  2009    8lbs of tissue was removed    TONSILLECTOMY      UPPER GASTROINTESTINAL ENDOSCOPY      VENOUS ACCESS DEVICE (PORT) INSERTION N/A 6/5/2023    Procedure: INSERTION VENOUS ACCESS DEVICE;  Surgeon: Rolando Liu MD;  Location: [facility] OR [facility];  Service: General;  Laterality: N/A;         Family History:   Family History   Problem Relation Age of Onset    Colon cancer Neg Hx          Social History:   Social History     Tobacco Use    Smoking status: Never     Passive exposure: Never    Smokeless tobacco: Never   Vaping Use    Vaping Use: Never used   Substance Use Topics    Alcohol use: Never    Drug use: Never         Home Medications:  ALPRAZolam, Diclofenac Sodium, HYDROcodone-acetaminophen, Mirabegron ER, Oxaliplatin, allopurinol, amLODIPine, b complex-C-E-zinc, docusate sodium, ferrous sulfate, fluorouracil, furosemide, hyoscyamine, ketorolac, lamoTRIgine, leucovorin, lidocaine-prilocaine, losartan, medroxyPROGESTERone, metoclopramide, metoprolol succinate XL, ondansetron, pantoprazole, rOPINIRole, sertraline, sucralfate, traMADol, and vitamin D3    Allergies:  Allergies   Allergen Reactions    Hydromorphone Hives, Itching and GI Intolerance    Morphine Hives, Itching and Nausea And Vomiting    Oxybutynin Swelling and Angioedema           Oxycodone-Acetaminophen Itching and Nausea And Vomiting     Can not take any higher than 7.5    Penicillins Rash    Promethazine Nausea And Vomiting    Tylenol With Codeine #3  [Acetaminophen-Codeine] Itching and Nausea And Vomiting       Review of Systems   All systems were reviewed and negative except for: Abdominal pain    Objective    Objective     Vitals:   Temp:  [98.3 °F (36.8 °C)] 98.3 °F (36.8 °C)  Heart Rate:  [69-77] 69  Resp:  [20] 20  BP: (137)/(96) 137/96    Physical Exam    Constitutional: Awake, alert, no acute distress   Eyes: Pupils equal, sclerae anicteric, no conjunctival injection   HENT: NCAT, mucous membranes moist   Neck: Supple, no thyromegaly, no lymphadenopathy, trachea midline   Respiratory: Clear to auscultation bilaterally, nonlabored respirations    Cardiovascular: RRR, no murmurs, rubs, or gallops, palpable pedal pulses bilaterally   Gastrointestinal: Positive bowel sounds, soft, nontender, nondistended   Musculoskeletal: No bilateral ankle edema, no clubbing or cyanosis to extremities   Psychiatric: Appropriate affect, cooperative   Neurologic: Oriented x 3, strength symmetric in all extremities, Cranial Nerves grossly intact to confrontation, speech clear   Skin: No rashes     Result Review    Result Review:  I have personally reviewed the results from the time of this admission to 6/16/2023 05:13 EDT and agree with these findings:  [x]  Laboratory  [x]  Microbiology  [x]  Radiology  []  EKG/Telemetry   []  Cardiology/Vascular   []  Pathology  []  Old records  []  Other:      Assessment & Plan   Assessment / Plan     Assessment/Plan:   Pneumoperitoneum: Surgery called from the emergency department and recommend admission.  They recommend starting Levaquin and Flagyl which has been started in ER and will be continued.  Supportive care and pain control.  Keep patient n.p.o. with IV fluid.  Surgery to evaluate.  Serial labs and serial abdominal exams.  Monitor on telemetry.  Colon adenocarcinoma stage III status post left colectomy May 5, 2023: Receiving chemotherapy infusions on outpatient basis.  Consider consulting patient's oncologist if plan to remain  in-house after surgical evaluation.  Hypertension: Resume home medications as appropriate      DVT prophylaxis:  SCDs    CODE STATUS:    Code Status (Patient has no pulse and is not breathing): CPR (Attempt to Resuscitate)  Medical Interventions (Patient has pulse or is breathing): Full Support      Admission Status:  I believe this patient meets observation status.    Electronically signed by Frank Bal Jr, MD, 06/16/23, 5:13 AM EDT.             Electronically signed by Frank Bal Jr., MD at 06/16/23 0520          Emergency Department Notes        Opal Thomas APRN at 06/16/23 0045       Attestation signed by Frank Mcconnell MD at 06/16/23 0642        SUPERVISE: For this patient encounter, I reviewed the APC's documentation, treatment plan, and medical decision making.  Frank Mcconnell MD 6/16/2023 06:42 EDT                         Time: 12:45 AM EDT  Date of encounter:  6/15/2023  Independent Historian/Clinical History and Information was obtained by:   Patient and Family  Chief Complaint: Abdominal pain    History is limited by: N/A    History of Present Illness:  Patient is a 47 y.o. year old female who presents to the emergency department for evaluation of upper abdominal pain that radiates up into her shoulders.  Pain started a few hours ago.  Patient had recent surgery for colon cancer where she had a resection by Dr. Liu.  Patient currently getting chemotherapy.  Patient also has a history of gastroparesis and diverticulitis.  Patient complains of nausea no active vomiting.  Patient states pain is sharp and stabbing and constant.  Patient has not tried anything for pain in the last few hours.  Patient has hydrocodone at home.  Patient has no fever.  Patient reports having problems with constipation since her most recent surgery for Port-A-Cath insertion on the fifth and has been taking senna and just started MiraLAX yesterday.  Patient denies dysuria.    HPI    Patient Care Team  Primary Care  Provider: Shelly Cash MD    Past Medical History:     Allergies   Allergen Reactions    Hydromorphone Hives, Itching and GI Intolerance    Morphine Hives, Itching and Nausea And Vomiting    Oxybutynin Swelling and Angioedema           Oxycodone-Acetaminophen Itching and Nausea And Vomiting     Can not take any higher than 7.5    Penicillins Rash    Promethazine Nausea And Vomiting    Tylenol With Codeine #3 [Acetaminophen-Codeine] Itching and Nausea And Vomiting     Past Medical History:   Diagnosis Date    Anesthesia     B/P bottomed out/UNSURE ON THE DATE    Anxiety     Asthma 1996    seasonal/NO INHALERS    Colon cancer 04/17/2023    DDD (degenerative disc disease), cervical     DDD (degenerative disc disease), lumbar     Depression     Diverticulitis of colon 2005    Scope was done in [facility]    GERD (gastroesophageal reflux disease)     Hypertension     Kidney stones     Melanoma     removed    Palpitation     FOLLOWS W/DR. ANTONIO  Q6 MONTHS, NO CP OR SOA    Prolapse of female bladder      Past Surgical History:   Procedure Laterality Date    BREAST SURGERY  2016    Removed 8lbs of tissue, 2016    CHOLECYSTECTOMY      COLON RESECTION Left 05/05/2023    Procedure: RESECTION OF DESCENDING COLON AND SPLENIC FLEXURE TAKEDOWN WITH DAVINCI ROBOT;  Surgeon: Rolando Liu MD;  Location: [facility];  Service: Robotics - DaVinci;  Laterality: Left;    COLONOSCOPY N/A 04/04/2023    Procedure: COLONOSCOPY WITH POLYPECTOMY/SNARE/BIOPSIES/TATTOOING DESCENDING COLON MASS;  Surgeon: Deniz Dowd MD;  Location: [facility] ENDOSCOPY;  Service: Gastroenterology;  Laterality: N/A;  INCOMPLETE COLONOSCOPY  POOR BOWEL PREP  COLON POLYP  COLON MASS  DIVERTICULOSIS    COLONOSCOPY N/A 04/12/2023    Procedure: COLONOSCOPY with cold snare;  Surgeon: Deniz Dowd MD;  Location: [facility] ENDOSCOPY;  Service: Gastroenterology;  Laterality: N/A;  colon polyps, diverticulosis, colon mass, hemorrhoids      CYSTOSCOPY  W/ URETERAL STENT PLACEMENT Left 05/05/2023    Procedure: Cystoscopy, left ureteral injection,;  Surgeon: Manjula Randolph MD;  Location: [facility] OR [facility];  Service: Urology;  Laterality: Left;    FRACTURE SURGERY  2001    Left knee    KNEE SURGERY Left     x4    LAPAROSCOPIC TUBAL LIGATION  1995    LAPAROSCOPIC TUBAL LIGATION      Reversed    REDUCTION MAMMAPLASTY  2009    8lbs of tissue was removed    TONSILLECTOMY      UPPER GASTROINTESTINAL ENDOSCOPY      VENOUS ACCESS DEVICE (PORT) INSERTION N/A 6/5/2023    Procedure: INSERTION VENOUS ACCESS DEVICE;  Surgeon: Rolando Liu MD;  Location: [facility] OR [facility];  Service: General;  Laterality: N/A;     Family History   Problem Relation Age of Onset    Colon cancer Neg Hx        Home Medications:  Prior to Admission medications    Medication Sig Start Date End Date Taking? Authorizing Provider   allopurinol (ZYLOPRIM) 100 MG tablet Take 1 tablet by mouth Daily.    Char Flor MD   ALPRAZolam (XANAX) 0.5 MG tablet Take 1 tablet by mouth 3 (Three) Times a Day As Needed.    Char Flor MD   amLODIPine (NORVASC) 10 MG tablet Take 1 tablet by mouth Every Night.    Char Flor MD   Diclofenac Sodium (VOLTAREN) 1 % gel gel Apply 4 g topically to the appropriate area as directed 4 (Four) Times a Day As Needed.    Char Flor MD   docusate sodium 100 MG capsule Take 1 capsule by mouth Daily.    Char Flor MD   ferrous sulfate 325 (65 FE) MG tablet Take 1 tablet by mouth Daily With Breakfast. 6/2/23   Brian Marroquin MD   fluorouracil (EFUDEX) 5 % cream Apply 1 application topically to the appropriate area as directed 2 (Two) Times a Day.    Char Flor MD   furosemide (LASIX) 20 MG tablet Take 1 tablet by mouth Daily.    Char Flor MD   HYDROcodone-acetaminophen (NORCO) 7.5-325 MG per tablet Take 1 tablet by mouth Every 8 (Eight) Hours As Needed.    Char Flor MD   hyoscyamine (ANASPAZ,LEVSIN)  0.125 MG tablet Take 1 tablet by mouth 4 (Four) Times a Day.    Char Flor MD   ketorolac (TORADOL) 10 MG tablet Take 1 tablet by mouth Every 6 (Six) Hours As Needed.    Char Flor MD   lamoTRIgine (LaMICtal) 25 MG tablet Take 1 tablet by mouth 2 (Two) Times a Day. Only takes at night    Char Flor MD   leucovorin 5 MG tablet Take  by mouth Every 6 (Six) Hours.    Char Flor MD   lidocaine-prilocaine (EMLA) 2.5-2.5 % cream Apply 1 application topically to the appropriate area as directed Every 2 (Two) Hours As Needed for Mild Pain. 6/14/23   Brian Marroquin MD   losartan (COZAAR) 100 MG tablet Take 1 tablet by mouth Every Night.    Char Flor MD   medroxyPROGESTERone (DEPO-PROVERA) 150 MG/ML injection 1 mL.    Char Flor MD   metoclopramide (REGLAN) 10 MG tablet Take 1 tablet by mouth 3 (Three) Times a Day.    Char Flor MD   metoprolol succinate XL (TOPROL-XL) 25 MG 24 hr tablet Take 1 tablet by mouth Daily.    Char Flor MD   Multiple Vitamins-Minerals (b complex-C-E-zinc) tablet Take 1 tablet by mouth Daily.    Char Flor MD   Myrbetriq 25 MG tablet sustained-release 24 hour 24 hr tablet Take 1 tablet by mouth Every Evening. 4/26/23   Char Flor MD   ondansetron (ZOFRAN) 8 MG tablet Take 1 tablet by mouth 3 (Three) Times a Day As Needed for Nausea or Vomiting. 6/9/23   Brian Marroquin MD   OXALIPLATIN IV Infuse  into a venous catheter.    Char Flor MD   pantoprazole (PROTONIX) 40 MG EC tablet Take 1 tablet by mouth 2 (Two) Times a Day.    Char Flor MD   rOPINIRole (REQUIP) 1 MG tablet Take 1 tablet by mouth Every Night. 3/13/23   Char Flor MD   sertraline (ZOLOFT) 100 MG tablet sertraline 100 mg tablet   Take 1 tablet every day by oral route for 30 days.    Char Flor MD   sucralfate (CARAFATE) 1 g tablet Take 1 tablet by mouth 2 (Two) Times a Day.    Chacho  "MD Char   traMADol (Ultram) 50 MG tablet Take 1 tablet by mouth Every 6 (Six) Hours As Needed for Severe Pain or Moderate Pain. 6/5/23 6/4/24  Rolando Liu MD   vitamin D3 125 MCG (5000 UT) capsule capsule Take 2,000 Units by mouth Daily.    Provider, MD Char        Social History:   Social History     Tobacco Use    Smoking status: Never     Passive exposure: Never    Smokeless tobacco: Never   Vaping Use    Vaping Use: Never used   Substance Use Topics    Alcohol use: Never    Drug use: Never         Review of Systems:  Review of Systems   Constitutional:  Negative for chills and fever.   HENT:  Negative for congestion, ear pain and sore throat.    Eyes:  Negative for pain.   Respiratory:  Negative for cough, chest tightness and shortness of breath.    Cardiovascular:  Negative for chest pain.   Gastrointestinal:  Positive for abdominal pain (Rating up into shoulder). Negative for diarrhea, nausea and vomiting.   Genitourinary:  Negative for dysuria, flank pain and hematuria.   Musculoskeletal:  Negative for back pain, joint swelling and neck pain.   Skin:  Negative for pallor.   Neurological:  Negative for seizures and headaches.   Hematological: Negative.    Psychiatric/Behavioral: Negative.     All other systems reviewed and are negative.     Physical Exam:  /69   Pulse 81   Temp 98.3 °F (36.8 °C) (Oral)   Resp 20   Ht 162.6 cm (64\")   Wt 134 kg (296 lb 4.8 oz)   SpO2 98%   BMI 50.86 kg/m²     Physical Exam  Vitals and nursing note reviewed.   Constitutional:       General: She is not in acute distress.     Appearance: Normal appearance. She is obese. She is not ill-appearing or toxic-appearing.   HENT:      Head: Normocephalic and atraumatic.      Mouth/Throat:      Mouth: Mucous membranes are moist.   Eyes:      General: No scleral icterus.  Cardiovascular:      Rate and Rhythm: Normal rate and regular rhythm.      Pulses: Normal pulses.      Heart sounds: Normal heart sounds. "   Pulmonary:      Effort: Pulmonary effort is normal. No respiratory distress.      Breath sounds: Normal breath sounds.   Abdominal:      General: Bowel sounds are normal. There is no distension.      Palpations: Abdomen is soft.      Tenderness: There is no abdominal tenderness in the right upper quadrant, epigastric area and left upper quadrant. There is no right CVA tenderness or left CVA tenderness.      Hernia: No hernia is present.   Musculoskeletal:         General: Normal range of motion.      Cervical back: Normal range of motion and neck supple.   Skin:     General: Skin is warm and dry.      Comments: Surgical trochanter sites and right abdomen incision are practically healed.  No signs of infection or dehiscence   Neurological:      Mental Status: She is alert and oriented to person, place, and time. Mental status is at baseline.   Psychiatric:         Mood and Affect: Mood normal.         Behavior: Behavior normal.            Procedures:  Procedures      Medical Decision Making:      Comorbidities that affect care:    Paresis and diverticulitis, Asthma, Cancer, Hypertension    External Notes reviewed:    Previous Admission Note: Previous admission notes were noted from 5 May and 5 June.  Patient had colon resection for colon cancer by Dr. Liu in May and had a Port-A-Cath placement on June 5.  She is currently being treated by Dr. Marroquin for oncology.  Patient has multiple oncology notes with infusion references      The following orders were placed and all results were independently analyzed by me:  Orders Placed This Encounter   Procedures    Blood Culture - Blood,    Blood Culture - Blood,    CT Abdomen Pelvis With Contrast    [place] Draw    Comprehensive Metabolic Panel    Lipase    Urinalysis With Microscopic If Indicated (No Culture) - Urine, Clean Catch    hCG, Quantitative, Pregnancy    CBC Auto Differential    Lactic Acid, Plasma    NPO Diet NPO Type: Strict NPO    Undress & Gown    Code  Status and Medical Interventions:    Surgery (on-call MD unless specified)    Hospitalist (on-call MD unless specified)    Insert Peripheral IV    Initiate Observation Status    CBC & Differential    Green Top (Gel)    Lavender Top    Gold Top - SST    Light Blue Top       Medications Given in the Emergency Department:  Medications   sodium chloride 0.9 % flush 10 mL (has no administration in time range)   iopamidol (ISOVUE-370) 76 % injection 100 mL (100 mL Intravenous Given 6/16/23 0114)   ketorolac (TORADOL) injection 30 mg (30 mg Intravenous Given 6/16/23 0158)   metoclopramide (REGLAN) injection 10 mg (10 mg Intravenous Given 6/16/23 0159)   diphenhydrAMINE (BENADRYL) injection 25 mg (25 mg Intravenous Given 6/16/23 0159)   sodium chloride 0.9 % bolus 1,000 mL (0 mL Intravenous Stopped 6/16/23 0500)   levoFLOXacin (LEVAQUIN) 750 mg/150 mL D5W (premix) (LEVAQUIN) 750 mg (750 mg Intravenous New Bag 6/16/23 0543)   metroNIDAZOLE (FLAGYL) IVPB 500 mg (0 mg Intravenous Stopped 6/16/23 0551)        ED Course:         Labs:    Lab Results (last 24 hours)       Procedure Component Value Units Date/Time    CBC & Differential [[id number]]  (Abnormal) Collected: 06/16/23 0017    Specimen: Blood Updated: 06/16/23 0043    Narrative:      The following orders were created for panel order CBC & Differential.  Procedure                               Abnormality         Status                     ---------                               -----------         ------                     CBC Auto Differential[[id number]]        Abnormal            Final result                 Please view results for these tests on the individual orders.    Lipase [[id number]]  (Abnormal) Collected: 06/16/23 0017    Specimen: Blood Updated: 06/16/23 0054     Lipase 99 U/L     hCG, Quantitative, Pregnancy [[id number]] Collected: 06/16/23 0017    Specimen: Blood Updated: 06/16/23 0053     HCG Quantitative <0.50 mIU/mL     Narrative:      HCG Ranges by  Gestational Age    Females - non-pregnant premenopausal   </= 1mIU/mL HCG  Females - postmenopausal               </= 7mIU/mL HCG    3 Weeks       5.4   -      72 mIU/mL  4 Weeks      10.2   -     708 mIU/mL  5 Weeks       217   -   8,245 mIU/mL  6 Weeks       152   -  32,177 mIU/mL  7 Weeks     4,059   - 153,767 mIU/mL  8 Weeks    31,366   - 149,094 mIU/mL  9 Weeks    59,109   - 135,901 mIU/mL  10 Weeks   44,186   - 170,409 mIU/mL  12 Weeks   27,107   - 201,615 mIU/mL  14 Weeks   24,302   -  93,646 mIU/mL  15 Weeks   12,540   -  69,747 mIU/mL  16 Weeks    8,904   -  55,332 mIU/mL  17 Weeks    8,240   -  51,793 mIU/mL  18 Weeks    9,649   -  55,271 mIU/mL      CBC Auto Differential [[id number]]  (Abnormal) Collected: 06/16/23 0017    Specimen: Blood Updated: 06/16/23 0043     WBC 13.40 10*3/mm3      RBC 4.94 10*6/mm3      Hemoglobin 14.8 g/dL      Hematocrit 42.3 %      MCV 85.6 fL      MCH 30.0 pg      MCHC 35.0 g/dL      RDW 11.9 %      RDW-SD 37.3 fl      MPV 9.8 fL      Platelets 254 10*3/mm3      Neutrophil % 72.2 %      Lymphocyte % 20.7 %      Monocyte % 6.0 %      Eosinophil % 0.4 %      Basophil % 0.4 %      Immature Grans % 0.3 %      Neutrophils, Absolute 9.67 10*3/mm3      Lymphocytes, Absolute 2.78 10*3/mm3      Monocytes, Absolute 0.80 10*3/mm3      Eosinophils, Absolute 0.06 10*3/mm3      Basophils, Absolute 0.05 10*3/mm3      Immature Grans, Absolute 0.04 10*3/mm3      nRBC 0.0 /100 WBC     Urinalysis With Microscopic If Indicated (No Culture) - Urine, Clean Catch [[id number]]  (Abnormal) Collected: 06/16/23 0147    Specimen: Urine, Clean Catch Updated: 06/16/23 0158     Color, UA Yellow     Appearance, UA Clear     pH, UA 6.0     Specific Gravity, UA >1.030     Glucose, UA Negative     Ketones, UA Negative     Bilirubin, UA Negative     Blood, UA Negative     Protein, UA Negative     Leuk Esterase, UA Negative     Nitrite, UA Negative     Urobilinogen, UA 1.0 E.U./dL    Narrative:      Urine  microscopic not indicated.    Comprehensive Metabolic Panel [[id number]]  (Abnormal) Collected: 06/16/23 0158    Specimen: Blood Updated: 06/16/23 0229     Glucose 108 mg/dL      BUN 24 mg/dL      Creatinine 0.81 mg/dL      Sodium 138 mmol/L      Potassium 4.3 mmol/L      Comment: Slight hemolysis detected by analyzer. Results may be affected.        Chloride 103 mmol/L      CO2 20.6 mmol/L      Calcium 9.6 mg/dL      Total Protein 7.4 g/dL      Albumin 4.2 g/dL      ALT (SGPT) 41 U/L      AST (SGOT) 60 U/L      Comment: Slight hemolysis detected by analyzer. Results may be affected.        Alkaline Phosphatase 100 U/L      Total Bilirubin 0.4 mg/dL      Globulin 3.2 gm/dL      A/G Ratio 1.3 g/dL      BUN/Creatinine Ratio 29.6     Anion Gap 14.4 mmol/L      eGFR 90.2 mL/min/1.73     Narrative:      GFR Normal >60  Chronic Kidney Disease <60  Kidney Failure <15      Lactic Acid, Plasma [[id number]]  (Normal) Collected: 06/16/23 0454    Specimen: Blood Updated: 06/16/23 0534     Lactate 0.9 mmol/L     Blood Culture - Blood, Arm, Left [[id number]] Collected: 06/16/23 0454    Specimen: Blood from Arm, Left Updated: 06/16/23 0504    Blood Culture - Blood, Arm, Left [[id number]] Collected: 06/16/23 0454    Specimen: Blood from Arm, Left Updated: 06/16/23 0504             Imaging:    CT Abdomen Pelvis With Contrast    Result Date: 6/16/2023  PROCEDURE: CT ABDOMEN PELVIS W CONTRAST  COMPARISON: 4/11/2023.  INDICATIONS: PAIN ACROSS UPPER ABDOMEN; H/O GASTROPARESIS; H/O LEFT COLONIC RESECTION FOR STAGE III COLON CANCER; THE PT. IS CURRENTLY UNDERGOING CHEMOTHERAPY.  TECHNIQUE: After obtaining the patient's consent, 689 CT images were created with non-ionic intravenous contrast material.  No oral contrast agent was administered for the study.  PROTOCOL:   Standard CT imaging protocol performed.    RADIATION:   Total DLP: 2,297.4 mGy*cm.   Automated exposure control was utilized to minimize radiation dose. CONTRAST: 100 mL Isovue  370 I.V.  FINDINGS: Recent postoperative changes are suggested as with a partial left colectomy and an anastomosis involving the left colon.  Formed stool is seen throughout the colon, especially remaining left colon, suggesting fecal stasis (or retention) as with constipation.  There is a moderate amount of pneumoperitoneum, which is thought most likely to be related to the recent surgery; please correlate clinically.  There is minimal if any ascites.  No definite focal pericolonic fluid collection is seen to suggest abscess or hematoma.  Stranding in the mesenteric fat is present, especially within the pelvis, and is probably related to the recent surgery.  There is increased attenuation of the right anterior lower abdominal/pelvic wall, especially in the subcutaneous region, with a CT number of about 25 Hounsfield units on the initial phase of the study.  On the delayed phase, the CT number is about 23 Hounsfield units or less.  This finding is nonspecific.  It may represent a normal postoperative fluid collection, such as a seroma or (more likely) hematoma.  It does not contain gas.  There is no retained foreign body associated with the finding.  An infected fluid collection cannot be excluded.  The finding is estimated at 7.6 x 5.3 x 3.2 cm in transverse, anteroposterior (AP), and craniocaudal extent, respectively, as seen on image 80 of series 201, image 82 of series 301, image 27 of series 202, image 95 of series 203, and adjacent image.  The no definite subcutaneous emphysema is seen elsewhere.  No other abdominal or pelvic wall fluid collections are appreciated.  There may be mild subsegmental atelectasis in the lung bases.  Probably no acute infiltrate is seen.  There is diffuse hepatic steatosis with hepatomegaly.  The maximum craniocaudal dimension of the liver is about 23.1 cm.  No hepatic metastases are suggested.  No splenomegaly is seen.  The maximum craniocaudal dimension of the spleen is about 12  cm.  The patient has undergone cholecystectomy.  There are probably small benign renal cysts.  Nonobstructing nephrolithiasis is seen on the left with upper pole calyceal stones measuring about 3 mm in greatest diameter.  No definite right nephrolithiasis.  No hydronephrosis or ureterolithiasis.  There may be mild renal cortical scarring, especially on the left.  No acute pyelonephritis.  Degenerative changes are seen throughout the imaged spine.  There may be diffuse idiopathic skeletal hyperostosis (DISH).  Degenerative changes involve the hip joints and the bilateral sacroiliac joints.  No acute fracture.  No aggressive osseous lesion is suggested.  No acute appendicitis.  There are incidental pelvic phleboliths.        1. There has been interval partial left colectomy since the prior 4/11/2023 CT study.  2. Formed stool is seen within the colon.  There may be fecal stasis (fecal retention) as with constipation.  Probably no fecal impaction.  No mechanical bowel obstruction is suspected. 3. There is a moderate amount of pneumoperitoneum, which is probably related to the recent surgery.  Please correlate with regard to the time frame of the surgery.  That the pneumoperitoneum may be associated with age-indeterminate bowel perforation or leak, especially at the anastomotic site, cannot be excluded entirely.  No focal sizable (drainable) intraperitoneal or retroperitoneal fluid collections are seen.  4. There is increased attenuation of the mesenteric fat, especially in the pelvis and in the left abdomen, probably related to the surgery.  5. There is a suspected resolving hematoma in the right anterior lower abdominal/upper pelvic wall (especially subcutaneous in location), which measures about 7.6 cm in greatest diameter.  An infected fluid collection cannot be excluded but is thought to be less likely.  6. No other acute findings are seen.  7. Please see above comments for further detail.   1.   Please note that  portions of this note were completed with a voice recognition program.  THEODORA BENDER JR, MD       Electronically Signed and Approved By: THEODORA BENDER JR, MD on 6/16/2023 at 2:32                 Differential Diagnosis and Discussion:    Abdominal Pain: Based on the patient's signs and symptoms, I considered abdominal aortic aneurysm, small bowel obstruction, pancreatitis, acute cholecystitis, acute appendecitis, peptic ulcer disease, gastritis, colitis, endocrine disorders, irritable bowel syndrome and other differential diagnosis an etiology of the patient's abdominal pain.    All labs were reviewed and interpreted by me.  CT scan radiology impression was interpreted by me.    MDM  Number of Diagnoses or Management Options  Malignant neoplasm of colon, unspecified part of colon  Pain of upper abdomen  Pneumoperitoneum  Diagnosis management comments: Patient presented to the emergency department with a complaint of upper abdominal pain radiating to her shoulders.  Patient states she has significant bowel medical history but that this pain was worse than a that she is ever felt.  CT findings did reveal constipation and moderate amount of pneumoperitoneum.  White blood cell count was 13,000.  The on-call surgeon was consulted since patient has been recently postop and she felt that the amount of pneumoperitoneum seemed abnormal due to the amount of time that has passed since the colon resection and she felt the patient should be admitted so that she can be evaluated.  Patient agreed with this plan.  Patient is currently resting comfortably after medication.  Vital signs are stable.  Patient has been admitted to the hospitalist       Amount and/or Complexity of Data Reviewed  Clinical lab tests: reviewed and ordered  Tests in the radiology section of CPT®: reviewed and ordered  Tests in the medicine section of CPT®: ordered and reviewed  Decide to obtain previous medical records or to obtain history from someone  other than the patient: yes  Obtain history from someone other than the patient: yes (Family at bedside)  Review and summarize past medical records: yes (Previous admission notes from May 5 and June for fifth for reviewed.  Patient had colon resection on May 5)    Risk of Complications, Morbidity, and/or Mortality  Presenting problems: moderate  Diagnostic procedures: moderate  Management options: low    Patient Progress  Patient progress: stable         Patient Care Considerations:    CONSULT: I considered consulting patient's oncologist, however no current emergent need is noted in the emergency department.  Patient will be admitted and if oncology is needed can be consulted as inpatient      Consultants/Shared Management Plan:    Hospitalist: I have discussed the case with Dr. Bal who agrees to accept the patient for admission.  Consultant: I have discussed the case with Dr. Andino the on-call surgeon who states admit the patient to medicine and start her on levaquin and Flagyl .  Surgery to consult    Social Determinants of Health:    Patient has presented with family members who are responsible, reliable and will ensure follow up care.      Disposition and Care Coordination:    Admit:   Through independent evaluation of the patient's history, physical, and imperical data, the patient meets criteria for observation/admission to the hospital.        Final diagnoses:   Pneumoperitoneum   Pain of upper abdomen   Malignant neoplasm of colon, unspecified part of colon   Constipation, unspecified constipation type        ED Disposition       ED Disposition   Decision to Admit    Condition   --    Comment   Level of Care: Telemetry [5]   Diagnosis: Pneumoperitoneum [385212]                 This medical record created using voice recognition software.             Opal Thomas APRN  06/16/23 0615      Electronically signed by Frank Mcconnell MD at 06/16/23 0642       Vital Signs (last day)       Date/Time Temp Temp  src Pulse Resp BP Patient Position SpO2    06/16/23 0735 98.6 (37) Oral 80 20 129/76 Sitting 99    06/16/23 0628 99 (37.2) Oral 79 18 161/96 Sitting 100    06/16/23 0501 -- -- 81 -- 136/69 -- 98    06/16/23 0459 -- -- 69 -- -- -- 98    06/15/23 2358 -- -- -- 20 -- -- --    06/15/23 2357 98.3 (36.8) Oral 77 -- 137/96 Sitting 99          Facility-Administered Medications as of 6/16/2023   Medication Dose Route Frequency Provider Last Rate Last Admin    sennosides-docusate (PERICOLACE) 8.6-50 MG per tablet 2 tablet  2 tablet Oral BID Frank Bal Jr., MD        And    polyethylene glycol (MIRALAX) packet 17 g  17 g Oral Daily PRN Frank Bal Jr., MD        And    bisacodyl (DULCOLAX) EC tablet 5 mg  5 mg Oral Daily PRN Frank Bal Jr., MD        And    bisacodyl (DULCOLAX) suppository 10 mg  10 mg Rectal Daily PRN Frank Bal Jr., MD   10 mg at 06/16/23 0745    bisacodyl (DULCOLAX) suppository 10 mg  10 mg Rectal BID Vani Harrison APRN        Calcium Replacement - Follow Nurse / BPA Driven Protocol   Does not apply PRN Frank Bal Jr., MD        [COMPLETED] diphenhydrAMINE (BENADRYL) injection 25 mg  25 mg Intravenous Once Opal Thomas APRN   25 mg at 06/16/23 0159    famotidine (PEPCID) injection 20 mg  20 mg Intravenous Q12H Vani Harrison APRN   20 mg at 06/16/23 0952    [COMPLETED] iopamidol (ISOVUE-370) 76 % injection 100 mL  100 mL Intravenous Once in imaging Frank Mcconnell MD   100 mL at 06/16/23 0114    ketorolac (TORADOL) injection 15 mg  15 mg Intravenous Q6H PRN Frank Bal Jr., MD   15 mg at 06/16/23 0720    [COMPLETED] ketorolac (TORADOL) injection 30 mg  30 mg Intravenous Once Opal Thomas APRN   30 mg at 06/16/23 0158    lactated ringers infusion  100 mL/hr Intravenous Continuous Frank Bal Jr.,  mL/hr at 06/16/23 0721 100 mL/hr at 06/16/23 0721    [COMPLETED] levoFLOXacin (LEVAQUIN) 750 mg/150 mL D5W (premix) (LEVAQUIN) 750 mg  750 mg Intravenous Once  Opal Thomas APRN   750 mg at 06/16/23 0543    [START ON 6/17/2023] levoFLOXacin (LEVAQUIN) 750 mg/150 mL D5W (premix) (LEVAQUIN) 750 mg  750 mg Intravenous Q24H Frank Bal Jr., MD        Magnesium Standard Dose Replacement - Follow Nurse / BPA Driven Protocol   Does not apply Frank Alvarez Jr., MD        [COMPLETED] metoclopramide (REGLAN) injection 10 mg  10 mg Intravenous Once Opal Thomas APRN   10 mg at 06/16/23 0159    [COMPLETED] metroNIDAZOLE (FLAGYL) IVPB 500 mg  500 mg Intravenous Once Opal Thomas APRN   Stopped at 06/16/23 0551    metroNIDAZOLE (FLAGYL) IVPB 500 mg  500 mg Intravenous Q8H Frank Bal Jr., MD        nitroglycerin (NITROSTAT) SL tablet 0.4 mg  0.4 mg Sublingual Q5 Min PRN Frank Bal Jr., MD        ondansetron (ZOFRAN) injection 4 mg  4 mg Intravenous Q6H PRN Frank Bal Jr., MD   4 mg at 06/16/23 0720    Phosphorus Replacement - Follow Nurse / BPA Driven Protocol   Does not apply Frank Alvarez Jr., MD        Potassium Replacement - Follow Nurse / BPA Driven Protocol   Does not apply Frank Alvarez Jr., MD        [COMPLETED] sodium chloride 0.9 % bolus 1,000 mL  1,000 mL Intravenous Once Opal Thomas APRN   Stopped at 06/16/23 0500    sodium chloride 0.9 % flush 10 mL  10 mL Intravenous PRFrank Avila Jr., MD        sodium chloride 0.9 % flush 10 mL  10 mL Intravenous Q12H Frank Bal Jr., MD   10 mL at 06/16/23 0953    sodium chloride 0.9 % flush 10 mL  10 mL Intravenous PRFrank Avila Jr., MD        sodium chloride 0.9 % infusion 40 mL  40 mL Intravenous Frank Alvarez Jr., MD         Lab Results (last 24 hours)       Procedure Component Value Units Date/Time    Lactic Acid, Plasma [[id number]]  (Normal) Collected: 06/16/23 0454    Specimen: Blood Updated: 06/16/23 0534     Lactate 0.9 mmol/L     Blood Culture - Blood, Arm, Left [[id number]] Collected: 06/16/23 0454    Specimen: Blood from Arm, Left Updated: 06/16/23 6188    Blood  Culture - Blood, Arm, Left [[id number]] Collected: 06/16/23 0454    Specimen: Blood from Arm, Left Updated: 06/16/23 0504    Comprehensive Metabolic Panel [[id number]]  (Abnormal) Collected: 06/16/23 0158    Specimen: Blood Updated: 06/16/23 0229     Glucose 108 mg/dL      BUN 24 mg/dL      Creatinine 0.81 mg/dL      Sodium 138 mmol/L      Potassium 4.3 mmol/L      Comment: Slight hemolysis detected by analyzer. Results may be affected.        Chloride 103 mmol/L      CO2 20.6 mmol/L      Calcium 9.6 mg/dL      Total Protein 7.4 g/dL      Albumin 4.2 g/dL      ALT (SGPT) 41 U/L      AST (SGOT) 60 U/L      Comment: Slight hemolysis detected by analyzer. Results may be affected.        Alkaline Phosphatase 100 U/L      Total Bilirubin 0.4 mg/dL      Globulin 3.2 gm/dL      A/G Ratio 1.3 g/dL      BUN/Creatinine Ratio 29.6     Anion Gap 14.4 mmol/L      eGFR 90.2 mL/min/1.73     Narrative:      GFR Normal >60  Chronic Kidney Disease <60  Kidney Failure <15      Urinalysis With Microscopic If Indicated (No Culture) - Urine, Clean Catch [[id number]]  (Abnormal) Collected: 06/16/23 0147    Specimen: Urine, Clean Catch Updated: 06/16/23 0158     Color, UA Yellow     Appearance, UA Clear     pH, UA 6.0     Specific Gravity, UA >1.030     Glucose, UA Negative     Ketones, UA Negative     Bilirubin, UA Negative     Blood, UA Negative     Protein, UA Negative     Leuk Esterase, UA Negative     Nitrite, UA Negative     Urobilinogen, UA 1.0 E.U./dL    Narrative:      Urine microscopic not indicated.    Lipase [[id number]]  (Abnormal) Collected: 06/16/23 0017    Specimen: Blood Updated: 06/16/23 0054     Lipase 99 U/L     hCG, Quantitative, Pregnancy [[id number]] Collected: 06/16/23 0017    Specimen: Blood Updated: 06/16/23 0053     HCG Quantitative <0.50 mIU/mL     Narrative:      HCG Ranges by Gestational Age    Females - non-pregnant premenopausal   </= 1mIU/mL HCG  Females - postmenopausal               </= 7mIU/mL HCG    3 Weeks        5.4   -      72 mIU/mL  4 Weeks      10.2   -     708 mIU/mL  5 Weeks       217   -   8,245 mIU/mL  6 Weeks       152   -  32,177 mIU/mL  7 Weeks     4,059   - 153,767 mIU/mL  8 Weeks    31,366   - 149,094 mIU/mL  9 Weeks    59,109   - 135,901 mIU/mL  10 Weeks   44,186   - 170,409 mIU/mL  12 Weeks   27,107   - 201,615 mIU/mL  14 Weeks   24,302   -  93,646 mIU/mL  15 Weeks   12,540   -  69,747 mIU/mL  16 Weeks    8,904   -  55,332 mIU/mL  17 Weeks    8,240   -  51,793 mIU/mL  18 Weeks    9,649   -  55,271 mIU/mL      CBC & Differential [[id number]]  (Abnormal) Collected: 06/16/23 0017    Specimen: Blood Updated: 06/16/23 0043    Narrative:      The following orders were created for panel order CBC & Differential.  Procedure                               Abnormality         Status                     ---------                               -----------         ------                     CBC Auto Differential[[id number]]        Abnormal            Final result                 Please view results for these tests on the individual orders.    CBC Auto Differential [[id number]]  (Abnormal) Collected: 06/16/23 0017    Specimen: Blood Updated: 06/16/23 0043     WBC 13.40 10*3/mm3      RBC 4.94 10*6/mm3      Hemoglobin 14.8 g/dL      Hematocrit 42.3 %      MCV 85.6 fL      MCH 30.0 pg      MCHC 35.0 g/dL      RDW 11.9 %      RDW-SD 37.3 fl      MPV 9.8 fL      Platelets 254 10*3/mm3      Neutrophil % 72.2 %      Lymphocyte % 20.7 %      Monocyte % 6.0 %      Eosinophil % 0.4 %      Basophil % 0.4 %      Immature Grans % 0.3 %      Neutrophils, Absolute 9.67 10*3/mm3      Lymphocytes, Absolute 2.78 10*3/mm3      Monocytes, Absolute 0.80 10*3/mm3      Eosinophils, Absolute 0.06 10*3/mm3      Basophils, Absolute 0.05 10*3/mm3      Immature Grans, Absolute 0.04 10*3/mm3      nRBC 0.0 /100 WBC     [place] Draw [[id number]] Collected: 06/16/23 0017    Specimen: Blood Updated: 06/16/23 0038    Narrative:      The following orders  were created for panel order [place] Draw.  Procedure                               Abnormality         Status                     ---------                               -----------         ------                     Green Top (Gel)[[id number]]                                  Final result               Lavender Top[[id number]]                                     Final result               Gold Top - SST[[id number]]                                   Final result               Light Blue Top[[id number]]                                   Final result                 Please view results for these tests on the individual orders.    Green Top (Gel) [[id number]] Collected: 06/16/23 0017    Specimen: Blood Updated: 06/16/23 0038     Extra Tube Hold for add-ons.     Comment: Auto resulted.       Lavender Top [[id number]] Collected: 06/16/23 0017    Specimen: Blood Updated: 06/16/23 0038     Extra Tube hold for add-on     Comment: Auto resulted       Gold Top - SST [[id number]] Collected: 06/16/23 0017    Specimen: Blood Updated: 06/16/23 0038     Extra Tube Hold for add-ons.     Comment: Auto resulted.       Light Blue Top [[id number]] Collected: 06/16/23 0017    Specimen: Blood Updated: 06/16/23 0038     Extra Tube Hold for add-ons.     Comment: Auto resulted             Imaging Results (Last 24 Hours)       Procedure Component Value Units Date/Time    CT Abdomen Pelvis With Contrast [[id number]] Collected: 06/16/23 0232     Updated: 06/16/23 0235    Narrative:      PROCEDURE: CT ABDOMEN PELVIS W CONTRAST     COMPARISON: 4/11/2023.     INDICATIONS: PAIN ACROSS UPPER ABDOMEN; H/O GASTROPARESIS; H/O LEFT COLONIC RESECTION FOR STAGE III   COLON CANCER; THE PT. IS CURRENTLY UNDERGOING CHEMOTHERAPY.     TECHNIQUE: After obtaining the patient's consent, 689 CT images were created with non-ionic   intravenous contrast material.  No oral contrast agent was administered for the study.     PROTOCOL:   Standard CT imaging protocol  performed.      RADIATION:   Total DLP: 2,297.4 mGy*cm.    Automated exposure control was utilized to minimize radiation dose.   CONTRAST: 100 mL Isovue 370 I.V.     FINDINGS: Recent postoperative changes are suggested as with a partial left colectomy and an   anastomosis involving the left colon.  Formed stool is seen throughout the colon, especially   remaining left colon, suggesting fecal stasis (or retention) as with constipation.  There is a   moderate amount of pneumoperitoneum, which is thought most likely to be related to the recent   surgery; please correlate clinically.  There is minimal if any ascites.  No definite focal   pericolonic fluid collection is seen to suggest abscess or hematoma.  Stranding in the mesenteric   fat is present, especially within the pelvis, and is probably related to the recent surgery.  There   is increased attenuation of the right anterior lower abdominal/pelvic wall, especially in the   subcutaneous region, with a CT number of about 25 Hounsfield units on the initial phase of the   study.  On the delayed phase, the CT number is about 23 Hounsfield units or less.  This finding is   nonspecific.  It may represent a normal postoperative fluid collection, such as a seroma or (more   likely) hematoma.  It does not contain gas.  There is no retained foreign body associated with the   finding.  An infected fluid collection cannot be excluded.  The finding is estimated at 7.6 x 5.3 x   3.2 cm in transverse, anteroposterior (AP), and craniocaudal extent, respectively, as seen on image   80 of series 201, image 82 of series 301, image 27 of series 202, image 95 of series 203, and   adjacent image.  The no definite subcutaneous emphysema is seen elsewhere.  No other abdominal or   pelvic wall fluid collections are appreciated.  There may be mild subsegmental atelectasis in the   lung bases.  Probably no acute infiltrate is seen.  There is diffuse hepatic steatosis with   hepatomegaly.   The maximum craniocaudal dimension of the liver is about 23.1 cm.  No hepatic   metastases are suggested.  No splenomegaly is seen.  The maximum craniocaudal dimension of the   spleen is about 12 cm.  The patient has undergone cholecystectomy.  There are probably small benign   renal cysts.  Nonobstructing nephrolithiasis is seen on the left with upper pole calyceal stones   measuring about 3 mm in greatest diameter.  No definite right nephrolithiasis.  No hydronephrosis   or ureterolithiasis.  There may be mild renal cortical scarring, especially on the left.  No acute   pyelonephritis.  Degenerative changes are seen throughout the imaged spine.  There may be diffuse   idiopathic skeletal hyperostosis (DISH).  Degenerative changes involve the hip joints and the   bilateral sacroiliac joints.  No acute fracture.  No aggressive osseous lesion is suggested.  No   acute appendicitis.  There are incidental pelvic phleboliths.       Impression:         1. There has been interval partial left colectomy since the prior 4/11/2023 CT study.    2. Formed stool is seen within the colon.  There may be fecal stasis (fecal retention) as with   constipation.  Probably no fecal impaction.  No mechanical bowel obstruction is suspected.  3. There is a moderate amount of pneumoperitoneum, which is probably related to the recent surgery.    Please correlate with regard to the time frame of the surgery.  That the pneumoperitoneum may be   associated with age-indeterminate bowel perforation or leak, especially at the anastomotic site,   cannot be excluded entirely.  No focal sizable (drainable) intraperitoneal or retroperitoneal fluid   collections are seen.    4. There is increased attenuation of the mesenteric fat, especially in the pelvis and in the left   abdomen, probably related to the surgery.    5. There is a suspected resolving hematoma in the right anterior lower abdominal/upper pelvic wall   (especially subcutaneous in  location), which measures about 7.6 cm in greatest diameter.  An   infected fluid collection cannot be excluded but is thought to be less likely.    6. No other acute findings are seen.    7. Please see above comments for further detail.       1.         Please note that portions of this note were completed with a voice recognition program.     THEODORA BENDER JR, MD         Electronically Signed and Approved By: THEODORA BENDER JR, MD on 6/16/2023 at 2:32                              Orders (active)        Start     Ordered    06/17/23 0600  CBC Auto Differential  Morning Draw         06/16/23 0639    06/17/23 0600  Comprehensive Metabolic Panel  Morning Draw         06/16/23 0639    06/17/23 0600  CBC & Differential  Morning Draw         06/16/23 0747    06/17/23 0600  Basic Metabolic Panel  Morning Draw         06/16/23 0747    06/17/23 0500  levoFLOXacin (LEVAQUIN) 750 mg/150 mL D5W (premix) (LEVAQUIN) 750 mg  Every 24 Hours         06/16/23 0639    06/16/23 1400  metroNIDAZOLE (FLAGYL) IVPB 500 mg  Every 8 Hours         06/16/23 0639    06/16/23 0900  sodium chloride 0.9 % flush 10 mL  Every 12 Hours Scheduled         06/16/23 0639    06/16/23 0900  sennosides-docusate (PERICOLACE) 8.6-50 MG per tablet 2 tablet  2 Times Daily        See Hyperspace for full Linked Orders Report.    06/16/23 0639    06/16/23 0900  famotidine (PEPCID) injection 20 mg  Every 12 Hours Scheduled         06/16/23 0747    06/16/23 0900  bisacodyl (DULCOLAX) suppository 10 mg  2 Times Daily         06/16/23 0748    06/16/23 0800  Vital Signs  Every 4 Hours       06/16/23 0639    06/16/23 0800  Oral Care  2 Times Daily       06/16/23 0639    06/16/23 0748  NPO Diet NPO Type: Strict NPO  Diet Effective Now         06/16/23 0747    06/16/23 0730  lactated ringers infusion  Continuous         06/16/23 0639    06/16/23 0640  Intake & Output  Every Shift       06/16/23 0639    06/16/23 0640  Weigh Patient  Once         06/16/23 0639    06/16/23  0640  Insert Peripheral IV  Once         06/16/23 0639    06/16/23 0640  Place Sequential Compression Device  Once         06/16/23 0639    06/16/23 0640  Maintain Sequential Compression Device  Continuous         06/16/23 0639    06/16/23 0640  Continuous Cardiac Monitoring  Continuous        Comments: Follow Standing Orders As Outlined in Process Instructions (Open Order Report to View Full Instructions)    06/16/23 0639    06/16/23 0640  Telemetry - Maintain IV Access  Continuous         06/16/23 0639    06/16/23 0640  Telemetry - Place Orders & Notify Provider of Results When Patient Experiences Acute Chest Pain, Dysrhythmia or Respiratory Distress  Until Discontinued         06/16/23 0639    06/16/23 0640  Notify Provider (With Default Parameters)  Until Discontinued         06/16/23 0639    06/16/23 0639  sodium chloride 0.9 % flush 10 mL  As Needed         06/16/23 0639    06/16/23 0639  sodium chloride 0.9 % infusion 40 mL  As Needed         06/16/23 0639    06/16/23 0639  polyethylene glycol (MIRALAX) packet 17 g  Daily PRN        See Hyperspace for full Linked Orders Report.    06/16/23 0639    06/16/23 0639  bisacodyl (DULCOLAX) EC tablet 5 mg  Daily PRN        See Hyperspace for full Linked Orders Report.    06/16/23 0639    06/16/23 0639  bisacodyl (DULCOLAX) suppository 10 mg  Daily PRN        See Hyperspace for full Linked Orders Report.    06/16/23 0639    06/16/23 0639  nitroglycerin (NITROSTAT) SL tablet 0.4 mg  Every 5 Minutes PRN         06/16/23 0639    06/16/23 0639  Potassium Replacement - Follow Nurse / BPA Driven Protocol  As Needed         06/16/23 0639    06/16/23 0639  Magnesium Standard Dose Replacement - Follow Nurse / BPA Driven Protocol  As Needed         06/16/23 0639    06/16/23 0639  Phosphorus Replacement - Follow Nurse / BPA Driven Protocol  As Needed         06/16/23 0639    06/16/23 0639  Calcium Replacement - Follow Nurse / BPA Driven Protocol  As Needed         06/16/23 0639     06/16/23 0639  ketorolac (TORADOL) injection 15 mg  Every 6 Hours PRN         06/16/23 0639    06/16/23 0639  ondansetron (ZOFRAN) injection 4 mg  Every 6 Hours PRN         06/16/23 0639    06/16/23 0458  Code Status and Medical Interventions:  Continuous         06/16/23 0459    06/16/23 0431  Blood Culture - Blood, Arm, Left  Once        See Hyperspace for full Linked Orders Report.    06/16/23 0430    06/16/23 0431  Blood Culture - Blood, Arm, Left  Once        See Hyperspace for full Linked Orders Report.    06/16/23 0430    06/16/23 0430  Hospitalist (on-call MD unless specified)  Once        Specialty:  Hospitalist  Provider:  Frank Bal Jr., MD    06/16/23 0429    06/16/23 0423  Surgery (on-call MD unless specified)  Once        Specialty:  General Surgery  Provider:  Mariana Andino MD    06/16/23 0422    06/16/23 0005  Insert Peripheral IV  Once         06/16/23 0004    06/16/23 0004  sodium chloride 0.9 % flush 10 mL  As Needed         06/16/23 0004    Unscheduled  Up With Assistance  As Needed       06/16/23 0639

## 2023-06-16 NOTE — CONSULTS
[facility]   GENERAL SURGERY  CONSULTATION    Patient Name: Lilian Pelaez  : 1975  MRN: [id number]  Primary Care Physician:  Shelly Cash MD  Date of admission: 2023    Subjective     Reason for Consult:  Abnormal CT scan    Referring Physician:  Dr. Bal    HPI:  Lilian Pelaez is a 47 y.o. female known to me as I performed robotic left colectomy on 2023 for colon cancer.  The cancer was node positive.  Patient recently started chemotherapy and has a portacatheter placed.  Chemo is being administered via an infusion pump and the patient was receiving chemotherapy when she went to the emergency room last night.  Patient went to the emergency room because she was having some back pain.  She was started on iron tablets recently and says after she started the iron tablets she has lots of constipation.  She has not had any nausea or vomiting.  I have seen her several times in the office since her surgery.  She was completely fine during those office visits.  The only issue was a small neuroma/hematoma that developed at the right lower quadrant specimen extraction site but there was no signs of infection and the area was only draining a small amount of fluid.  Patient says that she has been eating fine and did not have any abdominal pain at all until a day or 2 ago.  Labs last night showed white count elevated at about 13,000.  CT abdomen pelvis was done and the findings was the presence of a moderate amount of pneumoperitoneum.  I reviewed the CT scan images myself and I would describe the amount of pneumoperitoneum as small rather than moderate.  Nevertheless, the patient's vital signs were normal this morning abdomen been normal throughout the day.  While the patient early this morning around 7:45 AM and she was not having any abdominal pain at the time and the only medicine she had received during the prior few hours was Toradol.  I saw the patient again at about 12:30 PM and she had no pain  at all and said she felt completely normal.  He has not had any fevers.  She denies any abdominal pain and she no complaints at all when I last saw her.  Actually, she wants to eat and go home.   A suppository was given this morning the patient did have a small bowel movement this morning.  Patient was admitted to the hospitalist service and I was notified about the patient.    Personal History   Allergies:  Allergies   Allergen Reactions   • Hydromorphone Hives, Itching and GI Intolerance   • Morphine Hives, Itching and Nausea And Vomiting   • Oxybutynin Swelling and Angioedema          • Oxycodone-Acetaminophen Itching and Nausea And Vomiting     Can not take any higher than 7.5   • Penicillins Rash   • Promethazine Nausea And Vomiting   • Tylenol With Codeine #3 [Acetaminophen-Codeine] Itching and Nausea And Vomiting       Home Medications:  Prior to Admission medications    Medication Sig Start Date End Date Taking? Authorizing Provider   allopurinol (ZYLOPRIM) 100 MG tablet Take 1 tablet by mouth Daily.   Yes Char Flor MD   ALPRAZolam (XANAX) 0.5 MG tablet Take 1 tablet by mouth At Night As Needed.   Yes Char Flor MD   amLODIPine (NORVASC) 10 MG tablet Take 1 tablet by mouth Every Night.   Yes Char Flor MD   Diclofenac Sodium (VOLTAREN) 1 % gel gel Apply 4 g topically to the appropriate area as directed 4 (Four) Times a Day As Needed.   Yes Char Flor MD   docusate sodium 100 MG capsule Take 1 capsule by mouth Daily.   Yes Char Flor MD   ferrous sulfate 325 (65 FE) MG tablet Take 1 tablet by mouth Daily With Breakfast. 6/2/23  Yes Brian Marroquin MD   fluorouracil (EFUDEX) 5 % cream Apply 1 application topically to the appropriate area as directed 2 (Two) Times a Day.   Yes Char Flor MD   furosemide (LASIX) 20 MG tablet Take 1 tablet by mouth Daily.   Yes Char Flor MD   HYDROcodone-acetaminophen (NORCO) 7.5-325 MG per tablet Take  1 tablet by mouth Every 8 (Eight) Hours As Needed.   Yes Char Flor MD   hyoscyamine (ANASPAZ,LEVSIN) 0.125 MG tablet Take 1 tablet by mouth 4 (Four) Times a Day.   Yes Char Flor MD   ketorolac (TORADOL) 10 MG tablet Take 1 tablet by mouth Every 6 (Six) Hours As Needed.   Yes Char Flor MD   lamoTRIgine (LaMICtal) 25 MG tablet Take 1 tablet by mouth 2 (Two) Times a Day. Only takes at night   Yes Char Flor MD   leucovorin 5 MG tablet Take  by mouth Every 6 (Six) Hours.   Yes Char Flor MD   Lidocaine 4 % patch Apply 1 patch topically Daily.   Yes Char Flor MD   lidocaine-prilocaine (EMLA) 2.5-2.5 % cream Apply 1 application topically to the appropriate area as directed Every 2 (Two) Hours As Needed for Mild Pain. 6/14/23  Yes Brian Marroquin MD   linaclotide (LINZESS) 290 MCG capsule capsule Take 1 capsule by mouth As Needed.   Yes Cahr Flor MD   losartan (COZAAR) 100 MG tablet Take 1 tablet by mouth Every Night.   Yes Char Flor MD   medroxyPROGESTERone (DEPO-PROVERA) 150 MG/ML injection Inject 1 mL into the appropriate muscle as directed by prescriber Every 3 (Three) Months.   Yes Char Flor MD   metoclopramide (REGLAN) 10 MG tablet Take 1 tablet by mouth 3 (Three) Times a Day.   Yes Char Flor MD   metoprolol succinate XL (TOPROL-XL) 25 MG 24 hr tablet Take 1 tablet by mouth Daily.   Yes Char Flor MD   Multiple Vitamins-Minerals (b complex-C-E-zinc) tablet Take 1 tablet by mouth Daily.   Yes Char Flor MD   Myrbetriq 25 MG tablet sustained-release 24 hour 24 hr tablet Take 1 tablet by mouth Every Evening. 4/26/23  Yes Char Flor MD   ondansetron (ZOFRAN) 8 MG tablet Take 1 tablet by mouth 3 (Three) Times a Day As Needed for Nausea or Vomiting. 6/9/23  Yes Brian Marroquin MD   OXALIPLATIN IV Infuse  into a venous catheter.   Yes Char Flor MD   pantoprazole  (PROTONIX) 40 MG EC tablet Take 1 tablet by mouth 2 (Two) Times a Day.   Yes Char Flor MD   rOPINIRole (REQUIP) 1 MG tablet Take 1 tablet by mouth Every Night. 3/13/23  Yes Char Flor MD   sertraline (ZOLOFT) 100 MG tablet Take 1 tablet by mouth Daily.   Yes Char Flor MD   sucralfate (CARAFATE) 1 g tablet Take 1 tablet by mouth 2 (Two) Times a Day.   Yes Char Flor MD   traMADol (Ultram) 50 MG tablet Take 1 tablet by mouth Every 6 (Six) Hours As Needed for Severe Pain or Moderate Pain. 6/5/23 6/4/24 Yes Rolando Liu MD   vitamin D3 125 MCG (5000 UT) capsule capsule Take 2,000 Units by mouth Daily.   Yes Char Flor MD       Past Medical History:   Diagnosis Date   • Anesthesia     B/P bottomed out/UNSURE ON THE DATE   • Anxiety    • Asthma 1996    seasonal/NO INHALERS   • Colon cancer 04/17/2023   • DDD (degenerative disc disease), cervical    • DDD (degenerative disc disease), lumbar    • Depression    • Diverticulitis of colon 2005    Scope was done in [facility]   • GERD (gastroesophageal reflux disease)    • Hypertension    • Kidney stones    • Melanoma     removed   • Palpitation     FOLLOWS W/DR. ANTONIO  Q6 MONTHS, NO CP OR SOA   • Prolapse of female bladder        Past Surgical History:   Procedure Laterality Date   • BREAST SURGERY  2016    Removed 8lbs of tissue, 2016   • CHOLECYSTECTOMY     • COLON RESECTION Left 05/05/2023    Procedure: RESECTION OF DESCENDING COLON AND SPLENIC FLEXURE TAKEDOWN WITH DAVINCI ROBOT;  Surgeon: Rolando Liu MD;  Location: [facility] [place] [facility];  Service: Robotics - DaVinci;  Laterality: Left;   • COLONOSCOPY N/A 04/04/2023    Procedure: COLONOSCOPY WITH POLYPECTOMY/SNARE/BIOPSIES/TATTOOING DESCENDING COLON MASS;  Surgeon: Deniz Dowd MD;  Location: [facility] ENDOSCOPY;  Service: Gastroenterology;  Laterality: N/A;  INCOMPLETE COLONOSCOPY  POOR BOWEL PREP  COLON POLYP  COLON MASS  DIVERTICULOSIS   • COLONOSCOPY N/A  04/12/2023    Procedure: COLONOSCOPY with cold snare;  Surgeon: Deniz Dowd MD;  Location: [facility] ENDOSCOPY;  Service: Gastroenterology;  Laterality: N/A;  colon polyps, diverticulosis, colon mass, hemorrhoids     • CYSTOSCOPY W/ URETERAL STENT PLACEMENT Left 05/05/2023    Procedure: Cystoscopy, left ureteral injection,;  Surgeon: Manjula Randolph MD;  Location: [facility] OR [facility] [place];  Service: Urology;  Laterality: Left;   • FRACTURE SURGERY  2001    Left knee   • KNEE SURGERY Left     x4   • LAPAROSCOPIC TUBAL LIGATION  1995   • LAPAROSCOPIC TUBAL LIGATION      Reversed   • REDUCTION MAMMAPLASTY  2009    8lbs of tissue was removed   • TONSILLECTOMY     • UPPER GASTROINTESTINAL ENDOSCOPY     • VENOUS ACCESS DEVICE (PORT) INSERTION N/A 6/5/2023    Procedure: INSERTION VENOUS ACCESS DEVICE;  Surgeon: Rolando Liu MD;  Location: [facility] OR [facility];  Service: General;  Laterality: N/A;       Social History:  reports that she has never smoked. She has never been exposed to tobacco smoke. She has never used smokeless tobacco. She reports that she does not drink alcohol and does not use drugs.    Family History: family history is not on file. Otherwise pertinent FHx was reviewed and not pertinent to current issue.    Review of Systems: Review of systems is noncontributory besides as mentioned in above HPI section.       Objective   Vitals:   Temp:  [98.3 °F (36.8 °C)-99 °F (37.2 °C)] 98.6 °F (37 °C)  Heart Rate:  [69-82] 82  Resp:  [18-22] 20  BP: (129-161)/(69-96) 135/84  Physical Exam   • Constitutional:  present, alert, appears comfortable, reliable historian, up walking in room without difficult  • Respiratory:  breathing not labored, respiratory effort appears normal  • Cardiovascular:  heart regular rate and rhythm  • Abdomen:  soft, nontender, nondistended  • Skin and subcutaneous tissue:  Warm and dry  • Musculoskeletal: moving all extremities symmetrically and purposefully  • Neurologic:  no obvious  motor or sensory deficits, alert & oriented x 3, speech clear  • Psychiatric:  judgment and insight intact, mood normal  •     CBC        5/31/2023    16:00 6/14/2023    08:13 6/16/2023    00:17   CBC   WBC 8.77  8.20  13.40    RBC 5.27  4.53  4.94    Hemoglobin 15.5  13.3  14.8    Hematocrit 45.4  39.2  42.3    MCV 86.1  86.5  85.6    MCH 29.4  29.4  30.0    MCHC 34.1  33.9  35.0    RDW 12.7  12.1  11.9    Platelets 294  232  254      CMP        5/31/2023    16:00 6/14/2023    08:13 6/16/2023    01:58   CMP   Glucose 81  113  108    BUN 13  19  24    Creatinine 0.82  0.75  0.81    EGFR 88.9  99.0  90.2    Sodium 141  140  138    Potassium 3.7  3.7  4.3    Chloride 104  105  103    Calcium 9.6  9.5  9.6    Total Protein 7.6  7.0  7.4    Albumin 4.6  4.1  4.2    Globulin 3.0  2.9  3.2    Total Bilirubin 0.7  0.5  0.4    Alkaline Phosphatase 109  85  100    AST (SGOT) 18  12  60    ALT (SGPT) 21  12  41    Albumin/Globulin Ratio 1.5  1.4  1.3    BUN/Creatinine Ratio 15.9  25.3  29.6    Anion Gap 14.0  12.7  14.4          Lab Results   Lab Value Date/Time    LIPASE 99 (H) 06/16/2023 0017     No results found for: INR  Lab Results   Lab Value Date/Time    LACTATE 0.9 06/16/2023 0454       Radiology:  CT Abdomen Pelvis With Contrast   Final Result       1. There has been interval partial left colectomy since the prior 4/11/2023 CT study.     2. Formed stool is seen within the colon.  There may be fecal stasis (fecal retention) as with    constipation.  Probably no fecal impaction.  No mechanical bowel obstruction is suspected.   3. There is a moderate amount of pneumoperitoneum, which is probably related to the recent surgery.     Please correlate with regard to the time frame of the surgery.  That the pneumoperitoneum may be    associated with age-indeterminate bowel perforation or leak, especially at the anastomotic site,    cannot be excluded entirely.  No focal sizable (drainable) intraperitoneal or retroperitoneal  fluid    collections are seen.     4. There is increased attenuation of the mesenteric fat, especially in the pelvis and in the left    abdomen, probably related to the surgery.     5. There is a suspected resolving hematoma in the right anterior lower abdominal/upper pelvic wall    (especially subcutaneous in location), which measures about 7.6 cm in greatest diameter.  An    infected fluid collection cannot be excluded but is thought to be less likely.     6. No other acute findings are seen.     7. Please see above comments for further detail.         1.            Please note that portions of this note were completed with a voice recognition program.       THEODORA BENDER JR, MD          Electronically Signed and Approved By: THEODORA BENDER JR, MD on 6/16/2023 at 2:32                                    Assessment & Plan   Assessment / Plan     Assessment:  Active Hospital Problems:  Active Hospital Problems    Diagnosis    • **Pneumoperitoneum        Plan:   Etiology of pneumoperitoneum unclear but given the surgical anastomosis was performed over one month ago, it is unlikely there is an anastomotic leak.  Patient's vital signs and abdominal exam have been normal throughout the day.  I discussed the possibility for abdominal exploration surgery with the patient but given her normal clinical appearance, decided to proceed with nonoperative management and keep patient NPO, give IV antibiotics, and follow clinical progress.  Appreciate medical management by hospitalist service.      Rolando Liu MD  06/16/2023    Electronically signed by Rolando Liu MD, 06/16/23, 4:26 PM EDT.

## 2023-06-16 NOTE — TELEPHONE ENCOUNTER
Pt reports increased abdominal bloating and abdominal pain despite taking reglan, toradol, and norco. Denies any nausea, vomiting or diarrhea. Reports pain occurred after eating mexican food. Recommend PPI and tums, pt instructed to go to [facility] ER if pain does not improve with heartburn medication for abdominal imaging and IV pain medications. Pt stated she would go to ER if pain doesn't subside after these medications.

## 2023-06-16 NOTE — PLAN OF CARE
Goal Outcome Evaluation:   Pt. admitted from ED with pneumoperitoneum. Air in abdominal cavity likely due to recent surgical incision to right of abdomen. A&Ox4, chemo currently infusing into R subclavian port, on RA, up ad kary, NSR on the monitor, strict NPO, family remains at bedside, VSS will continue with POC.

## 2023-06-16 NOTE — TELEPHONE ENCOUNTER
Lilian Pelaez 1975       Symptoms    Does patient have nausea and vomiting?    Does patient have medications prescribed for N/V?    Does patient have diarrhea?    Does the patient have diarrhea medications?    Does the patient have pain?    Is pain medications effective?    Discharge  Do you have any questions about your discharge instructions?    Patient Satisfaction with [facility]    Where you satisfied with the care you received?    Pt suggestions for the [facility]    Do you have any suggestions to improve your care?    Comments    Follow up phone call comments  The pt was was unavailable for call. The pt is in-pt at [facility] at this time.

## 2023-06-16 NOTE — PLAN OF CARE
Goal Outcome Evaluation:                 VSS, pain managed with prn analgesics, chemo med dc'd this am, strict NPO, family at bedside, safety maintained, will continue POC

## 2023-06-16 NOTE — DISCHARGE INSTRUCTIONS
Dr. Liu's Instructions       -You will receive a prescription for two antibiotics.  Take them as prescribed for 7 days.    -Stay on a full liquid diet until June 22.  You can resume your normal diet on June 22.    -Take an over-the-counter fiber supplement daily.  Take double the recommended dose.  For example, if the instructions are to take one teaspoon or one tablet daily then take two teaspoons or two tablets.    -Maintain a high fiber diet aiming for at least 25 grams of fiber daily.    -Drink at least 100 ounces of water daily.    -Call and schedule a follow-up appointment with Dr. Liu on Friday, June 23.    -Did not start chemotherapy again until sometime in August.

## 2023-06-17 LAB
ALBUMIN SERPL-MCNC: 3.4 G/DL (ref 3.5–5.2)
ALBUMIN/GLOB SERPL: 1.4 G/DL
ALP SERPL-CCNC: 86 U/L (ref 39–117)
ALT SERPL W P-5'-P-CCNC: 25 U/L (ref 1–33)
ANION GAP SERPL CALCULATED.3IONS-SCNC: 9.3 MMOL/L (ref 5–15)
AST SERPL-CCNC: 19 U/L (ref 1–32)
BASOPHILS # BLD AUTO: 0.04 10*3/MM3 (ref 0–0.2)
BASOPHILS NFR BLD AUTO: 0.6 % (ref 0–1.5)
BILIRUB SERPL-MCNC: 0.5 MG/DL (ref 0–1.2)
BUN SERPL-MCNC: 16 MG/DL (ref 6–20)
BUN/CREAT SERPL: 22.5 (ref 7–25)
CALCIUM SPEC-SCNC: 9.1 MG/DL (ref 8.6–10.5)
CHLORIDE SERPL-SCNC: 104 MMOL/L (ref 98–107)
CO2 SERPL-SCNC: 21.7 MMOL/L (ref 22–29)
CREAT SERPL-MCNC: 0.71 MG/DL (ref 0.57–1)
DEPRECATED RDW RBC AUTO: 37.5 FL (ref 37–54)
EGFRCR SERPLBLD CKD-EPI 2021: 105.7 ML/MIN/1.73
EOSINOPHIL # BLD AUTO: 0.15 10*3/MM3 (ref 0–0.4)
EOSINOPHIL NFR BLD AUTO: 2.3 % (ref 0.3–6.2)
ERYTHROCYTE [DISTWIDTH] IN BLOOD BY AUTOMATED COUNT: 12 % (ref 12.3–15.4)
GLOBULIN UR ELPH-MCNC: 2.5 GM/DL
GLUCOSE SERPL-MCNC: 81 MG/DL (ref 65–99)
HCT VFR BLD AUTO: 36.9 % (ref 34–46.6)
HGB BLD-MCNC: 12.8 G/DL (ref 12–15.9)
IMM GRANULOCYTES # BLD AUTO: 0.02 10*3/MM3 (ref 0–0.05)
IMM GRANULOCYTES NFR BLD AUTO: 0.3 % (ref 0–0.5)
LYMPHOCYTES # BLD AUTO: 2.44 10*3/MM3 (ref 0.7–3.1)
LYMPHOCYTES NFR BLD AUTO: 37.7 % (ref 19.6–45.3)
MAGNESIUM SERPL-MCNC: 1.9 MG/DL (ref 1.6–2.6)
MCH RBC QN AUTO: 29.6 PG (ref 26.6–33)
MCHC RBC AUTO-ENTMCNC: 34.7 G/DL (ref 31.5–35.7)
MCV RBC AUTO: 85.2 FL (ref 79–97)
MONOCYTES # BLD AUTO: 0.17 10*3/MM3 (ref 0.1–0.9)
MONOCYTES NFR BLD AUTO: 2.6 % (ref 5–12)
NEUTROPHILS NFR BLD AUTO: 3.65 10*3/MM3 (ref 1.7–7)
NEUTROPHILS NFR BLD AUTO: 56.5 % (ref 42.7–76)
NRBC BLD AUTO-RTO: 0 /100 WBC (ref 0–0.2)
PHOSPHATE SERPL-MCNC: 3.5 MG/DL (ref 2.5–4.5)
PLATELET # BLD AUTO: 221 10*3/MM3 (ref 140–450)
PMV BLD AUTO: 9.4 FL (ref 6–12)
POTASSIUM SERPL-SCNC: 3.9 MMOL/L (ref 3.5–5.2)
PROT SERPL-MCNC: 5.9 G/DL (ref 6–8.5)
RBC # BLD AUTO: 4.33 10*6/MM3 (ref 3.77–5.28)
SODIUM SERPL-SCNC: 135 MMOL/L (ref 136–145)
WBC NRBC COR # BLD: 6.47 10*3/MM3 (ref 3.4–10.8)

## 2023-06-17 PROCEDURE — 25010000002 LEVOFLOXACIN PER 250 MG: Performed by: INTERNAL MEDICINE

## 2023-06-17 PROCEDURE — 84100 ASSAY OF PHOSPHORUS: CPT | Performed by: SURGERY

## 2023-06-17 PROCEDURE — 96376 TX/PRO/DX INJ SAME DRUG ADON: CPT

## 2023-06-17 PROCEDURE — 25010000002 KETOROLAC TROMETHAMINE PER 15 MG: Performed by: INTERNAL MEDICINE

## 2023-06-17 PROCEDURE — 96361 HYDRATE IV INFUSION ADD-ON: CPT

## 2023-06-17 PROCEDURE — 83735 ASSAY OF MAGNESIUM: CPT | Performed by: SURGERY

## 2023-06-17 PROCEDURE — 25010000002 ONDANSETRON PER 1 MG: Performed by: INTERNAL MEDICINE

## 2023-06-17 PROCEDURE — G0378 HOSPITAL OBSERVATION PER HR: HCPCS

## 2023-06-17 PROCEDURE — 85025 COMPLETE CBC W/AUTO DIFF WBC: CPT | Performed by: NURSE PRACTITIONER

## 2023-06-17 PROCEDURE — 96366 THER/PROPH/DIAG IV INF ADDON: CPT

## 2023-06-17 PROCEDURE — 80053 COMPREHEN METABOLIC PANEL: CPT | Performed by: INTERNAL MEDICINE

## 2023-06-17 RX ORDER — HYDROCORTISONE 25 MG/G
CREAM TOPICAL 2 TIMES DAILY PRN
Status: DISCONTINUED | OUTPATIENT
Start: 2023-06-17 | End: 2023-06-18 | Stop reason: HOSPADM

## 2023-06-17 RX ADMIN — METRONIDAZOLE 500 MG: 5 INJECTION, SOLUTION INTRAVENOUS at 01:41

## 2023-06-17 RX ADMIN — SODIUM CHLORIDE, POTASSIUM CHLORIDE, SODIUM LACTATE AND CALCIUM CHLORIDE 100 ML/HR: 600; 310; 30; 20 INJECTION, SOLUTION INTRAVENOUS at 01:41

## 2023-06-17 RX ADMIN — MIRABEGRON 25 MG: 25 TABLET, FILM COATED, EXTENDED RELEASE ORAL at 16:28

## 2023-06-17 RX ADMIN — LAMOTRIGINE 25 MG: 25 TABLET ORAL at 20:37

## 2023-06-17 RX ADMIN — ALPRAZOLAM 0.5 MG: 0.25 TABLET ORAL at 20:37

## 2023-06-17 RX ADMIN — BISACODYL 5 MG: 5 TABLET, COATED ORAL at 16:24

## 2023-06-17 RX ADMIN — ONDANSETRON 4 MG: 2 INJECTION INTRAMUSCULAR; INTRAVENOUS at 08:01

## 2023-06-17 RX ADMIN — METRONIDAZOLE 500 MG: 5 INJECTION, SOLUTION INTRAVENOUS at 09:40

## 2023-06-17 RX ADMIN — LAMOTRIGINE 25 MG: 25 TABLET ORAL at 08:41

## 2023-06-17 RX ADMIN — BISACODYL 10 MG: 10 SUPPOSITORY RECTAL at 20:37

## 2023-06-17 RX ADMIN — KETOROLAC TROMETHAMINE 15 MG: 15 INJECTION, SOLUTION INTRAMUSCULAR; INTRAVENOUS at 21:49

## 2023-06-17 RX ADMIN — BISACODYL 10 MG: 10 SUPPOSITORY RECTAL at 08:03

## 2023-06-17 RX ADMIN — HYDROCORTISONE: 25 CREAM TOPICAL at 21:49

## 2023-06-17 RX ADMIN — LEVOFLOXACIN 750 MG: 750 INJECTION, SOLUTION INTRAVENOUS at 04:42

## 2023-06-17 RX ADMIN — METRONIDAZOLE 500 MG: 5 INJECTION, SOLUTION INTRAVENOUS at 17:44

## 2023-06-17 RX ADMIN — SODIUM CHLORIDE, POTASSIUM CHLORIDE, SODIUM LACTATE AND CALCIUM CHLORIDE 100 ML/HR: 600; 310; 30; 20 INJECTION, SOLUTION INTRAVENOUS at 04:18

## 2023-06-17 RX ADMIN — SENNOSIDES AND DOCUSATE SODIUM 2 TABLET: 50; 8.6 TABLET ORAL at 08:41

## 2023-06-17 RX ADMIN — FAMOTIDINE 20 MG: 10 INJECTION, SOLUTION INTRAVENOUS at 21:19

## 2023-06-17 RX ADMIN — FAMOTIDINE 20 MG: 10 INJECTION, SOLUTION INTRAVENOUS at 09:38

## 2023-06-17 RX ADMIN — ROPINIROLE HYDROCHLORIDE 1 MG: 1 TABLET, FILM COATED ORAL at 20:37

## 2023-06-17 RX ADMIN — METOPROLOL SUCCINATE 25 MG: 25 TABLET, EXTENDED RELEASE ORAL at 08:41

## 2023-06-17 RX ADMIN — SENNOSIDES AND DOCUSATE SODIUM 2 TABLET: 50; 8.6 TABLET ORAL at 20:37

## 2023-06-17 RX ADMIN — POLYETHYLENE GLYCOL 3350 17 G: 17 POWDER, FOR SOLUTION ORAL at 09:42

## 2023-06-17 RX ADMIN — Medication 10 ML: at 09:47

## 2023-06-17 RX ADMIN — MAGNESIUM HYDROXIDE 30 ML: 2400 SUSPENSION ORAL at 09:47

## 2023-06-17 RX ADMIN — SODIUM CHLORIDE, POTASSIUM CHLORIDE, SODIUM LACTATE AND CALCIUM CHLORIDE 100 ML/HR: 600; 310; 30; 20 INJECTION, SOLUTION INTRAVENOUS at 16:24

## 2023-06-17 RX ADMIN — DICLOFENAC SODIUM 2 G: 10 GEL TOPICAL at 21:49

## 2023-06-17 RX ADMIN — Medication 10 ML: at 20:38

## 2023-06-17 NOTE — PROGRESS NOTES
[facility]   Hospitalist Progress Note  Date: 2023  Patient Name: Lilian Pelaez  : 1975  MRN: [id number]  Date of admission: 2023      Subjective   Subjective     Chief Complaint: abdominal pain     Summary:   47 y.o. female past medical history of adenocarcinoma of the colon stage III status post left colectomy on May 5 presents to the emergency department for evaluation of abdominal pain since yesterday afternoon.  She describes it as pressure and sharp, bilateral upper quadrants, radiates to her back, constant, no known aggravating or alleviating factors.  She denies any other fevers, chills and sweats, nausea, vomiting, chest pain or shortness of breath, palpitations, diarrhea constipation, dysuria, weakness, rash.  Work-up in the emergency department shows a slight leukocytosis with white blood cell count 13,000 but CT abdomen pelvis shows a moderate amount of pneumoperitoneum which could be related to recent surgery however due to the timing surgery is concerned after being called in the emergency department.  Surgery recommended admission and starting empiric antibiotics with Levaquin and Flagyl and they will evaluate.  Also noted resolving hematoma versus infected fluid in the right anterior lower abdominal upper pelvic wall.  The rest of her work-up unrevealing thus far.     Interval Followup:   Patient continues to feel better.  She does not have the abdominal pain like she had when she came in.  Patient is wanting to increase her diet however is only allowed ice chips  Patient denies any nausea or vomiting at this time  Patient has had bowel movements     Review of Systems   All systems were reviewed and negative    Objective   Objective     Vitals:   Temp:  [98.4 °F (36.9 °C)-99.1 °F (37.3 °C)] 98.8 °F (37.1 °C)  Heart Rate:  [68-82] 78  Resp:  [18-22] 18  BP: (120-145)/(74-84) 144/84  Physical Exam    Constitutional: Awake, alert, no acute distress sitting in the bedside chair with  family at the bedside    Eyes: Pupils equal, sclerae anicteric, no conjunctival injection   HENT: NCAT, mucous membranes moist   Neck: Supple, no thyromegaly, no lymphadenopathy, trachea midline   Respiratory: Clear to auscultation bilaterally, nonlabored respirations    Cardiovascular: RRR, no murmurs, rubs, or gallops, palpable pedal pulses bilaterally   Gastrointestinal: Positive bowel sounds, soft, nontender, nondistended   Musculoskeletal: No bilateral ankle edema, no clubbing or cyanosis to extremities   Psychiatric: Appropriate affect, cooperative   Neurologic: Oriented x 3, strength symmetric in all extremities, Cranial Nerves grossly intact to confrontation, speech clear   Skin: No rashes     Result Review    Result Review:  I have personally reviewed the results from the time of this admission to 6/17/2023 08:51 EDT and agree with these findings:  [x]  Laboratory  CBC          6/14/2023    08:13 6/16/2023    00:17 6/17/2023    04:17   CBC   WBC 8.20  13.40  6.47    RBC 4.53  4.94  4.33    Hemoglobin 13.3  14.8  12.8    Hematocrit 39.2  42.3  36.9    MCV 86.5  85.6  85.2    MCH 29.4  30.0  29.6    MCHC 33.9  35.0  34.7    RDW 12.1  11.9  12.0    Platelets 232  254  221      BMP          6/14/2023    08:13 6/16/2023    01:58 6/17/2023    04:17   BMP   BUN 19  24  16    Creatinine 0.75  0.81  0.71    Sodium 140  138  135    Potassium 3.7  4.3  3.9    Chloride 105  103  104    CO2 22.3  20.6  21.7    Calcium 9.5  9.6  9.1        [x]  Microbiology  Blood Culture   Date Value Ref Range Status   06/16/2023 No growth at 24 hours  Preliminary   06/16/2023 No growth at 24 hours  Preliminary     No results found for: BCIDPCR, CXREFLEX, CSFCX, CULTURETIS  No results found for: CULTURES, HSVCX, URCX  No results found for: EYECULTURE, GCCX, HSVCULTURE, LABHSV  No results found for: LEGIONELLA, MRSACX, MUMPSCX, MYCOPLASCX  No results found for: NOCARDIACX, STOOLCX  No results found for: THROATCX, UNSTIMCULT, URINECX,  CULTURE, VZVCULTUR  No results found for: VIRALCULTU, WOUNDCX    [x]  Radiology     CT Abdomen Pelvis With Contrast    Result Date: 6/16/2023  PROCEDURE: CT ABDOMEN PELVIS W CONTRAST  COMPARISON: 4/11/2023.  INDICATIONS: PAIN ACROSS UPPER ABDOMEN; H/O GASTROPARESIS; H/O LEFT COLONIC RESECTION FOR STAGE III COLON CANCER; THE PT. IS CURRENTLY UNDERGOING CHEMOTHERAPY.  TECHNIQUE: After obtaining the patient's consent, 689 CT images were created with non-ionic intravenous contrast material.  No oral contrast agent was administered for the study.  PROTOCOL:   Standard CT imaging protocol performed.    RADIATION:   Total DLP: 2,297.4 mGy*cm.   Automated exposure control was utilized to minimize radiation dose. CONTRAST: 100 mL Isovue 370 I.V.  FINDINGS: Recent postoperative changes are suggested as with a partial left colectomy and an anastomosis involving the left colon.  Formed stool is seen throughout the colon, especially remaining left colon, suggesting fecal stasis (or retention) as with constipation.  There is a moderate amount of pneumoperitoneum, which is thought most likely to be related to the recent surgery; please correlate clinically.  There is minimal if any ascites.  No definite focal pericolonic fluid collection is seen to suggest abscess or hematoma.  Stranding in the mesenteric fat is present, especially within the pelvis, and is probably related to the recent surgery.  There is increased attenuation of the right anterior lower abdominal/pelvic wall, especially in the subcutaneous region, with a CT number of about 25 Hounsfield units on the initial phase of the study.  On the delayed phase, the CT number is about 23 Hounsfield units or less.  This finding is nonspecific.  It may represent a normal postoperative fluid collection, such as a seroma or (more likely) hematoma.  It does not contain gas.  There is no retained foreign body associated with the finding.  An infected fluid collection cannot be  excluded.  The finding is estimated at 7.6 x 5.3 x 3.2 cm in transverse, anteroposterior (AP), and craniocaudal extent, respectively, as seen on image 80 of series 201, image 82 of series 301, image 27 of series 202, image 95 of series 203, and adjacent image.  The no definite subcutaneous emphysema is seen elsewhere.  No other abdominal or pelvic wall fluid collections are appreciated.  There may be mild subsegmental atelectasis in the lung bases.  Probably no acute infiltrate is seen.  There is diffuse hepatic steatosis with hepatomegaly.  The maximum craniocaudal dimension of the liver is about 23.1 cm.  No hepatic metastases are suggested.  No splenomegaly is seen.  The maximum craniocaudal dimension of the spleen is about 12 cm.  The patient has undergone cholecystectomy.  There are probably small benign renal cysts.  Nonobstructing nephrolithiasis is seen on the left with upper pole calyceal stones measuring about 3 mm in greatest diameter.  No definite right nephrolithiasis.  No hydronephrosis or ureterolithiasis.  There may be mild renal cortical scarring, especially on the left.  No acute pyelonephritis.  Degenerative changes are seen throughout the imaged spine.  There may be diffuse idiopathic skeletal hyperostosis (DISH).  Degenerative changes involve the hip joints and the bilateral sacroiliac joints.  No acute fracture.  No aggressive osseous lesion is suggested.  No acute appendicitis.  There are incidental pelvic phleboliths.      Impression:   1. There has been interval partial left colectomy since the prior 4/11/2023 CT study.  2. Formed stool is seen within the colon.  There may be fecal stasis (fecal retention) as with constipation.  Probably no fecal impaction.  No mechanical bowel obstruction is suspected. 3. There is a moderate amount of pneumoperitoneum, which is probably related to the recent surgery.  Please correlate with regard to the time frame of the surgery.  That the pneumoperitoneum  may be associated with age-indeterminate bowel perforation or leak, especially at the anastomotic site, cannot be excluded entirely.  No focal sizable (drainable) intraperitoneal or retroperitoneal fluid collections are seen.  4. There is increased attenuation of the mesenteric fat, especially in the pelvis and in the left abdomen, probably related to the surgery.  5. There is a suspected resolving hematoma in the right anterior lower abdominal/upper pelvic wall (especially subcutaneous in location), which measures about 7.6 cm in greatest diameter.  An infected fluid collection cannot be excluded but is thought to be less likely.  6. No other acute findings are seen.  7. Please see above comments for further detail.   1.   Please note that portions of this note were completed with a voice recognition program.  THEODORA BENDER JR, MD       Electronically Signed and Approved By: THEODORA BENDER JR, MD on 6/16/2023 at 2:32               []  EKG/Telemetry   []  Cardiology/Vascular   []  Pathology  []  Old records  []  Other:    Assessment & Plan   Assessment / Plan     Assessment/Plan:  Concern for pneumoperitoneum  Colon adenocarcinoma stage III currently getting chemotherapy  Hypertension    PLAN  -- Patient overall is feeling better.  General surgery following and are uncertain of the etiology of the findings noted on CT  --Overall patient is feeling better.  At this time patient is allowed sips of clears and oral medication.  We will start a clear liquid diet this evening or tomorrow  --Continue patient on a course of antibiotics     Discussed plan with RN.    DVT prophylaxis:  Mechanical DVT prophylaxis orders are present.    CODE STATUS:   Code Status (Patient has no pulse and is not breathing): CPR (Attempt to Resuscitate)  Medical Interventions (Patient has pulse or is breathing): Full Support        Electronically signed by Jones Ramirez DO, 06/17/23, 8:51 AM EDT.

## 2023-06-17 NOTE — PLAN OF CARE
Goal Outcome Evaluation:                 VSS, RA, denies pain or abd discomfort, oobtc and ambulating in [place], soft/formed bowel movement success, denies nausea, no acute changes, safety maintained, will continue POC

## 2023-06-17 NOTE — PROGRESS NOTES
" [facility]     Surgery Progress Note    Patient Name: Lilian Pelaez  :    1975  MRN:    [id number]  Date of admission:  2023  Length of Stay: 0 days    Subjective   Feel tired but denies any abdominal pain.  \"I feel normal.\"  No fevers.  No n/v.  Passing gas and had several small bowel movements.  Pt said she had no abdominal pain last night.  The nursing note indicates  \"Pt continue to have abdominal discomfort, passing gas and has small bowel movement this shift.\"  My impression is that the note is a type and was meant to indicate \"Pt continue to have no abdominal discomfort.\"    Objective     Current Diet:    Dietary Orders (From admission, onward)       Start     Ordered    23 0748  NPO Diet NPO Type: Strict NPO  Diet Effective Now        Question:  NPO Type  Answer:  Strict NPO    23 0747                  Vitals:   Temp:  [98.4 °F (36.9 °C)-99.1 °F (37.3 °C)] 98.8 °F (37.1 °C)  Heart Rate:  [68-82] 78  Resp:  [18-22] 18  BP: (120-145)/(74-84) 144/84  Physical Exam   Constitutional: alert, appears comfortable  Respiratory:  breathing not labored, respiratory effort appears normal  Cardiovascular:  heart regular rate and rhythm  Abdomen:  soft, nondistended, nontender  Musculoskeletal: moving all extremities symmetrically and purposefully  Neurologic:  no obvious motor or sensory deficits, alert & oriented x 3, speech clear    LABS:  CBC          2023    08:13 2023    00:17 2023    04:17   CBC   WBC 8.20  13.40  6.47    RBC 4.53  4.94  4.33    Hemoglobin 13.3  14.8  12.8    Hematocrit 39.2  42.3  36.9    MCV 86.5  85.6  85.2    MCH 29.4  30.0  29.6    MCHC 33.9  35.0  34.7    RDW 12.1  11.9  12.0    Platelets 232  254  221      CMP          2023    08:13 2023    01:58 2023    04:17   CMP   Glucose 113  108  81    BUN 19  24  16    Creatinine 0.75  0.81  0.71    EGFR 99.0  90.2  105.7    Sodium 140  138  135    Potassium 3.7  4.3  3.9    Chloride 105  103  " 104    Calcium 9.5  9.6  9.1    Total Protein 7.0  7.4  5.9    Albumin 4.1  4.2  3.4    Globulin 2.9  3.2  2.5    Total Bilirubin 0.5  0.4  0.5    Alkaline Phosphatase 85  100  86    AST (SGOT) 12  60  19    ALT (SGPT) 12  41  25    Albumin/Globulin Ratio 1.4  1.3  1.4    BUN/Creatinine Ratio 25.3  29.6  22.5    Anion Gap 12.7  14.4  9.3        Assessment / Plan   Assessment:   -Pneumoperitoneum - ? Etiology, possibly secondary to air release from bowel wall through a mucosal injury due to the internal pressure from constipation  -WBC, vitals, and physical exam normal    Plan:    Will allow sips clears and oral meds  If continues to do well then will start clear liquids this evening or tomorrow  Continue Ab  Appreciate medical management by Hospitalist Service     Electronically signed by Rolando Liu MD, 06/17/23, 8:37 AM EDT.

## 2023-06-17 NOTE — PLAN OF CARE
Goal Outcome Evaluation:  Plan of Care Reviewed With: patient      Pt continue to have abdominal discomfort, passing gas and has small bowel movement this shift. Pt VSS and stable throughout the night, Pt resting in bed, is able to make needs known, call light in reach, will continue current POC

## 2023-06-17 NOTE — CONSULTS
"Nutrition Services    Patient Name: Lilian Pelaez  YOB: 1975  MRN: [id number]  Admission date: 6/16/2023      CLINICAL NUTRITION ASSESSMENT      Reason for Assessment  Follow-up protocol   H&P:    Past Medical History:   Diagnosis Date   • Anesthesia     B/P bottomed out/UNSURE ON THE DATE   • Anxiety    • Asthma 1996    seasonal/NO INHALERS   • Colon cancer 04/17/2023   • DDD (degenerative disc disease), cervical    • DDD (degenerative disc disease), lumbar    • Depression    • Diverticulitis of colon 2005    Scope was done in [facility]   • GERD (gastroesophageal reflux disease)    • Hypertension    • Kidney stones    • Melanoma     removed   • Palpitation     FOLLOWS W/DR. ANTONIO  Q6 MONTHS, NO CP OR SOA   • Prolapse of female bladder         Current Problems:   Active Hospital Problems    Diagnosis    • **Pneumoperitoneum         Nutrition/Diet History         Narrative     RD consult triggered by MST 3 for 14-23 lb wt loss. Pt presented to ED with abdominal pain radiating into shoulder. S/P colon resection d/t colon cancer. Admitted with pneumoperitoneum.     Pt is being seen by Oncology RD. Over the past 4 months, pt with 4.2% decrease in weight, not clinically significant. Pt is strict NPO for surgical evaluation.    As diet advances, will continue with Oncology RD's nutrition intervention: Ensure Enlive BID.     Follow up note 6/17/23: per MD notes: Pt feeling better, no N/V, receives chemo, might start clear liquid diet today or tomorrow.      Anthropometrics        Current Height, Weight Height: 162.6 cm (64\")  Weight: 134 kg (296 lb 4.8 oz)   Current BMI Body mass index is 50.86 kg/m².       Weight Hx  Wt Readings from Last 30 Encounters:   06/15/23 2357 134 kg (296 lb 4.8 oz)   06/14/23 0700 134 kg (295 lb 3.1 oz)   06/14/23 0806 134 kg (295 lb 6.7 oz)   06/13/23 1457 135 kg (297 lb 2.9 oz)   06/05/23 0917 135 kg (298 lb 4.5 oz)   06/02/23 1531 135 kg (298 lb)   05/31/23 1351 135 kg (297 lb " 13.5 oz)   05/16/23 0832 136 kg (300 lb)   05/05/23 0604 (!) 139 kg (306 lb 14.1 oz)   04/26/23 1131 (!) 140 kg (308 lb 3.3 oz)   04/17/23 1252 (!) 139 kg (307 lb)   04/13/23 0958 (!) 140 kg (309 lb 4.9 oz)   04/12/23 0825 (!) 139 kg (306 lb 7 oz)   04/04/23 0843 (!) 142 kg (313 lb 15 oz)   02/02/23 1004 (!) 140 kg (309 lb 9.6 oz)   08/27/19 0000 112 kg (247 lb 8 oz)   08/06/19 0000 114 kg (250 lb 8 oz)            Wt Change Observation -4.2% x 4 months, not clinically significant     Estimated/Assessed Needs       Energy Requirements 30-35 kcal/kg adj BW (75 kg)   EST Needs (kcal/day) 9976-8263 kcal       Protein Requirements 2.0 g/kg adj BW   EST Daily Needs (g/day) 150 g       Fluid Requirements 25-30 ml/kg adj BW    Estimated Needs (mL/day) 3691-7172 ml     Labs/Medications         Pertinent Labs Reviewed.   Results from last 7 days   Lab Units 06/17/23  0417 06/16/23  0158 06/14/23  0813   SODIUM mmol/L 135* 138 140   POTASSIUM mmol/L 3.9 4.3 3.7   CHLORIDE mmol/L 104 103 105   CO2 mmol/L 21.7* 20.6* 22.3   BUN mg/dL 16 24* 19   CREATININE mg/dL 0.71 0.81 0.75   CALCIUM mg/dL 9.1 9.6 9.5   BILIRUBIN mg/dL 0.5 0.4 0.5   ALK PHOS U/L 86 100 85   ALT (SGPT) U/L 25 41* 12   AST (SGOT) U/L 19 60* 12   GLUCOSE mg/dL 81 108* 113*     Results from last 7 days   Lab Units 06/17/23  0417   MAGNESIUM mg/dL 1.9   PHOSPHORUS mg/dL 3.5   HEMOGLOBIN g/dL 12.8   HEMATOCRIT % 36.9     No results found for: COVID19  No results found for: HGBA1C      Pertinent Medications Reviewed.     Current Nutrition Orders & Evaluation of Intake       Oral Nutrition     Current PO Diet NPO Diet NPO Type: Sips with Meds, Ice Chips   Supplement No active supplement orders       Malnutrition Severity Assessment                Nutrition Diagnosis         Nutrition Dx Problem 1 Inadequate energy Intake related to decreased ability to consume sufficient energy as evidenced by NPO.    Inadequate energy intake r/t increased nutrient needs d/t  catabolic disease AEB physiological causes increasing nutrient needs.      Nutrition Intervention         Pt currently NPO.  As diet is advanced order Ensure Enlive BID (per Oncology RD)  Provides 700 kcal, 40 g pro     Medical Nutrition Therapy/Nutrition Education          Learner     Readiness N/A  N/A     Method     Response N/A  N/A     Monitor/Evaluation        Monitor Per protocol, POC/GOC, Diet advancement     Nutrition Discharge Plan         To be determined     Electronically signed by:  Stefany Brunner RD  06/17/23 17:08 EDT

## 2023-06-18 ENCOUNTER — READMISSION MANAGEMENT (OUTPATIENT)
Dept: CALL CENTER | Facility: HOSPITAL | Age: 48
End: 2023-06-18
Payer: COMMERCIAL

## 2023-06-18 VITALS
BODY MASS INDEX: 50.02 KG/M2 | HEART RATE: 80 BPM | SYSTOLIC BLOOD PRESSURE: 147 MMHG | WEIGHT: 293 LBS | RESPIRATION RATE: 16 BRPM | OXYGEN SATURATION: 95 % | DIASTOLIC BLOOD PRESSURE: 77 MMHG | TEMPERATURE: 98.2 F | HEIGHT: 64 IN

## 2023-06-18 LAB
ANION GAP SERPL CALCULATED.3IONS-SCNC: 9.6 MMOL/L (ref 5–15)
BUN SERPL-MCNC: 14 MG/DL (ref 6–20)
BUN/CREAT SERPL: 18.7 (ref 7–25)
CALCIUM SPEC-SCNC: 8.8 MG/DL (ref 8.6–10.5)
CHLORIDE SERPL-SCNC: 107 MMOL/L (ref 98–107)
CO2 SERPL-SCNC: 23.4 MMOL/L (ref 22–29)
CREAT SERPL-MCNC: 0.75 MG/DL (ref 0.57–1)
EGFRCR SERPLBLD CKD-EPI 2021: 99 ML/MIN/1.73
GLUCOSE SERPL-MCNC: 91 MG/DL (ref 65–99)
MAGNESIUM SERPL-MCNC: 2.3 MG/DL (ref 1.6–2.6)
PHOSPHATE SERPL-MCNC: 3.3 MG/DL (ref 2.5–4.5)
POTASSIUM SERPL-SCNC: 3.7 MMOL/L (ref 3.5–5.2)
SODIUM SERPL-SCNC: 140 MMOL/L (ref 136–145)

## 2023-06-18 PROCEDURE — 84100 ASSAY OF PHOSPHORUS: CPT | Performed by: SURGERY

## 2023-06-18 PROCEDURE — 96361 HYDRATE IV INFUSION ADD-ON: CPT

## 2023-06-18 PROCEDURE — 25010000002 LEVOFLOXACIN PER 250 MG: Performed by: INTERNAL MEDICINE

## 2023-06-18 PROCEDURE — 83735 ASSAY OF MAGNESIUM: CPT | Performed by: SURGERY

## 2023-06-18 PROCEDURE — 96376 TX/PRO/DX INJ SAME DRUG ADON: CPT

## 2023-06-18 PROCEDURE — 80048 BASIC METABOLIC PNL TOTAL CA: CPT | Performed by: SURGERY

## 2023-06-18 PROCEDURE — 96366 THER/PROPH/DIAG IV INF ADDON: CPT

## 2023-06-18 PROCEDURE — 25010000002 HEPARIN LOCK FLUSH PER 10 UNITS: Performed by: INTERNAL MEDICINE

## 2023-06-18 PROCEDURE — G0378 HOSPITAL OBSERVATION PER HR: HCPCS

## 2023-06-18 RX ORDER — SODIUM CHLORIDE 0.9 % (FLUSH) 0.9 %
10 SYRINGE (ML) INJECTION EVERY 12 HOURS SCHEDULED
Status: DISCONTINUED | OUTPATIENT
Start: 2023-06-18 | End: 2023-06-18 | Stop reason: HOSPADM

## 2023-06-18 RX ORDER — LEVOFLOXACIN 750 MG/1
750 TABLET ORAL DAILY
Qty: 7 TABLET | Refills: 0 | Status: SHIPPED | OUTPATIENT
Start: 2023-06-18

## 2023-06-18 RX ORDER — METRONIDAZOLE 500 MG/1
500 TABLET ORAL 3 TIMES DAILY
Qty: 21 TABLET | Refills: 0 | Status: SHIPPED | OUTPATIENT
Start: 2023-06-18 | End: 2023-06-25

## 2023-06-18 RX ORDER — SODIUM CHLORIDE 0.9 % (FLUSH) 0.9 %
20 SYRINGE (ML) INJECTION AS NEEDED
Status: DISCONTINUED | OUTPATIENT
Start: 2023-06-18 | End: 2023-06-18 | Stop reason: HOSPADM

## 2023-06-18 RX ORDER — HEPARIN SODIUM (PORCINE) LOCK FLUSH IV SOLN 100 UNIT/ML 100 UNIT/ML
5 SOLUTION INTRAVENOUS AS NEEDED
Status: DISCONTINUED | OUTPATIENT
Start: 2023-06-18 | End: 2023-06-18 | Stop reason: HOSPADM

## 2023-06-18 RX ORDER — SODIUM CHLORIDE 9 MG/ML
40 INJECTION, SOLUTION INTRAVENOUS AS NEEDED
Status: DISCONTINUED | OUTPATIENT
Start: 2023-06-18 | End: 2023-06-18 | Stop reason: HOSPADM

## 2023-06-18 RX ORDER — SODIUM CHLORIDE 0.9 % (FLUSH) 0.9 %
10 SYRINGE (ML) INJECTION AS NEEDED
Status: DISCONTINUED | OUTPATIENT
Start: 2023-06-18 | End: 2023-06-18 | Stop reason: HOSPADM

## 2023-06-18 RX ADMIN — METRONIDAZOLE 500 MG: 5 INJECTION, SOLUTION INTRAVENOUS at 10:07

## 2023-06-18 RX ADMIN — Medication 10 ML: at 12:15

## 2023-06-18 RX ADMIN — BISACODYL 10 MG: 10 SUPPOSITORY RECTAL at 08:47

## 2023-06-18 RX ADMIN — Medication 10 ML: at 08:47

## 2023-06-18 RX ADMIN — LEVOFLOXACIN 750 MG: 750 INJECTION, SOLUTION INTRAVENOUS at 04:17

## 2023-06-18 RX ADMIN — HEPARIN 500 UNITS: 100 SYRINGE at 12:16

## 2023-06-18 RX ADMIN — METRONIDAZOLE 500 MG: 5 INJECTION, SOLUTION INTRAVENOUS at 01:18

## 2023-06-18 RX ADMIN — LAMOTRIGINE 25 MG: 25 TABLET ORAL at 08:49

## 2023-06-18 RX ADMIN — MAGNESIUM HYDROXIDE 15 ML: 2400 SUSPENSION ORAL at 10:07

## 2023-06-18 RX ADMIN — FAMOTIDINE 20 MG: 10 INJECTION, SOLUTION INTRAVENOUS at 08:44

## 2023-06-18 RX ADMIN — METOPROLOL SUCCINATE 25 MG: 25 TABLET, EXTENDED RELEASE ORAL at 08:44

## 2023-06-18 RX ADMIN — SODIUM CHLORIDE, POTASSIUM CHLORIDE, SODIUM LACTATE AND CALCIUM CHLORIDE 100 ML/HR: 600; 310; 30; 20 INJECTION, SOLUTION INTRAVENOUS at 03:42

## 2023-06-18 RX ADMIN — SENNOSIDES AND DOCUSATE SODIUM 2 TABLET: 50; 8.6 TABLET ORAL at 08:43

## 2023-06-18 NOTE — DISCHARGE SUMMARY
[facility]         HOSPITALIST  DISCHARGE SUMMARY    Patient Name: Lilian Pelaez  : 1975  MRN: [id number]    Date of Admission: 2023  Date of Discharge:  2023    Primary Care Physician: Shelly Cash MD    Consults       Date and Time Order Name Status Description    2023  4:29 AM Hospitalist (on-call MD unless specified)      2023  4:22 AM Surgery (on-call MD unless specified) Completed             Active and Resolved Hospital Problems:  Active Hospital Problems    Diagnosis POA    **Pneumoperitoneum [K66.8] Yes      Resolved Hospital Problems   No resolved problems to display.       Hospital Course     Hospital Course:  Lilian Pelaez is a 47 y.o. female  with  past medical history of adenocarcinoma of the colon stage III status post left colectomy on May 5 presents to the emergency department for evaluation of abdominal pain since yesterday afternoon.  She describes it as pressure and sharp, bilateral upper quadrants, radiates to her back, constant, no known aggravating or alleviating factors.  She denies any other fevers, chills and sweats, nausea, vomiting, chest pain or shortness of breath, palpitations, diarrhea constipation, dysuria, weakness, rash.  Work-up in the emergency department shows a slight leukocytosis with white blood cell count 13,000 but CT abdomen pelvis shows a moderate amount of pneumoperitoneum which could be related to recent surgery however due to the timing surgery is concerned after being called in the emergency department.  Surgery recommended admission and starting empiric antibiotics with Levaquin and Flagyl and they will evaluate.  Also noted resolving hematoma versus infected fluid in the right anterior lower abdominal upper pelvic wall.  Was admitted to the hospital.  By the next morning she felt significantly better.  Unclear etiology for the pneumoperitoneum possibly secondary to air released from bowel wall through mucosal injury  due to internal pressure from constipation is what general surgery was thinking.  Patient's laboratory data as well as vitals and physical exam remain normal.  Patient is tolerating a liquid diet.  Patient is having some diarrhea however she did get some stool softeners.  Patient has been instructed to stop her iron tablets by oncology as that could be contributing to her abdominal symptoms also.  Patient's chemotherapy will also be delayed another month due to her symptoms.  Patient should follow-up with general surgery in the next week.  Patient was given a prescription for oral antibiotics by general surgery to complete a course.     DISCHARGE Follow Up Recommendations for labs and diagnostics:   Close follow up with general surgery and oncology       Day of Discharge     Vital Signs:  Temp:  [98 °F (36.7 °C)-98.4 °F (36.9 °C)] 98.1 °F (36.7 °C)  Heart Rate:  [69-87] 78  Resp:  [16-19] 16  BP: (127-172)/(65-98) 153/98  Physical Exam:         Constitutional: Awake, alert, no acute distress sitting in the bedside               Eyes: Pupils equal, sclerae anicteric, no conjunctival injection              HENT: NCAT, mucous membranes moist              Neck: Supple, no thyromegaly, no lymphadenopathy, trachea midline              Respiratory: Clear to auscultation bilaterally, nonlabored respirations               Cardiovascular: RRR, no murmurs, rubs, or gallops, palpable pedal pulses bilaterally              Gastrointestinal: Positive bowel sounds, soft, nontender, nondistended              Musculoskeletal: No bilateral ankle edema, no clubbing or cyanosis to extremities              Psychiatric: Appropriate affect, cooperative              Neurologic: Oriented x 3, strength symmetric in all extremities, Cranial Nerves grossly intact to confrontation, speech clear              Skin: No rashes          Discharge Details        Discharge Medications        New Medications        Instructions Start Date   levoFLOXacin  750 MG tablet  Commonly known as: Levaquin   750 mg, Oral, Daily      metroNIDAZOLE 500 MG tablet  Commonly known as: Flagyl   500 mg, Oral, 3 Times Daily             Continue These Medications        Instructions Start Date   allopurinol 100 MG tablet  Commonly known as: ZYLOPRIM   Take 1 tablet by mouth Daily.      ALPRAZolam 0.5 MG tablet  Commonly known as: XANAX   Take 1 tablet by mouth At Night As Needed.      amLODIPine 10 MG tablet  Commonly known as: NORVASC   Take 1 tablet by mouth Every Night.      b complex-C-E-zinc tablet   1 tablet, Oral, Daily      Diclofenac Sodium 1 % gel gel  Commonly known as: VOLTAREN   4 g, Topical, 4 Times Daily PRN      docusate sodium 100 MG capsule   Take 1 capsule by mouth Daily.      fluorouracil 5 % cream  Commonly known as: EFUDEX   1 application, Topical, 2 Times Daily      furosemide 20 MG tablet  Commonly known as: LASIX   1 tablet, Oral, Daily      HYDROcodone-acetaminophen 7.5-325 MG per tablet  Commonly known as: NORCO   Take 1 tablet by mouth Every 8 (Eight) Hours As Needed.      hyoscyamine 0.125 MG tablet  Commonly known as: ANASPAZ,LEVSIN   0.125 mg, Oral, 4 Times Daily      ketorolac 10 MG tablet  Commonly known as: TORADOL   Take 1 tablet by mouth Every 6 (Six) Hours As Needed.      lamoTRIgine 25 MG tablet  Commonly known as: LaMICtal   Take 1 tablet by mouth 2 (Two) Times a Day. Only takes at night      leucovorin 5 MG tablet   Oral, Every 6 Hours      Lidocaine 4 % patch   1 patch, Apply externally, Daily      lidocaine-prilocaine 2.5-2.5 % cream  Commonly known as: EMLA   1 application, Topical, Every 2 Hours PRN      linaclotide 290 MCG capsule capsule  Commonly known as: LINZESS   290 mcg, Oral, As Needed      losartan 100 MG tablet  Commonly known as: COZAAR   Take 1 tablet by mouth Every Night.      medroxyPROGESTERone 150 MG/ML injection  Commonly known as: DEPO-PROVERA   1 mL, Intramuscular, Every 3 Months      metoclopramide 10 MG tablet  Commonly  known as: REGLAN   Take 1 tablet by mouth 3 (Three) Times a Day.      metoprolol succinate XL 25 MG 24 hr tablet  Commonly known as: TOPROL-XL   Take 1 tablet by mouth Daily.      Myrbetriq 25 MG tablet sustained-release 24 hour 24 hr tablet  Generic drug: Mirabegron ER   25 mg, Oral, Every Evening      ondansetron 8 MG tablet  Commonly known as: ZOFRAN   8 mg, Oral, 3 Times Daily PRN      OXALIPLATIN IV   Intravenous      pantoprazole 40 MG EC tablet  Commonly known as: PROTONIX   Take 1 tablet by mouth 2 (Two) Times a Day.      rOPINIRole 1 MG tablet  Commonly known as: REQUIP   1 mg, Oral, Nightly      sertraline 100 MG tablet  Commonly known as: ZOLOFT   Take 1 tablet by mouth Daily.      sucralfate 1 g tablet  Commonly known as: CARAFATE   Take 1 tablet by mouth 2 (Two) Times a Day.      traMADol 50 MG tablet  Commonly known as: Ultram   50 mg, Oral, Every 6 Hours PRN      vitamin D3 125 MCG (5000 UT) capsule capsule   2,000 Units, Oral, Daily             Stop These Medications      ferrous sulfate 325 (65 FE) MG tablet              Allergies   Allergen Reactions    Hydromorphone Hives, Itching and GI Intolerance    Morphine Hives, Itching and Nausea And Vomiting    Oxybutynin Swelling and Angioedema           Oxycodone-Acetaminophen Itching and Nausea And Vomiting     Can not take any higher than 7.5    Penicillins Rash    Promethazine Nausea And Vomiting    Tylenol With Codeine #3 [Acetaminophen-Codeine] Itching and Nausea And Vomiting       Discharge Disposition:  Home or Self Care    Diet:  Hospital:  Diet Order   Procedures    Diet: Liquid Diets; Full Liquid; Texture: Regular Texture (IDDSI 7); Fluid Consistency: Thin (IDDSI 0)       Discharge Activity:       CODE STATUS:  Code Status and Medical Interventions:   Ordered at: 06/16/23 0450     Code Status (Patient has no pulse and is not breathing):    CPR (Attempt to Resuscitate)     Medical Interventions (Patient has pulse or is breathing):    Full  Support         Future Appointments   Date Time Provider Department Center   6/28/2023  8:00 AM CHAIR 17 [facility]  [facility]   6/28/2023  8:15 AM Brian Marroquin MD [facility] [address]   7/12/2023  8:00 AM CHAIR 17 [facility]  [facility]   7/12/2023  8:45 AM Brian Marroquin MD [facility] [address] [facility]   7/26/2023  8:00 AM CHAIR [facility] IN[facility]  [facility]   7/26/2023  8:30 AM Brian Marroquin MD [facility] [address] [facility]   8/9/2023  8:45 AM CHAIR 17 [facility] OP INFU  [facility]   8/9/2023  9:45 AM Brian Marroquin MD [facility] [address] [facility]   8/23/2023  8:00 AM CHAIR 17 [facility] OP INFU  [facility]   8/23/2023  8:45 AM Brian Marroquin MD [facility] [address] [facility]   10/9/2023 10:00 AM Bessy Moore  [facility] None           Pertinent  and/or Most Recent Results     PROCEDURES:   none    LAB RESULTS:      Lab 06/17/23  0417 06/16/23  0454 06/16/23  0017 06/14/23  0813   WBC 6.47  --  13.40* 8.20   HEMOGLOBIN 12.8  --  14.8 13.3   HEMATOCRIT 36.9  --  42.3 39.2   PLATELETS 221  --  254 232   NEUTROS ABS 3.65  --  9.67* 5.00   IMMATURE GRANS (ABS) 0.02  --  0.04 0.01   LYMPHS ABS 2.44  --  2.78 2.05   MONOS ABS 0.17  --  0.80 0.95*   EOS ABS 0.15  --  0.06 0.17   MCV 85.2  --  85.6 86.5   LACTATE  --  0.9  --   --          Lab 06/18/23  0447 06/17/23  0417 06/16/23  0158 06/14/23  0813   SODIUM 140 135* 138 140   POTASSIUM 3.7 3.9 4.3 3.7   CHLORIDE 107 104 103 105   CO2 23.4 21.7* 20.6* 22.3   ANION GAP 9.6 9.3 14.4 12.7   BUN 14 16 24* 19   CREATININE 0.75 0.71 0.81 0.75   EGFR 99.0 105.7 90.2 99.0   GLUCOSE 91 81 108* 113*   CALCIUM 8.8 9.1 9.6 9.5   MAGNESIUM 2.3 1.9  --   --    PHOSPHORUS 3.3 3.5  --   --          Lab 06/17/23  0417 06/16/23  0158 06/16/23  0017 06/14/23  0813   TOTAL PROTEIN 5.9* 7.4  --  7.0   ALBUMIN 3.4* 4.2  --  4.1   GLOBULIN 2.5 3.2  --  2.9   ALT (SGPT) 25 41*  --  12   AST (SGOT) 19 60*  --  12   BILIRUBIN 0.5 0.4  --  0.5   ALK PHOS 86 100  --  85   LIPASE  --   --  99*  --                      Brief  Urine Lab Results  (Last result in the past 365 days)        Color   Clarity   Blood   Leuk Est   Nitrite   Protein   CREAT   Urine HCG        06/16/23 0147 Yellow   Clear   Negative   Negative   Negative   Negative                 Microbiology Results (last 10 days)       Procedure Component Value - Date/Time    Blood Culture - Blood, Arm, Left [[id number]]  (Normal) Collected: 06/16/23 0454    Lab Status: Preliminary result Specimen: Blood from Arm, Left Updated: 06/18/23 0515     Blood Culture No growth at 2 days    Blood Culture - Blood, Arm, Left [[id number]]  (Normal) Collected: 06/16/23 0454    Lab Status: Preliminary result Specimen: Blood from Arm, Left Updated: 06/18/23 0515     Blood Culture No growth at 2 days            CT Abdomen Pelvis With Contrast    Result Date: 6/16/2023  Impression:   1. There has been interval partial left colectomy since the prior 4/11/2023 CT study.  2. Formed stool is seen within the colon.  There may be fecal stasis (fecal retention) as with constipation.  Probably no fecal impaction.  No mechanical bowel obstruction is suspected. 3. There is a moderate amount of pneumoperitoneum, which is probably related to the recent surgery.  Please correlate with regard to the time frame of the surgery.  That the pneumoperitoneum may be associated with age-indeterminate bowel perforation or leak, especially at the anastomotic site, cannot be excluded entirely.  No focal sizable (drainable) intraperitoneal or retroperitoneal fluid collections are seen.  4. There is increased attenuation of the mesenteric fat, especially in the pelvis and in the left abdomen, probably related to the surgery.  5. There is a suspected resolving hematoma in the right anterior lower abdominal/upper pelvic wall (especially subcutaneous in location), which measures about 7.6 cm in greatest diameter.  An infected fluid collection cannot be excluded but is thought to be less likely.  6. No other acute findings are  seen.  7. Please see above comments for further detail.   1.   Please note that portions of this note were completed with a voice recognition program.  THEODORA BENDER JR, MD       Electronically Signed and Approved By: THEODORA BENDER JR, MD on 6/16/2023 at 2:32              XR Chest 1 View    Result Date: 6/5/2023  Impression:   1. Right IJ port with tip at the confluence of the brachiocephalic veins.       DENG SANCHES MD       Electronically Signed and Approved By: DENG SANCHES MD on 6/05/2023 at 11:06                          Labs Pending at Discharge:  Pending Labs       Order Current Status    Blood Culture - Blood, Arm, Left Preliminary result    Blood Culture - Blood, Arm, Left Preliminary result              Time spent on Discharge including face to face service:  more than 35 minutes    Electronically signed by Jones Ramirez DO, 06/18/23, 11:03 AM EDT.

## 2023-06-18 NOTE — PLAN OF CARE
Goal Outcome Evaluation:  Plan of Care Reviewed With: patient      Pt pain controlled, passing gas and had x2 bowel movement this shift. Pt c/o hemorrhoids see Mar for new order, Pt resting in bed is able to make needs known, call light in reach, will continue current POC

## 2023-06-18 NOTE — PROGRESS NOTES
" [facility]     Surgery Progress Note    Patient Name: Lilian Pelaez  :    1975  MRN:    [id number]  Date of admission:  2023  Length of Stay: 0 days    Subjective   No abd pain.  \"I feel completely normal.\"  Had large BM.      Objective       Vitals:   Temp:  [98.4 °F (36.9 °C)-99.1 °F (37.3 °C)] 98.8 °F (37.1 °C)  Heart Rate:  [68-82] 78  Resp:  [18-22] 18  BP: (120-145)/(74-84) 144/84  Physical Exam   Constitutional: alert, appears comfortable  Respiratory:  breathing not labored, respiratory effort appears normal  Cardiovascular:  heart regular rate  Abdomen:  soft, nondistended, nontender  Musculoskeletal: moving all extremities symmetrically and purposefully  Neurologic:  no obvious motor or sensory deficits, alert & oriented x 3, speech clear        Assessment / Plan   Assessment:   -Pneumoperitoneum - ? Etiology, possibly secondary to air release from bowel wall through a mucosal injury due to the internal pressure from constipation  -WBC, vitals, and physical exam remain normal    Plan:    Full liquid diet  Can home later day if continues to do well.  Specific discharge instructions are available in the after visit summary.  Those instructions include diet, antibiotics, and f/u with me.  I provided a prescription for the antibiotics.  Appreciated medical management by Dr. Rodriguez.     Electronically signed by Rolando Liu MD, 23, 8:37 AM EDT.       "

## 2023-06-18 NOTE — PLAN OF CARE
Goal Outcome Evaluation:                 Pt's vss, ra, denies pain or abdominal discomfor, ambulating in room, tolerating full liquid diet, safety maintained, POC resolved

## 2023-06-19 NOTE — OUTREACH NOTE
Prep Survey      Flowsheet Row Responses   [facility] facility patient discharged from? [facility]   Is LACE score < 7 ? No   Eligibility Readm Mgmt   Discharge diagnosis Pneumoperitoneum   Does the patient have one of the following disease processes/diagnoses(primary or secondary)? Other   Does the patient have Home health ordered? No   Is there a DME ordered? No   Prep survey completed? Yes            Vanessa RUTHERFORD - Registered Nurse

## 2023-06-21 ENCOUNTER — TELEPHONE (OUTPATIENT)
Dept: ONCOLOGY | Facility: HOSPITAL | Age: 48
End: 2023-06-21

## 2023-06-21 LAB
BACTERIA SPEC AEROBE CULT: NORMAL
BACTERIA SPEC AEROBE CULT: NORMAL

## 2023-06-21 NOTE — TELEPHONE ENCOUNTER
Caller: Lilian Pelaez    Relationship: Self    Best call back number: [phone]    What is the best time to reach you: ANY    Who are you requesting to speak with (clinical staff, provider,  specific staff member): CLINICAL    What was the call regarding: LILIAN IS CALLING STATES THAT DR ELMORE WANTED HER TO CANCEL ALL HER APPTS UNTIL AUGUST  LILIAN IS WANTING DR GERMAIN TO CALL HER TO DISCUSS    Is it okay if the provider responds through MyChart: NO

## 2023-06-23 ENCOUNTER — TELEPHONE (OUTPATIENT)
Dept: ONCOLOGY | Facility: HOSPITAL | Age: 48
End: 2023-06-23

## 2023-06-23 NOTE — TELEPHONE ENCOUNTER
Caller: Lilian Pelaez    Relationship to patient: Self    Best call back number: [phone]    Patient is needing: TO LET  KNOW THAT THE DRINKS SHE RECEIVED ARE THE WRONG KIND BECAUSE THEY ARE HIGH IN SUGAR AND SHE CANNOT DO THE SUGAR.

## 2023-06-23 NOTE — TELEPHONE ENCOUNTER
Caller: Lilian Pelaez    Relationship to patient: Self    Best call back number: [phone]    Patient is needing: TO R/S TO WEDNESDAYS FOR ALL UPCOMING APPTS. SHE HAS SOMEONE THAT CAN BRING HER ON WEDNESDAYS AND BRING HER BACK TO GET THE PUMP OFF ON FRIDAY.

## 2023-08-01 RX ORDER — HYOSCYAMINE SULFATE 0.125 MG
TABLET ORAL
Qty: 120 TABLET | Refills: 1 | Status: SHIPPED | OUTPATIENT
Start: 2023-08-01

## 2023-08-02 ENCOUNTER — OFFICE VISIT (OUTPATIENT)
Dept: ONCOLOGY | Facility: HOSPITAL | Age: 48
End: 2023-08-02
Payer: COMMERCIAL

## 2023-08-02 ENCOUNTER — DOCUMENTATION (OUTPATIENT)
Dept: ONCOLOGY | Facility: HOSPITAL | Age: 48
End: 2023-08-02
Payer: COMMERCIAL

## 2023-08-02 ENCOUNTER — HOSPITAL ENCOUNTER (OUTPATIENT)
Dept: ONCOLOGY | Facility: HOSPITAL | Age: 48
Discharge: HOME | End: 2023-08-02
Payer: COMMERCIAL

## 2023-08-02 VITALS
RESPIRATION RATE: 17 BRPM | DIASTOLIC BLOOD PRESSURE: 88 MMHG | BODY MASS INDEX: 49.31 KG/M2 | HEART RATE: 74 BPM | OXYGEN SATURATION: 100 % | HEIGHT: 64 IN | TEMPERATURE: 97.9 F | WEIGHT: 288.8 LBS | SYSTOLIC BLOOD PRESSURE: 148 MMHG

## 2023-08-02 VITALS
OXYGEN SATURATION: 100 % | WEIGHT: 288.58 LBS | BODY MASS INDEX: 49.53 KG/M2 | DIASTOLIC BLOOD PRESSURE: 88 MMHG | RESPIRATION RATE: 17 BRPM | HEART RATE: 74 BPM | TEMPERATURE: 97.9 F | SYSTOLIC BLOOD PRESSURE: 148 MMHG

## 2023-08-02 DIAGNOSIS — C18.6 MALIGNANT NEOPLASM OF DESCENDING COLON: Primary | ICD-10-CM

## 2023-08-02 LAB
ALBUMIN SERPL-MCNC: 4.2 G/DL (ref 3.5–5.2)
ALBUMIN/GLOB SERPL: 1.8 G/DL
ALP SERPL-CCNC: 99 U/L (ref 39–117)
ALT SERPL W P-5'-P-CCNC: 16 U/L (ref 1–33)
ANION GAP SERPL CALCULATED.3IONS-SCNC: 11.2 MMOL/L (ref 5–15)
AST SERPL-CCNC: 16 U/L (ref 1–32)
BASOPHILS # BLD AUTO: 0.01 10*3/MM3 (ref 0–0.2)
BASOPHILS NFR BLD AUTO: 0.1 % (ref 0–1.5)
BILIRUB SERPL-MCNC: 0.4 MG/DL (ref 0–1.2)
BUN SERPL-MCNC: 14 MG/DL (ref 6–20)
BUN/CREAT SERPL: 18.7 (ref 7–25)
CALCIUM SPEC-SCNC: 9.2 MG/DL (ref 8.6–10.5)
CEA SERPL-MCNC: 1.2 NG/ML
CHLORIDE SERPL-SCNC: 108 MMOL/L (ref 98–107)
CO2 SERPL-SCNC: 21.8 MMOL/L (ref 22–29)
CREAT SERPL-MCNC: 0.75 MG/DL (ref 0.57–1)
DEPRECATED RDW RBC AUTO: 39 FL (ref 37–54)
EGFRCR SERPLBLD CKD-EPI 2021: 99 ML/MIN/1.73
EOSINOPHIL # BLD AUTO: 0.13 10*3/MM3 (ref 0–0.4)
EOSINOPHIL NFR BLD AUTO: 1.9 % (ref 0.3–6.2)
ERYTHROCYTE [DISTWIDTH] IN BLOOD BY AUTOMATED COUNT: 12.3 % (ref 12.3–15.4)
GLOBULIN UR ELPH-MCNC: 2.4 GM/DL
GLUCOSE SERPL-MCNC: 101 MG/DL (ref 65–99)
HCT VFR BLD AUTO: 40.6 % (ref 34–46.6)
HGB BLD-MCNC: 13.9 G/DL (ref 12–15.9)
IMM GRANULOCYTES # BLD AUTO: 0.02 10*3/MM3 (ref 0–0.05)
IMM GRANULOCYTES NFR BLD AUTO: 0.3 % (ref 0–0.5)
LYMPHOCYTES # BLD AUTO: 2.03 10*3/MM3 (ref 0.7–3.1)
LYMPHOCYTES NFR BLD AUTO: 29 % (ref 19.6–45.3)
MCH RBC QN AUTO: 29.3 PG (ref 26.6–33)
MCHC RBC AUTO-ENTMCNC: 34.2 G/DL (ref 31.5–35.7)
MCV RBC AUTO: 85.5 FL (ref 79–97)
MONOCYTES # BLD AUTO: 0.65 10*3/MM3 (ref 0.1–0.9)
MONOCYTES NFR BLD AUTO: 9.3 % (ref 5–12)
NEUTROPHILS NFR BLD AUTO: 4.16 10*3/MM3 (ref 1.7–7)
NEUTROPHILS NFR BLD AUTO: 59.4 % (ref 42.7–76)
PLATELET # BLD AUTO: 208 10*3/MM3 (ref 140–450)
PMV BLD AUTO: 9.8 FL (ref 6–12)
POTASSIUM SERPL-SCNC: 3.4 MMOL/L (ref 3.5–5.2)
PROT SERPL-MCNC: 6.6 G/DL (ref 6–8.5)
RBC # BLD AUTO: 4.75 10*6/MM3 (ref 3.77–5.28)
SODIUM SERPL-SCNC: 141 MMOL/L (ref 136–145)
WBC NRBC COR # BLD: 7 10*3/MM3 (ref 3.4–10.8)

## 2023-08-02 PROCEDURE — 96413 CHEMO IV INFUSION 1 HR: CPT

## 2023-08-02 PROCEDURE — 82378 CARCINOEMBRYONIC ANTIGEN: CPT | Performed by: INTERNAL MEDICINE

## 2023-08-02 PROCEDURE — 25010000002 LEUCOVORIN CALCIUM PER 50 MG: Performed by: INTERNAL MEDICINE

## 2023-08-02 PROCEDURE — 96415 CHEMO IV INFUSION ADDL HR: CPT

## 2023-08-02 PROCEDURE — 25010000002 DEXAMETHASONE SODIUM PHOSPHATE 120 MG/30ML SOLUTION: Performed by: INTERNAL MEDICINE

## 2023-08-02 PROCEDURE — G0498 CHEMO EXTEND IV INFUS W/PUMP: HCPCS

## 2023-08-02 PROCEDURE — 96367 TX/PROPH/DG ADDL SEQ IV INF: CPT

## 2023-08-02 PROCEDURE — 96375 TX/PRO/DX INJ NEW DRUG ADDON: CPT

## 2023-08-02 PROCEDURE — 25010000002 FLUOROURACIL PER 500 MG: Performed by: INTERNAL MEDICINE

## 2023-08-02 PROCEDURE — 96368 THER/DIAG CONCURRENT INF: CPT

## 2023-08-02 PROCEDURE — 25010000002 PALONOSETRON PER 25 MCG: Performed by: INTERNAL MEDICINE

## 2023-08-02 PROCEDURE — 96411 CHEMO IV PUSH ADDL DRUG: CPT

## 2023-08-02 PROCEDURE — 80053 COMPREHEN METABOLIC PANEL: CPT | Performed by: INTERNAL MEDICINE

## 2023-08-02 PROCEDURE — 25010000002 OXALIPLATIN PER 0.5 MG: Performed by: INTERNAL MEDICINE

## 2023-08-02 PROCEDURE — 85025 COMPLETE CBC W/AUTO DIFF WBC: CPT | Performed by: INTERNAL MEDICINE

## 2023-08-02 RX ORDER — PALONOSETRON 0.05 MG/ML
0.25 INJECTION, SOLUTION INTRAVENOUS ONCE
Status: CANCELLED | OUTPATIENT
Start: 2023-08-02

## 2023-08-02 RX ORDER — DIPHENHYDRAMINE HYDROCHLORIDE 50 MG/ML
50 INJECTION INTRAMUSCULAR; INTRAVENOUS AS NEEDED
Status: CANCELLED | OUTPATIENT
Start: 2023-08-02

## 2023-08-02 RX ORDER — DEXTROSE MONOHYDRATE 50 MG/ML
250 INJECTION, SOLUTION INTRAVENOUS ONCE
Status: COMPLETED | OUTPATIENT
Start: 2023-08-02 | End: 2023-08-02

## 2023-08-02 RX ORDER — DEXTROSE MONOHYDRATE 50 MG/ML
250 INJECTION, SOLUTION INTRAVENOUS ONCE
Status: CANCELLED | OUTPATIENT
Start: 2023-08-02

## 2023-08-02 RX ORDER — PALONOSETRON 0.05 MG/ML
0.25 INJECTION, SOLUTION INTRAVENOUS ONCE
Status: COMPLETED | OUTPATIENT
Start: 2023-08-02 | End: 2023-08-02

## 2023-08-02 RX ORDER — FAMOTIDINE 10 MG/ML
20 INJECTION, SOLUTION INTRAVENOUS AS NEEDED
Status: CANCELLED | OUTPATIENT
Start: 2023-08-02

## 2023-08-02 RX ORDER — FLUOROURACIL 50 MG/ML
400 INJECTION, SOLUTION INTRAVENOUS ONCE
Status: CANCELLED | OUTPATIENT
Start: 2023-08-02

## 2023-08-02 RX ORDER — FLUOROURACIL 50 MG/ML
950 INJECTION, SOLUTION INTRAVENOUS ONCE
Status: COMPLETED | OUTPATIENT
Start: 2023-08-02 | End: 2023-08-02

## 2023-08-02 RX ORDER — HYDROCORTISONE 25 MG/G
CREAM TOPICAL
COMMUNITY
Start: 2023-06-28

## 2023-08-02 RX ADMIN — PALONOSETRON 0.25 MG: 0.05 INJECTION, SOLUTION INTRAVENOUS at 09:16

## 2023-08-02 RX ADMIN — FLUOROURACIL 950 MG: 50 INJECTION, SOLUTION INTRAVENOUS at 12:05

## 2023-08-02 RX ADMIN — FLUOROURACIL 5740 MG: 50 INJECTION, SOLUTION INTRAVENOUS at 12:10

## 2023-08-02 RX ADMIN — DEXTROSE MONOHYDRATE 250 ML: 50 INJECTION, SOLUTION INTRAVENOUS at 09:11

## 2023-08-02 RX ADMIN — LEUCOVORIN CALCIUM 950 MG: 350 INJECTION, POWDER, LYOPHILIZED, FOR SOLUTION INTRAMUSCULAR; INTRAVENOUS at 10:00

## 2023-08-02 RX ADMIN — OXALIPLATIN 205 MG: 5 INJECTION, SOLUTION INTRAVENOUS at 10:00

## 2023-08-02 RX ADMIN — DEXAMETHASONE SODIUM PHOSPHATE 12 MG: 4 INJECTION, SOLUTION INTRA-ARTICULAR; INTRALESIONAL; INTRAMUSCULAR; INTRAVENOUS; SOFT TISSUE at 09:19

## 2023-08-04 ENCOUNTER — HOSPITAL ENCOUNTER (OUTPATIENT)
Dept: ONCOLOGY | Facility: HOSPITAL | Age: 48
Discharge: HOME | End: 2023-08-04
Admitting: INTERNAL MEDICINE
Payer: COMMERCIAL

## 2023-08-04 DIAGNOSIS — C18.6 MALIGNANT NEOPLASM OF DESCENDING COLON: ICD-10-CM

## 2023-08-04 DIAGNOSIS — Z45.2 ENCOUNTER FOR ADJUSTMENT OR MANAGEMENT OF VASCULAR ACCESS DEVICE: Primary | ICD-10-CM

## 2023-08-04 PROCEDURE — 25010000002 HEPARIN LOCK FLUSH PER 10 UNITS: Performed by: INTERNAL MEDICINE

## 2023-08-04 RX ORDER — HEPARIN SODIUM (PORCINE) LOCK FLUSH IV SOLN 100 UNIT/ML 100 UNIT/ML
500 SOLUTION INTRAVENOUS AS NEEDED
Status: DISCONTINUED | OUTPATIENT
Start: 2023-08-04 | End: 2023-08-05 | Stop reason: HOSPADM

## 2023-08-04 RX ORDER — SODIUM CHLORIDE 0.9 % (FLUSH) 0.9 %
20 SYRINGE (ML) INJECTION AS NEEDED
OUTPATIENT
Start: 2023-08-04

## 2023-08-04 RX ORDER — SODIUM CHLORIDE 0.9 % (FLUSH) 0.9 %
20 SYRINGE (ML) INJECTION AS NEEDED
Status: DISCONTINUED | OUTPATIENT
Start: 2023-08-04 | End: 2023-08-05 | Stop reason: HOSPADM

## 2023-08-04 RX ORDER — HEPARIN SODIUM (PORCINE) LOCK FLUSH IV SOLN 100 UNIT/ML 100 UNIT/ML
500 SOLUTION INTRAVENOUS AS NEEDED
OUTPATIENT
Start: 2023-08-04

## 2023-08-04 RX ADMIN — Medication 20 ML: at 11:28

## 2023-08-04 RX ADMIN — HEPARIN SODIUM (PORCINE) LOCK FLUSH IV SOLN 100 UNIT/ML 500 UNITS: 100 SOLUTION at 11:30

## 2023-08-15 NOTE — PROGRESS NOTES
Chief Complaint/Care Team   ADENOCARCINOMA OF DESCENDING COLON    Shelly Cash MD Stephens, Cassandra, MD    History of Present Illness     Diagnosis: Colon Adenocarcinoma S/p Left colectomy on 5/5/23, stage after resection [id number], stage III    Current Treatment: FOLFOX IV M5osovi x 6 months, cycle 1 on 6/14/2023  Treatment delay secondary to pneumoperitoneum which developed in June 2023, patient cleared to resume chemotherapy in August 2023.    Previous Treatment: c-scope on 4/2023 biopsy revealed colon adenocarcinoma    Lilian Pelaez is a 47 y.o. female who presents to [facility] HEMATOLOGY & ONCOLOGY for discussion of newly diagnosed colon adenocarcinoma seen on biopsy of colon mass in the descending colon during colonoscopy performed in April 2023.     Staging scans revealed on 4/11/2023:  CT abdomen/pelvis without any metastatic disease in abdomen/pelvis  CT chest no evidence of metastatic disease in chest.    Patient underwent robotic resection of descending colon and splenic flexure by Dr. Rolando Liu on 5/5/2023.    Patient is a former smoker.   Patient reports family history of several relatives with other forms of cancer cancer on her mother side of the family, but denies any known history of colon cancer in her family.    Patient reports noticing some blood mixed in with stool prior to cancer diagnosis.      Patient received cycle 1 on 6/14/2023, cycle 2 was delayed secondary to development of pneumoperitoneum after cycle 1, patient was mated to the hospital very by her surgeon Dr. Liu who recommended delaying further cycles of chemotherapy until August 1, 2023.    Interval history: Patient here prior to cycle 3 of chemotherapy with FOLFOX.  Patient cleared by Dr. Rolando Liu to resume chemotherapy in August 2023 after developing pneumoperitoneum in June 2023.  She reports feeling well, no report of fever, chills, SOA,  blood loss, melena, or n/v/d.  After last  "chemotherapy infusion patient with some diarrhea and reports it resolved after taking Imodium more frequently.      Review of Systems   Constitutional:  Positive for fatigue. Negative for appetite change, diaphoresis, fever, unexpected weight gain and unexpected weight loss.   HENT:  Negative for hearing loss, mouth sores, sore throat, swollen glands, trouble swallowing and voice change.    Eyes:  Negative for blurred vision.   Respiratory:  Negative for cough, shortness of breath and wheezing.    Cardiovascular:  Negative for chest pain and palpitations.   Gastrointestinal:  Negative for abdominal pain, blood in stool, constipation, diarrhea, nausea and vomiting.   Endocrine: Negative for cold intolerance and heat intolerance.   Genitourinary:  Negative for difficulty urinating, dysuria, frequency, hematuria and urinary incontinence.   Musculoskeletal:  Negative for arthralgias, back pain and myalgias.   Skin:  Negative for rash, skin lesions and wound.   Neurological:  Negative for dizziness, seizures, weakness, numbness and headache.   Hematological:  Does not bruise/bleed easily.   Psychiatric/Behavioral:  Negative for depressed mood. The patient is nervous/anxious.    All other systems reviewed and are negative.     Oncology/Hematology History   Malignant neoplasm of descending colon   5/6/2023 Initial Diagnosis    Malignant neoplasm of descending colon     5/31/2023 Cancer Staged    Staging form: Colon And Rectum, AJCC 8th Edition  - Pathologic: Stage IIIB (pT3, pN1b, cM0) - Signed by Brian Marroquin MD on 5/31/2023 6/14/2023 -  Chemotherapy    OP COLON mFOLFOX6 OXALIplatin / Leucovorin / Fluorouracil           Objective     Vitals:    08/16/23 0827   BP: 115/67   Pulse: 78   Resp: 18   Temp: 98.1 øF (36.7 øC)   TempSrc: Temporal   SpO2: 97%   Weight: 131 kg (288 lb 12.8 oz)   Height: 162.6 cm (64.02\")   PainSc: Comment: no value     ECOG score: 0         PHQ-9 Total Score:         Physical Exam  Vitals " reviewed. Exam conducted with a chaperone present.   Constitutional:       General: She is not in acute distress.     Appearance: Normal appearance.   HENT:      Head: Normocephalic and atraumatic.   Eyes:      Extraocular Movements: Extraocular movements intact.      Conjunctiva/sclera: Conjunctivae normal.   Cardiovascular:      Pulses: Normal pulses.      Heart sounds: Normal heart sounds.      Comments: Chemo port in place, clean dry and intact  Pulmonary:      Effort: Pulmonary effort is normal.      Breath sounds: Normal breath sounds. No rhonchi or rales.   Abdominal:      General: Bowel sounds are normal. There is no distension.      Palpations: Abdomen is soft. There is no mass.      Tenderness: There is no abdominal tenderness.      Comments: Well healed surgical incisions from abdominal surgery   Musculoskeletal:      Cervical back: Normal range of motion and neck supple.   Skin:     General: Skin is warm and dry.   Neurological:      Mental Status: She is oriented to person, place, and time.         Past Medical History     Past Medical History:   Diagnosis Date    Anesthesia     B/P bottomed out/UNSURE ON THE DATE    Anxiety     Asthma 1996    seasonal/NO INHALERS    Colon cancer 04/17/2023    DDD (degenerative disc disease), cervical     DDD (degenerative disc disease), lumbar     Depression     Diverticulitis of colon 2005    Scope was done in [facility]    GERD (gastroesophageal reflux disease)     Hypertension     Kidney stones     Melanoma     removed    Palpitation     FOLLOWS W/DR. ANTONIO  Q6 MONTHS, NO CP OR SOA    Prolapse of female bladder      Current Outpatient Medications on File Prior to Visit   Medication Sig Dispense Refill    allopurinol (ZYLOPRIM) 100 MG tablet Take 1 tablet by mouth Daily.      ALPRAZolam (XANAX) 0.5 MG tablet Take 1 tablet by mouth At Night As Needed.      amLODIPine (NORVASC) 10 MG tablet Take 1 tablet by mouth Every Night.      aspirin 81 MG EC tablet Take 1 tablet  every day by oral route.      Diclofenac Sodium (VOLTAREN) 1 % gel gel Apply 4 g topically to the appropriate area as directed 4 (Four) Times a Day As Needed.      docusate sodium 100 MG capsule Take 1 capsule by mouth Daily.      fluorouracil (EFUDEX) 5 % cream Apply 1 application  topically to the appropriate area as directed 2 (Two) Times a Day.      furosemide (LASIX) 20 MG tablet Take 1 tablet by mouth Daily.      hydroCHLOROthiazide (HYDRODIURIL) 25 MG tablet Take 1 tablet every day by oral route for 90 days.      HYDROcodone-acetaminophen (NORCO) 7.5-325 MG per tablet Take 1 tablet by mouth Every 8 (Eight) Hours As Needed.      Hydrocortisone, Perianal, (ANUSOL-HC) 2.5 % rectal cream       hyoscyamine (ANASPAZ,LEVSIN) 0.125 MG tablet TAKE ONE TABLET BY MOUTH FOUR TIMES A DAY WITH MEALS AND AT BEDTIME. 120 tablet 1    ketorolac (TORADOL) 10 MG tablet Take 1 tablet by mouth Every 6 (Six) Hours As Needed.      lamoTRIgine (LaMICtal) 25 MG tablet Take 1 tablet by mouth 2 (Two) Times a Day. Only takes at night      leucovorin 5 MG tablet Take  by mouth Every 6 (Six) Hours.      levoFLOXacin (Levaquin) 750 MG tablet Take 1 tablet by mouth Daily. 7 tablet 0    Lidocaine 4 % patch Apply 1 patch topically Daily.      lidocaine-prilocaine (EMLA) 2.5-2.5 % cream Apply 1 application topically to the appropriate area as directed Every 2 (Two) Hours As Needed for Mild Pain. 30 g 1    linaclotide (LINZESS) 290 MCG capsule capsule Take 1 capsule by mouth As Needed.      losartan (COZAAR) 100 MG tablet Take 1 tablet by mouth Every Night.      medroxyPROGESTERone (DEPO-PROVERA) 150 MG/ML injection Inject 1 mL into the appropriate muscle as directed by prescriber Every 3 (Three) Months.      metoclopramide (REGLAN) 10 MG tablet Take 1 tablet by mouth 3 (Three) Times a Day.      metoprolol succinate XL (TOPROL-XL) 25 MG 24 hr tablet Take 1 tablet by mouth Daily.      Multiple Vitamins-Minerals (b complex-C-E-zinc) tablet Take 1  tablet by mouth Daily.      Myrbetriq 25 MG tablet sustained-release 24 hour 24 hr tablet Take 1 tablet by mouth Every Evening.      Myrbetriq 50 MG tablet sustained-release 24 hour 24 hr tablet       naloxone (NARCAN) 4 MG/0.1ML nasal spray Take 1 spray every day by nasal route as needed.      ondansetron (ZOFRAN) 8 MG tablet Take 1 tablet by mouth 3 (Three) Times a Day As Needed for Nausea or Vomiting. 30 tablet 5    OXALIPLATIN IV Infuse  into a venous catheter.      pantoprazole (PROTONIX) 40 MG EC tablet Take 1 tablet by mouth 2 (Two) Times a Day.      polyethylene glycol (GaviLyte-G) 236 g solution       rOPINIRole (REQUIP) 1 MG tablet Take 1 tablet by mouth Every Night.      sertraline (ZOLOFT) 100 MG tablet Take 1 tablet by mouth Daily.      sucralfate (CARAFATE) 1 g tablet Take 1 tablet by mouth 2 (Two) Times a Day.      tetracycline (ACHROMYCIN,SUMYCIN) 250 MG capsule       traMADol (Ultram) 50 MG tablet Take 1 tablet by mouth Every 6 (Six) Hours As Needed for Severe Pain or Moderate Pain. 5 tablet 0    vitamin D3 125 MCG (5000 UT) capsule capsule Take 2,000 Units by mouth Daily.       No current facility-administered medications on file prior to visit.      Allergies   Allergen Reactions    Hydromorphone Hives, Itching and GI Intolerance    Morphine Hives, Itching and Nausea And Vomiting    Oxybutynin Swelling and Angioedema           Oxycodone-Acetaminophen Itching and Nausea And Vomiting     Can not take any higher than 7.5    Penicillins Rash    Promethazine Nausea And Vomiting    Tylenol With Codeine #3 [Acetaminophen-Codeine] Itching and Nausea And Vomiting    Oxybutynin Chloride Angioedema     Past Surgical History:   Procedure Laterality Date    BREAST SURGERY  2016    Removed 8lbs of tissue, 2016    CHOLECYSTECTOMY      COLON RESECTION Left 05/05/2023    Procedure: RESECTION OF DESCENDING COLON AND SPLENIC FLEXURE TAKEDOWN WITH DAVINCI ROBOT;  Surgeon: Rolando Liu MD;  Location: [facility]  [facility];  Service: Robotics - DaVinci;  Laterality: Left;    COLONOSCOPY N/A 04/04/2023    Procedure: COLONOSCOPY WITH POLYPECTOMY/SNARE/BIOPSIES/TATTOOING DESCENDING COLON MASS;  Surgeon: Deniz Dowd MD;  Location: [facility] ENDOSCOPY;  Service: Gastroenterology;  Laterality: N/A;  INCOMPLETE COLONOSCOPY  POOR BOWEL PREP  COLON POLYP  COLON MASS  DIVERTICULOSIS    COLONOSCOPY N/A 04/12/2023    Procedure: COLONOSCOPY with cold snare;  Surgeon: Deniz Dowd MD;  Location: [facility] ENDOSCOPY;  Service: Gastroenterology;  Laterality: N/A;  colon polyps, diverticulosis, colon mass, hemorrhoids      CYSTOSCOPY W/ URETERAL STENT PLACEMENT Left 05/05/2023    Procedure: Cystoscopy, left ureteral injection,;  Surgeon: Manjula Randolph MD;  Location: [facility] OR [facility];  Service: Urology;  Laterality: Left;    FRACTURE SURGERY  2001    Left knee    KNEE SURGERY Left     x4    LAPAROSCOPIC TUBAL LIGATION  1995    LAPAROSCOPIC TUBAL LIGATION      Reversed    REDUCTION MAMMAPLASTY  2009    8lbs of tissue was removed    TONSILLECTOMY      UPPER GASTROINTESTINAL ENDOSCOPY      VENOUS ACCESS DEVICE (PORT) INSERTION N/A 6/5/2023    Procedure: INSERTION VENOUS ACCESS DEVICE;  Surgeon: Rolando Liu MD;  Location: [facility] OR [facility];  Service: General;  Laterality: N/A;     Social History     Socioeconomic History    Marital status:    Tobacco Use    Smoking status: Never     Passive exposure: Never    Smokeless tobacco: Never   Vaping Use    Vaping Use: Never used   Substance and Sexual Activity    Alcohol use: Never    Drug use: Never    Sexual activity: Defer     Family History   Problem Relation Age of Onset    Colon cancer Neg Hx        Results     Result Review   The following data was reviewed by: Brian Marroquin MD on 04/13/2023:  Lab Results   Component Value Date    HGB 12.8 08/16/2023    HCT 37.4 08/16/2023    MCV 85.6 08/16/2023     08/16/2023    WBC 4.82 08/16/2023    NEUTROABS 2.19 08/16/2023    LYMPHSABS  1.79 08/16/2023    MONOSABS 0.68 08/16/2023    EOSABS 0.11 08/16/2023    BASOSABS 0.04 08/16/2023     Lab Results   Component Value Date    GLUCOSE 101 (H) 08/02/2023    BUN 14 08/02/2023    CREATININE 0.75 08/02/2023     08/02/2023    K 3.4 (L) 08/02/2023     (H) 08/02/2023    CO2 21.8 (L) 08/02/2023    CALCIUM 9.2 08/02/2023    PROTEINTOT 6.6 08/02/2023    ALBUMIN 4.2 08/02/2023    BILITOT 0.4 08/02/2023    ALKPHOS 99 08/02/2023    AST 16 08/02/2023    ALT 16 08/02/2023     Lab Results   Component Value Date    MG 2.3 06/18/2023    PHOS 3.3 06/18/2023       Case Report   Surgical Pathology Report                         Case: [id number]                                   Authorizing Provider:  Deniz Dowd MD    Collected:           04/12/2023 09:41 AM           Ordering Location:     [facility] Received:            04/12/2023 11:41 AM                                  SUITES                                                                        Pathologist:           Niesha Torres DO                                                        Specimens:   1) - Large Intestine, Cecum, cecal polyp cold snare                                                  2) - Large Intestine, Sigmoid Colon, sigmoid colon polyp cold snare                        Clinical Information    Mass of colon   Final Diagnosis   1. Cecum polyp, biopsy:                            - Tubular adenoma        2. Sigmoid colon polyp, biopsy:                            - Tubular adenoma    Electronically signed by Niesha Torres DO on 4/13/2023 at 0840     Surgical Pathology Report                         Case: [id number]                                   Authorizing Provider:  Deniz Dowd MD    Collected:           04/04/2023 11:15 AM           Ordering Location:     [facility] Received:            04/04/2023 11:59 AM                                  SUITES                                                                         Pathologist:           Jennifer Mike MD                                                      Specimens:   1) - Large Intestine, Transverse Colon, TRANSVERSE COLON POLYP                                       2) - Large Intestine, Left / Descending Colon, DESCENDING COLON BIOPSIES                   Clinical Information    Constipation, unspecified constipation type   Final Diagnosis   1. Transverse colon polyp, biopsy:               - Fragments of tubular adenoma        2.  Descending colon, biopsy:               -Adenocarcinoma, moderately to poorly differentiated     Comment: Per policy, reflex testing has been ordered, and the results will be separately reported.     REMARKS: The above positive (malignant) diagnosis was called to April in Dr. Dowd's office at 09:09 EDT on 4/5/2023 by s.          Electronically signed by Jennifer Mike MD on 4/5/2023 at 0923         Surgical Pathology Report                         Case: [id number]                                   Authorizing Provider:  Rolando Liu MD        Collected:           05/05/2023 03:06 PM           Ordering Location:     [facility]  Received:            05/08/2023 06:48 AM                                  OR                                                                            Pathologist:           Jt Florence MD                                                             Specimen:    Large Intestine, Left / Descending Colon, left colon                                       Clinical Information    Malignant neoplasm of descending colon   Final Diagnosis   Left colon, resection:               - Adenocarcinoma               - Three of thirty-three lymph nodes positive for carcinoma (3/33)               - See synoptic checklist   Electronically signed by Jt Florence MD on 5/11/2023 at 1508   Synoptic Checklist   COLON AND RECTUM: Resection, Including Transanal Disk  Excision of Rectal Neoplasms  8th Edition - Protocol posted: 6/22/2022  COLON AND RECTUM: RESECTION - All Specimens  SPECIMEN   Procedure  Descending colon resection    TUMOR   Tumor Site  Descending colon    Histologic Type  Adenocarcinoma    Histologic Grade  G3, poorly differentiated    Tumor Size  Greatest dimension (Centimeters): 3.5 cm   Tumor Extent  Invades through muscularis propria into the pericolonic or perirectal tissue    Macroscopic Tumor Perforation  Not identified    Lymphovascular Invasion  Small vessel    Perineural Invasion  Present    Treatment Effect  No known presurgical therapy    MARGINS   Margin Status for Invasive Carcinoma  All margins negative for invasive carcinoma    Closest Margin(s) to Invasive Carcinoma  Radial (circumferential) or mesenteric    Distance from Invasive Carcinoma to Closest Margin  3.5 cm   Margin Status for Non-Invasive Tumor  All margins negative for high-grade dysplasia / intramucosal carcinoma and low-grade dysplasia    REGIONAL LYMPH NODES   Regional Lymph Node Status  Tumor present in regional lymph node(s)    Number of Lymph Nodes with Tumor  3    Number of Lymph Nodes Examined  33    Tumor Deposits  Present    Number of Tumor Deposits  1    PATHOLOGIC STAGE CLASSIFICATION (pTNM, AJCC 8th Edition)   Reporting of pT, pN, and (when applicable) pM categories is based on information available to the pathologist at the time the report is issued. As per the AJCC (Chapter 1, 8th Ed.) it is the managing physician's responsibility to establish the final pathologic stage based upon all pertinent information, including but potentially not limited to this pathology report.   pT Category  pT3    pN Category  pN1b    ADDITIONAL FINDINGS   Additional Findings  None identified    .               Assessment & Plan     Diagnoses and all orders for this visit:    1. Primary adenocarcinoma of descending colon (Primary)  -     prochlorperazine (COMPAZINE) 10 MG tablet; Take 1  tablet by mouth Every 6 (Six) Hours As Needed for Nausea or Vomiting.  Dispense: 30 tablet; Refill: 5  -     loperamide (Imodium A-D) 2 MG tablet; Take 1 tablet by mouth 4 (Four) Times a Day As Needed for Diarrhea. Imodium - take 4 mg first dose, followed by 2 mg every 2-4 hours after each stool (MAX of 16 mg in 24 hour period)  Dispense: 60 tablet; Refill: 1    2. Chemotherapy induced diarrhea  -     loperamide (Imodium A-D) 2 MG tablet; Take 1 tablet by mouth 4 (Four) Times a Day As Needed for Diarrhea. Imodium - take 4 mg first dose, followed by 2 mg every 2-4 hours after each stool (MAX of 16 mg in 24 hour period)  Dispense: 60 tablet; Refill: 1    3. Chemotherapy-induced nausea  -     prochlorperazine (COMPAZINE) 10 MG tablet; Take 1 tablet by mouth Every 6 (Six) Hours As Needed for Nausea or Vomiting.  Dispense: 30 tablet; Refill: 5          Lilian Pelaez is a 47 y.o. female who presents to [facility] HEMATOLOGY & ONCOLOGY evaluation prior to systemic therapy for stage III colon cancer, patient status post resection of descending colon splenic flexure by Dr. Rolando Liu on 5/5/2023.     Reviewed NCCN guidelines for her stage III cancer, discussed high risk features seen on pathology report and discussed CapeOx for 3 months versus FOLFOX for 3 to 6 months with patient and  today.  Patient agreed to plan to undergo FOLFOX IV chemotherapy every 2 weeks for 6 months.    Plan to rescan patient after 3 months of FOLFOX therapy (11/2023).    Patient received cycle 1 on June 14, 2023, she developed pneumoperitoneum which her surgeon suspect was secondary to severe constipation from iron tablets, but patient was cleared to resume chemotherapy after 8/1/2023.    Iron deficiency anemia from GI blood loss  -Evidence of this on lab work from May 2023  - We will continue to monitor and will offer IV iron therapy in the future if she becomes iron deficient secondary to pneumoperitoneum that developed  in June 2023 from severe constipation.    Chemotherapy-induced nausea  - Currently well controlled with Zofran, but prescribe Compazine as a backup option  - Patient also taking Reglan and was counseled regarding not taking Reglan with Zofran and Compazine together but rather patient to take other antiemetics at least 4 to 6 hours after Reglan dose.    Chemotherapy induced diarrhea  - Patient prescribed Imodium   -We will prescribe Lomotil if Imodium fails to work.    Labs reviewed and plan to proceed with cycle 3 day 1 on today.    Plan patient follow-up in the next 2 weeks for cycle 4 day 1 of chemotherapy.    Patient verbalized understanding and agreement plan.    Please note that portions of this note were completed with a voice recognition program.    Electronically signed by Brian Marroquin MD, 08/16/23, 10:00 AM EDT.      Follow Up     I spent 45 minutes caring for Lilian on this date of service. This time includes time spent by me in the following activities:preparing for the visit, reviewing tests, obtaining and/or reviewing a separately obtained history, performing a medically appropriate examination and/or evaluation , counseling and educating the patient/family/caregiver, ordering medications, tests, or procedures, documenting information in the medical record and care coordination.    This is an acute or chronic illness that poses a threat to life or bodily function. The above treatment plan involves a high risk of complications and/or mortality of patient management.    The patient was seen and examined. Work by the provider also included review and/or ordering of lab tests, review and/or ordering of radiology tests, review and/or ordering of medicine tests, discussion with other physicians or providers, independent review of data, obtaining old records, review/summation of old records, and/or other review.    I have reviewed the family history, social history, and past medical history for this patient.  Previous information and data has been reviewed and updated as needed. I have reviewed and verified the chief complaint, history, and other documentation. The patient was interviewed and examined in the clinic and the chart reviewed. The previous observations, recommendations, and conclusions were reviewed including those of other providers.     The plan was discussed with the patient and/or family. The patient was given time to ask questions and these questions were answered. At the conclusion of their visit they had no additional questions or concerns and all questions were answered to their satisfaction.    Patient was given instructions and counseling regarding her condition or for health maintenance advice. Please see specific information pulled into the AVS if appropriate.

## 2023-08-16 ENCOUNTER — OFFICE VISIT (OUTPATIENT)
Dept: ONCOLOGY | Facility: HOSPITAL | Age: 48
End: 2023-08-16
Payer: COMMERCIAL

## 2023-08-16 ENCOUNTER — HOSPITAL ENCOUNTER (OUTPATIENT)
Dept: ONCOLOGY | Facility: HOSPITAL | Age: 48
Discharge: HOME | End: 2023-08-16
Admitting: INTERNAL MEDICINE
Payer: COMMERCIAL

## 2023-08-16 VITALS
WEIGHT: 288.8 LBS | HEIGHT: 64 IN | TEMPERATURE: 98.1 F | SYSTOLIC BLOOD PRESSURE: 115 MMHG | OXYGEN SATURATION: 97 % | BODY MASS INDEX: 49.31 KG/M2 | DIASTOLIC BLOOD PRESSURE: 67 MMHG | RESPIRATION RATE: 18 BRPM | HEART RATE: 78 BPM

## 2023-08-16 VITALS
DIASTOLIC BLOOD PRESSURE: 67 MMHG | TEMPERATURE: 98.1 F | OXYGEN SATURATION: 97 % | SYSTOLIC BLOOD PRESSURE: 115 MMHG | HEIGHT: 64 IN | WEIGHT: 288.58 LBS | HEART RATE: 78 BPM | RESPIRATION RATE: 18 BRPM | BODY MASS INDEX: 49.27 KG/M2

## 2023-08-16 DIAGNOSIS — T45.1X5A CHEMOTHERAPY INDUCED DIARRHEA: ICD-10-CM

## 2023-08-16 DIAGNOSIS — T45.1X5A CHEMOTHERAPY-INDUCED NAUSEA: ICD-10-CM

## 2023-08-16 DIAGNOSIS — C18.6 MALIGNANT NEOPLASM OF DESCENDING COLON: Primary | ICD-10-CM

## 2023-08-16 DIAGNOSIS — C18.6 PRIMARY ADENOCARCINOMA OF DESCENDING COLON: Primary | ICD-10-CM

## 2023-08-16 DIAGNOSIS — K52.1 CHEMOTHERAPY INDUCED DIARRHEA: ICD-10-CM

## 2023-08-16 DIAGNOSIS — R11.0 CHEMOTHERAPY-INDUCED NAUSEA: ICD-10-CM

## 2023-08-16 PROBLEM — K64.4 EXTERNAL HEMORRHOIDS: Status: ACTIVE | Noted: 2023-06-22

## 2023-08-16 LAB
ALBUMIN SERPL-MCNC: 4 G/DL (ref 3.5–5.2)
ALBUMIN/GLOB SERPL: 1.7 G/DL
ALP SERPL-CCNC: 104 U/L (ref 39–117)
ALT SERPL W P-5'-P-CCNC: 21 U/L (ref 1–33)
ANION GAP SERPL CALCULATED.3IONS-SCNC: 8.7 MMOL/L (ref 5–15)
AST SERPL-CCNC: 18 U/L (ref 1–32)
BASOPHILS # BLD AUTO: 0.04 10*3/MM3 (ref 0–0.2)
BASOPHILS NFR BLD AUTO: 0.8 % (ref 0–1.5)
BILIRUB SERPL-MCNC: 0.4 MG/DL (ref 0–1.2)
BUN SERPL-MCNC: 13 MG/DL (ref 6–20)
BUN/CREAT SERPL: 17.1 (ref 7–25)
CALCIUM SPEC-SCNC: 9.2 MG/DL (ref 8.6–10.5)
CHLORIDE SERPL-SCNC: 108 MMOL/L (ref 98–107)
CO2 SERPL-SCNC: 23.3 MMOL/L (ref 22–29)
CREAT SERPL-MCNC: 0.76 MG/DL (ref 0.57–1)
DEPRECATED RDW RBC AUTO: 40.1 FL (ref 37–54)
EGFRCR SERPLBLD CKD-EPI 2021: 97.4 ML/MIN/1.73
EOSINOPHIL # BLD AUTO: 0.11 10*3/MM3 (ref 0–0.4)
EOSINOPHIL NFR BLD AUTO: 2.3 % (ref 0.3–6.2)
ERYTHROCYTE [DISTWIDTH] IN BLOOD BY AUTOMATED COUNT: 13 % (ref 12.3–15.4)
GLOBULIN UR ELPH-MCNC: 2.3 GM/DL
GLUCOSE SERPL-MCNC: 114 MG/DL (ref 65–99)
HCT VFR BLD AUTO: 37.4 % (ref 34–46.6)
HGB BLD-MCNC: 12.8 G/DL (ref 12–15.9)
IMM GRANULOCYTES # BLD AUTO: 0.01 10*3/MM3 (ref 0–0.05)
IMM GRANULOCYTES NFR BLD AUTO: 0.2 % (ref 0–0.5)
LYMPHOCYTES # BLD AUTO: 1.79 10*3/MM3 (ref 0.7–3.1)
LYMPHOCYTES NFR BLD AUTO: 37.1 % (ref 19.6–45.3)
MCH RBC QN AUTO: 29.3 PG (ref 26.6–33)
MCHC RBC AUTO-ENTMCNC: 34.2 G/DL (ref 31.5–35.7)
MCV RBC AUTO: 85.6 FL (ref 79–97)
MONOCYTES # BLD AUTO: 0.68 10*3/MM3 (ref 0.1–0.9)
MONOCYTES NFR BLD AUTO: 14.1 % (ref 5–12)
NEUTROPHILS NFR BLD AUTO: 2.19 10*3/MM3 (ref 1.7–7)
NEUTROPHILS NFR BLD AUTO: 45.5 % (ref 42.7–76)
PLATELET # BLD AUTO: 209 10*3/MM3 (ref 140–450)
PMV BLD AUTO: 9.4 FL (ref 6–12)
POTASSIUM SERPL-SCNC: 3.8 MMOL/L (ref 3.5–5.2)
PROT SERPL-MCNC: 6.3 G/DL (ref 6–8.5)
RBC # BLD AUTO: 4.37 10*6/MM3 (ref 3.77–5.28)
SODIUM SERPL-SCNC: 140 MMOL/L (ref 136–145)
WBC NRBC COR # BLD: 4.82 10*3/MM3 (ref 3.4–10.8)

## 2023-08-16 PROCEDURE — G0498 CHEMO EXTEND IV INFUS W/PUMP: HCPCS

## 2023-08-16 PROCEDURE — 25010000002 OXALIPLATIN PER 0.5 MG: Performed by: INTERNAL MEDICINE

## 2023-08-16 PROCEDURE — 96413 CHEMO IV INFUSION 1 HR: CPT

## 2023-08-16 PROCEDURE — 25010000002 FLUOROURACIL PER 500 MG: Performed by: INTERNAL MEDICINE

## 2023-08-16 PROCEDURE — 25010000002 PALONOSETRON PER 25 MCG: Performed by: INTERNAL MEDICINE

## 2023-08-16 PROCEDURE — 96375 TX/PRO/DX INJ NEW DRUG ADDON: CPT

## 2023-08-16 PROCEDURE — 25010000002 LEUCOVORIN CALCIUM PER 50 MG: Performed by: INTERNAL MEDICINE

## 2023-08-16 PROCEDURE — 85025 COMPLETE CBC W/AUTO DIFF WBC: CPT | Performed by: INTERNAL MEDICINE

## 2023-08-16 PROCEDURE — 96409 CHEMO IV PUSH SNGL DRUG: CPT

## 2023-08-16 PROCEDURE — 96368 THER/DIAG CONCURRENT INF: CPT

## 2023-08-16 PROCEDURE — 25010000002 DEXAMETHASONE SODIUM PHOSPHATE 120 MG/30ML SOLUTION: Performed by: INTERNAL MEDICINE

## 2023-08-16 PROCEDURE — 96415 CHEMO IV INFUSION ADDL HR: CPT

## 2023-08-16 PROCEDURE — 96411 CHEMO IV PUSH ADDL DRUG: CPT

## 2023-08-16 PROCEDURE — 80053 COMPREHEN METABOLIC PANEL: CPT | Performed by: INTERNAL MEDICINE

## 2023-08-16 RX ORDER — DEXTROSE MONOHYDRATE 50 MG/ML
250 INJECTION, SOLUTION INTRAVENOUS ONCE
Status: CANCELLED | OUTPATIENT
Start: 2023-08-16

## 2023-08-16 RX ORDER — MIRABEGRON 50 MG/1
TABLET, FILM COATED, EXTENDED RELEASE ORAL
COMMUNITY
Start: 2023-08-15

## 2023-08-16 RX ORDER — LOPERAMIDE HYDROCHLORIDE 2 MG/1
2 TABLET ORAL 4 TIMES DAILY PRN
Qty: 60 TABLET | Refills: 1 | Status: SHIPPED | OUTPATIENT
Start: 2023-08-16

## 2023-08-16 RX ORDER — TETRACYCLINE HYDROCHLORIDE 250 MG/1
CAPSULE ORAL
COMMUNITY
Start: 2023-08-12

## 2023-08-16 RX ORDER — POLYETHYLENE GLYCOL-3350 AND ELECTROLYTES 236; 6.74; 5.86; 2.97; 22.74 G/274.31G; G/274.31G; G/274.31G; G/274.31G; G/274.31G
POWDER, FOR SOLUTION ORAL
COMMUNITY

## 2023-08-16 RX ORDER — FAMOTIDINE 10 MG/ML
20 INJECTION, SOLUTION INTRAVENOUS AS NEEDED
Status: CANCELLED | OUTPATIENT
Start: 2023-08-16

## 2023-08-16 RX ORDER — NALOXONE HYDROCHLORIDE 4 MG/.1ML
SPRAY NASAL
COMMUNITY

## 2023-08-16 RX ORDER — PALONOSETRON 0.05 MG/ML
0.25 INJECTION, SOLUTION INTRAVENOUS ONCE
Status: COMPLETED | OUTPATIENT
Start: 2023-08-16 | End: 2023-08-16

## 2023-08-16 RX ORDER — PROCHLORPERAZINE MALEATE 10 MG
10 TABLET ORAL EVERY 6 HOURS PRN
Qty: 30 TABLET | Refills: 5 | Status: SHIPPED | OUTPATIENT
Start: 2023-08-16

## 2023-08-16 RX ORDER — ASPIRIN 81 MG/1
TABLET ORAL
COMMUNITY

## 2023-08-16 RX ORDER — DIPHENHYDRAMINE HYDROCHLORIDE 50 MG/ML
50 INJECTION INTRAMUSCULAR; INTRAVENOUS AS NEEDED
Status: CANCELLED | OUTPATIENT
Start: 2023-08-16

## 2023-08-16 RX ORDER — HYDROCHLOROTHIAZIDE 25 MG/1
TABLET ORAL
COMMUNITY

## 2023-08-16 RX ORDER — FLUOROURACIL 50 MG/ML
950 INJECTION, SOLUTION INTRAVENOUS ONCE
Status: COMPLETED | OUTPATIENT
Start: 2023-08-16 | End: 2023-08-16

## 2023-08-16 RX ORDER — PALONOSETRON 0.05 MG/ML
0.25 INJECTION, SOLUTION INTRAVENOUS ONCE
Status: CANCELLED | OUTPATIENT
Start: 2023-08-16

## 2023-08-16 RX ORDER — FLUOROURACIL 50 MG/ML
400 INJECTION, SOLUTION INTRAVENOUS ONCE
Status: CANCELLED | OUTPATIENT
Start: 2023-08-16

## 2023-08-16 RX ORDER — DEXTROSE MONOHYDRATE 50 MG/ML
250 INJECTION, SOLUTION INTRAVENOUS ONCE
Status: COMPLETED | OUTPATIENT
Start: 2023-08-16 | End: 2023-08-16

## 2023-08-16 RX ADMIN — DEXTROSE MONOHYDRATE 250 ML: 50 INJECTION, SOLUTION INTRAVENOUS at 10:08

## 2023-08-16 RX ADMIN — FLUOROURACIL 950 MG: 50 INJECTION, SOLUTION INTRAVENOUS at 12:42

## 2023-08-16 RX ADMIN — DEXAMETHASONE SODIUM PHOSPHATE 12 MG: 4 INJECTION, SOLUTION INTRA-ARTICULAR; INTRALESIONAL; INTRAMUSCULAR; INTRAVENOUS; SOFT TISSUE at 10:10

## 2023-08-16 RX ADMIN — PALONOSETRON 0.25 MG: 0.05 INJECTION, SOLUTION INTRAVENOUS at 10:08

## 2023-08-16 RX ADMIN — FLUOROURACIL 5740 MG: 50 INJECTION, SOLUTION INTRAVENOUS at 12:48

## 2023-08-16 RX ADMIN — OXALIPLATIN 205 MG: 5 INJECTION, SOLUTION INTRAVENOUS at 10:41

## 2023-08-16 RX ADMIN — LEUCOVORIN CALCIUM 950 MG: 350 INJECTION, POWDER, LYOPHILIZED, FOR SOLUTION INTRAMUSCULAR; INTRAVENOUS at 10:41

## 2023-08-18 ENCOUNTER — HOSPITAL ENCOUNTER (OUTPATIENT)
Dept: ONCOLOGY | Facility: HOSPITAL | Age: 48
Discharge: HOME | End: 2023-08-18
Payer: COMMERCIAL

## 2023-08-18 DIAGNOSIS — C18.6 MALIGNANT NEOPLASM OF DESCENDING COLON: Primary | ICD-10-CM

## 2023-08-18 DIAGNOSIS — Z45.2 ENCOUNTER FOR ADJUSTMENT OR MANAGEMENT OF VASCULAR ACCESS DEVICE: ICD-10-CM

## 2023-08-18 PROCEDURE — 25010000002 HEPARIN LOCK FLUSH PER 10 UNITS: Performed by: INTERNAL MEDICINE

## 2023-08-18 RX ORDER — SODIUM CHLORIDE 0.9 % (FLUSH) 0.9 %
20 SYRINGE (ML) INJECTION AS NEEDED
OUTPATIENT
Start: 2023-08-18

## 2023-08-18 RX ORDER — HEPARIN SODIUM (PORCINE) LOCK FLUSH IV SOLN 100 UNIT/ML 100 UNIT/ML
500 SOLUTION INTRAVENOUS AS NEEDED
Status: DISCONTINUED | OUTPATIENT
Start: 2023-08-18 | End: 2023-08-19 | Stop reason: HOSPADM

## 2023-08-18 RX ORDER — SODIUM CHLORIDE 0.9 % (FLUSH) 0.9 %
20 SYRINGE (ML) INJECTION AS NEEDED
Status: DISCONTINUED | OUTPATIENT
Start: 2023-08-18 | End: 2023-08-19 | Stop reason: HOSPADM

## 2023-08-18 RX ORDER — HEPARIN SODIUM (PORCINE) LOCK FLUSH IV SOLN 100 UNIT/ML 100 UNIT/ML
500 SOLUTION INTRAVENOUS AS NEEDED
OUTPATIENT
Start: 2023-08-18

## 2023-08-18 RX ADMIN — Medication 20 ML: at 11:41

## 2023-08-18 RX ADMIN — HEPARIN SODIUM (PORCINE) LOCK FLUSH IV SOLN 100 UNIT/ML 500 UNITS: 100 SOLUTION at 11:42

## 2023-08-23 ENCOUNTER — TELEPHONE (OUTPATIENT)
Dept: ONCOLOGY | Facility: HOSPITAL | Age: 48
End: 2023-08-23
Payer: COMMERCIAL

## 2023-08-23 NOTE — TELEPHONE ENCOUNTER
Caller: Lilian Pelaez    Relationship to patient: Self    Best call back number: [phone]    Chief complaint: PT WANTED TO SEE IF SHE CAN COME A LITTLE LATER     Type of visit: INFUSION AND FOLLOW UP    Requested date: 8-30-23 AROUND 8:45 AM    If rescheduling, when is the original appointment: 8-30-23     Additional notes:PLEASE CALL TO ADVISE

## 2023-08-23 NOTE — TELEPHONE ENCOUNTER
PATIENT IS GOING TO STAY AS SCHEDULED, WILL CALL BACK TO SEE IF WE HAVE CANCELLATION FOR LATER TIME THAT DAY.

## 2023-08-30 ENCOUNTER — TELEPHONE (OUTPATIENT)
Dept: ONCOLOGY | Facility: HOSPITAL | Age: 48
End: 2023-08-30

## 2023-08-30 ENCOUNTER — OFFICE VISIT (OUTPATIENT)
Dept: ONCOLOGY | Facility: HOSPITAL | Age: 48
End: 2023-08-30
Payer: COMMERCIAL

## 2023-08-30 ENCOUNTER — HOSPITAL ENCOUNTER (OUTPATIENT)
Dept: ONCOLOGY | Facility: HOSPITAL | Age: 48
Discharge: HOME | End: 2023-08-30
Payer: COMMERCIAL

## 2023-08-30 ENCOUNTER — DOCUMENTATION (OUTPATIENT)
Dept: ONCOLOGY | Facility: HOSPITAL | Age: 48
End: 2023-08-30
Payer: COMMERCIAL

## 2023-08-30 VITALS
TEMPERATURE: 97.8 F | DIASTOLIC BLOOD PRESSURE: 74 MMHG | WEIGHT: 279.98 LBS | BODY MASS INDEX: 47.8 KG/M2 | HEART RATE: 79 BPM | SYSTOLIC BLOOD PRESSURE: 137 MMHG | OXYGEN SATURATION: 99 % | HEIGHT: 64 IN | RESPIRATION RATE: 18 BRPM

## 2023-08-30 VITALS
HEART RATE: 79 BPM | TEMPERATURE: 97.8 F | WEIGHT: 279.1 LBS | OXYGEN SATURATION: 99 % | DIASTOLIC BLOOD PRESSURE: 74 MMHG | RESPIRATION RATE: 18 BRPM | BODY MASS INDEX: 47.88 KG/M2 | SYSTOLIC BLOOD PRESSURE: 137 MMHG

## 2023-08-30 DIAGNOSIS — C18.6 MALIGNANT NEOPLASM OF DESCENDING COLON: Primary | ICD-10-CM

## 2023-08-30 PROBLEM — K12.1 STOMATITIS: Status: ACTIVE | Noted: 2023-08-10

## 2023-08-30 LAB
ALBUMIN SERPL-MCNC: 4.2 G/DL (ref 3.5–5.2)
ALBUMIN/GLOB SERPL: 1.8 G/DL
ALP SERPL-CCNC: 104 U/L (ref 39–117)
ALT SERPL W P-5'-P-CCNC: 23 U/L (ref 1–33)
ANION GAP SERPL CALCULATED.3IONS-SCNC: 10.5 MMOL/L (ref 5–15)
AST SERPL-CCNC: 21 U/L (ref 1–32)
BASOPHILS # BLD AUTO: 0.04 10*3/MM3 (ref 0–0.2)
BASOPHILS NFR BLD AUTO: 0.9 % (ref 0–1.5)
BILIRUB SERPL-MCNC: 0.6 MG/DL (ref 0–1.2)
BUN SERPL-MCNC: 10 MG/DL (ref 6–20)
BUN/CREAT SERPL: 12.3 (ref 7–25)
CALCIUM SPEC-SCNC: 10 MG/DL (ref 8.6–10.5)
CEA SERPL-MCNC: 1.66 NG/ML
CHLORIDE SERPL-SCNC: 108 MMOL/L (ref 98–107)
CO2 SERPL-SCNC: 23.5 MMOL/L (ref 22–29)
CREAT SERPL-MCNC: 0.81 MG/DL (ref 0.57–1)
DEPRECATED RDW RBC AUTO: 43.2 FL (ref 37–54)
EGFRCR SERPLBLD CKD-EPI 2021: 90.2 ML/MIN/1.73
EOSINOPHIL # BLD AUTO: 0.1 10*3/MM3 (ref 0–0.4)
EOSINOPHIL NFR BLD AUTO: 2.1 % (ref 0.3–6.2)
ERYTHROCYTE [DISTWIDTH] IN BLOOD BY AUTOMATED COUNT: 14.2 % (ref 12.3–15.4)
GLOBULIN UR ELPH-MCNC: 2.4 GM/DL
GLUCOSE SERPL-MCNC: 111 MG/DL (ref 65–99)
HCT VFR BLD AUTO: 38.4 % (ref 34–46.6)
HGB BLD-MCNC: 13.1 G/DL (ref 12–15.9)
IMM GRANULOCYTES # BLD AUTO: 0.02 10*3/MM3 (ref 0–0.05)
IMM GRANULOCYTES NFR BLD AUTO: 0.4 % (ref 0–0.5)
LYMPHOCYTES # BLD AUTO: 1.89 10*3/MM3 (ref 0.7–3.1)
LYMPHOCYTES NFR BLD AUTO: 40.2 % (ref 19.6–45.3)
MCH RBC QN AUTO: 29.4 PG (ref 26.6–33)
MCHC RBC AUTO-ENTMCNC: 34.1 G/DL (ref 31.5–35.7)
MCV RBC AUTO: 86.1 FL (ref 79–97)
MONOCYTES # BLD AUTO: 0.86 10*3/MM3 (ref 0.1–0.9)
MONOCYTES NFR BLD AUTO: 18.3 % (ref 5–12)
NEUTROPHILS NFR BLD AUTO: 1.79 10*3/MM3 (ref 1.7–7)
NEUTROPHILS NFR BLD AUTO: 38.1 % (ref 42.7–76)
PLATELET # BLD AUTO: 172 10*3/MM3 (ref 140–450)
PMV BLD AUTO: 9.5 FL (ref 6–12)
POTASSIUM SERPL-SCNC: 3.9 MMOL/L (ref 3.5–5.2)
PROT SERPL-MCNC: 6.6 G/DL (ref 6–8.5)
RBC # BLD AUTO: 4.46 10*6/MM3 (ref 3.77–5.28)
SODIUM SERPL-SCNC: 142 MMOL/L (ref 136–145)
WBC NRBC COR # BLD: 4.7 10*3/MM3 (ref 3.4–10.8)

## 2023-08-30 PROCEDURE — 96415 CHEMO IV INFUSION ADDL HR: CPT

## 2023-08-30 PROCEDURE — 96411 CHEMO IV PUSH ADDL DRUG: CPT

## 2023-08-30 PROCEDURE — 96413 CHEMO IV INFUSION 1 HR: CPT

## 2023-08-30 PROCEDURE — 25010000002 PALONOSETRON PER 25 MCG: Performed by: INTERNAL MEDICINE

## 2023-08-30 PROCEDURE — 25010000002 FLUOROURACIL PER 500 MG: Performed by: INTERNAL MEDICINE

## 2023-08-30 PROCEDURE — 85025 COMPLETE CBC W/AUTO DIFF WBC: CPT | Performed by: INTERNAL MEDICINE

## 2023-08-30 PROCEDURE — 96375 TX/PRO/DX INJ NEW DRUG ADDON: CPT

## 2023-08-30 PROCEDURE — 82378 CARCINOEMBRYONIC ANTIGEN: CPT | Performed by: INTERNAL MEDICINE

## 2023-08-30 PROCEDURE — 80053 COMPREHEN METABOLIC PANEL: CPT | Performed by: INTERNAL MEDICINE

## 2023-08-30 PROCEDURE — 96549 UNLISTED CHEMOTHERAPY PX: CPT

## 2023-08-30 PROCEDURE — 25010000002 LEUCOVORIN CALCIUM PER 50 MG: Performed by: INTERNAL MEDICINE

## 2023-08-30 PROCEDURE — 25010000002 OXALIPLATIN PER 0.5 MG: Performed by: INTERNAL MEDICINE

## 2023-08-30 PROCEDURE — 25010000002 DEXAMETHASONE SODIUM PHOSPHATE 120 MG/30ML SOLUTION: Performed by: INTERNAL MEDICINE

## 2023-08-30 PROCEDURE — G0498 CHEMO EXTEND IV INFUS W/PUMP: HCPCS

## 2023-08-30 RX ORDER — FLUOROURACIL 50 MG/ML
950 INJECTION, SOLUTION INTRAVENOUS ONCE
Status: COMPLETED | OUTPATIENT
Start: 2023-08-30 | End: 2023-08-30

## 2023-08-30 RX ORDER — PALONOSETRON 0.05 MG/ML
0.25 INJECTION, SOLUTION INTRAVENOUS ONCE
Status: COMPLETED | OUTPATIENT
Start: 2023-08-30 | End: 2023-08-30

## 2023-08-30 RX ORDER — FAMOTIDINE 10 MG/ML
20 INJECTION, SOLUTION INTRAVENOUS AS NEEDED
Status: CANCELLED | OUTPATIENT
Start: 2023-08-30

## 2023-08-30 RX ORDER — DEXTROSE MONOHYDRATE 50 MG/ML
250 INJECTION, SOLUTION INTRAVENOUS ONCE
Status: COMPLETED | OUTPATIENT
Start: 2023-08-30 | End: 2023-08-30

## 2023-08-30 RX ORDER — FLUOROURACIL 50 MG/ML
400 INJECTION, SOLUTION INTRAVENOUS ONCE
Status: CANCELLED | OUTPATIENT
Start: 2023-08-30

## 2023-08-30 RX ORDER — DIPHENHYDRAMINE HYDROCHLORIDE 50 MG/ML
50 INJECTION INTRAMUSCULAR; INTRAVENOUS AS NEEDED
Status: CANCELLED | OUTPATIENT
Start: 2023-08-30

## 2023-08-30 RX ORDER — DEXTROSE MONOHYDRATE 50 MG/ML
250 INJECTION, SOLUTION INTRAVENOUS ONCE
Status: CANCELLED | OUTPATIENT
Start: 2023-08-30

## 2023-08-30 RX ORDER — PALONOSETRON 0.05 MG/ML
0.25 INJECTION, SOLUTION INTRAVENOUS ONCE
Status: CANCELLED | OUTPATIENT
Start: 2023-08-30

## 2023-08-30 RX ORDER — LOPERAMIDE HYDROCHLORIDE 2 MG/1
CAPSULE ORAL
COMMUNITY
Start: 2023-08-16

## 2023-08-30 RX ADMIN — FLUOROURACIL 5740 MG: 50 INJECTION, SOLUTION INTRAVENOUS at 12:41

## 2023-08-30 RX ADMIN — PALONOSETRON 0.25 MG: 0.05 INJECTION, SOLUTION INTRAVENOUS at 09:57

## 2023-08-30 RX ADMIN — DEXAMETHASONE SODIUM PHOSPHATE 12 MG: 4 INJECTION, SOLUTION INTRA-ARTICULAR; INTRALESIONAL; INTRAMUSCULAR; INTRAVENOUS; SOFT TISSUE at 10:00

## 2023-08-30 RX ADMIN — DEXTROSE MONOHYDRATE 250 ML: 50 INJECTION, SOLUTION INTRAVENOUS at 09:56

## 2023-08-30 RX ADMIN — OXALIPLATIN 200 MG: 5 INJECTION, SOLUTION INTRAVENOUS at 10:27

## 2023-08-30 RX ADMIN — FLUOROURACIL 950 MG: 50 INJECTION, SOLUTION INTRAVENOUS at 12:32

## 2023-08-30 RX ADMIN — LEUCOVORIN CALCIUM 950 MG: 350 INJECTION, POWDER, LYOPHILIZED, FOR SOLUTION INTRAMUSCULAR; INTRAVENOUS at 10:27

## 2023-08-30 NOTE — TELEPHONE ENCOUNTER
Spoke with patient, advised that we are not able to give Thorazine at this time due to the interaction with Reglan. Patient states that the hiccups are tolerable for now. Instructed to maintain all follow up visits and to contact the office with any questions or concerns. Patient verbalized understanding of all information discussed.

## 2023-08-30 NOTE — PROGRESS NOTES
Diagnosis: Colon cancer    Reason for Referral: Travel reimbursement    Content of Visit: OSW met with patient at her infusion chair this morning to complete the travel to [place] reimbursement form in order for patient to receive reimbursement for mileage to and from treatment.  Patient appeared to be in a good mood with matching affect.  Travel reimbursement form was emailed to travel to [place] on behalf of patient today.    Resources/Referrals Provided: Travel to [place]

## 2023-08-30 NOTE — PROGRESS NOTES
Chief Complaint/Care Team   ADENOCARCINOMA OF DESCENDING COLON    Shelly Cash MD Stephens, Cassandra, MD    History of Present Illness     Diagnosis: Colon Adenocarcinoma S/p Left colectomy on 5/5/23, stage after resection [id number], stage III    Current Treatment: FOLFOX IV [id number] x 6 months, cycle 1 on 6/14/2023  Treatment delay secondary to pneumoperitoneum which developed in June 2023, patient cleared to resume chemotherapy in August 2023.    Previous Treatment: c-scope on 4/2023 biopsy revealed colon adenocarcinoma    Lilian Pelaez is a 47 y.o. female who presents to [facility] HEMATOLOGY & ONCOLOGY for discussion of newly diagnosed colon adenocarcinoma seen on biopsy of colon mass in the descending colon during colonoscopy performed in April 2023.     Staging scans revealed on 4/11/2023:  CT abdomen/pelvis without any metastatic disease in abdomen/pelvis  CT chest no evidence of metastatic disease in chest.    Patient underwent robotic resection of descending colon and splenic flexure by Dr. Rolando Liu on 5/5/2023.    Patient is a former smoker.   Patient reports family history of several relatives with other forms of cancer cancer on her mother side of the family, but denies any known history of colon cancer in her family.    Patient reports noticing some blood mixed in with stool prior to cancer diagnosis.      Patient received cycle 1 on 6/14/2023, cycle 2 was delayed secondary to development of pneumoperitoneum after cycle 1, patient was mated to the hospital very by her surgeon Dr. Liu who recommended delaying further cycles of chemotherapy until August 1, 2023.    Interval history: Patient here prior to cycle 4 of chemotherapy with FOLFOX.  Patient cleared by Dr. Rolando Liu to resume chemotherapy in August 2023 after developing pneumoperitoneum in June 2023.  She reports feeling well, no report of fever, chills, SOA, recent infections, blood loss, melena, or n/v/d.   "After last chemotherapy infusion patient with some diarrhea and reports it resolved after taking Imodium more frequently.      Review of Systems   Constitutional:  Positive for activity change and fatigue. Negative for appetite change, diaphoresis, fever, unexpected weight gain and unexpected weight loss.   HENT:  Negative for hearing loss, mouth sores, sore throat, swollen glands, trouble swallowing and voice change.    Eyes:  Negative for blurred vision.   Respiratory:  Negative for cough, shortness of breath and wheezing.    Cardiovascular:  Negative for chest pain and palpitations.   Gastrointestinal:  Negative for abdominal pain, blood in stool, constipation, diarrhea, nausea and vomiting.   Endocrine: Negative for cold intolerance and heat intolerance.   Genitourinary:  Negative for difficulty urinating, dysuria, frequency, hematuria and urinary incontinence.   Musculoskeletal:  Negative for arthralgias, back pain and myalgias.   Skin:  Negative for rash, skin lesions and wound.   Neurological:  Negative for dizziness, seizures, weakness, numbness and headache.   Hematological:  Does not bruise/bleed easily.   Psychiatric/Behavioral:  Negative for depressed mood. The patient is nervous/anxious.    All other systems reviewed and are negative.     Oncology/Hematology History   Malignant neoplasm of descending colon   5/6/2023 Initial Diagnosis    Malignant neoplasm of descending colon     5/31/2023 Cancer Staged    Staging form: Colon And Rectum, AJCC 8th Edition  - Pathologic: Stage IIIB (pT3, pN1b, cM0) - Signed by Brian Marroquin MD on 5/31/2023 6/14/2023 -  Chemotherapy    OP COLON [id number] OXALIplatin / Leucovorin / Fluorouracil           Objective     Vitals:    08/30/23 0830   BP: 137/74   Pulse: 79   Resp: 18   Temp: 97.8 øF (36.6 øC)   TempSrc: Temporal   SpO2: 99%   Weight: 127 kg (279 lb 15.8 oz)   Height: 162.6 cm (64.02\")   PainSc: 0-No pain     ECOG score: 0         PHQ-9 Total Score:     "     Physical Exam  Vitals reviewed. Exam conducted with a chaperone present.   Constitutional:       General: She is not in acute distress.     Appearance: Normal appearance.   HENT:      Head: Normocephalic and atraumatic.   Eyes:      Extraocular Movements: Extraocular movements intact.      Conjunctiva/sclera: Conjunctivae normal.   Pulmonary:      Effort: Pulmonary effort is normal.   Musculoskeletal:      Cervical back: Normal range of motion and neck supple.   Skin:     General: Skin is warm and dry.      Findings: No bruising.   Neurological:      Mental Status: She is oriented to person, place, and time.         Past Medical History     Past Medical History:   Diagnosis Date    Anesthesia     B/P bottomed out/UNSURE ON THE DATE    Anxiety     Asthma 1996    seasonal/NO INHALERS    Colon cancer 04/17/2023    DDD (degenerative disc disease), cervical     DDD (degenerative disc disease), lumbar     Depression     Diverticulitis of colon 2005    Scope was done in [facility]    GERD (gastroesophageal reflux disease)     Hypertension     Kidney stones     Melanoma     removed    Palpitation     FOLLOWS W/DR. ANTONIO  Q6 MONTHS, NO CP OR SOA    Prolapse of female bladder      Current Outpatient Medications on File Prior to Visit   Medication Sig Dispense Refill    al mag oxide-diphenhydramine-nystatin (MAGIC MOUTHWASH) suspension SWISH & SPIT ONE TEASPOONFUL BY MOUTH FOUR TIMES DAILY      allopurinol (ZYLOPRIM) 100 MG tablet Take 1 tablet by mouth Daily.      ALPRAZolam (XANAX) 0.5 MG tablet Take 1 tablet by mouth At Night As Needed.      amLODIPine (NORVASC) 10 MG tablet Take 1 tablet by mouth Every Night.      aspirin 81 MG EC tablet Take 1 tablet every day by oral route.      Diclofenac Sodium (VOLTAREN) 1 % gel gel Apply 4 g topically to the appropriate area as directed 4 (Four) Times a Day As Needed.      docusate sodium 100 MG capsule Take 1 capsule by mouth Daily.      fluorouracil (EFUDEX) 5 % cream Apply 1  application  topically to the appropriate area as directed 2 (Two) Times a Day.      furosemide (LASIX) 20 MG tablet Take 1 tablet by mouth Daily.      hydroCHLOROthiazide (HYDRODIURIL) 25 MG tablet Take 1 tablet every day by oral route for 90 days.      HYDROcodone-acetaminophen (NORCO) 7.5-325 MG per tablet Take 1 tablet by mouth Every 8 (Eight) Hours As Needed.      Hydrocortisone, Perianal, (ANUSOL-HC) 2.5 % rectal cream       hyoscyamine (ANASPAZ,LEVSIN) 0.125 MG tablet TAKE ONE TABLET BY MOUTH FOUR TIMES A DAY WITH MEALS AND AT BEDTIME. 120 tablet 1    ketorolac (TORADOL) 10 MG tablet Take 1 tablet by mouth Every 6 (Six) Hours As Needed.      lamoTRIgine (LaMICtal) 25 MG tablet Take 1 tablet by mouth 2 (Two) Times a Day. Only takes at night      leucovorin 5 MG tablet Take  by mouth Every 6 (Six) Hours.      levoFLOXacin (Levaquin) 750 MG tablet Take 1 tablet by mouth Daily. 7 tablet 0    Lidocaine 4 % patch Apply 1 patch topically Daily.      lidocaine-prilocaine (EMLA) 2.5-2.5 % cream Apply 1 application topically to the appropriate area as directed Every 2 (Two) Hours As Needed for Mild Pain. 30 g 1    linaclotide (LINZESS) 290 MCG capsule capsule Take 1 capsule by mouth As Needed.      loperamide (Imodium A-D) 2 MG tablet Take 1 tablet by mouth 4 (Four) Times a Day As Needed for Diarrhea. Imodium - take 4 mg first dose, followed by 2 mg every 2-4 hours after each stool (MAX of 16 mg in 24 hour period) 60 tablet 1    loperamide (IMODIUM) 2 MG capsule       losartan (COZAAR) 100 MG tablet Take 1 tablet by mouth Every Night.      medroxyPROGESTERone (DEPO-PROVERA) 150 MG/ML injection Inject 1 mL into the appropriate muscle as directed by prescriber Every 3 (Three) Months.      metoclopramide (REGLAN) 10 MG tablet Take 1 tablet by mouth 3 (Three) Times a Day.      metoprolol succinate XL (TOPROL-XL) 25 MG 24 hr tablet Take 1 tablet by mouth Daily.      Multiple Vitamins-Minerals (b complex-C-E-zinc) tablet  Take 1 tablet by mouth Daily.      Myrbetriq 50 MG tablet sustained-release 24 hour 24 hr tablet       naloxone (NARCAN) 4 MG/0.1ML nasal spray Take 1 spray every day by nasal route as needed.      ondansetron (ZOFRAN) 8 MG tablet Take 1 tablet by mouth 3 (Three) Times a Day As Needed for Nausea or Vomiting. 30 tablet 5    OXALIPLATIN IV Infuse  into a venous catheter.      pantoprazole (PROTONIX) 40 MG EC tablet Take 1 tablet by mouth 2 (Two) Times a Day.      polyethylene glycol (GaviLyte-G) 236 g solution       prochlorperazine (COMPAZINE) 10 MG tablet Take 1 tablet by mouth Every 6 (Six) Hours As Needed for Nausea or Vomiting. 30 tablet 5    rOPINIRole (REQUIP) 1 MG tablet Take 1 tablet by mouth Every Night.      sertraline (ZOLOFT) 100 MG tablet Take 1 tablet by mouth Daily.      sucralfate (CARAFATE) 1 g tablet Take 1 tablet by mouth 2 (Two) Times a Day.      tetracycline (ACHROMYCIN,SUMYCIN) 250 MG capsule       traMADol (Ultram) 50 MG tablet Take 1 tablet by mouth Every 6 (Six) Hours As Needed for Severe Pain or Moderate Pain. 5 tablet 0    vitamin D3 125 MCG (5000 UT) capsule capsule Take 2,000 Units by mouth Daily.      Myrbetriq 25 MG tablet sustained-release 24 hour 24 hr tablet Take 1 tablet by mouth Every Evening.       No current facility-administered medications on file prior to visit.      Allergies   Allergen Reactions    Hydromorphone Hives, Itching and GI Intolerance    Morphine Hives, Itching and Nausea And Vomiting    Oxybutynin Swelling and Angioedema           Oxycodone-Acetaminophen Itching and Nausea And Vomiting     Can not take any higher than 7.5    Penicillins Rash    Promethazine Nausea And Vomiting    Tylenol With Codeine #3 [Acetaminophen-Codeine] Itching and Nausea And Vomiting    Oxybutynin Chloride Angioedema     Past Surgical History:   Procedure Laterality Date    BREAST SURGERY  2016    Removed 8lbs of tissue, 2016    CHOLECYSTECTOMY      COLON RESECTION Left 05/05/2023     Procedure: RESECTION OF DESCENDING COLON AND SPLENIC FLEXURE TAKEDOWN WITH DAVINCI ROBOT;  Surgeon: Rolando Liu MD;  Location: [facility];  Service: Robotics - DaVinci;  Laterality: Left;    COLONOSCOPY N/A 04/04/2023    Procedure: COLONOSCOPY WITH POLYPECTOMY/SNARE/BIOPSIES/TATTOOING DESCENDING COLON MASS;  Surgeon: Deniz Dowd MD;  Location: [facility] ENDOSCOPY;  Service: Gastroenterology;  Laterality: N/A;  INCOMPLETE COLONOSCOPY  POOR BOWEL PREP  COLON POLYP  COLON MASS  DIVERTICULOSIS    COLONOSCOPY N/A 04/12/2023    Procedure: COLONOSCOPY with cold snare;  Surgeon: Deniz Dowd MD;  Location: [facility] ENDOSCOPY;  Service: Gastroenterology;  Laterality: N/A;  colon polyps, diverticulosis, colon mass, hemorrhoids      CYSTOSCOPY W/ URETERAL STENT PLACEMENT Left 05/05/2023    Procedure: Cystoscopy, left ureteral injection,;  Surgeon: Manjula Randolph MD;  Location: [facility];  Service: Urology;  Laterality: Left;    FRACTURE SURGERY  2001    Left knee    KNEE SURGERY Left     x4    LAPAROSCOPIC TUBAL LIGATION  1995    LAPAROSCOPIC TUBAL LIGATION      Reversed    REDUCTION MAMMAPLASTY  2009    8lbs of tissue was removed    TONSILLECTOMY      UPPER GASTROINTESTINAL ENDOSCOPY      VENOUS ACCESS DEVICE (PORT) INSERTION N/A 6/5/2023    Procedure: INSERTION VENOUS ACCESS DEVICE;  Surgeon: Rolando Liu MD;  Location: [facility];  Service: General;  Laterality: N/A;     Social History     Socioeconomic History    Marital status:    Tobacco Use    Smoking status: Never     Passive exposure: Never    Smokeless tobacco: Never   Vaping Use    Vaping Use: Never used   Substance and Sexual Activity    Alcohol use: Never    Drug use: Never    Sexual activity: Defer     Family History   Problem Relation Age of Onset    Colon cancer Neg Hx        Results     Result Review   The following data was reviewed by: Brian Marroquin MD on 04/13/2023:  Lab Results   Component Value Date    HGB 13.1  08/30/2023    HCT 38.4 08/30/2023    MCV 86.1 08/30/2023     08/30/2023    WBC 4.70 08/30/2023    NEUTROABS 1.79 08/30/2023    LYMPHSABS 1.89 08/30/2023    MONOSABS 0.86 08/30/2023    EOSABS 0.10 08/30/2023    BASOSABS 0.04 08/30/2023     Lab Results   Component Value Date    GLUCOSE 114 (H) 08/16/2023    BUN 13 08/16/2023    CREATININE 0.76 08/16/2023     08/16/2023    K 3.8 08/16/2023     (H) 08/16/2023    CO2 23.3 08/16/2023    CALCIUM 9.2 08/16/2023    PROTEINTOT 6.3 08/16/2023    ALBUMIN 4.0 08/16/2023    BILITOT 0.4 08/16/2023    ALKPHOS 104 08/16/2023    AST 18 08/16/2023    ALT 21 08/16/2023     Lab Results   Component Value Date    MG 2.3 06/18/2023    PHOS 3.3 06/18/2023       Case Report   Surgical Pathology Report                         Case: [id number]                                   Authorizing Provider:  Deniz Dowd MD    Collected:           04/12/2023 09:41 AM           Ordering Location:     [facility] Received:            04/12/2023 11:41 AM                                  SUITES                                                                        Pathologist:           Niesha Torres DO                                                        Specimens:   1) - Large Intestine, Cecum, cecal polyp cold snare                                                  2) - Large Intestine, Sigmoid Colon, sigmoid colon polyp cold snare                        Clinical Information    Mass of colon   Final Diagnosis   1. Cecum polyp, biopsy:                            - Tubular adenoma        2. Sigmoid colon polyp, biopsy:                            - Tubular adenoma    Electronically signed by Niesha Torres DO on 4/13/2023 at 0840     Surgical Pathology Report                         Case: [id number]                                   Authorizing Provider:  Deniz Dowd MD    Collected:           04/04/2023 11:15 AM           Ordering Location:      [facility] Received:            04/04/2023 11:59 AM                                  SUITES                                                                        Pathologist:           Jennifer Mike MD                                                      Specimens:   1) - Large Intestine, Transverse Colon, TRANSVERSE COLON POLYP                                       2) - Large Intestine, Left / Descending Colon, DESCENDING COLON BIOPSIES                   Clinical Information    Constipation, unspecified constipation type   Final Diagnosis   1. Transverse colon polyp, biopsy:               - Fragments of tubular adenoma        2.  Descending colon, biopsy:               -Adenocarcinoma, moderately to poorly differentiated     Comment: Per policy, reflex testing has been ordered, and the results will be separately reported.     REMARKS: The above positive (malignant) diagnosis was called to April in Dr. Dowd's office at 09:09 EDT on 4/5/2023 by s.          Electronically signed by Jennifer Mike MD on 4/5/2023 at 0930         Surgical Pathology Report                         Case: [id number]                                   Authorizing Provider:  Rolando Liu MD        Collected:           05/05/2023 03:06 PM           Ordering Location:     [facility]  Received:            05/08/2023 06:48 AM                                  OR                                                                            Pathologist:           Jt Florence MD                                                             Specimen:    Large Intestine, Left / Descending Colon, left colon                                       Clinical Information    Malignant neoplasm of descending colon   Final Diagnosis   Left colon, resection:               - Adenocarcinoma               - Three of thirty-three lymph nodes positive for carcinoma (3/33)               - See synoptic checklist    Electronically signed by Jt Florence MD on 5/11/2023 at 1508   Synoptic Checklist   COLON AND RECTUM: Resection, Including Transanal Disk Excision of Rectal Neoplasms  8th Edition - Protocol posted: 6/22/2022  COLON AND RECTUM: RESECTION - All Specimens  SPECIMEN   Procedure  Descending colon resection    TUMOR   Tumor Site  Descending colon    Histologic Type  Adenocarcinoma    Histologic Grade  G3, poorly differentiated    Tumor Size  Greatest dimension (Centimeters): 3.5 cm   Tumor Extent  Invades through muscularis propria into the pericolonic or perirectal tissue    Macroscopic Tumor Perforation  Not identified    Lymphovascular Invasion  Small vessel    Perineural Invasion  Present    Treatment Effect  No known presurgical therapy    MARGINS   Margin Status for Invasive Carcinoma  All margins negative for invasive carcinoma    Closest Margin(s) to Invasive Carcinoma  Radial (circumferential) or mesenteric    Distance from Invasive Carcinoma to Closest Margin  3.5 cm   Margin Status for Non-Invasive Tumor  All margins negative for high-grade dysplasia / intramucosal carcinoma and low-grade dysplasia    REGIONAL LYMPH NODES   Regional Lymph Node Status  Tumor present in regional lymph node(s)    Number of Lymph Nodes with Tumor  3    Number of Lymph Nodes Examined  33    Tumor Deposits  Present    Number of Tumor Deposits  1    PATHOLOGIC STAGE CLASSIFICATION (pTNM, AJCC 8th Edition)   Reporting of pT, pN, and (when applicable) pM categories is based on information available to the pathologist at the time the report is issued. As per the AJCC (Chapter 1, 8th Ed.) it is the managing physician's responsibility to establish the final pathologic stage based upon all pertinent information, including but potentially not limited to this pathology report.   pT Category  pT3    pN Category  pN1b    ADDITIONAL FINDINGS   Additional Findings  None identified    .               Assessment & Plan     Diagnoses and all  orders for this visit:    1. Malignant neoplasm of descending colon (Primary)            Lilian Pelaez is a 47 y.o. female who presents to [facility] HEMATOLOGY & ONCOLOGY evaluation prior to systemic therapy for stage III colon cancer, patient status post resection of descending colon splenic flexure by Dr. Rolando Liu on 5/5/2023.     Reviewed NCCN guidelines for her stage III cancer, discussed high risk features seen on pathology report and discussed CapeOx for 3 months versus FOLFOX for 3 to 6 months with patient and  today.  Patient agreed to plan to undergo FOLFOX IV chemotherapy every 2 weeks for 6 months.    Plan to rescan patient after 3 months of FOLFOX therapy (11/2023).    Patient received cycle 1 on June 14, 2023, she developed pneumoperitoneum which her surgeon suspect was secondary to severe constipation from iron tablets, but patient was cleared to resume chemotherapy after 8/1/2023.    Iron deficiency anemia from GI blood loss  -Evidence of this on lab work from May 2023  - We will continue to monitor and will offer IV iron therapy in the future if she becomes iron deficient secondary to pneumoperitoneum that developed in June 2023 from severe constipation.    Chemotherapy-induced nausea  - Currently well controlled with Zofran, but prescribe Compazine as a backup option  - Patient also taking Reglan and was counseled regarding not taking Reglan with Zofran and Compazine together but rather patient to take other antiemetics at least 4 to 6 hours after Reglan dose.    Chemotherapy induced diarrhea  - Patient prescribed Imodium   -We will prescribe Lomotil if Imodium fails to work.    Labs reviewed and plan to proceed with cycle 4 day 1 on today.    Plan patient follow-up in the next 2 weeks for cycle 5 day 1 of chemotherapy.    Patient verbalized understanding and agreement plan.    Please note that portions of this note were completed with a voice recognition  program.    Electronically signed by Brian Marroquin MD, 08/30/23, 9:43 AM EDT.      Follow Up     I spent 46 minutes caring for Lilian on this date of service. This time includes time spent by me in the following activities:preparing for the visit, reviewing tests, obtaining and/or reviewing a separately obtained history, performing a medically appropriate examination and/or evaluation , counseling and educating the patient/family/caregiver, ordering medications, tests, or procedures, documenting information in the medical record and care coordination.    This is an acute or chronic illness that poses a threat to life or bodily function. The above treatment plan involves a high risk of complications and/or mortality of patient management.    The patient was seen and examined. Work by the provider also included review and/or ordering of lab tests, review and/or ordering of radiology tests, review and/or ordering of medicine tests, discussion with other physicians or providers, independent review of data, obtaining old records, review/summation of old records, and/or other review.    I have reviewed the family history, social history, and past medical history for this patient. Previous information and data has been reviewed and updated as needed. I have reviewed and verified the chief complaint, history, and other documentation. The patient was interviewed and examined in the clinic and the chart reviewed. The previous observations, recommendations, and conclusions were reviewed including those of other providers.     The plan was discussed with the patient and/or family. The patient was given time to ask questions and these questions were answered. At the conclusion of their visit they had no additional questions or concerns and all questions were answered to their satisfaction.    Patient was given instructions and counseling regarding her condition or for health maintenance advice. Please see specific information  pulled into the AVS if appropriate.

## 2023-09-01 ENCOUNTER — HOSPITAL ENCOUNTER (OUTPATIENT)
Dept: ONCOLOGY | Facility: HOSPITAL | Age: 48
Discharge: HOME | End: 2023-09-01
Payer: COMMERCIAL

## 2023-09-01 DIAGNOSIS — C18.6 MALIGNANT NEOPLASM OF DESCENDING COLON: ICD-10-CM

## 2023-09-01 DIAGNOSIS — Z45.2 ENCOUNTER FOR ADJUSTMENT OR MANAGEMENT OF VASCULAR ACCESS DEVICE: Primary | ICD-10-CM

## 2023-09-01 PROCEDURE — 25010000002 HEPARIN LOCK FLUSH PER 10 UNITS: Performed by: INTERNAL MEDICINE

## 2023-09-01 RX ORDER — HEPARIN SODIUM (PORCINE) LOCK FLUSH IV SOLN 100 UNIT/ML 100 UNIT/ML
500 SOLUTION INTRAVENOUS AS NEEDED
Status: DISCONTINUED | OUTPATIENT
Start: 2023-09-01 | End: 2023-09-02 | Stop reason: HOSPADM

## 2023-09-01 RX ORDER — HEPARIN SODIUM (PORCINE) LOCK FLUSH IV SOLN 100 UNIT/ML 100 UNIT/ML
500 SOLUTION INTRAVENOUS AS NEEDED
OUTPATIENT
Start: 2023-09-01

## 2023-09-01 RX ORDER — SODIUM CHLORIDE 0.9 % (FLUSH) 0.9 %
20 SYRINGE (ML) INJECTION AS NEEDED
OUTPATIENT
Start: 2023-09-01

## 2023-09-01 RX ORDER — SODIUM CHLORIDE 0.9 % (FLUSH) 0.9 %
20 SYRINGE (ML) INJECTION AS NEEDED
Status: DISCONTINUED | OUTPATIENT
Start: 2023-09-01 | End: 2023-09-02 | Stop reason: HOSPADM

## 2023-09-01 RX ADMIN — HEPARIN SODIUM (PORCINE) LOCK FLUSH IV SOLN 100 UNIT/ML 500 UNITS: 100 SOLUTION at 11:56

## 2023-09-01 RX ADMIN — Medication 20 ML: at 11:56

## 2023-09-13 ENCOUNTER — HOSPITAL ENCOUNTER (OUTPATIENT)
Dept: ONCOLOGY | Facility: HOSPITAL | Age: 48
Discharge: HOME | End: 2023-09-13
Admitting: INTERNAL MEDICINE
Payer: COMMERCIAL

## 2023-09-13 ENCOUNTER — OFFICE VISIT (OUTPATIENT)
Dept: ONCOLOGY | Facility: HOSPITAL | Age: 48
End: 2023-09-13
Payer: COMMERCIAL

## 2023-09-13 VITALS
DIASTOLIC BLOOD PRESSURE: 87 MMHG | HEART RATE: 72 BPM | BODY MASS INDEX: 48.98 KG/M2 | SYSTOLIC BLOOD PRESSURE: 149 MMHG | RESPIRATION RATE: 20 BRPM | TEMPERATURE: 97.2 F | WEIGHT: 285.5 LBS | OXYGEN SATURATION: 98 %

## 2023-09-13 VITALS
HEIGHT: 64 IN | BODY MASS INDEX: 48.55 KG/M2 | SYSTOLIC BLOOD PRESSURE: 149 MMHG | TEMPERATURE: 97.2 F | WEIGHT: 284.39 LBS | RESPIRATION RATE: 20 BRPM | DIASTOLIC BLOOD PRESSURE: 87 MMHG | HEART RATE: 72 BPM | OXYGEN SATURATION: 98 %

## 2023-09-13 DIAGNOSIS — C18.6 MALIGNANT NEOPLASM OF DESCENDING COLON: Primary | ICD-10-CM

## 2023-09-13 LAB
ALBUMIN SERPL-MCNC: 4.4 G/DL (ref 3.5–5.2)
ALBUMIN/GLOB SERPL: 1.8 G/DL
ALP SERPL-CCNC: 106 U/L (ref 39–117)
ALT SERPL W P-5'-P-CCNC: 25 U/L (ref 1–33)
ANION GAP SERPL CALCULATED.3IONS-SCNC: 8.1 MMOL/L (ref 5–15)
AST SERPL-CCNC: 28 U/L (ref 1–32)
BASOPHILS # BLD AUTO: 0.04 10*3/MM3 (ref 0–0.2)
BASOPHILS NFR BLD AUTO: 0.7 % (ref 0–1.5)
BILIRUB SERPL-MCNC: 0.6 MG/DL (ref 0–1.2)
BUN SERPL-MCNC: 10 MG/DL (ref 6–20)
BUN/CREAT SERPL: 12.5 (ref 7–25)
CALCIUM SPEC-SCNC: 9.4 MG/DL (ref 8.6–10.5)
CHLORIDE SERPL-SCNC: 106 MMOL/L (ref 98–107)
CO2 SERPL-SCNC: 25.9 MMOL/L (ref 22–29)
CREAT SERPL-MCNC: 0.8 MG/DL (ref 0.57–1)
DEPRECATED RDW RBC AUTO: 46.7 FL (ref 37–54)
EGFRCR SERPLBLD CKD-EPI 2021: 91.6 ML/MIN/1.73
EOSINOPHIL # BLD AUTO: 0.12 10*3/MM3 (ref 0–0.4)
EOSINOPHIL NFR BLD AUTO: 2 % (ref 0.3–6.2)
ERYTHROCYTE [DISTWIDTH] IN BLOOD BY AUTOMATED COUNT: 15.5 % (ref 12.3–15.4)
GLOBULIN UR ELPH-MCNC: 2.4 GM/DL
GLUCOSE SERPL-MCNC: 86 MG/DL (ref 65–99)
HCT VFR BLD AUTO: 38.7 % (ref 34–46.6)
HGB BLD-MCNC: 13.1 G/DL (ref 12–15.9)
IMM GRANULOCYTES # BLD AUTO: 0.04 10*3/MM3 (ref 0–0.05)
IMM GRANULOCYTES NFR BLD AUTO: 0.7 % (ref 0–0.5)
LYMPHOCYTES # BLD AUTO: 2.29 10*3/MM3 (ref 0.7–3.1)
LYMPHOCYTES NFR BLD AUTO: 37.9 % (ref 19.6–45.3)
MCH RBC QN AUTO: 29.3 PG (ref 26.6–33)
MCHC RBC AUTO-ENTMCNC: 33.9 G/DL (ref 31.5–35.7)
MCV RBC AUTO: 86.6 FL (ref 79–97)
MONOCYTES # BLD AUTO: 1.13 10*3/MM3 (ref 0.1–0.9)
MONOCYTES NFR BLD AUTO: 18.7 % (ref 5–12)
NEUTROPHILS NFR BLD AUTO: 2.43 10*3/MM3 (ref 1.7–7)
NEUTROPHILS NFR BLD AUTO: 40 % (ref 42.7–76)
PLATELET # BLD AUTO: 165 10*3/MM3 (ref 140–450)
PMV BLD AUTO: 9.6 FL (ref 6–12)
POTASSIUM SERPL-SCNC: 3.9 MMOL/L (ref 3.5–5.2)
PROT SERPL-MCNC: 6.8 G/DL (ref 6–8.5)
RBC # BLD AUTO: 4.47 10*6/MM3 (ref 3.77–5.28)
SODIUM SERPL-SCNC: 140 MMOL/L (ref 136–145)
WBC NRBC COR # BLD: 6.05 10*3/MM3 (ref 3.4–10.8)

## 2023-09-13 PROCEDURE — 96413 CHEMO IV INFUSION 1 HR: CPT

## 2023-09-13 PROCEDURE — 96411 CHEMO IV PUSH ADDL DRUG: CPT

## 2023-09-13 PROCEDURE — 80053 COMPREHEN METABOLIC PANEL: CPT | Performed by: INTERNAL MEDICINE

## 2023-09-13 PROCEDURE — 96375 TX/PRO/DX INJ NEW DRUG ADDON: CPT

## 2023-09-13 PROCEDURE — 25010000002 OXALIPLATIN PER 0.5 MG: Performed by: INTERNAL MEDICINE

## 2023-09-13 PROCEDURE — 96368 THER/DIAG CONCURRENT INF: CPT

## 2023-09-13 PROCEDURE — 96415 CHEMO IV INFUSION ADDL HR: CPT

## 2023-09-13 PROCEDURE — 25010000002 PALONOSETRON PER 25 MCG: Performed by: INTERNAL MEDICINE

## 2023-09-13 PROCEDURE — 85025 COMPLETE CBC W/AUTO DIFF WBC: CPT | Performed by: INTERNAL MEDICINE

## 2023-09-13 PROCEDURE — 25010000002 DEXAMETHASONE SODIUM PHOSPHATE 120 MG/30ML SOLUTION: Performed by: INTERNAL MEDICINE

## 2023-09-13 PROCEDURE — 96417 CHEMO IV INFUS EACH ADDL SEQ: CPT

## 2023-09-13 PROCEDURE — 25010000002 LEUCOVORIN CALCIUM PER 50 MG: Performed by: INTERNAL MEDICINE

## 2023-09-13 PROCEDURE — 25010000002 FLUOROURACIL PER 500 MG: Performed by: INTERNAL MEDICINE

## 2023-09-13 PROCEDURE — G0498 CHEMO EXTEND IV INFUS W/PUMP: HCPCS

## 2023-09-13 RX ORDER — DEXTROSE MONOHYDRATE 50 MG/ML
250 INJECTION, SOLUTION INTRAVENOUS ONCE
Status: CANCELLED | OUTPATIENT
Start: 2023-09-13

## 2023-09-13 RX ORDER — DEXTROSE MONOHYDRATE 50 MG/ML
250 INJECTION, SOLUTION INTRAVENOUS ONCE
Status: COMPLETED | OUTPATIENT
Start: 2023-09-13 | End: 2023-09-13

## 2023-09-13 RX ORDER — PALONOSETRON 0.05 MG/ML
0.25 INJECTION, SOLUTION INTRAVENOUS ONCE
Status: COMPLETED | OUTPATIENT
Start: 2023-09-13 | End: 2023-09-13

## 2023-09-13 RX ORDER — FAMOTIDINE 10 MG/ML
20 INJECTION, SOLUTION INTRAVENOUS AS NEEDED
Status: CANCELLED | OUTPATIENT
Start: 2023-09-13

## 2023-09-13 RX ORDER — DIPHENHYDRAMINE HYDROCHLORIDE 50 MG/ML
50 INJECTION INTRAMUSCULAR; INTRAVENOUS AS NEEDED
Status: CANCELLED | OUTPATIENT
Start: 2023-09-13

## 2023-09-13 RX ORDER — PALONOSETRON 0.05 MG/ML
0.25 INJECTION, SOLUTION INTRAVENOUS ONCE
Status: CANCELLED | OUTPATIENT
Start: 2023-09-13

## 2023-09-13 RX ORDER — FLUOROURACIL 50 MG/ML
400 INJECTION, SOLUTION INTRAVENOUS ONCE
Status: CANCELLED | OUTPATIENT
Start: 2023-09-13

## 2023-09-13 RX ORDER — FLUOROURACIL 50 MG/ML
950 INJECTION, SOLUTION INTRAVENOUS ONCE
Status: COMPLETED | OUTPATIENT
Start: 2023-09-13 | End: 2023-09-13

## 2023-09-13 RX ADMIN — FLUOROURACIL 5740 MG: 50 INJECTION, SOLUTION INTRAVENOUS at 13:38

## 2023-09-13 RX ADMIN — DEXAMETHASONE SODIUM PHOSPHATE 12 MG: 4 INJECTION, SOLUTION INTRA-ARTICULAR; INTRALESIONAL; INTRAMUSCULAR; INTRAVENOUS; SOFT TISSUE at 10:41

## 2023-09-13 RX ADMIN — FLUOROURACIL 950 MG: 50 INJECTION, SOLUTION INTRAVENOUS at 13:29

## 2023-09-13 RX ADMIN — LEUCOVORIN CALCIUM 950 MG: 350 INJECTION, POWDER, LYOPHILIZED, FOR SOLUTION INTRAMUSCULAR; INTRAVENOUS at 11:26

## 2023-09-13 RX ADMIN — OXALIPLATIN 200 MG: 5 INJECTION, SOLUTION INTRAVENOUS at 11:26

## 2023-09-13 RX ADMIN — DEXTROSE MONOHYDRATE 250 ML: 50 INJECTION, SOLUTION INTRAVENOUS at 10:41

## 2023-09-13 RX ADMIN — PALONOSETRON 0.25 MG: 0.05 INJECTION, SOLUTION INTRAVENOUS at 10:41

## 2023-09-13 NOTE — PROGRESS NOTES
Chief Complaint/Care Team   Malignant neoplasm of descending colon    Shelly Cash MD Stephens, Cassandra, MD    History of Present Illness     Diagnosis: Colon Adenocarcinoma S/p Left colectomy on 5/5/23, stage after resection [id number], stage III    Current Treatment: FOLFOX IV M1rifhr x 6 months, cycle 1 on 6/14/2023  Treatment delay secondary to pneumoperitoneum which developed in June 2023, patient cleared to resume chemotherapy in August 2023.    Previous Treatment: c-scope on 4/2023 biopsy revealed colon adenocarcinoma    Lilian Pelaez is a 47 y.o. female who presents to [facility] HEMATOLOGY & ONCOLOGY for discussion of newly diagnosed colon adenocarcinoma seen on biopsy of colon mass in the descending colon during colonoscopy performed in April 2023.     Staging scans revealed on 4/11/2023:  CT abdomen/pelvis without any metastatic disease in abdomen/pelvis  CT chest no evidence of metastatic disease in chest.    Patient underwent robotic resection of descending colon and splenic flexure by Dr. Rolando Liu on 5/5/2023.    Patient is a former smoker.   Patient reports family history of several relatives with other forms of cancer cancer on her mother side of the family, but denies any known history of colon cancer in her family.    Patient reports noticing some blood mixed in with stool prior to cancer diagnosis.      Patient received cycle 1 on 6/14/2023, cycle 2 was delayed secondary to development of pneumoperitoneum after cycle 1, patient was mated to the hospital very by her surgeon Dr. Liu who recommended delaying further cycles of chemotherapy until August 1, 2023.    Interval history: Patient here prior to cycle 5 of chemotherapy with FOLFOX.  Patient cleared by Dr. Rolando Liu to resume chemotherapy in August 2023 after developing pneumoperitoneum in June 2023.  She without report of fever, chills, SOA, recent infections, blood loss, melena, or v/d.  After last  chemotherapy infusion patient with some diarrhea and reports it resolved after taking Imodium more frequently.  She reports increased dry mouth, post nasal drip, along with fatigue. She reports nausea is well controlled with anti-emetics.     Review of Systems   Constitutional:  Positive for activity change and fatigue. Negative for appetite change, diaphoresis, fever, unexpected weight gain and unexpected weight loss.   HENT:  Negative for hearing loss, mouth sores, sore throat, swollen glands, trouble swallowing and voice change.    Eyes:  Negative for blurred vision.   Respiratory:  Negative for cough, shortness of breath and wheezing.    Cardiovascular:  Negative for chest pain and palpitations.   Gastrointestinal:  Negative for abdominal pain, blood in stool, constipation, diarrhea, nausea and vomiting.   Endocrine: Negative for cold intolerance and heat intolerance.   Genitourinary:  Negative for difficulty urinating, dysuria, frequency, hematuria and urinary incontinence.   Musculoskeletal:  Negative for arthralgias, back pain and myalgias.   Skin:  Negative for rash, skin lesions and wound.   Neurological:  Negative for dizziness, seizures, weakness, numbness and headache.   Hematological:  Does not bruise/bleed easily.   Psychiatric/Behavioral:  Negative for depressed mood. The patient is nervous/anxious.    All other systems reviewed and are negative.     Oncology/Hematology History   Malignant neoplasm of descending colon   5/6/2023 Initial Diagnosis    Malignant neoplasm of descending colon     5/31/2023 Cancer Staged    Staging form: Colon And Rectum, AJCC 8th Edition  - Pathologic: Stage IIIB (pT3, pN1b, cM0) - Signed by Brian Marroquin MD on 5/31/2023 6/14/2023 -  Chemotherapy    OP COLON [id number] OXALIplatin / Leucovorin / Fluorouracil         Objective     Vitals:    09/13/23 0945   BP: 149/87   Pulse: 72   Resp: 20   Temp: 97.2 °F (36.2 °C)   SpO2: 98%   Weight: 129 kg (284 lb 6.3 oz)   Height:  "162.6 cm (64.02\")   PainSc: 0-No pain     ECOG score: 0         PHQ-9 Total Score:         Physical Exam  Vitals reviewed. Exam conducted with a chaperone present.   Constitutional:       General: She is not in acute distress.     Appearance: Normal appearance.   HENT:      Head: Normocephalic and atraumatic.   Eyes:      Extraocular Movements: Extraocular movements intact.      Conjunctiva/sclera: Conjunctivae normal.   Pulmonary:      Effort: Pulmonary effort is normal.   Musculoskeletal:      Cervical back: Normal range of motion and neck supple.   Skin:     General: Skin is warm and dry.      Findings: No bruising.   Neurological:      Mental Status: She is oriented to person, place, and time.         Past Medical History     Past Medical History:   Diagnosis Date    Anesthesia     B/P bottomed out/UNSURE ON THE DATE    Anxiety     Asthma 1996    seasonal/NO INHALERS    Colon cancer 04/17/2023    DDD (degenerative disc disease), cervical     DDD (degenerative disc disease), lumbar     Depression     Diverticulitis of colon 2005    Scope was done in [facility]    GERD (gastroesophageal reflux disease)     Hypertension     Kidney stones     Melanoma     removed    Palpitation     FOLLOWS W/DR. ANTONIO  Q6 MONTHS, NO CP OR SOA    Prolapse of female bladder      Current Outpatient Medications on File Prior to Visit   Medication Sig Dispense Refill    al mag oxide-diphenhydramine-nystatin (MAGIC MOUTHWASH) suspension SWISH & SPIT ONE TEASPOONFUL BY MOUTH FOUR TIMES DAILY      allopurinol (ZYLOPRIM) 100 MG tablet Take 1 tablet by mouth Daily.      ALPRAZolam (XANAX) 0.5 MG tablet Take 1 tablet by mouth At Night As Needed.      amLODIPine (NORVASC) 10 MG tablet Take 1 tablet by mouth Every Night.      aspirin 81 MG EC tablet Take 1 tablet every day by oral route.      Diclofenac Sodium (VOLTAREN) 1 % gel gel Apply 4 g topically to the appropriate area as directed 4 (Four) Times a Day As Needed.      docusate sodium 100 " MG capsule Take 1 capsule by mouth Daily.      fluorouracil (EFUDEX) 5 % cream Apply 1 application  topically to the appropriate area as directed 2 (Two) Times a Day.      furosemide (LASIX) 20 MG tablet Take 1 tablet by mouth Daily.      hydroCHLOROthiazide (HYDRODIURIL) 25 MG tablet Take 1 tablet every day by oral route for 90 days.      HYDROcodone-acetaminophen (NORCO) 7.5-325 MG per tablet Take 1 tablet by mouth Every 8 (Eight) Hours As Needed.      Hydrocortisone, Perianal, (ANUSOL-HC) 2.5 % rectal cream       hyoscyamine (ANASPAZ,LEVSIN) 0.125 MG tablet TAKE ONE TABLET BY MOUTH FOUR TIMES A DAY WITH MEALS AND AT BEDTIME. 120 tablet 1    ketorolac (TORADOL) 10 MG tablet Take 1 tablet by mouth Every 6 (Six) Hours As Needed.      lamoTRIgine (LaMICtal) 25 MG tablet Take 1 tablet by mouth 2 (Two) Times a Day. Only takes at night      leucovorin 5 MG tablet Take  by mouth Every 6 (Six) Hours.      levoFLOXacin (Levaquin) 750 MG tablet Take 1 tablet by mouth Daily. 7 tablet 0    Lidocaine 4 % patch Apply 1 patch topically Daily.      lidocaine-prilocaine (EMLA) 2.5-2.5 % cream Apply 1 application topically to the appropriate area as directed Every 2 (Two) Hours As Needed for Mild Pain. 30 g 1    linaclotide (LINZESS) 290 MCG capsule capsule Take 1 capsule by mouth As Needed.      loperamide (Imodium A-D) 2 MG tablet Take 1 tablet by mouth 4 (Four) Times a Day As Needed for Diarrhea. Imodium - take 4 mg first dose, followed by 2 mg every 2-4 hours after each stool (MAX of 16 mg in 24 hour period) 60 tablet 1    loperamide (IMODIUM) 2 MG capsule       losartan (COZAAR) 100 MG tablet Take 1 tablet by mouth Every Night.      medroxyPROGESTERone (DEPO-PROVERA) 150 MG/ML injection Inject 1 mL into the appropriate muscle as directed by prescriber Every 3 (Three) Months.      metoclopramide (REGLAN) 10 MG tablet Take 1 tablet by mouth 3 (Three) Times a Day.      metoprolol succinate XL (TOPROL-XL) 25 MG 24 hr tablet Take 1  tablet by mouth Daily.      Multiple Vitamins-Minerals (b complex-C-E-zinc) tablet Take 1 tablet by mouth Daily.      Myrbetriq 50 MG tablet sustained-release 24 hour 24 hr tablet       naloxone (NARCAN) 4 MG/0.1ML nasal spray Take 1 spray every day by nasal route as needed.      ondansetron (ZOFRAN) 8 MG tablet Take 1 tablet by mouth 3 (Three) Times a Day As Needed for Nausea or Vomiting. 30 tablet 5    OXALIPLATIN IV Infuse  into a venous catheter.      pantoprazole (PROTONIX) 40 MG EC tablet Take 1 tablet by mouth 2 (Two) Times a Day.      polyethylene glycol (GaviLyte-G) 236 g solution       prochlorperazine (COMPAZINE) 10 MG tablet Take 1 tablet by mouth Every 6 (Six) Hours As Needed for Nausea or Vomiting. 30 tablet 5    rOPINIRole (REQUIP) 1 MG tablet Take 1 tablet by mouth Every Night.      sertraline (ZOLOFT) 100 MG tablet Take 1 tablet by mouth Daily.      sucralfate (CARAFATE) 1 g tablet Take 1 tablet by mouth 2 (Two) Times a Day.      tetracycline (ACHROMYCIN,SUMYCIN) 250 MG capsule       traMADol (Ultram) 50 MG tablet Take 1 tablet by mouth Every 6 (Six) Hours As Needed for Severe Pain or Moderate Pain. 5 tablet 0    vitamin D3 125 MCG (5000 UT) capsule capsule Take 2,000 Units by mouth Daily.      Myrbetriq 25 MG tablet sustained-release 24 hour 24 hr tablet Take 1 tablet by mouth Every Evening.       Current Facility-Administered Medications on File Prior to Visit   Medication Dose Route Frequency Provider Last Rate Last Admin    [COMPLETED] dexAMETHasone (DECADRON) IVPB 12 mg  12 mg Intravenous Once Brian Marroquin MD   Stopped at 09/13/23 1056    [COMPLETED] dextrose (D5W) 5 % infusion 250 mL  250 mL Intravenous Once Brian Marroquin MD   Stopped at 09/13/23 1336    fluorouracil (ADRUCIL) 5,740 mg in sodium chloride 0.9 % 138 mL chemo infusion - FOR HOME USE  2,400 mg/m2 (Treatment Plan Recorded) Intravenous Once Brian Marroquin MD   5,740 mg at 09/13/23 1338    [COMPLETED] fluorouracil (ADRUCIL) chemo  injection 950 mg ( 19 mL)  950 mg Intravenous Once Brian Marroquin MD   Stopped at 09/13/23 1344    [COMPLETED] leucovorin 950 mg in dextrose (D5W) 5 % 322.5 mL IVPB  950 mg Intravenous Once Brian Marroquin MD   Stopped at 09/13/23 1326    [COMPLETED] OXALIplatin (ELOXATIN) 200 mg in dextrose (D5W) 5 % 315 mL chemo IVPB  200 mg Intravenous Once Brian Marroquin MD   Stopped at 09/13/23 1326    [COMPLETED] palonosetron (ALOXI) injection 0.25 mg  0.25 mg Intravenous Once Brian Marroquin MD   0.25 mg at 09/13/23 1041      Allergies   Allergen Reactions    Hydromorphone Hives, Itching and GI Intolerance    Morphine Hives, Itching and Nausea And Vomiting    Oxybutynin Swelling and Angioedema           Oxycodone-Acetaminophen Itching and Nausea And Vomiting     Can not take any higher than 7.5    Penicillins Rash    Promethazine Nausea And Vomiting    Tylenol With Codeine #3 [Acetaminophen-Codeine] Itching and Nausea And Vomiting    Oxybutynin Chloride Angioedema     Past Surgical History:   Procedure Laterality Date    BREAST SURGERY  2016    Removed 8lbs of tissue, 2016    CHOLECYSTECTOMY      COLON RESECTION Left 05/05/2023    Procedure: RESECTION OF DESCENDING COLON AND SPLENIC FLEXURE TAKEDOWN WITH DAVINCI ROBOT;  Surgeon: Rolando Liu MD;  Location: [facility];  Service: Robotics - DaVinci;  Laterality: Left;    COLONOSCOPY N/A 04/04/2023    Procedure: COLONOSCOPY WITH POLYPECTOMY/SNARE/BIOPSIES/TATTOOING DESCENDING COLON MASS;  Surgeon: Deniz Dowd MD;  Location: [facility] ENDOSCOPY;  Service: Gastroenterology;  Laterality: N/A;  INCOMPLETE COLONOSCOPY  POOR BOWEL PREP  COLON POLYP  COLON MASS  DIVERTICULOSIS    COLONOSCOPY N/A 04/12/2023    Procedure: COLONOSCOPY with cold snare;  Surgeon: Deniz Dowd MD;  Location: [facility] ENDOSCOPY;  Service: Gastroenterology;  Laterality: N/A;  colon polyps, diverticulosis, colon mass, hemorrhoids      CYSTOSCOPY W/ URETERAL STENT PLACEMENT Left 05/05/2023     Procedure: Cystoscopy, left ureteral injection,;  Surgeon: Manjula Randolph MD;  Location: [facility] OR [facility] [place];  Service: Urology;  Laterality: Left;    FRACTURE SURGERY  2001    Left knee    KNEE SURGERY Left     x4    LAPAROSCOPIC TUBAL LIGATION  1995    LAPAROSCOPIC TUBAL LIGATION      Reversed    REDUCTION MAMMAPLASTY  2009    8lbs of tissue was removed    TONSILLECTOMY      UPPER GASTROINTESTINAL ENDOSCOPY      VENOUS ACCESS DEVICE (PORT) INSERTION N/A 6/5/2023    Procedure: INSERTION VENOUS ACCESS DEVICE;  Surgeon: Rolando Liu MD;  Location: [facility];  Service: General;  Laterality: N/A;     Social History     Socioeconomic History    Marital status:    Tobacco Use    Smoking status: Never     Passive exposure: Never    Smokeless tobacco: Never   Vaping Use    Vaping Use: Never used   Substance and Sexual Activity    Alcohol use: Never    Drug use: Never    Sexual activity: Defer     Family History   Problem Relation Age of Onset    Colon cancer Neg Hx        Results     Result Review   The following data was reviewed by: Brian Marroquin MD on 04/13/2023:  Lab Results   Component Value Date    HGB 13.1 09/13/2023    HCT 38.7 09/13/2023    MCV 86.6 09/13/2023     09/13/2023    WBC 6.05 09/13/2023    NEUTROABS 2.43 09/13/2023    LYMPHSABS 2.29 09/13/2023    MONOSABS 1.13 (H) 09/13/2023    EOSABS 0.12 09/13/2023    BASOSABS 0.04 09/13/2023     Lab Results   Component Value Date    GLUCOSE 86 09/13/2023    BUN 10 09/13/2023    CREATININE 0.80 09/13/2023     09/13/2023    K 3.9 09/13/2023     09/13/2023    CO2 25.9 09/13/2023    CALCIUM 9.4 09/13/2023    PROTEINTOT 6.8 09/13/2023    ALBUMIN 4.4 09/13/2023    BILITOT 0.6 09/13/2023    ALKPHOS 106 09/13/2023    AST 28 09/13/2023    ALT 25 09/13/2023     Lab Results   Component Value Date    MG 2.3 06/18/2023    PHOS 3.3 06/18/2023       Case Report   Surgical Pathology Report                         Case: [id number]                                    Authorizing Provider:  Deniz Dowd MD    Collected:           04/12/2023 09:41 AM           Ordering Location:     [facility] Received:            04/12/2023 11:41 AM                                  SUITES                                                                        Pathologist:           Niesha Torres DO                                                        Specimens:   1) - Large Intestine, Cecum, cecal polyp cold snare                                                  2) - Large Intestine, Sigmoid Colon, sigmoid colon polyp cold snare                        Clinical Information    Mass of colon   Final Diagnosis   1. Cecum polyp, biopsy:                            - Tubular adenoma        2. Sigmoid colon polyp, biopsy:                            - Tubular adenoma    Electronically signed by Niesha Torres DO on 4/13/2023 at 0840     Surgical Pathology Report                         Case: [id number]                                   Authorizing Provider:  Deniz Dowd MD    Collected:           04/04/2023 11:15 AM           Ordering Location:     [facility] Received:            04/04/2023 11:59 AM                                  SUITES                                                                        Pathologist:           Jennifer Mike MD                                                      Specimens:   1) - Large Intestine, Transverse Colon, TRANSVERSE COLON POLYP                                       2) - Large Intestine, Left / Descending Colon, DESCENDING COLON BIOPSIES                   Clinical Information    Constipation, unspecified constipation type   Final Diagnosis   1. Transverse colon polyp, biopsy:               - Fragments of tubular adenoma        2.  Descending colon, biopsy:               -Adenocarcinoma, moderately to poorly differentiated     Comment: Per policy, reflex testing has been ordered,  and the results will be separately reported.     REMARKS: The above positive (malignant) diagnosis was called to April in Dr. Dowd's office at 09:09 EDT on 4/5/2023 by bjs.          Electronically signed by Jennifer Mike MD on 4/5/2023 at 0949         Surgical Pathology Report                         Case: [id number]                                   Authorizing Provider:  Rolando Liu MD        Collected:           05/05/2023 03:06 PM           Ordering Location:     [facility]  Received:            05/08/2023 06:48 AM                                  OR                                                                            Pathologist:           Jt Florence MD                                                             Specimen:    Large Intestine, Left / Descending Colon, left colon                                       Clinical Information    Malignant neoplasm of descending colon   Final Diagnosis   Left colon, resection:               - Adenocarcinoma               - Three of thirty-three lymph nodes positive for carcinoma (3/33)               - See synoptic checklist   Electronically signed by Jt Florence MD on 5/11/2023 at 1508   Synoptic Checklist   COLON AND RECTUM: Resection, Including Transanal Disk Excision of Rectal Neoplasms  8th Edition - Protocol posted: 6/22/2022  COLON AND RECTUM: RESECTION - All Specimens  SPECIMEN   Procedure  Descending colon resection    TUMOR   Tumor Site  Descending colon    Histologic Type  Adenocarcinoma    Histologic Grade  G3, poorly differentiated    Tumor Size  Greatest dimension (Centimeters): 3.5 cm   Tumor Extent  Invades through muscularis propria into the pericolonic or perirectal tissue    Macroscopic Tumor Perforation  Not identified    Lymphovascular Invasion  Small vessel    Perineural Invasion  Present    Treatment Effect  No known presurgical therapy    MARGINS   Margin Status for Invasive Carcinoma  All margins  negative for invasive carcinoma    Closest Margin(s) to Invasive Carcinoma  Radial (circumferential) or mesenteric    Distance from Invasive Carcinoma to Closest Margin  3.5 cm   Margin Status for Non-Invasive Tumor  All margins negative for high-grade dysplasia / intramucosal carcinoma and low-grade dysplasia    REGIONAL LYMPH NODES   Regional Lymph Node Status  Tumor present in regional lymph node(s)    Number of Lymph Nodes with Tumor  3    Number of Lymph Nodes Examined  33    Tumor Deposits  Present    Number of Tumor Deposits  1    PATHOLOGIC STAGE CLASSIFICATION (pTNM, AJCC 8th Edition)   Reporting of pT, pN, and (when applicable) pM categories is based on information available to the pathologist at the time the report is issued. As per the AJCC (Chapter 1, 8th Ed.) it is the managing physician's responsibility to establish the final pathologic stage based upon all pertinent information, including but potentially not limited to this pathology report.   pT Category  pT3    pN Category  pN1b    ADDITIONAL FINDINGS   Additional Findings  None identified    .               Assessment & Plan     Diagnoses and all orders for this visit:    1. Malignant neoplasm of descending colon (Primary)  -     CT chest w contrast; Future  -     CT abdomen pelvis w contrast; Future    Other orders  -     Cancel: dextrose (D5W) 5 % infusion 250 mL  -     Cancel: palonosetron (ALOXI) injection 0.25 mg  -     Cancel: dexAMETHasone (DECADRON) 12 mg in sodium chloride 0.9 % IVPB  -     Cancel: OXALIplatin (ELOXATIN) 205 mg in dextrose (D5W) 5 % 291 mL chemo IVPB  -     Cancel: leucovorin 960 mg in dextrose (D5W) 5 % 346 mL IVPB  -     Cancel: fluorouracil (ADRUCIL) chemo injection 960 mg  -     Cancel: fluorouracil (ADRUCIL) 5,740 mg in sodium chloride 0.9 % 114.8 mL chemo infusion - FOR HOME USE  -     Cancel: Hydrocortisone Sod Suc (PF) (Solu-CORTEF) injection 100 mg  -     Cancel: diphenhydrAMINE (BENADRYL) injection 50 mg  -      Cancel: famotidine (PEPCID) injection 20 mg            Lilian Pelaez is a 47 y.o. female who presents to [facility] HEMATOLOGY & ONCOLOGY evaluation prior to systemic therapy for stage III colon cancer, patient status post resection of descending colon splenic flexure by Dr. Rolando Liu on 5/5/2023.     Reviewed NCCN guidelines for her stage III cancer, discussed high risk features seen on pathology report and discussed CapeOx for 3 months versus FOLFOX for 3 to 6 months with patient and  today.  Patient agreed to plan to undergo FOLFOX IV chemotherapy every 2 weeks for 6 months.    Plan to rescan patient after 3 months of FOLFOX therapy (11/2023).    Patient received cycle 1 on June 14, 2023, she developed pneumoperitoneum which her surgeon suspect was secondary to severe constipation from iron tablets, but patient was cleared to resume chemotherapy after 8/1/2023.    Iron deficiency anemia from GI blood loss  -Evidence of this on lab work from May 2023  - We will continue to monitor and will offer IV iron therapy in the future if she becomes iron deficient secondary to pneumoperitoneum that developed in June 2023 from severe constipation.    Chemotherapy-induced nausea  - Currently well controlled with Zofran, but prescribe Compazine as a backup option  - Patient also taking Reglan and was counseled regarding not taking Reglan with Zofran and Compazine together but rather patient to take other antiemetics at least 4 to 6 hours after Reglan dose.    Chemotherapy induced diarrhea  - Patient prescribed Imodium   -We will prescribe Lomotil if Imodium fails to work.    Labs reviewed and plan to proceed with cycle 5 day 1 on today.    Plan patient follow-up in the next 2 weeks for cycle 6 day 1 of chemotherapy.    Patient verbalized understanding and agreement plan.    Please note that portions of this note were completed with a voice recognition program.    Electronically signed by Brian Marroquin  MD, 09/13/23, 4:42 PM EDT.        Follow Up     I spent 45 minutes caring for Lilian on this date of service. This time includes time spent by me in the following activities:preparing for the visit, reviewing tests, obtaining and/or reviewing a separately obtained history, performing a medically appropriate examination and/or evaluation , counseling and educating the patient/family/caregiver, ordering medications, tests, or procedures, documenting information in the medical record and care coordination.    This is an acute or chronic illness that poses a threat to life or bodily function. The above treatment plan involves a high risk of complications and/or mortality of patient management.    The patient was seen and examined. Work by the provider also included review and/or ordering of lab tests, review and/or ordering of radiology tests, review and/or ordering of medicine tests, discussion with other physicians or providers, independent review of data, obtaining old records, review/summation of old records, and/or other review.    I have reviewed the family history, social history, and past medical history for this patient. Previous information and data has been reviewed and updated as needed. I have reviewed and verified the chief complaint, history, and other documentation. The patient was interviewed and examined in the clinic and the chart reviewed. The previous observations, recommendations, and conclusions were reviewed including those of other providers.     The plan was discussed with the patient and/or family. The patient was given time to ask questions and these questions were answered. At the conclusion of their visit they had no additional questions or concerns and all questions were answered to their satisfaction.    Patient was given instructions and counseling regarding her condition or for health maintenance advice. Please see specific information pulled into the AVS if appropriate.

## 2023-09-15 ENCOUNTER — HOSPITAL ENCOUNTER (OUTPATIENT)
Dept: ONCOLOGY | Facility: HOSPITAL | Age: 48
Discharge: HOME | End: 2023-09-15
Payer: COMMERCIAL

## 2023-09-15 DIAGNOSIS — Z45.2 ENCOUNTER FOR ADJUSTMENT OR MANAGEMENT OF VASCULAR ACCESS DEVICE: Primary | ICD-10-CM

## 2023-09-15 DIAGNOSIS — C18.6 MALIGNANT NEOPLASM OF DESCENDING COLON: ICD-10-CM

## 2023-09-15 PROCEDURE — 25010000002 HEPARIN LOCK FLUSH PER 10 UNITS: Performed by: INTERNAL MEDICINE

## 2023-09-15 RX ORDER — HEPARIN SODIUM (PORCINE) LOCK FLUSH IV SOLN 100 UNIT/ML 100 UNIT/ML
500 SOLUTION INTRAVENOUS AS NEEDED
OUTPATIENT
Start: 2023-09-15

## 2023-09-15 RX ORDER — SODIUM CHLORIDE 0.9 % (FLUSH) 0.9 %
20 SYRINGE (ML) INJECTION AS NEEDED
Status: DISCONTINUED | OUTPATIENT
Start: 2023-09-15 | End: 2023-09-16 | Stop reason: HOSPADM

## 2023-09-15 RX ORDER — HEPARIN SODIUM (PORCINE) LOCK FLUSH IV SOLN 100 UNIT/ML 100 UNIT/ML
500 SOLUTION INTRAVENOUS AS NEEDED
Status: DISCONTINUED | OUTPATIENT
Start: 2023-09-15 | End: 2023-09-16 | Stop reason: HOSPADM

## 2023-09-15 RX ORDER — SODIUM CHLORIDE 0.9 % (FLUSH) 0.9 %
20 SYRINGE (ML) INJECTION AS NEEDED
OUTPATIENT
Start: 2023-09-15

## 2023-09-15 RX ADMIN — HEPARIN SODIUM (PORCINE) LOCK FLUSH IV SOLN 100 UNIT/ML 500 UNITS: 100 SOLUTION at 12:13

## 2023-09-15 RX ADMIN — Medication 20 ML: at 12:13

## 2023-09-25 NOTE — PROGRESS NOTES
Chief Complaint/Care Team   adenocarcinoma of descending colon    Shelly Cash MD Stephens, Cassandra, MD    History of Present Illness     Diagnosis: Colon Adenocarcinoma S/p Left colectomy on 5/5/23, stage after resection [id number], stage III    Current Treatment: FOLFOX IV Y6ncmpi x 6 months, cycle 1 on 6/14/2023  Treatment delay secondary to pneumoperitoneum which developed in June 2023, patient cleared to resume chemotherapy in August 2023.    Previous Treatment: c-scope on 4/2023 biopsy revealed colon adenocarcinoma    Lilian Pelaez is a 47 y.o. female who presents to [facility] for discussion of newly diagnosed colon adenocarcinoma seen on biopsy of colon mass in the descending colon during colonoscopy performed in April 2023.     Staging scans revealed on 4/11/2023:  CT abdomen/pelvis without any metastatic disease in abdomen/pelvis  CT chest no evidence of metastatic disease in chest.    Patient underwent robotic resection of descending colon and splenic flexure by Dr. Rolando Liu on 5/5/2023.    Patient is a former smoker.   Patient reports family history of several relatives with other forms of cancer cancer on her mother side of the family, but denies any known history of colon cancer in her family.    Patient reports noticing some blood mixed in with stool prior to cancer diagnosis.      Patient received cycle 1 on 6/14/2023, cycle 2 was delayed secondary to development of pneumoperitoneum after cycle 1, patient was mated to the hospital very by her surgeon Dr. Liu who recommended delaying further cycles of chemotherapy until August 1, 2023.    Interval history: Patient here prior to cycle 6 of chemotherapy with FOLFOX.  Patient cleared by Dr. Rolando Liu to resume chemotherapy in August 2023 after developing pneumoperitoneum in June 2023.  She without report of fever, chills, SOA, recent infections, blood loss, melena, or v/d.  After last chemotherapy  infusion patient with some diarrhea and reports it resolved after taking Imodium more frequently.  She reports increased dry mouth, loss of taste since beginning chemo, post nasal drip, along with fatigue. Pt reports dry mouth, decrease in food and liquids due to loss of taste from chemotherapy, pt reports lack of improvement with use of Cynthia's magic mouthwash. She reports nausea is well controlled with anti-emetics.     Review of Systems   Constitutional:  Positive for activity change, appetite change and fatigue. Negative for diaphoresis and fever.   HENT:  Negative for hearing loss, mouth sores, sore throat, swollen glands, trouble swallowing and voice change.    Eyes:  Negative for blurred vision.   Respiratory:  Negative for cough, shortness of breath and wheezing.    Cardiovascular:  Negative for chest pain and palpitations.   Gastrointestinal:  Positive for nausea. Negative for abdominal pain, blood in stool, constipation, diarrhea and vomiting.   Endocrine: Positive for cold intolerance and heat intolerance.   Genitourinary:  Negative for difficulty urinating, dysuria, frequency, hematuria and urinary incontinence.   Musculoskeletal:  Negative for arthralgias, back pain and myalgias.   Skin:  Negative for rash, skin lesions and wound.   Neurological:  Negative for dizziness, seizures, weakness, numbness and headache.   Hematological:  Does not bruise/bleed easily.   Psychiatric/Behavioral:  Negative for depressed mood. The patient is nervous/anxious.    All other systems reviewed and are negative.     Oncology/Hematology History   Primary adenocarcinoma of descending colon   4/11/2023 Initial Diagnosis    Primary adenocarcinoma of descending colon     9/27/2023 -  Chemotherapy    OP SUPPORTIVE HYDRATION + ANTIEMETICS       Malignant neoplasm of descending colon   5/6/2023 Initial Diagnosis    Malignant neoplasm of descending colon     5/31/2023 Cancer Staged    Staging form: Colon And Rectum, AJCC 8th  "Edition  - Pathologic: Stage IIIB (pT3, pN1b, cM0) - Signed by Brian Marroquin MD on 5/31/2023 6/14/2023 -  Chemotherapy    OP COLON [id number] OXALIplatin / Leucovorin / Fluorouracil           Objective     Vitals:    09/27/23 0915   BP: 142/77   Pulse: 71   Resp: 20   Temp: 98.5 °F (36.9 °C)   TempSrc: Temporal   SpO2: 100%   Weight: 127 kg (279 lb 15.8 oz)   Height: 162.6 cm (64.02\")   PainSc: 0-No pain     ECOG score: 0         PHQ-9 Total Score:         Physical Exam  Vitals reviewed. Exam conducted with a chaperone present.   Constitutional:       General: She is not in acute distress.     Appearance: Normal appearance.   HENT:      Head: Normocephalic and atraumatic.      Mouth/Throat:      Mouth: Mucous membranes are dry.      Comments: Dry, some thrush on tongue  Eyes:      Extraocular Movements: Extraocular movements intact.      Conjunctiva/sclera: Conjunctivae normal.   Pulmonary:      Effort: Pulmonary effort is normal.   Musculoskeletal:      Cervical back: Normal range of motion and neck supple.   Skin:     General: Skin is warm and dry.      Findings: No bruising.   Neurological:      Mental Status: She is oriented to person, place, and time.         Past Medical History     Past Medical History:   Diagnosis Date    Anesthesia     B/P bottomed out/UNSURE ON THE DATE    Anxiety     Asthma 1996    seasonal/NO INHALERS    Colon cancer 04/17/2023    DDD (degenerative disc disease), cervical     DDD (degenerative disc disease), lumbar     Depression     Diverticulitis of colon 2005    Scope was done in [facility]    GERD (gastroesophageal reflux disease)     Hypertension     Kidney stones     Melanoma     removed    Palpitation     FOLLOWS W/DR. ANTONIO  Q6 MONTHS, NO CP OR SOA    Prolapse of female bladder      Current Outpatient Medications on File Prior to Visit   Medication Sig Dispense Refill    allopurinol (ZYLOPRIM) 100 MG tablet Take 1 tablet by mouth Daily.      ALPRAZolam (XANAX) 0.5 MG tablet " Take 1 tablet by mouth At Night As Needed.      amLODIPine (NORVASC) 10 MG tablet Take 1 tablet by mouth Every Night.      aspirin 81 MG EC tablet Take 1 tablet every day by oral route.      Diclofenac Sodium (VOLTAREN) 1 % gel gel Apply 4 g topically to the appropriate area as directed 4 (Four) Times a Day As Needed.      docusate sodium 100 MG capsule Take 1 capsule by mouth Daily.      fluorouracil (EFUDEX) 5 % cream Apply 1 application  topically to the appropriate area as directed 2 (Two) Times a Day.      furosemide (LASIX) 20 MG tablet Take 1 tablet by mouth Daily.      hydroCHLOROthiazide (HYDRODIURIL) 25 MG tablet Take 1 tablet every day by oral route for 90 days.      HYDROcodone-acetaminophen (NORCO) 7.5-325 MG per tablet Take 1 tablet by mouth Every 8 (Eight) Hours As Needed.      Hydrocortisone, Perianal, (ANUSOL-HC) 2.5 % rectal cream       hyoscyamine (ANASPAZ,LEVSIN) 0.125 MG tablet TAKE ONE TABLET BY MOUTH FOUR TIMES A DAY WITH MEALS AND AT BEDTIME. 120 tablet 1    ketorolac (TORADOL) 10 MG tablet Take 1 tablet by mouth Every 6 (Six) Hours As Needed.      lamoTRIgine (LaMICtal) 25 MG tablet Take 1 tablet by mouth 2 (Two) Times a Day. Only takes at night      leucovorin 5 MG tablet Take  by mouth Every 6 (Six) Hours.      levoFLOXacin (Levaquin) 750 MG tablet Take 1 tablet by mouth Daily. 7 tablet 0    Lidocaine 4 % patch Apply 1 patch topically Daily.      lidocaine-prilocaine (EMLA) 2.5-2.5 % cream Apply 1 application topically to the appropriate area as directed Every 2 (Two) Hours As Needed for Mild Pain. 30 g 1    linaclotide (LINZESS) 290 MCG capsule capsule Take 1 capsule by mouth As Needed.      loperamide (Imodium A-D) 2 MG tablet Take 1 tablet by mouth 4 (Four) Times a Day As Needed for Diarrhea. Imodium - take 4 mg first dose, followed by 2 mg every 2-4 hours after each stool (MAX of 16 mg in 24 hour period) 60 tablet 1    loperamide (IMODIUM) 2 MG capsule       losartan (COZAAR) 100 MG  tablet Take 1 tablet by mouth Every Night.      medroxyPROGESTERone (DEPO-PROVERA) 150 MG/ML injection Inject 1 mL into the appropriate muscle as directed by prescriber Every 3 (Three) Months.      metoclopramide (REGLAN) 10 MG tablet Take 1 tablet by mouth 3 (Three) Times a Day.      metoprolol succinate XL (TOPROL-XL) 25 MG 24 hr tablet Take 1 tablet by mouth Daily.      Multiple Vitamins-Minerals (b complex-C-E-zinc) tablet Take 1 tablet by mouth Daily.      Myrbetriq 50 MG tablet sustained-release 24 hour 24 hr tablet       naloxone (NARCAN) 4 MG/0.1ML nasal spray Take 1 spray every day by nasal route as needed.      ondansetron (ZOFRAN) 8 MG tablet Take 1 tablet by mouth 3 (Three) Times a Day As Needed for Nausea or Vomiting. 30 tablet 5    OXALIPLATIN IV Infuse  into a venous catheter.      pantoprazole (PROTONIX) 40 MG EC tablet Take 1 tablet by mouth 2 (Two) Times a Day.      polyethylene glycol (GaviLyte-G) 236 g solution       prochlorperazine (COMPAZINE) 10 MG tablet Take 1 tablet by mouth Every 6 (Six) Hours As Needed for Nausea or Vomiting. 30 tablet 5    rOPINIRole (REQUIP) 1 MG tablet Take 1 tablet by mouth Every Night.      sertraline (ZOLOFT) 100 MG tablet Take 1 tablet by mouth Daily.      sucralfate (CARAFATE) 1 g tablet Take 1 tablet by mouth 2 (Two) Times a Day.      tetracycline (ACHROMYCIN,SUMYCIN) 250 MG capsule       traMADol (Ultram) 50 MG tablet Take 1 tablet by mouth Every 6 (Six) Hours As Needed for Severe Pain or Moderate Pain. 5 tablet 0    vitamin D3 125 MCG (5000 UT) capsule capsule Take 2,000 Units by mouth Daily.      [DISCONTINUED] al mag oxide-diphenhydramine-nystatin (MAGIC MOUTHWASH) suspension SWISH & SPIT ONE TEASPOONFUL BY MOUTH FOUR TIMES DAILY      Myrbetriq 25 MG tablet sustained-release 24 hour 24 hr tablet Take 1 tablet by mouth Every Evening.       Current Facility-Administered Medications on File Prior to Visit   Medication Dose Route Frequency Provider Last Rate Last  Admin    alteplase (CATHFLO/ACTIVASE) injection 2 mg  2 mg Intracatheter Q2H PRN Brian Marroquin MD   New Syringe/Cartridge at 09/27/23 0930    [COMPLETED] dexAMETHasone (DECADRON) IVPB 12 mg  12 mg Intravenous Once Brian Marroquin  mL/hr at 09/27/23 1127 12 mg at 09/27/23 1127    [COMPLETED] dextrose (D5W) 5 % infusion 250 mL  250 mL Intravenous Once Brian Marroquin MD 20 mL/hr at 09/27/23 1126 250 mL at 09/27/23 1126    fluorouracil (ADRUCIL) 5,740 mg in sodium chloride 0.9 % 138 mL chemo infusion - FOR HOME USE  2,400 mg/m2 (Treatment Plan Recorded) Intravenous Once Brian Marroquin MD        fluorouracil (ADRUCIL) chemo injection 950 mg (19 mL)  950 mg Intravenous Once Brian Marroquin MD        leucovorin 950 mg in dextrose (D5W) 5 % 322.5 mL IVPB  950 mg Intravenous Once Brian Marroquin MD   950 mg at 09/27/23 1201    OXALIplatin (ELOXATIN) 200 mg in dextrose (D5W) 5 % 315 mL chemo IVPB  200 mg Intravenous Once Brian Marroquin MD   200 mg at 09/27/23 1201    [COMPLETED] palonosetron (ALOXI) injection 0.25 mg  0.25 mg Intravenous Once Brian Marroquin MD   0.25 mg at 09/27/23 1128    [COMPLETED] sodium chloride 0.9 % bolus 1,000 mL  1,000 mL Intravenous Once Brian Marroquin MD   Stopped at 09/27/23 1149      Allergies   Allergen Reactions    Hydromorphone Hives, Itching and GI Intolerance    Morphine Hives, Itching and Nausea And Vomiting    Oxybutynin Swelling and Angioedema           Oxycodone-Acetaminophen Itching and Nausea And Vomiting     Can not take any higher than 7.5    Penicillins Rash    Promethazine Nausea And Vomiting    Tylenol With Codeine #3 [Acetaminophen-Codeine] Itching and Nausea And Vomiting    Oxybutynin Chloride Angioedema     Past Surgical History:   Procedure Laterality Date    BREAST SURGERY  2016    Removed 8lbs of tissue, 2016    CHOLECYSTECTOMY      COLON RESECTION Left 05/05/2023    Procedure: RESECTION OF DESCENDING COLON AND SPLENIC FLEXURE TAKEDOWN WITH DAVINCI ROBOT;  Surgeon:  Rolando Liu MD;  Location: [facility] [place];  Service: Robotics - DaVinci;  Laterality: Left;    COLONOSCOPY N/A 04/04/2023    Procedure: COLONOSCOPY WITH POLYPECTOMY/SNARE/BIOPSIES/TATTOOING DESCENDING COLON MASS;  Surgeon: Deniz Dowd MD;  Location: [facility] ENDOSCOPY;  Service: Gastroenterology;  Laterality: N/A;  INCOMPLETE COLONOSCOPY  POOR BOWEL PREP  COLON POLYP  COLON MASS  DIVERTICULOSIS    COLONOSCOPY N/A 04/12/2023    Procedure: COLONOSCOPY with cold snare;  Surgeon: Deniz Dowd MD;  Location: [facility] ENDOSCOPY;  Service: Gastroenterology;  Laterality: N/A;  colon polyps, diverticulosis, colon mass, hemorrhoids      CYSTOSCOPY W/ URETERAL STENT PLACEMENT Left 05/05/2023    Procedure: Cystoscopy, left ureteral injection,;  Surgeon: Manjula Randolph MD;  Location: [facility] [place];  Service: Urology;  Laterality: Left;    FRACTURE SURGERY  2001    Left knee    KNEE SURGERY Left     x4    LAPAROSCOPIC TUBAL LIGATION  1995    LAPAROSCOPIC TUBAL LIGATION      Reversed    REDUCTION MAMMAPLASTY  2009    8lbs of tissue was removed    TONSILLECTOMY      UPPER GASTROINTESTINAL ENDOSCOPY      VENOUS ACCESS DEVICE (PORT) INSERTION N/A 6/5/2023    Procedure: INSERTION VENOUS ACCESS DEVICE;  Surgeon: Rolando Liu MD;  Location: [facility];  Service: General;  Laterality: N/A;     Social History     Socioeconomic History    Marital status:    Tobacco Use    Smoking status: Never     Passive exposure: Never    Smokeless tobacco: Never   Vaping Use    Vaping Use: Never used   Substance and Sexual Activity    Alcohol use: Never    Drug use: Never    Sexual activity: Defer     Family History   Problem Relation Age of Onset    Colon cancer Neg Hx        Results     Result Review   The following data was reviewed by: Brian Marroquin MD on 04/13/2023:  Lab Results   Component Value Date    HGB 12.5 09/27/2023    HCT 37.0 09/27/2023    MCV 90.5 09/27/2023     09/27/2023    WBC 4.69 09/27/2023     NEUTROABS 1.70 09/27/2023    LYMPHSABS 1.98 09/27/2023    MONOSABS 0.92 (H) 09/27/2023    EOSABS 0.04 09/27/2023    BASOSABS 0.03 09/27/2023     Lab Results   Component Value Date    GLUCOSE 98 09/27/2023    BUN 10 09/27/2023    CREATININE 0.77 09/27/2023     09/27/2023    K 3.7 09/27/2023     (H) 09/27/2023    CO2 25.4 09/27/2023    CALCIUM 9.8 09/27/2023    PROTEINTOT 6.7 09/27/2023    ALBUMIN 4.2 09/27/2023    BILITOT 0.8 09/27/2023    ALKPHOS 106 09/27/2023    AST 32 09/27/2023    ALT 29 09/27/2023     Lab Results   Component Value Date    MG 2.3 06/18/2023    PHOS 3.3 06/18/2023       Case Report   Surgical Pathology Report                         Case: [id number]                                   Authorizing Provider:  Deniz Dowd MD    Collected:           04/12/2023 09:41 AM           Ordering Location:     [facility] Received:            04/12/2023 11:41 AM                                  SUITES                                                                        Pathologist:           Niesha Torres DO                                                        Specimens:   1) - Large Intestine, Cecum, cecal polyp cold snare                                                  2) - Large Intestine, Sigmoid Colon, sigmoid colon polyp cold snare                        Clinical Information    Mass of colon   Final Diagnosis   1. Cecum polyp, biopsy:                            - Tubular adenoma        2. Sigmoid colon polyp, biopsy:                            - Tubular adenoma    Electronically signed by Niesha Torres DO on 4/13/2023 at 0840     Surgical Pathology Report                         Case: [id number]                                   Authorizing Provider:  Deniz Dowd MD    Collected:           04/04/2023 11:15 AM           Ordering Location:     [facility] Received:            04/04/2023 11:59 AM                                   SUITES                                                                        Pathologist:           Jennifer Mike MD                                                      Specimens:   1) - Large Intestine, Transverse Colon, TRANSVERSE COLON POLYP                                       2) - Large Intestine, Left / Descending Colon, DESCENDING COLON BIOPSIES                   Clinical Information    Constipation, unspecified constipation type   Final Diagnosis   1. Transverse colon polyp, biopsy:               - Fragments of tubular adenoma        2.  Descending colon, biopsy:               -Adenocarcinoma, moderately to poorly differentiated     Comment: Per policy, reflex testing has been ordered, and the results will be separately reported.     REMARKS: The above positive (malignant) diagnosis was called to April in Dr. Dowd's office at 09:09 EDT on 4/5/2023 by s.          Electronically signed by Jennifer Mike MD on 4/5/2023 at 0922         Surgical Pathology Report                         Case: [id number]                                   Authorizing Provider:  Rolando Liu MD        Collected:           05/05/2023 03:06 PM           Ordering Location:     [facility]  Received:            05/08/2023 06:48 AM                                  OR                                                                            Pathologist:           Jt [place] MD                                                             Specimen:    Large Intestine, Left / Descending Colon, left colon                                       Clinical Information    Malignant neoplasm of descending colon   Final Diagnosis   Left colon, resection:               - Adenocarcinoma               - Three of thirty-three lymph nodes positive for carcinoma (3/33)               - See synoptic checklist   Electronically signed by Jt [place] MD on 5/11/2023 at 1508   Synoptic Checklist   COLON AND RECTUM:  Resection, Including Transanal Disk Excision of Rectal Neoplasms  8th Edition - Protocol posted: 6/22/2022  COLON AND RECTUM: RESECTION - All Specimens  SPECIMEN   Procedure  Descending colon resection    TUMOR   Tumor Site  Descending colon    Histologic Type  Adenocarcinoma    Histologic Grade  G3, poorly differentiated    Tumor Size  Greatest dimension (Centimeters): 3.5 cm   Tumor Extent  Invades through muscularis propria into the pericolonic or perirectal tissue    Macroscopic Tumor Perforation  Not identified    Lymphovascular Invasion  Small vessel    Perineural Invasion  Present    Treatment Effect  No known presurgical therapy    MARGINS   Margin Status for Invasive Carcinoma  All margins negative for invasive carcinoma    Closest Margin(s) to Invasive Carcinoma  Radial (circumferential) or mesenteric    Distance from Invasive Carcinoma to Closest Margin  3.5 cm   Margin Status for Non-Invasive Tumor  All margins negative for high-grade dysplasia / intramucosal carcinoma and low-grade dysplasia    REGIONAL LYMPH NODES   Regional Lymph Node Status  Tumor present in regional lymph node(s)    Number of Lymph Nodes with Tumor  3    Number of Lymph Nodes Examined  33    Tumor Deposits  Present    Number of Tumor Deposits  1    PATHOLOGIC STAGE CLASSIFICATION (pTNM, AJCC 8th Edition)   Reporting of pT, pN, and (when applicable) pM categories is based on information available to the pathologist at the time the report is issued. As per the AJCC (Chapter 1, 8th Ed.) it is the managing physician's responsibility to establish the final pathologic stage based upon all pertinent information, including but potentially not limited to this pathology report.   pT Category  pT3    pN Category  pN1b    ADDITIONAL FINDINGS   Additional Findings  None identified    .               Assessment & Plan     Diagnoses and all orders for this visit:    1. Malignant neoplasm of descending colon (Primary)  -     nystatin (MYCOSTATIN)  100,000 unit/mL suspension; Swish and swallow 5 mL 4 (Four) Times a Day.  Dispense: 473 mL; Refill: 1    2. Mucositis  -     nystatin (MYCOSTATIN) 100,000 unit/mL suspension; Swish and swallow 5 mL 4 (Four) Times a Day.  Dispense: 473 mL; Refill: 1    3. Dehydration  -     ONCBCN INFUSION APPOINTMENT REQUEST 01; Future    4. Primary adenocarcinoma of descending colon  -     ONCBCN INFUSION APPOINTMENT REQUEST 01; Future    Other orders  -     Cancel: sodium chloride 0.9 % bolus 1,000 mL  -     Cancel: dextrose (D5W) 5 % infusion 250 mL  -     Cancel: palonosetron (ALOXI) injection 0.25 mg  -     Cancel: dexAMETHasone (DECADRON) 12 mg in sodium chloride 0.9 % IVPB  -     Cancel: OXALIplatin (ELOXATIN) 205 mg in dextrose (D5W) 5 % 291 mL chemo IVPB  -     Cancel: leucovorin 960 mg in dextrose (D5W) 5 % 346 mL IVPB  -     Cancel: fluorouracil (ADRUCIL) chemo injection 960 mg  -     Cancel: fluorouracil (ADRUCIL) 5,740 mg in sodium chloride 0.9 % 114.8 mL chemo infusion - FOR HOME USE  -     Hydrocortisone Sod Suc (PF) (Solu-CORTEF) injection 100 mg  -     diphenhydrAMINE (BENADRYL) injection 50 mg  -     famotidine (PEPCID) injection 20 mg            Lilian Pelaez is a 47 y.o. female who presents to [facility] HEMATOLOGY [facility] ONCOLOGY evaluation prior to systemic therapy for stage III colon cancer, patient status post resection of descending colon splenic flexure by Dr. Rolando Liu on 5/5/2023.     Reviewed NCCN guidelines for her stage III cancer, discussed high risk features seen on pathology report and discussed CapeOx for 3 months versus FOLFOX for 3 to 6 months with patient and  today.  Patient agreed to plan to undergo FOLFOX IV chemotherapy every 2 weeks for 6 months.    Plan to rescan patient after 3 months of FOLFOX therapy (11/2023).    Patient received cycle 1 on June 14, 2023, she developed pneumoperitoneum which her surgeon suspect was secondary to severe constipation from iron  tablets, but patient was cleared to resume chemotherapy after 8/1/2023.    Iron deficiency anemia from GI blood loss  -Evidence of this on lab work from May 2023  - We will continue to monitor and will offer IV iron therapy in the future if she becomes iron deficient secondary to pneumoperitoneum that developed in June 2023 from severe constipation.    Chemotherapy-induced nausea  - Currently well controlled with Zofran, but prescribe Compazine as a backup option  - Patient also taking Reglan and was counseled regarding not taking Reglan with Zofran and Compazine together but rather patient to take other antiemetics at least 4 to 6 hours after Reglan dose.    Chemotherapy induced diarrhea  - Patient prescribed Imodium   -We will prescribe Lomotil if Imodium fails to work.    Pt with loss of taste, mouth with some evidence of thrush- ordered nystatin swish and spit    Ordered IVFs as suspect pt is dehydrated from decrease in oral intake.    Labs reviewed and plan to proceed with cycle 6 day 1 on today.    Plan patient follow-up in the next 2 weeks for cycle 7 day 1 of chemotherapy.    Patient verbalized understanding and agreement plan.    Please note that portions of this note were completed with a voice recognition program.    Electronically signed by Brian Marroquin MD, 09/27/23, 1:03 PM EDT.      Follow Up     I spent 30 minutes caring for Lilian on this date of service. This time includes time spent by me in the following activities:preparing for the visit, reviewing tests, obtaining and/or reviewing a separately obtained history, performing a medically appropriate examination and/or evaluation , counseling and educating the patient/family/caregiver, ordering medications, tests, or procedures, documenting information in the medical record and care coordination.    This is an acute or chronic illness that poses a threat to life or bodily function. The above treatment plan involves a high risk of complications and/or  mortality of patient management.    The patient was seen and examined. Work by the provider also included review and/or ordering of lab tests, review and/or ordering of radiology tests, review and/or ordering of medicine tests, discussion with other physicians or providers, independent review of data, obtaining old records, review/summation of old records, and/or other review.    I have reviewed the family history, social history, and past medical history for this patient. Previous information and data has been reviewed and updated as needed. I have reviewed and verified the chief complaint, history, and other documentation. The patient was interviewed and examined in the clinic and the chart reviewed. The previous observations, recommendations, and conclusions were reviewed including those of other providers.     The plan was discussed with the patient and/or family. The patient was given time to ask questions and these questions were answered. At the conclusion of their visit they had no additional questions or concerns and all questions were answered to their satisfaction.    Patient was given instructions and counseling regarding her condition or for health maintenance advice. Please see specific information pulled into the AVS if appropriate.

## 2023-09-27 ENCOUNTER — HOSPITAL ENCOUNTER (OUTPATIENT)
Dept: ONCOLOGY | Facility: HOSPITAL | Age: 48
Discharge: HOME | End: 2023-09-27
Admitting: INTERNAL MEDICINE
Payer: COMMERCIAL

## 2023-09-27 ENCOUNTER — DOCUMENTATION (OUTPATIENT)
Dept: ONCOLOGY | Facility: HOSPITAL | Age: 48
End: 2023-09-27
Payer: COMMERCIAL

## 2023-09-27 ENCOUNTER — OFFICE VISIT (OUTPATIENT)
Dept: ONCOLOGY | Facility: HOSPITAL | Age: 48
End: 2023-09-27
Payer: COMMERCIAL

## 2023-09-27 VITALS
TEMPERATURE: 98.5 F | SYSTOLIC BLOOD PRESSURE: 142 MMHG | RESPIRATION RATE: 20 BRPM | BODY MASS INDEX: 47.95 KG/M2 | DIASTOLIC BLOOD PRESSURE: 77 MMHG | HEART RATE: 71 BPM | OXYGEN SATURATION: 100 % | HEIGHT: 64 IN | WEIGHT: 280.87 LBS

## 2023-09-27 VITALS
TEMPERATURE: 98.5 F | RESPIRATION RATE: 20 BRPM | WEIGHT: 279.98 LBS | HEART RATE: 71 BPM | HEIGHT: 64 IN | DIASTOLIC BLOOD PRESSURE: 77 MMHG | SYSTOLIC BLOOD PRESSURE: 142 MMHG | OXYGEN SATURATION: 100 % | BODY MASS INDEX: 47.8 KG/M2

## 2023-09-27 DIAGNOSIS — K12.30 MUCOSITIS: ICD-10-CM

## 2023-09-27 DIAGNOSIS — Z45.2 ENCOUNTER FOR ADJUSTMENT OR MANAGEMENT OF VASCULAR ACCESS DEVICE: ICD-10-CM

## 2023-09-27 DIAGNOSIS — C18.6 MALIGNANT NEOPLASM OF DESCENDING COLON: Primary | ICD-10-CM

## 2023-09-27 DIAGNOSIS — E86.0 DEHYDRATION: ICD-10-CM

## 2023-09-27 DIAGNOSIS — C18.6 PRIMARY ADENOCARCINOMA OF DESCENDING COLON: ICD-10-CM

## 2023-09-27 LAB
ALBUMIN SERPL-MCNC: 4.2 G/DL (ref 3.5–5.2)
ALBUMIN/GLOB SERPL: 1.7 G/DL
ALP SERPL-CCNC: 106 U/L (ref 39–117)
ALT SERPL W P-5'-P-CCNC: 29 U/L (ref 1–33)
ANION GAP SERPL CALCULATED.3IONS-SCNC: 9.6 MMOL/L (ref 5–15)
AST SERPL-CCNC: 32 U/L (ref 1–32)
BASOPHILS # BLD AUTO: 0.03 10*3/MM3 (ref 0–0.2)
BASOPHILS NFR BLD AUTO: 0.6 % (ref 0–1.5)
BILIRUB SERPL-MCNC: 0.8 MG/DL (ref 0–1.2)
BUN SERPL-MCNC: 10 MG/DL (ref 6–20)
BUN/CREAT SERPL: 13 (ref 7–25)
CALCIUM SPEC-SCNC: 9.8 MG/DL (ref 8.6–10.5)
CEA SERPL-MCNC: 2.59 NG/ML
CHLORIDE SERPL-SCNC: 108 MMOL/L (ref 98–107)
CO2 SERPL-SCNC: 25.4 MMOL/L (ref 22–29)
CREAT SERPL-MCNC: 0.77 MG/DL (ref 0.57–1)
DEPRECATED RDW RBC AUTO: 52 FL (ref 37–54)
EGFRCR SERPLBLD CKD-EPI 2021: 95.9 ML/MIN/1.73
EOSINOPHIL # BLD AUTO: 0.04 10*3/MM3 (ref 0–0.4)
EOSINOPHIL NFR BLD AUTO: 0.9 % (ref 0.3–6.2)
ERYTHROCYTE [DISTWIDTH] IN BLOOD BY AUTOMATED COUNT: 16.2 % (ref 12.3–15.4)
GLOBULIN UR ELPH-MCNC: 2.5 GM/DL
GLUCOSE SERPL-MCNC: 98 MG/DL (ref 65–99)
HCT VFR BLD AUTO: 37 % (ref 34–46.6)
HGB BLD-MCNC: 12.5 G/DL (ref 12–15.9)
IMM GRANULOCYTES # BLD AUTO: 0.02 10*3/MM3 (ref 0–0.05)
IMM GRANULOCYTES NFR BLD AUTO: 0.4 % (ref 0–0.5)
LYMPHOCYTES # BLD AUTO: 1.98 10*3/MM3 (ref 0.7–3.1)
LYMPHOCYTES NFR BLD AUTO: 42.2 % (ref 19.6–45.3)
MCH RBC QN AUTO: 30.6 PG (ref 26.6–33)
MCHC RBC AUTO-ENTMCNC: 33.8 G/DL (ref 31.5–35.7)
MCV RBC AUTO: 90.5 FL (ref 79–97)
MONOCYTES # BLD AUTO: 0.92 10*3/MM3 (ref 0.1–0.9)
MONOCYTES NFR BLD AUTO: 19.6 % (ref 5–12)
NEUTROPHILS NFR BLD AUTO: 1.7 10*3/MM3 (ref 1.7–7)
NEUTROPHILS NFR BLD AUTO: 36.3 % (ref 42.7–76)
PLATELET # BLD AUTO: 140 10*3/MM3 (ref 140–450)
PMV BLD AUTO: 9.5 FL (ref 6–12)
POTASSIUM SERPL-SCNC: 3.7 MMOL/L (ref 3.5–5.2)
PROT SERPL-MCNC: 6.7 G/DL (ref 6–8.5)
RBC # BLD AUTO: 4.09 10*6/MM3 (ref 3.77–5.28)
SODIUM SERPL-SCNC: 143 MMOL/L (ref 136–145)
WBC NRBC COR # BLD: 4.69 10*3/MM3 (ref 3.4–10.8)

## 2023-09-27 PROCEDURE — 80053 COMPREHEN METABOLIC PANEL: CPT | Performed by: INTERNAL MEDICINE

## 2023-09-27 PROCEDURE — 25010000002 PALONOSETRON PER 25 MCG: Performed by: INTERNAL MEDICINE

## 2023-09-27 PROCEDURE — 96375 TX/PRO/DX INJ NEW DRUG ADDON: CPT

## 2023-09-27 PROCEDURE — 85025 COMPLETE CBC W/AUTO DIFF WBC: CPT | Performed by: INTERNAL MEDICINE

## 2023-09-27 PROCEDURE — 96416 CHEMO PROLONG INFUSE W/PUMP: CPT

## 2023-09-27 PROCEDURE — 82378 CARCINOEMBRYONIC ANTIGEN: CPT | Performed by: INTERNAL MEDICINE

## 2023-09-27 PROCEDURE — 25010000002 OXALIPLATIN PER 0.5 MG: Performed by: INTERNAL MEDICINE

## 2023-09-27 PROCEDURE — 25010000002 DEXAMETHASONE SODIUM PHOSPHATE 120 MG/30ML SOLUTION: Performed by: INTERNAL MEDICINE

## 2023-09-27 PROCEDURE — 96413 CHEMO IV INFUSION 1 HR: CPT

## 2023-09-27 PROCEDURE — 25010000002 LEUCOVORIN CALCIUM PER 50 MG: Performed by: INTERNAL MEDICINE

## 2023-09-27 PROCEDURE — 36593 DECLOT VASCULAR DEVICE: CPT

## 2023-09-27 PROCEDURE — 25010000002 FLUOROURACIL PER 500 MG: Performed by: INTERNAL MEDICINE

## 2023-09-27 PROCEDURE — 25010000002 ALTEPLASE 2 MG RECONSTITUTED SOLUTION: Performed by: INTERNAL MEDICINE

## 2023-09-27 PROCEDURE — 96368 THER/DIAG CONCURRENT INF: CPT

## 2023-09-27 PROCEDURE — 96411 CHEMO IV PUSH ADDL DRUG: CPT

## 2023-09-27 PROCEDURE — G0498 CHEMO EXTEND IV INFUS W/PUMP: HCPCS

## 2023-09-27 PROCEDURE — 96361 HYDRATE IV INFUSION ADD-ON: CPT

## 2023-09-27 PROCEDURE — 96415 CHEMO IV INFUSION ADDL HR: CPT

## 2023-09-27 RX ORDER — DIPHENHYDRAMINE HYDROCHLORIDE 50 MG/ML
50 INJECTION INTRAMUSCULAR; INTRAVENOUS AS NEEDED
Status: CANCELLED | OUTPATIENT
Start: 2023-09-27

## 2023-09-27 RX ORDER — FAMOTIDINE 10 MG/ML
20 INJECTION, SOLUTION INTRAVENOUS AS NEEDED
Status: CANCELLED | OUTPATIENT
Start: 2023-09-27

## 2023-09-27 RX ORDER — FLUOROURACIL 50 MG/ML
950 INJECTION, SOLUTION INTRAVENOUS ONCE
Status: COMPLETED | OUTPATIENT
Start: 2023-09-27 | End: 2023-09-27

## 2023-09-27 RX ORDER — DEXTROSE MONOHYDRATE 50 MG/ML
250 INJECTION, SOLUTION INTRAVENOUS ONCE
Status: CANCELLED | OUTPATIENT
Start: 2023-09-27

## 2023-09-27 RX ORDER — FLUOROURACIL 50 MG/ML
400 INJECTION, SOLUTION INTRAVENOUS ONCE
Status: CANCELLED | OUTPATIENT
Start: 2023-09-27

## 2023-09-27 RX ORDER — DEXTROSE MONOHYDRATE 50 MG/ML
250 INJECTION, SOLUTION INTRAVENOUS ONCE
Status: COMPLETED | OUTPATIENT
Start: 2023-09-27 | End: 2023-09-27

## 2023-09-27 RX ORDER — PALONOSETRON 0.05 MG/ML
0.25 INJECTION, SOLUTION INTRAVENOUS ONCE
Status: CANCELLED | OUTPATIENT
Start: 2023-09-27

## 2023-09-27 RX ORDER — PALONOSETRON 0.05 MG/ML
0.25 INJECTION, SOLUTION INTRAVENOUS ONCE
Status: COMPLETED | OUTPATIENT
Start: 2023-09-27 | End: 2023-09-27

## 2023-09-27 RX ADMIN — ALTEPLASE: 2.2 INJECTION, POWDER, LYOPHILIZED, FOR SOLUTION INTRAVENOUS at 09:30

## 2023-09-27 RX ADMIN — PALONOSETRON HYDROCHLORIDE 0.25 MG: 0.25 INJECTION INTRAVENOUS at 11:28

## 2023-09-27 RX ADMIN — DEXTROSE MONOHYDRATE 250 ML: 50 INJECTION, SOLUTION INTRAVENOUS at 11:26

## 2023-09-27 RX ADMIN — SODIUM CHLORIDE 1000 ML: 9 INJECTION, SOLUTION INTRAVENOUS at 10:42

## 2023-09-27 RX ADMIN — DEXAMETHASONE SODIUM PHOSPHATE 12 MG: 4 INJECTION, SOLUTION INTRA-ARTICULAR; INTRALESIONAL; INTRAMUSCULAR; INTRAVENOUS; SOFT TISSUE at 11:27

## 2023-09-27 RX ADMIN — FLUOROURACIL 5740 MG: 50 INJECTION, SOLUTION INTRAVENOUS at 14:10

## 2023-09-27 RX ADMIN — OXALIPLATIN 200 MG: 5 INJECTION, SOLUTION INTRAVENOUS at 12:01

## 2023-09-27 RX ADMIN — LEUCOVORIN CALCIUM 950 MG: 350 INJECTION, POWDER, LYOPHILIZED, FOR SOLUTION INTRAMUSCULAR; INTRAVENOUS at 12:01

## 2023-09-27 RX ADMIN — FLUOROURACIL 950 MG: 50 INJECTION, SOLUTION INTRAVENOUS at 14:05

## 2023-09-27 NOTE — PROGRESS NOTES
Diagnosis: Colon cancer    Reason for Referral: [facility] assisting with transportation reimbursement    Content of Visit: [facility] met with patient at her infusion chair today.  [facility] obtained patient's signature on her Travel to [place] form to request reimbursement for 460 miles of driving back and forth to treatment in the month of September. Patient stated that she gets frustrated when she wants to do something but she does not have the energy to follow through.  Patient stated she has seven more treatments.    Resources/Referrals Provided: Travel to [place] reimbursement form completion

## 2023-09-28 ENCOUNTER — TELEPHONE (OUTPATIENT)
Dept: ONCOLOGY | Facility: HOSPITAL | Age: 48
End: 2023-09-28

## 2023-09-28 NOTE — TELEPHONE ENCOUNTER
Caller: Lilian Pelaez    Relationship to patient: Self    Best call back number: [phone]    Patient is needing: TO REQUEST CALL FROM DR. GERMAIN. SHE IS WORRIED ABOUT THE CEA LEVEL BEING A LITTLE HIGHER THAN NORMAL.

## 2023-09-29 ENCOUNTER — HOSPITAL ENCOUNTER (OUTPATIENT)
Dept: ONCOLOGY | Facility: HOSPITAL | Age: 48
Discharge: HOME | End: 2023-09-29
Payer: COMMERCIAL

## 2023-09-29 DIAGNOSIS — Z45.2 ENCOUNTER FOR ADJUSTMENT OR MANAGEMENT OF VASCULAR ACCESS DEVICE: Primary | ICD-10-CM

## 2023-09-29 PROCEDURE — 25010000002 HEPARIN LOCK FLUSH PER 10 UNITS: Performed by: INTERNAL MEDICINE

## 2023-09-29 RX ORDER — HEPARIN SODIUM (PORCINE) LOCK FLUSH IV SOLN 100 UNIT/ML 100 UNIT/ML
500 SOLUTION INTRAVENOUS AS NEEDED
OUTPATIENT
Start: 2023-09-29

## 2023-09-29 RX ORDER — SODIUM CHLORIDE 0.9 % (FLUSH) 0.9 %
20 SYRINGE (ML) INJECTION AS NEEDED
OUTPATIENT
Start: 2023-09-29

## 2023-09-29 RX ORDER — HEPARIN SODIUM (PORCINE) LOCK FLUSH IV SOLN 100 UNIT/ML 100 UNIT/ML
500 SOLUTION INTRAVENOUS AS NEEDED
Status: DISCONTINUED | OUTPATIENT
Start: 2023-09-29 | End: 2023-09-30 | Stop reason: HOSPADM

## 2023-09-29 RX ORDER — SODIUM CHLORIDE 0.9 % (FLUSH) 0.9 %
20 SYRINGE (ML) INJECTION AS NEEDED
Status: DISCONTINUED | OUTPATIENT
Start: 2023-09-29 | End: 2023-09-30 | Stop reason: HOSPADM

## 2023-09-29 RX ADMIN — HEPARIN SODIUM (PORCINE) LOCK FLUSH IV SOLN 100 UNIT/ML 500 UNITS: 100 SOLUTION at 13:55

## 2023-09-29 RX ADMIN — Medication 20 ML: at 13:54

## 2023-10-02 ENCOUNTER — DOCUMENTATION (OUTPATIENT)
Dept: ONCOLOGY | Facility: HOSPITAL | Age: 48
End: 2023-10-02
Payer: COMMERCIAL

## 2023-10-02 NOTE — PROGRESS NOTES
[facility] emailed patient's travel to [place] reimbursement request. Patient was awarded $200 additional to assist with traveling to treatment. [facility] to [place] requested an update for treatment and [facility] sent via secure mail the dates of Oct to Dec approximately 4 trips per month.

## 2023-10-04 ENCOUNTER — TELEPHONE (OUTPATIENT)
Dept: GENETICS | Facility: HOSPITAL | Age: 48
End: 2023-10-04
Payer: COMMERCIAL

## 2023-10-09 ENCOUNTER — HOSPITAL ENCOUNTER (OUTPATIENT)
Dept: CT IMAGING | Facility: HOSPITAL | Age: 48
Discharge: HOME | End: 2023-10-09
Admitting: INTERNAL MEDICINE
Payer: COMMERCIAL

## 2023-10-09 ENCOUNTER — CLINICAL SUPPORT (OUTPATIENT)
Dept: GENETICS | Facility: HOSPITAL | Age: 48
End: 2023-10-09
Payer: COMMERCIAL

## 2023-10-09 DIAGNOSIS — C18.6 MALIGNANT NEOPLASM OF DESCENDING COLON: ICD-10-CM

## 2023-10-09 DIAGNOSIS — Z80.3 FAMILY HISTORY OF BREAST CANCER: ICD-10-CM

## 2023-10-09 DIAGNOSIS — C18.9 MALIGNANT NEOPLASM OF COLON, UNSPECIFIED PART OF COLON: Primary | ICD-10-CM

## 2023-10-09 DIAGNOSIS — Z80.8 FAMILY HISTORY OF MELANOMA: ICD-10-CM

## 2023-10-09 PROCEDURE — 74177 CT ABD & PELVIS W/CONTRAST: CPT

## 2023-10-09 PROCEDURE — 71260 CT THORAX DX C+: CPT

## 2023-10-09 PROCEDURE — 25510000001 IOPAMIDOL PER 1 ML: Performed by: INTERNAL MEDICINE

## 2023-10-09 RX ORDER — HEPARIN SODIUM (PORCINE) LOCK FLUSH IV SOLN 100 UNIT/ML 100 UNIT/ML
SOLUTION INTRAVENOUS
Status: DISCONTINUED
Start: 2023-10-09 | End: 2023-10-10 | Stop reason: HOSPADM

## 2023-10-09 RX ADMIN — IOPAMIDOL 100 ML: 755 INJECTION, SOLUTION INTRAVENOUS at 16:55

## 2023-10-10 NOTE — PROGRESS NOTES
Chief Complaint/Care Team   Malignant neoplasm of descending colon    Shelly Cash MD Stephens, Cassandra, MD    History of Present Illness     Diagnosis: Colon Adenocarcinoma S/p Left colectomy on 5/5/23, stage after resection [id number], stage III    Current Treatment: FOLFOX IV [id number] x 6 months, cycle 1 on 6/14/2023  Treatment delay secondary to pneumoperitoneum which developed in June 2023, patient cleared to resume chemotherapy in August 2023.    Previous Treatment: c-scope on 4/2023 biopsy revealed colon adenocarcinoma    Lilian Pelaez is a 48 y.o. female who presents to [facility] HEMATOLOGY & ONCOLOGY for discussion of newly diagnosed colon adenocarcinoma seen on biopsy of colon mass in the descending colon during colonoscopy performed in April 2023.     Staging scans revealed on 4/11/2023:  CT abdomen/pelvis without any metastatic disease in abdomen/pelvis  CT chest no evidence of metastatic disease in chest.    Patient underwent robotic resection of descending colon and splenic flexure by Dr. Rolando Liu on 5/5/2023.    Patient is a former smoker.   Patient reports family history of several relatives with other forms of cancer cancer on her mother side of the family, but denies any known history of colon cancer in her family.    Patient reports noticing some blood mixed in with stool prior to cancer diagnosis.      Patient received cycle 1 on 6/14/2023, cycle 2 was delayed secondary to development of pneumoperitoneum after cycle 1, patient was mated to the hospital very by her surgeon Dr. Liu who recommended delaying further cycles of chemotherapy until August 1, 2023.    Interval history: Patient here prior to cycle 7 of chemotherapy with FOLFOX.  Patient cleared by Dr. Rolando Liu to resume chemotherapy in August 2023 after developing pneumoperitoneum in June 2023.  She without report of fever, chills, SOA, recent infections, melena, or v/d. She continues to report  increased dry mouth, loss of taste since beginning chemo, post nasal drip, along with fatigue. Pt attributes nasal congestion to allergies, reports some minor nosebleeds and reports both have resolved today. No report of blood loss today. Pt with chemo induced nausea, vomiting and diarrhea well controlled with prescribed medications. Pt reports peripheral neuropathy in her fingertips, and feet, that do not impede her ability to perform ADLs or IADLs.     Review of Systems   Constitutional:  Positive for activity change, appetite change and fatigue. Negative for diaphoresis and fever.   HENT:  Positive for congestion and trouble swallowing (PT experiencing dry mouth which is causing her to not be able to eat or drink). Negative for hearing loss, mouth sores, sore throat, swollen glands and voice change.    Eyes:  Negative for blurred vision.   Respiratory:  Negative for cough, shortness of breath and wheezing.    Cardiovascular:  Negative for chest pain and palpitations.   Gastrointestinal:  Positive for nausea. Negative for abdominal pain, blood in stool, constipation, diarrhea and vomiting.   Endocrine: Positive for cold intolerance and heat intolerance.   Genitourinary:  Negative for difficulty urinating, dysuria, frequency, hematuria and urinary incontinence.   Musculoskeletal:  Negative for arthralgias, back pain and myalgias.   Skin:  Negative for rash, skin lesions and wound.   Neurological:  Negative for dizziness, seizures, weakness, numbness and headache.   Hematological:  Does not bruise/bleed easily.   Psychiatric/Behavioral:  Negative for depressed mood. The patient is nervous/anxious.    All other systems reviewed and are negative.       Oncology/Hematology History   Primary adenocarcinoma of descending colon   4/11/2023 Initial Diagnosis    Primary adenocarcinoma of descending colon     9/27/2023 -  Chemotherapy    OP SUPPORTIVE HYDRATION + ANTIEMETICS     Malignant neoplasm of descending colon  "  5/6/2023 Initial Diagnosis    Malignant neoplasm of descending colon     5/31/2023 Cancer Staged    Staging form: Colon And Rectum, AJCC 8th Edition  - Pathologic: Stage IIIB (pT3, pN1b, cM0) - Signed by Brian Marroquin MD on 5/31/2023 6/14/2023 -  Chemotherapy    OP COLON [id number] OXALIplatin / Leucovorin / Fluorouracil         Objective     Vitals:    10/11/23 0916   BP: 132/66   Pulse: 74   Resp: 20   Temp: 98 øF (36.7 øC)   TempSrc: Temporal   SpO2: 100%   Weight: 126 kg (277 lb 12.5 oz)   Height: 162.6 cm (64.02\")   PainSc: 0-No pain       ECOG score: 0         PHQ-9 Total Score:         Physical Exam  Vitals reviewed. Exam conducted with a chaperone present.   Constitutional:       General: She is not in acute distress.     Appearance: Normal appearance.   HENT:      Head: Normocephalic and atraumatic.      Mouth/Throat:      Mouth: Mucous membranes are dry.   Eyes:      Extraocular Movements: Extraocular movements intact.      Conjunctiva/sclera: Conjunctivae normal.   Pulmonary:      Effort: Pulmonary effort is normal.   Musculoskeletal:      Cervical back: Normal range of motion and neck supple.   Skin:     General: Skin is warm and dry.      Findings: No bruising.   Neurological:      Mental Status: She is oriented to person, place, and time.           Past Medical History     Past Medical History:   Diagnosis Date    Anesthesia     B/P bottomed out/UNSURE ON THE DATE    Anxiety     Asthma 1996    seasonal/NO INHALERS    Colon cancer 04/17/2023    DDD (degenerative disc disease), cervical     DDD (degenerative disc disease), lumbar     Depression     Diverticulitis of colon 2005    Scope was done in [facility]    GERD (gastroesophageal reflux disease)     Hypertension     Kidney stones     Melanoma     removed    Palpitation     FOLLOWS W/DR. ANTONIO  Q6 MONTHS, NO CP OR SOA    Prolapse of female bladder      Current Outpatient Medications on File Prior to Visit   Medication Sig Dispense Refill    " allopurinol (ZYLOPRIM) 100 MG tablet Take 1 tablet by mouth Daily.      ALPRAZolam (XANAX) 0.5 MG tablet Take 1 tablet by mouth At Night As Needed.      amLODIPine (NORVASC) 10 MG tablet Take 1 tablet by mouth Every Night.      aspirin 81 MG EC tablet Take 1 tablet every day by oral route.      Diclofenac Sodium (VOLTAREN) 1 % gel gel Apply 4 g topically to the appropriate area as directed 4 (Four) Times a Day As Needed.      docusate sodium 100 MG capsule Take 1 capsule by mouth Daily.      fluorouracil (EFUDEX) 5 % cream Apply 1 application  topically to the appropriate area as directed 2 (Two) Times a Day.      furosemide (LASIX) 20 MG tablet Take 1 tablet by mouth Daily.      hydroCHLOROthiazide (HYDRODIURIL) 25 MG tablet Take 1 tablet every day by oral route for 90 days.      HYDROcodone-acetaminophen (NORCO) 7.5-325 MG per tablet Take 1 tablet by mouth Every 8 (Eight) Hours As Needed.      Hydrocortisone, Perianal, (ANUSOL-HC) 2.5 % rectal cream       hyoscyamine (ANASPAZ,LEVSIN) 0.125 MG tablet TAKE ONE TABLET BY MOUTH FOUR TIMES A DAY WITH MEALS AND AT BEDTIME. 120 tablet 1    ketorolac (TORADOL) 10 MG tablet Take 1 tablet by mouth Every 6 (Six) Hours As Needed.      lamoTRIgine (LaMICtal) 25 MG tablet Take 1 tablet by mouth 2 (Two) Times a Day. Only takes at night      leucovorin 5 MG tablet Take  by mouth Every 6 (Six) Hours.      levoFLOXacin (Levaquin) 750 MG tablet Take 1 tablet by mouth Daily. 7 tablet 0    Lidocaine 4 % patch Apply 1 patch topically Daily.      lidocaine-prilocaine (EMLA) 2.5-2.5 % cream Apply 1 application topically to the appropriate area as directed Every 2 (Two) Hours As Needed for Mild Pain. 30 g 1    linaclotide (LINZESS) 290 MCG capsule capsule Take 1 capsule by mouth As Needed.      loperamide (Imodium A-D) 2 MG tablet Take 1 tablet by mouth 4 (Four) Times a Day As Needed for Diarrhea. Imodium - take 4 mg first dose, followed by 2 mg every 2-4 hours after each stool (MAX of 16  mg in 24 hour period) 60 tablet 1    loperamide (IMODIUM) 2 MG capsule       losartan (COZAAR) 100 MG tablet Take 1 tablet by mouth Every Night.      medroxyPROGESTERone (DEPO-PROVERA) 150 MG/ML injection Inject 1 mL into the appropriate muscle as directed by prescriber Every 3 (Three) Months.      metoclopramide (REGLAN) 10 MG tablet Take 1 tablet by mouth 3 (Three) Times a Day.      metoprolol succinate XL (TOPROL-XL) 25 MG 24 hr tablet Take 1 tablet by mouth Daily.      Multiple Vitamins-Minerals (b complex-C-E-zinc) tablet Take 1 tablet by mouth Daily.      Myrbetriq 50 MG tablet sustained-release 24 hour 24 hr tablet       naloxone (NARCAN) 4 MG/0.1ML nasal spray Take 1 spray every day by nasal route as needed.      nystatin (MYCOSTATIN) 100,000 unit/mL suspension Swish and swallow 5 mL 4 (Four) Times a Day. 473 mL 1    ondansetron (ZOFRAN) 8 MG tablet Take 1 tablet by mouth 3 (Three) Times a Day As Needed for Nausea or Vomiting. 30 tablet 5    OXALIPLATIN IV Infuse  into a venous catheter.      pantoprazole (PROTONIX) 40 MG EC tablet Take 1 tablet by mouth 2 (Two) Times a Day.      polyethylene glycol (GaviLyte-G) 236 g solution       prochlorperazine (COMPAZINE) 10 MG tablet Take 1 tablet by mouth Every 6 (Six) Hours As Needed for Nausea or Vomiting. 30 tablet 5    rOPINIRole (REQUIP) 1 MG tablet Take 1 tablet by mouth Every Night.      sertraline (ZOLOFT) 100 MG tablet Take 1 tablet by mouth Daily.      sucralfate (CARAFATE) 1 g tablet Take 1 tablet by mouth 2 (Two) Times a Day.      tetracycline (ACHROMYCIN,SUMYCIN) 250 MG capsule       traMADol (Ultram) 50 MG tablet Take 1 tablet by mouth Every 6 (Six) Hours As Needed for Severe Pain or Moderate Pain. 5 tablet 0    vitamin D3 125 MCG (5000 UT) capsule capsule Take 2,000 Units by mouth Daily.      [DISCONTINUED] Myrbetriq 25 MG tablet sustained-release 24 hour 24 hr tablet Take 1 tablet by mouth Every Evening.       No current facility-administered  medications on file prior to visit.      Allergies   Allergen Reactions    Hydromorphone Hives, Itching and GI Intolerance    Morphine Hives, Itching and Nausea And Vomiting    Oxybutynin Swelling and Angioedema           Oxycodone-Acetaminophen Itching and Nausea And Vomiting     Can not take any higher than 7.5    Penicillins Rash    Promethazine Nausea And Vomiting    Tylenol With Codeine #3 [Acetaminophen-Codeine] Itching and Nausea And Vomiting    Oxybutynin Chloride Angioedema     Past Surgical History:   Procedure Laterality Date    BREAST SURGERY  2016    Removed 8lbs of tissue, 2016    CHOLECYSTECTOMY      COLON RESECTION Left 05/05/2023    Procedure: RESECTION OF DESCENDING COLON AND SPLENIC FLEXURE TAKEDOWN WITH DAVINCI ROBOT;  Surgeon: Rolando Liu MD;  [place] [facility] [place] [facility] [place]  Service: Robotics - DaVinci;  Laterality: Left;    COLONOSCOPY N/A 04/04/2023    Procedure: COLONOSCOPY WITH POLYPECTOMY/SNARE/BIOPSIES/TATTOOING DESCENDING COLON MASS;  Surgeon: Deniz Dowd MD;  Location: [facility] ENDOSCOPY;  Service: Gastroenterology;  Laterality: N/A;  INCOMPLETE COLONOSCOPY  POOR BOWEL PREP  COLON POLYP  COLON MASS  DIVERTICULOSIS    COLONOSCOPY N/A 04/12/2023    Procedure: COLONOSCOPY with cold snare;  Surgeon: Deniz Dowd MD;  Location: [facility] ENDOSCOPY;  Service: Gastroenterology;  Laterality: N/A;  colon polyps, diverticulosis, colon mass, hemorrhoids      CYSTOSCOPY W/ URETERAL STENT PLACEMENT Left 05/05/2023    Procedure: Cystoscopy, left ureteral injection,;  Surgeon: Manjula Randolph MD;  [place] [facility] [place] [facility] [place]  Service: Urology;  Laterality: Left;    FRACTURE SURGERY  2001    Left knee    KNEE SURGERY Left     x4    LAPAROSCOPIC TUBAL LIGATION  1995    LAPAROSCOPIC TUBAL LIGATION      Reversed    REDUCTION MAMMAPLASTY  2009    8lbs of tissue was removed    TONSILLECTOMY      UPPER GASTROINTESTINAL ENDOSCOPY      VENOUS ACCESS DEVICE (PORT) INSERTION N/A 6/5/2023    Procedure:  INSERTION VENOUS ACCESS DEVICE;  Surgeon: Rolando Liu MD;  Location: [facility] [place] [facility] [place];  Service: General;  Laterality: N/A;     Social History     Socioeconomic History    Marital status:    Tobacco Use    Smoking status: Never     Passive exposure: Never    Smokeless tobacco: Never   Vaping Use    Vaping Use: Never used   Substance and Sexual Activity    Alcohol use: Never    Drug use: Never    Sexual activity: Defer     Family History   Problem Relation Age of Onset    Colon cancer Neg Hx        Results     Result Review   The following data was reviewed by: Brian Marroquin MD on 04/13/2023:  Lab Results   Component Value Date    HGB 12.6 10/11/2023    HCT 36.7 10/11/2023    MCV 91.5 10/11/2023     10/11/2023    WBC 4.16 10/11/2023    NEUTROABS 1.52 (L) 10/11/2023    LYMPHSABS 1.75 10/11/2023    MONOSABS 0.78 10/11/2023    EOSABS 0.04 10/11/2023    BASOSABS 0.05 10/11/2023     Lab Results   Component Value Date    GLUCOSE 115 (H) 10/11/2023    BUN 8 10/11/2023    CREATININE 0.79 10/11/2023     10/11/2023    K 3.7 10/11/2023     10/11/2023    CO2 24.3 10/11/2023    CALCIUM 9.8 10/11/2023    PROTEINTOT 6.8 10/11/2023    ALBUMIN 4.3 10/11/2023    BILITOT 0.6 10/11/2023    ALKPHOS 110 10/11/2023    AST 31 10/11/2023    ALT 26 10/11/2023     Lab Results   Component Value Date    MG 2.3 06/18/2023    PHOS 3.3 06/18/2023       Case Report   Surgical Pathology Report                         Case: [id number]                                   Authorizing Provider:  Deniz Dowd MD    Collected:           04/12/2023 09:41 AM           Ordering Location:     [facility] Received:            04/12/2023 11:41 AM                                  SUITES                                                                        Pathologist:           Niesha Torres DO                                                        Specimens:   1) - Large Intestine, Cecum, cecal polyp cold  snare                                                  2) - Large Intestine, Sigmoid Colon, sigmoid colon polyp cold snare                        Clinical Information    Mass of colon   Final Diagnosis   1. Cecum polyp, biopsy:                            - Tubular adenoma        2. Sigmoid colon polyp, biopsy:                            - Tubular adenoma    Electronically signed by Niesha Torres DO on 4/13/2023 at 0840     Surgical Pathology Report                         Case: [id number]                                   Authorizing Provider:  Deniz Dowd MD    Collected:           04/04/2023 11:15 AM           Ordering Location:     [facility] Received:            04/04/2023 11:59 AM                                  SUITES                                                                        Pathologist:           Jennifer Mike MD                                                      Specimens:   1) - Large Intestine, Transverse Colon, TRANSVERSE COLON POLYP                                       2) - Large Intestine, Left / Descending Colon, DESCENDING COLON BIOPSIES                   Clinical Information    Constipation, unspecified constipation type   Final Diagnosis   1. Transverse colon polyp, biopsy:               - Fragments of tubular adenoma        2.  Descending colon, biopsy:               -Adenocarcinoma, moderately to poorly differentiated     Comment: Per policy, reflex testing has been ordered, and the results will be separately reported.     REMARKS: The above positive (malignant) diagnosis was called to April in Dr. Dowd's office at 09:09 EDT on 4/5/2023 by [facility].          Electronically signed by Jennifer Mike MD on 4/5/2023 at 0993         Surgical Pathology Report                         Case: [id number]                                   Authorizing Provider:  Rolando Liu MD        Collected:           05/05/2023 03:06 PM           Ordering  Location:     [facility]  Received:            05/08/2023 06:48 AM                                  OR                                                                            Pathologist:           Jt Florence MD                                                             Specimen:    Large Intestine, Left / Descending Colon, left colon                                       Clinical Information    Malignant neoplasm of descending colon   Final Diagnosis   Left colon, resection:               - Adenocarcinoma               - Three of thirty-three lymph nodes positive for carcinoma (3/33)               - See synoptic checklist   Electronically signed by Jt Florence MD on 5/11/2023 at 1508   Synoptic Checklist   COLON AND RECTUM: Resection, Including Transanal Disk Excision of Rectal Neoplasms  8th Edition - Protocol posted: 6/22/2022  COLON AND RECTUM: RESECTION - All Specimens  SPECIMEN   Procedure  Descending colon resection    TUMOR   Tumor Site  Descending colon    Histologic Type  Adenocarcinoma    Histologic Grade  G3, poorly differentiated    Tumor Size  Greatest dimension (Centimeters): 3.5 cm   Tumor Extent  Invades through muscularis propria into the pericolonic or perirectal tissue    Macroscopic Tumor Perforation  Not identified    Lymphovascular Invasion  Small vessel    Perineural Invasion  Present    Treatment Effect  No known presurgical therapy    MARGINS   Margin Status for Invasive Carcinoma  All margins negative for invasive carcinoma    Closest Margin(s) to Invasive Carcinoma  Radial (circumferential) or mesenteric    Distance from Invasive Carcinoma to Closest Margin  3.5 cm   Margin Status for Non-Invasive Tumor  All margins negative for high-grade dysplasia / intramucosal carcinoma and low-grade dysplasia    REGIONAL LYMPH NODES   Regional Lymph Node Status  Tumor present in regional lymph node(s)    Number of Lymph Nodes with Tumor  3    Number of Lymph Nodes Examined   33    Tumor Deposits  Present    Number of Tumor Deposits  1    PATHOLOGIC STAGE CLASSIFICATION (pTNM, AJCC 8th Edition)   Reporting of pT, pN, and (when applicable) pM categories is based on information available to the pathologist at the time the report is issued. As per the AJCC (Chapter 1, 8th Ed.) it is the managing physician's responsibility to establish the final pathologic stage based upon all pertinent information, including but potentially not limited to this pathology report.   pT Category  pT3    pN Category  pN1b    ADDITIONAL FINDINGS   Additional Findings  None identified    .          CT Abdomen Pelvis With Contrast    Result Date: 10/10/2023  Impression:  No evidence of abdominopelvic metastatic disease     NKECHI GARCIA MD       Electronically Signed and Approved By: NKECHI GARICA MD on 10/10/2023 at 7:37             CT Chest With Contrast Diagnostic    Result Date: 10/10/2023  Impression:  No evidence of thoracic metastatic disease     NKECHI GARCIA MD       Electronically Signed and Approved By: NKECHI GARCIA MD on 10/10/2023 at 7:30              Assessment & Plan     Diagnoses and all orders for this visit:    1. Primary adenocarcinoma of descending colon (Primary)    2. Chemotherapy induced diarrhea    3. Chemotherapy-induced nausea    Other orders  -     dextrose (D5W) 5 % infusion 250 mL  -     palonosetron (ALOXI) injection 0.25 mg  -     dexAMETHasone (DECADRON) 12 mg in sodium chloride 0.9 % IVPB  -     OXALIplatin (ELOXATIN) 205 mg in dextrose (D5W) 5 % 291 mL chemo IVPB  -     leucovorin 960 mg in dextrose (D5W) 5 % 346 mL IVPB  -     fluorouracil (ADRUCIL) chemo injection 960 mg  -     fluorouracil (ADRUCIL) 5,740 mg in sodium chloride 0.9 % 114.8 mL chemo infusion - FOR HOME USE  -     sodium chloride 0.9 % bolus 1,000 mL  -     Hydrocortisone Sod Suc (PF) (Solu-CORTEF) injection 100 mg  -     diphenhydrAMINE (BENADRYL) injection 50 mg  -     famotidine (PEPCID) injection 20  mg Lilian Pelaez is a 48 y.o. female who [facility] to [facility] HEMATOLOGY [facility] ONCOLOGY evaluation prior to systemic therapy for stage III colon cancer, patient status post resection of descending colon splenic flexure by Dr. Rolando Liu on 5/5/2023.     Reviewed NCCN guidelines for her stage III cancer, discussed high risk features seen on pathology report and discussed CapeOx for 3 months versus FOLFOX for 3 to 6 months with patient and  today.  Patient agreed to plan to undergo FOLFOX IV chemotherapy every 2 weeks for 6 months.    Discussed restaging imaging scans obtained on 10/9/2023 which was without evidence of disease recurrence or metastatic disease.     Patient received cycle 1 on June 14, 2023, she developed pneumoperitoneum which her surgeon suspect was secondary to severe constipation from iron tablets, but patient was cleared to resume chemotherapy after 8/1/2023.    Iron deficiency anemia from GI blood loss  -Evidence of this on lab work from May 2023  - We will continue to monitor and will offer IV iron therapy in the future if she becomes iron deficient secondary to pneumoperitoneum that developed in June 2023 from severe constipation.    Chemotherapy-induced nausea  - Currently well controlled with Zofran, but prescribe Compazine as a backup option  - Patient also taking Reglan and was counseled regarding not taking Reglan with Zofran and Compazine together but rather patient to take other antiemetics at least 4 to 6 hours after Reglan dose.    Chemotherapy induced diarrhea  - Patient prescribed Imodium  -We will prescribe Lomotil if Imodium fails to work.    Pt with loss of taste, mouth with some evidence of thrush- ordered nystatin swish and spit, recommend pt continue    Ordered IVFs as suspect pt is dehydrated from decrease in oral intake for today's chemotherapy infusion and each subsequent chemo infusion.    Labs reviewed and plan to proceed with cycle 7 day  1 on today.    Plan patient follow-up in the next 2 weeks for cycle 8 day 1 of chemotherapy.    Plan to re-scan pt after she completes chemotherapy in 3 months.    Patient verbalized understanding and agreement plan.    Please note that portions of this note were completed with a voice recognition program.    Electronically signed by Brian Marroquin MD, 10/11/23, 10:14 AM EDT.        Follow Up     I spent 30 minutes caring for Lilian on this date of service. This time includes time spent by me in the following activities:preparing for the visit, reviewing tests, obtaining and/or reviewing a separately obtained history, performing a medically appropriate examination and/or evaluation , counseling and educating the patient/family/caregiver, ordering medications, tests, or procedures, documenting information in the medical record and care coordination.    This is an acute or chronic illness that poses a threat to life or bodily function. The above treatment plan involves a high risk of complications and/or mortality of patient management.    The patient was seen and examined. Work by the provider also included review and/or ordering of lab tests, review and/or ordering of radiology tests, review and/or ordering of medicine tests, discussion with other physicians or providers, independent review of data, obtaining old records, review/summation of old records, and/or other review.    I have reviewed the family history, social history, and past medical history for this patient. Previous information and data has been reviewed and updated as needed. I have reviewed and verified the chief complaint, history, and other documentation. The patient was interviewed and examined in the clinic and the chart reviewed. The previous observations, recommendations, and conclusions were reviewed including those of other providers.     The plan was discussed with the patient and/or family. The patient was given time to ask questions and these  questions were answered. At the conclusion of their visit they had no additional questions or concerns and all questions were answered to their satisfaction.    Patient was given instructions and counseling regarding her condition or for health maintenance advice. Please see specific information pulled into the AVS if appropriate.

## 2023-10-10 NOTE — PROGRESS NOTES
Lilian Pelaez, a 48-year-old female who was referred for genetic counseling due to a personal and family history of cancer.  Genetic counseling was provided via telehealth. Ms. Pelaez was diagnosed with colon cancer at age 47.  Ms. Pelaez reports that she was diagnosed with a melanoma on her back and chest at age 46. Ms. Pelaez was interested in discussing his risk for a hereditary cancer syndrome. Ms. Pelaez opted to pursue a multi-gene panel, and [employer] CancerNext panel was ordered which analyzes 36 genes associated with hereditary cancer risk. Sample coordination is planned for 10/11/23 while Ms. Pelaez is onsite at [facility].  Once the sample is sent in, results from this testing are expected in approximately 2-3 weeks.    FAMILY HISTORY (see attached pedigree):    Mother:  Breast biopsy, not known if there was a cancer diagnosis  Sister:  GYN cancer, specific type not known  Mat. Uncle: Esophageal cancer  Mat. Uncle: Melanoma  Pat. Uncle: Brain cancer  Pat. 1st cousin: Melanoma    Metastatic breast cancer  Mat. 1st cousin, once-removed: Colon cancer, >50    We do not have medical records confirming the diagnoses in Ms. Pelaez's family.  RISK ASSESSMENT: Ms. Pelaez's personal history of colon cancer led to concern regarding a hereditary cancer syndrome. Ms. Pelaez meets NCCN guidelines criteria for Love syndrome testing based on her personal history of colon cancer under age 50. Ms. Pelaez opted to pursue multigene panel testing via the CancerNext panel. These risk assessments are based on the family history information provided at the time of the appointment and could change in the future should new information be obtained.    GENETIC COUNSELING:  We reviewed the family history information in detail. Cases of cancer follow three general patterns: sporadic, familial, and hereditary.  While most cancer is sporadic, some cases appear to occur in family clusters.  These cases are said to be familial and account for  10-20% of cancer cases.  Familial cases may be due to a combination of shared genes and environmental factors among family members.  In even fewer cases, the risk for cancer is inherited, and the genes responsible for the increased cancer risk are known.      Family histories typical of hereditary cancer syndromes usually include multiple first- and second-degree relatives diagnosed with cancer types that define a syndrome. These cases tend to be diagnosed at younger-than-expected ages and can be bilateral or multifocal. The cancer in these families follows an autosomal dominant inheritance pattern, which indicates the likely presence of a mutation in a cancer susceptibility gene. Children and siblings of an individual believed to carry this mutation have a 50% chance of inheriting that mutation, thereby inheriting the increased risk to develop cancer. These mutations can be passed down from the maternal or the paternal lineage.    The most common form of hereditary colorectal cancer is Love syndrome.  Love syndrome is caused by mutations in mismatch repair genes, (MLH1, MSH2, MSH6, PMS2, and EPCAM).  The lifetime risk for colon cancer for individuals with Love syndrome is as high as 80% if there is no intervention (i.e. removal of polyps detected on colonoscopy).  The specific risks vary by gene, but other risks can include gastric, urinary tract, small intestines, biliary tract, pancreatic, and brain cancer.  Women with Love syndrome also have an elevated risk for endometrial cancer and, in some cases, ovarian cancer.  We discussed that there are other less common hereditary colon cancer syndromes, and genes associated with other cancer risks. Based on Ms. Pelaez's personal history of cancer and her desire to get more information regarding her personal risks and risks to family members, genetic testing was pursued via a panel evaluating several other genes known to increase the risk for cancer.    GENETIC  TESTING:  The risks, benefits and limitations of genetic testing and implications for clinical management following testing were reviewed. DNA test results can influence decisions regarding screening and prevention.  Genetic testing can have significant psychological implications for both individuals and families. Also discussed was the possibility of employment and insurance discrimination based on genetic test results and the federal and states laws that are in place to prevent this ([place]).         We discussed panel testing, which would involve testing 36 genes associated with hereditary cancer risk. The benefits and limitations of genetic testing were discussed. The implications of a positive or negative test result were discussed. We discussed the possibility that, in some cases, genetic test results may be ambiguous due to the identification of a genetic variant. These variants may or may not be associated with an increased cancer risk. With multigene panel testing, it is not uncommon for a variant of uncertain significance (VUS) to be identified.  If a VUS is identified, testing family members is not recommended and screening recommendations are made based on the family history. The laboratories that perform genetic testing work to reclassify the VUS and send out an amended report if and when a VUS is reclassified.  The majority of variant findings are ultimately reclassified to a negative result.     PLAN:  Genetic testing via the [employer] [facility] panel was ordered and sample collection is planned for 10/11/23.  Once the sample is sent in, results are expected in 2-3 weeks. We will contact Ms. Pelaez with her results once they are received.      Bessy Moore MS, [facility] [place], [facility]  Licensed Certified Genetic Counselor

## 2023-10-11 ENCOUNTER — HOSPITAL ENCOUNTER (OUTPATIENT)
Dept: ONCOLOGY | Facility: HOSPITAL | Age: 48
Discharge: HOME | End: 2023-10-11
Admitting: INTERNAL MEDICINE
Payer: COMMERCIAL

## 2023-10-11 ENCOUNTER — OFFICE VISIT (OUTPATIENT)
Dept: ONCOLOGY | Facility: HOSPITAL | Age: 48
End: 2023-10-11
Payer: COMMERCIAL

## 2023-10-11 VITALS
WEIGHT: 277.78 LBS | HEIGHT: 64 IN | OXYGEN SATURATION: 100 % | BODY MASS INDEX: 47.42 KG/M2 | RESPIRATION RATE: 20 BRPM | TEMPERATURE: 98 F | HEART RATE: 74 BPM | DIASTOLIC BLOOD PRESSURE: 66 MMHG | SYSTOLIC BLOOD PRESSURE: 132 MMHG

## 2023-10-11 VITALS
HEIGHT: 64 IN | SYSTOLIC BLOOD PRESSURE: 132 MMHG | DIASTOLIC BLOOD PRESSURE: 66 MMHG | BODY MASS INDEX: 47.54 KG/M2 | OXYGEN SATURATION: 100 % | WEIGHT: 278.44 LBS | HEART RATE: 74 BPM | TEMPERATURE: 98 F | RESPIRATION RATE: 20 BRPM

## 2023-10-11 DIAGNOSIS — R11.0 CHEMOTHERAPY-INDUCED NAUSEA: ICD-10-CM

## 2023-10-11 DIAGNOSIS — T45.1X5A CHEMOTHERAPY INDUCED DIARRHEA: ICD-10-CM

## 2023-10-11 DIAGNOSIS — C18.6 PRIMARY ADENOCARCINOMA OF DESCENDING COLON: Primary | ICD-10-CM

## 2023-10-11 DIAGNOSIS — C18.6 MALIGNANT NEOPLASM OF DESCENDING COLON: Primary | ICD-10-CM

## 2023-10-11 DIAGNOSIS — K52.1 CHEMOTHERAPY INDUCED DIARRHEA: ICD-10-CM

## 2023-10-11 DIAGNOSIS — T45.1X5A CHEMOTHERAPY-INDUCED NAUSEA: ICD-10-CM

## 2023-10-11 LAB
ALBUMIN SERPL-MCNC: 4.3 G/DL (ref 3.5–5.2)
ALBUMIN/GLOB SERPL: 1.7 G/DL
ALP SERPL-CCNC: 110 U/L (ref 39–117)
ALT SERPL W P-5'-P-CCNC: 26 U/L (ref 1–33)
ANION GAP SERPL CALCULATED.3IONS-SCNC: 8.7 MMOL/L (ref 5–15)
AST SERPL-CCNC: 31 U/L (ref 1–32)
BASOPHILS # BLD AUTO: 0.05 10*3/MM3 (ref 0–0.2)
BASOPHILS NFR BLD AUTO: 1.2 % (ref 0–1.5)
BILIRUB SERPL-MCNC: 0.6 MG/DL (ref 0–1.2)
BUN SERPL-MCNC: 8 MG/DL (ref 6–20)
BUN/CREAT SERPL: 10.1 (ref 7–25)
CALCIUM SPEC-SCNC: 9.8 MG/DL (ref 8.6–10.5)
CEA SERPL-MCNC: 2.73 NG/ML
CHLORIDE SERPL-SCNC: 107 MMOL/L (ref 98–107)
CO2 SERPL-SCNC: 24.3 MMOL/L (ref 22–29)
CREAT SERPL-MCNC: 0.79 MG/DL (ref 0.57–1)
DEPRECATED RDW RBC AUTO: 52.6 FL (ref 37–54)
EGFRCR SERPLBLD CKD-EPI 2021: 92.4 ML/MIN/1.73
EOSINOPHIL # BLD AUTO: 0.04 10*3/MM3 (ref 0–0.4)
EOSINOPHIL NFR BLD AUTO: 1 % (ref 0.3–6.2)
ERYTHROCYTE [DISTWIDTH] IN BLOOD BY AUTOMATED COUNT: 15.9 % (ref 12.3–15.4)
GLOBULIN UR ELPH-MCNC: 2.5 GM/DL
GLUCOSE SERPL-MCNC: 115 MG/DL (ref 65–99)
HCT VFR BLD AUTO: 36.7 % (ref 34–46.6)
HGB BLD-MCNC: 12.6 G/DL (ref 12–15.9)
IMM GRANULOCYTES # BLD AUTO: 0.02 10*3/MM3 (ref 0–0.05)
IMM GRANULOCYTES NFR BLD AUTO: 0.5 % (ref 0–0.5)
LYMPHOCYTES # BLD AUTO: 1.75 10*3/MM3 (ref 0.7–3.1)
LYMPHOCYTES NFR BLD AUTO: 42.1 % (ref 19.6–45.3)
MCH RBC QN AUTO: 31.4 PG (ref 26.6–33)
MCHC RBC AUTO-ENTMCNC: 34.3 G/DL (ref 31.5–35.7)
MCV RBC AUTO: 91.5 FL (ref 79–97)
MONOCYTES # BLD AUTO: 0.78 10*3/MM3 (ref 0.1–0.9)
MONOCYTES NFR BLD AUTO: 18.8 % (ref 5–12)
NEUTROPHILS NFR BLD AUTO: 1.52 10*3/MM3 (ref 1.7–7)
NEUTROPHILS NFR BLD AUTO: 36.4 % (ref 42.7–76)
PLATELET # BLD AUTO: 161 10*3/MM3 (ref 140–450)
PMV BLD AUTO: 9.6 FL (ref 6–12)
POTASSIUM SERPL-SCNC: 3.7 MMOL/L (ref 3.5–5.2)
PROT SERPL-MCNC: 6.8 G/DL (ref 6–8.5)
RBC # BLD AUTO: 4.01 10*6/MM3 (ref 3.77–5.28)
SODIUM SERPL-SCNC: 140 MMOL/L (ref 136–145)
WBC NRBC COR # BLD: 4.16 10*3/MM3 (ref 3.4–10.8)

## 2023-10-11 PROCEDURE — 25010000002 FLUOROURACIL PER 500 MG: Performed by: INTERNAL MEDICINE

## 2023-10-11 PROCEDURE — 0 DEXTROSE 5 % SOLUTION: Performed by: INTERNAL MEDICINE

## 2023-10-11 PROCEDURE — 25010000002 LEUCOVORIN CALCIUM PER 50 MG: Performed by: INTERNAL MEDICINE

## 2023-10-11 PROCEDURE — 82378 CARCINOEMBRYONIC ANTIGEN: CPT | Performed by: INTERNAL MEDICINE

## 2023-10-11 PROCEDURE — 96368 THER/DIAG CONCURRENT INF: CPT

## 2023-10-11 PROCEDURE — 96415 CHEMO IV INFUSION ADDL HR: CPT

## 2023-10-11 PROCEDURE — 25810000003 SODIUM CHLORIDE 0.9 % SOLUTION: Performed by: INTERNAL MEDICINE

## 2023-10-11 PROCEDURE — 96411 CHEMO IV PUSH ADDL DRUG: CPT

## 2023-10-11 PROCEDURE — 25010000002 PALONOSETRON PER 25 MCG: Performed by: INTERNAL MEDICINE

## 2023-10-11 PROCEDURE — 80053 COMPREHEN METABOLIC PANEL: CPT | Performed by: INTERNAL MEDICINE

## 2023-10-11 PROCEDURE — 0 DEXTROSE 5 % SOLUTION 250 ML FLEX CONT: Performed by: INTERNAL MEDICINE

## 2023-10-11 PROCEDURE — 96375 TX/PRO/DX INJ NEW DRUG ADDON: CPT

## 2023-10-11 PROCEDURE — 25010000002 OXALIPLATIN PER 0.5 MG: Performed by: INTERNAL MEDICINE

## 2023-10-11 PROCEDURE — 96413 CHEMO IV INFUSION 1 HR: CPT

## 2023-10-11 PROCEDURE — 96416 CHEMO PROLONG INFUSE W/PUMP: CPT

## 2023-10-11 PROCEDURE — 25010000002 DEXAMETHASONE SODIUM PHOSPHATE 120 MG/30ML SOLUTION: Performed by: INTERNAL MEDICINE

## 2023-10-11 PROCEDURE — G0498 CHEMO EXTEND IV INFUS W/PUMP: HCPCS

## 2023-10-11 PROCEDURE — 85025 COMPLETE CBC W/AUTO DIFF WBC: CPT | Performed by: INTERNAL MEDICINE

## 2023-10-11 RX ORDER — FLUOROURACIL 50 MG/ML
400 INJECTION, SOLUTION INTRAVENOUS ONCE
Status: CANCELLED | OUTPATIENT
Start: 2023-10-11

## 2023-10-11 RX ORDER — DEXTROSE MONOHYDRATE 50 MG/ML
250 INJECTION, SOLUTION INTRAVENOUS ONCE
Status: COMPLETED | OUTPATIENT
Start: 2023-10-11 | End: 2023-10-11

## 2023-10-11 RX ORDER — DIPHENHYDRAMINE HYDROCHLORIDE 50 MG/ML
50 INJECTION INTRAMUSCULAR; INTRAVENOUS AS NEEDED
Status: CANCELLED | OUTPATIENT
Start: 2023-10-11

## 2023-10-11 RX ORDER — DEXTROSE MONOHYDRATE 50 MG/ML
250 INJECTION, SOLUTION INTRAVENOUS ONCE
Status: CANCELLED | OUTPATIENT
Start: 2023-10-11

## 2023-10-11 RX ORDER — PALONOSETRON 0.05 MG/ML
0.25 INJECTION, SOLUTION INTRAVENOUS ONCE
Status: CANCELLED | OUTPATIENT
Start: 2023-10-11

## 2023-10-11 RX ORDER — PALONOSETRON 0.05 MG/ML
0.25 INJECTION, SOLUTION INTRAVENOUS ONCE
Status: COMPLETED | OUTPATIENT
Start: 2023-10-11 | End: 2023-10-11

## 2023-10-11 RX ORDER — FLUOROURACIL 50 MG/ML
950 INJECTION, SOLUTION INTRAVENOUS ONCE
Status: COMPLETED | OUTPATIENT
Start: 2023-10-11 | End: 2023-10-11

## 2023-10-11 RX ORDER — FAMOTIDINE 10 MG/ML
20 INJECTION, SOLUTION INTRAVENOUS AS NEEDED
Status: CANCELLED | OUTPATIENT
Start: 2023-10-11

## 2023-10-11 RX ADMIN — LEUCOVORIN CALCIUM 950 MG: 350 INJECTION, POWDER, LYOPHILIZED, FOR SOLUTION INTRAMUSCULAR; INTRAVENOUS at 11:30

## 2023-10-11 RX ADMIN — FLUOROURACIL 950 MG: 50 INJECTION, SOLUTION INTRAVENOUS at 13:33

## 2023-10-11 RX ADMIN — SODIUM CHLORIDE 1000 ML: 9 INJECTION, SOLUTION INTRAVENOUS at 10:10

## 2023-10-11 RX ADMIN — DEXAMETHASONE SODIUM PHOSPHATE 12 MG: 4 INJECTION, SOLUTION INTRA-ARTICULAR; INTRALESIONAL; INTRAMUSCULAR; INTRAVENOUS; SOFT TISSUE at 10:59

## 2023-10-11 RX ADMIN — DEXTROSE MONOHYDRATE 250 ML: 50 INJECTION, SOLUTION INTRAVENOUS at 10:59

## 2023-10-11 RX ADMIN — FLUOROURACIL 5740 MG: 50 INJECTION, SOLUTION INTRAVENOUS at 13:42

## 2023-10-11 RX ADMIN — OXALIPLATIN 200 MG: 5 INJECTION, SOLUTION INTRAVENOUS at 11:30

## 2023-10-11 RX ADMIN — PALONOSETRON 0.25 MG: 0.05 INJECTION, SOLUTION INTRAVENOUS at 10:47

## 2023-10-13 ENCOUNTER — HOSPITAL ENCOUNTER (OUTPATIENT)
Dept: ONCOLOGY | Facility: HOSPITAL | Age: 48
Discharge: HOME | End: 2023-10-13
Payer: COMMERCIAL

## 2023-10-13 DIAGNOSIS — C18.6 MALIGNANT NEOPLASM OF DESCENDING COLON: ICD-10-CM

## 2023-10-13 DIAGNOSIS — Z45.2 ENCOUNTER FOR ADJUSTMENT OR MANAGEMENT OF VASCULAR ACCESS DEVICE: Primary | ICD-10-CM

## 2023-10-13 PROCEDURE — 25010000002 HEPARIN LOCK FLUSH PER 10 UNITS: Performed by: INTERNAL MEDICINE

## 2023-10-13 RX ORDER — SODIUM CHLORIDE 0.9 % (FLUSH) 0.9 %
20 SYRINGE (ML) INJECTION AS NEEDED
Status: DISCONTINUED | OUTPATIENT
Start: 2023-10-13 | End: 2023-10-14 | Stop reason: HOSPADM

## 2023-10-13 RX ORDER — HEPARIN SODIUM (PORCINE) LOCK FLUSH IV SOLN 100 UNIT/ML 100 UNIT/ML
500 SOLUTION INTRAVENOUS AS NEEDED
Status: DISCONTINUED | OUTPATIENT
Start: 2023-10-13 | End: 2023-10-14 | Stop reason: HOSPADM

## 2023-10-13 RX ORDER — SODIUM CHLORIDE 0.9 % (FLUSH) 0.9 %
20 SYRINGE (ML) INJECTION AS NEEDED
OUTPATIENT
Start: 2023-10-13

## 2023-10-13 RX ORDER — HEPARIN SODIUM (PORCINE) LOCK FLUSH IV SOLN 100 UNIT/ML 100 UNIT/ML
500 SOLUTION INTRAVENOUS AS NEEDED
OUTPATIENT
Start: 2023-10-13

## 2023-10-13 RX ADMIN — Medication 20 ML: at 13:54

## 2023-10-13 RX ADMIN — HEPARIN SODIUM (PORCINE) LOCK FLUSH IV SOLN 100 UNIT/ML 500 UNITS: 100 SOLUTION at 13:54

## 2023-10-25 ENCOUNTER — TELEPHONE (OUTPATIENT)
Dept: ONCOLOGY | Facility: HOSPITAL | Age: 48
End: 2023-10-25

## 2023-10-25 ENCOUNTER — DOCUMENTATION (OUTPATIENT)
Dept: ONCOLOGY | Facility: HOSPITAL | Age: 48
End: 2023-10-25
Payer: COMMERCIAL

## 2023-10-25 ENCOUNTER — HOSPITAL ENCOUNTER (OUTPATIENT)
Dept: ONCOLOGY | Facility: HOSPITAL | Age: 48
Discharge: HOME | End: 2023-10-25
Admitting: INTERNAL MEDICINE
Payer: COMMERCIAL

## 2023-10-25 ENCOUNTER — OFFICE VISIT (OUTPATIENT)
Dept: ONCOLOGY | Facility: HOSPITAL | Age: 48
End: 2023-10-25
Payer: COMMERCIAL

## 2023-10-25 VITALS
SYSTOLIC BLOOD PRESSURE: 135 MMHG | OXYGEN SATURATION: 100 % | DIASTOLIC BLOOD PRESSURE: 87 MMHG | RESPIRATION RATE: 18 BRPM | BODY MASS INDEX: 47.28 KG/M2 | WEIGHT: 275.57 LBS | TEMPERATURE: 97.4 F | HEART RATE: 83 BPM

## 2023-10-25 VITALS
HEART RATE: 83 BPM | HEIGHT: 64 IN | BODY MASS INDEX: 47.2 KG/M2 | SYSTOLIC BLOOD PRESSURE: 135 MMHG | TEMPERATURE: 97.4 F | DIASTOLIC BLOOD PRESSURE: 87 MMHG | OXYGEN SATURATION: 100 % | RESPIRATION RATE: 18 BRPM | WEIGHT: 276.46 LBS

## 2023-10-25 DIAGNOSIS — E86.0 DEHYDRATION: ICD-10-CM

## 2023-10-25 DIAGNOSIS — C18.6 MALIGNANT NEOPLASM OF DESCENDING COLON: Primary | ICD-10-CM

## 2023-10-25 DIAGNOSIS — J34.89 NASAL DRYNESS: ICD-10-CM

## 2023-10-25 DIAGNOSIS — C18.6 PRIMARY ADENOCARCINOMA OF DESCENDING COLON: Primary | ICD-10-CM

## 2023-10-25 DIAGNOSIS — Z45.2 ENCOUNTER FOR ADJUSTMENT OR MANAGEMENT OF VASCULAR ACCESS DEVICE: ICD-10-CM

## 2023-10-25 DIAGNOSIS — C18.6 PRIMARY ADENOCARCINOMA OF DESCENDING COLON: ICD-10-CM

## 2023-10-25 LAB
ALBUMIN SERPL-MCNC: 4.3 G/DL (ref 3.5–5.2)
ALBUMIN/GLOB SERPL: 1.8 G/DL
ALP SERPL-CCNC: 113 U/L (ref 39–117)
ALT SERPL W P-5'-P-CCNC: 24 U/L (ref 1–33)
ANION GAP SERPL CALCULATED.3IONS-SCNC: 5.7 MMOL/L (ref 5–15)
AST SERPL-CCNC: 31 U/L (ref 1–32)
BASOPHILS # BLD AUTO: 0.04 10*3/MM3 (ref 0–0.2)
BASOPHILS NFR BLD AUTO: 0.9 % (ref 0–1.5)
BILIRUB SERPL-MCNC: 0.6 MG/DL (ref 0–1.2)
BUN SERPL-MCNC: 10 MG/DL (ref 6–20)
BUN/CREAT SERPL: 13 (ref 7–25)
CALCIUM SPEC-SCNC: 9.3 MG/DL (ref 8.6–10.5)
CEA SERPL-MCNC: 2.76 NG/ML
CHLORIDE SERPL-SCNC: 108 MMOL/L (ref 98–107)
CO2 SERPL-SCNC: 26.3 MMOL/L (ref 22–29)
CREAT SERPL-MCNC: 0.77 MG/DL (ref 0.57–1)
DEPRECATED RDW RBC AUTO: 53.4 FL (ref 37–54)
EGFRCR SERPLBLD CKD-EPI 2021: 95.3 ML/MIN/1.73
EOSINOPHIL # BLD AUTO: 0.04 10*3/MM3 (ref 0–0.4)
EOSINOPHIL NFR BLD AUTO: 0.9 % (ref 0.3–6.2)
ERYTHROCYTE [DISTWIDTH] IN BLOOD BY AUTOMATED COUNT: 15.8 % (ref 12.3–15.4)
GLOBULIN UR ELPH-MCNC: 2.4 GM/DL
GLUCOSE SERPL-MCNC: 108 MG/DL (ref 65–99)
HCT VFR BLD AUTO: 36 % (ref 34–46.6)
HGB BLD-MCNC: 12.4 G/DL (ref 12–15.9)
IMM GRANULOCYTES # BLD AUTO: 0.01 10*3/MM3 (ref 0–0.05)
IMM GRANULOCYTES NFR BLD AUTO: 0.2 % (ref 0–0.5)
LYMPHOCYTES # BLD AUTO: 1.79 10*3/MM3 (ref 0.7–3.1)
LYMPHOCYTES NFR BLD AUTO: 42 % (ref 19.6–45.3)
MAGNESIUM SERPL-MCNC: 2.1 MG/DL (ref 1.6–2.6)
MCH RBC QN AUTO: 32.1 PG (ref 26.6–33)
MCHC RBC AUTO-ENTMCNC: 34.4 G/DL (ref 31.5–35.7)
MCV RBC AUTO: 93.3 FL (ref 79–97)
MONOCYTES # BLD AUTO: 0.9 10*3/MM3 (ref 0.1–0.9)
MONOCYTES NFR BLD AUTO: 21.1 % (ref 5–12)
NEUTROPHILS NFR BLD AUTO: 1.48 10*3/MM3 (ref 1.7–7)
NEUTROPHILS NFR BLD AUTO: 34.9 % (ref 42.7–76)
PLATELET # BLD AUTO: 156 10*3/MM3 (ref 140–450)
PMV BLD AUTO: 10.2 FL (ref 6–12)
POTASSIUM SERPL-SCNC: 3.7 MMOL/L (ref 3.5–5.2)
PROT SERPL-MCNC: 6.7 G/DL (ref 6–8.5)
RBC # BLD AUTO: 3.86 10*6/MM3 (ref 3.77–5.28)
SODIUM SERPL-SCNC: 140 MMOL/L (ref 136–145)
WBC NRBC COR # BLD: 4.26 10*3/MM3 (ref 3.4–10.8)

## 2023-10-25 PROCEDURE — 96368 THER/DIAG CONCURRENT INF: CPT

## 2023-10-25 PROCEDURE — 96411 CHEMO IV PUSH ADDL DRUG: CPT

## 2023-10-25 PROCEDURE — 0 DEXTROSE 5 % SOLUTION: Performed by: INTERNAL MEDICINE

## 2023-10-25 PROCEDURE — 25010000002 LEUCOVORIN CALCIUM PER 50 MG: Performed by: INTERNAL MEDICINE

## 2023-10-25 PROCEDURE — 25010000002 OXALIPLATIN PER 0.5 MG: Performed by: INTERNAL MEDICINE

## 2023-10-25 PROCEDURE — 25010000002 FLUOROURACIL PER 500 MG: Performed by: INTERNAL MEDICINE

## 2023-10-25 PROCEDURE — 25010000002 DEXAMETHASONE SODIUM PHOSPHATE 120 MG/30ML SOLUTION: Performed by: INTERNAL MEDICINE

## 2023-10-25 PROCEDURE — 83735 ASSAY OF MAGNESIUM: CPT | Performed by: INTERNAL MEDICINE

## 2023-10-25 PROCEDURE — G0498 CHEMO EXTEND IV INFUS W/PUMP: HCPCS

## 2023-10-25 PROCEDURE — 85025 COMPLETE CBC W/AUTO DIFF WBC: CPT | Performed by: INTERNAL MEDICINE

## 2023-10-25 PROCEDURE — 96413 CHEMO IV INFUSION 1 HR: CPT

## 2023-10-25 PROCEDURE — 25810000003 SODIUM CHLORIDE 0.9 % SOLUTION: Performed by: INTERNAL MEDICINE

## 2023-10-25 PROCEDURE — 0 DEXTROSE 5 % SOLUTION 250 ML FLEX CONT: Performed by: INTERNAL MEDICINE

## 2023-10-25 PROCEDURE — 96360 HYDRATION IV INFUSION INIT: CPT

## 2023-10-25 PROCEDURE — 96361 HYDRATE IV INFUSION ADD-ON: CPT

## 2023-10-25 PROCEDURE — 96415 CHEMO IV INFUSION ADDL HR: CPT

## 2023-10-25 PROCEDURE — 80053 COMPREHEN METABOLIC PANEL: CPT | Performed by: INTERNAL MEDICINE

## 2023-10-25 PROCEDURE — 25010000002 PALONOSETRON PER 25 MCG: Performed by: INTERNAL MEDICINE

## 2023-10-25 PROCEDURE — 96375 TX/PRO/DX INJ NEW DRUG ADDON: CPT

## 2023-10-25 PROCEDURE — 82378 CARCINOEMBRYONIC ANTIGEN: CPT | Performed by: INTERNAL MEDICINE

## 2023-10-25 RX ORDER — DEXTROSE MONOHYDRATE 50 MG/ML
250 INJECTION, SOLUTION INTRAVENOUS ONCE
Status: CANCELLED | OUTPATIENT
Start: 2023-10-25

## 2023-10-25 RX ORDER — FAMOTIDINE 10 MG/ML
20 INJECTION, SOLUTION INTRAVENOUS AS NEEDED
Status: CANCELLED | OUTPATIENT
Start: 2023-10-25

## 2023-10-25 RX ORDER — SODIUM CHLORIDE 0.9 % (FLUSH) 0.9 %
20 SYRINGE (ML) INJECTION AS NEEDED
Status: DISCONTINUED | OUTPATIENT
Start: 2023-10-25 | End: 2023-10-26 | Stop reason: HOSPADM

## 2023-10-25 RX ORDER — PALONOSETRON 0.05 MG/ML
0.25 INJECTION, SOLUTION INTRAVENOUS ONCE
Status: CANCELLED | OUTPATIENT
Start: 2023-10-25

## 2023-10-25 RX ORDER — FLUOROURACIL 50 MG/ML
950 INJECTION, SOLUTION INTRAVENOUS ONCE
Status: COMPLETED | OUTPATIENT
Start: 2023-10-25 | End: 2023-10-25

## 2023-10-25 RX ORDER — DEXTROSE MONOHYDRATE 50 MG/ML
250 INJECTION, SOLUTION INTRAVENOUS ONCE
Status: COMPLETED | OUTPATIENT
Start: 2023-10-25 | End: 2023-10-25

## 2023-10-25 RX ORDER — FLUOROURACIL 50 MG/ML
400 INJECTION, SOLUTION INTRAVENOUS ONCE
Status: CANCELLED | OUTPATIENT
Start: 2023-10-25

## 2023-10-25 RX ORDER — DIPHENHYDRAMINE HYDROCHLORIDE 50 MG/ML
50 INJECTION INTRAMUSCULAR; INTRAVENOUS AS NEEDED
Status: CANCELLED | OUTPATIENT
Start: 2023-10-25

## 2023-10-25 RX ORDER — HEPARIN SODIUM (PORCINE) LOCK FLUSH IV SOLN 100 UNIT/ML 100 UNIT/ML
500 SOLUTION INTRAVENOUS AS NEEDED
Status: CANCELLED | OUTPATIENT
Start: 2023-10-27

## 2023-10-25 RX ORDER — PALONOSETRON 0.05 MG/ML
0.25 INJECTION, SOLUTION INTRAVENOUS ONCE
Status: COMPLETED | OUTPATIENT
Start: 2023-10-25 | End: 2023-10-25

## 2023-10-25 RX ORDER — HEPARIN SODIUM (PORCINE) LOCK FLUSH IV SOLN 100 UNIT/ML 100 UNIT/ML
500 SOLUTION INTRAVENOUS AS NEEDED
Status: DISCONTINUED | OUTPATIENT
Start: 2023-10-25 | End: 2023-10-26 | Stop reason: HOSPADM

## 2023-10-25 RX ORDER — ECHINACEA PURPUREA EXTRACT 125 MG
1 TABLET ORAL 2 TIMES DAILY PRN
Qty: 480 ML | Refills: 4 | Status: SHIPPED | OUTPATIENT
Start: 2023-10-25

## 2023-10-25 RX ORDER — SODIUM CHLORIDE 0.9 % (FLUSH) 0.9 %
20 SYRINGE (ML) INJECTION AS NEEDED
Status: CANCELLED | OUTPATIENT
Start: 2023-10-25

## 2023-10-25 RX ADMIN — Medication 20 ML: at 13:54

## 2023-10-25 RX ADMIN — DEXAMETHASONE SODIUM PHOSPHATE 12 MG: 4 INJECTION, SOLUTION INTRA-ARTICULAR; INTRALESIONAL; INTRAMUSCULAR; INTRAVENOUS; SOFT TISSUE at 11:13

## 2023-10-25 RX ADMIN — FLUOROURACIL 950 MG: 50 INJECTION, SOLUTION INTRAVENOUS at 13:51

## 2023-10-25 RX ADMIN — SODIUM CHLORIDE 1000 ML: 9 INJECTION, SOLUTION INTRAVENOUS at 10:31

## 2023-10-25 RX ADMIN — DEXTROSE MONOHYDRATE 250 ML: 50 INJECTION, SOLUTION INTRAVENOUS at 11:33

## 2023-10-25 RX ADMIN — OXALIPLATIN 200 MG: 5 INJECTION, SOLUTION INTRAVENOUS at 11:47

## 2023-10-25 RX ADMIN — PALONOSETRON 0.25 MG: 0.05 INJECTION, SOLUTION INTRAVENOUS at 11:31

## 2023-10-25 RX ADMIN — LEUCOVORIN CALCIUM 950 MG: 350 INJECTION, POWDER, LYOPHILIZED, FOR SOLUTION INTRAMUSCULAR; INTRAVENOUS at 11:47

## 2023-10-25 RX ADMIN — FLUOROURACIL 5740 MG: 50 INJECTION, SOLUTION INTRAVENOUS at 13:57

## 2023-10-25 NOTE — PROGRESS NOTES
Diagnosis: Colon cancer    Reason for Referral: [facility]W monitoring patient's access to travel to [place]    Content of Visit: OSW provided patient with a list of her appointments and confirmation that she received her services in October to submit for her Travel to [place] reimbursement for her treatment appointments.  Patient confirmed that her treatment dates listed in her chart were accurate.  Patient provides signature on her travel to [place] reimbursement form.  Patient stated that she was struggling with incontinence after she has her infusion treatment.  Patient stated she was using chucks on her bed and her chair to prevent stains and smells.  OSW requested permission to contact patient's insurance carrier to see if this was a product that would be covered by [place] Medicaid.  Patient's carrier reported that they would cover it if it was preauthorized.  OSW contacted a medical supply agency in [place] and asked if they were able to get chucks paid through Medicaid for any patient and was informed no carrier pays for the chucks or adult diapers.  [facility] contacted the [facility] and asked if they are able to provide any chucks for the community.  Staff stated to send patient to them whenever they finished her treatment today and they would have a supply of chucks for the patient.  Patient expressed appreciation for all of employee assistance.  Patient stated she was struggling this month with having to buy chucks and adult diapers and maintain utilities payments.  Patient stated she received a large electric bill that was a struggle to make the payment.  Patient stated any assistance helps at this point.  OSW plans to submit the travel reimbursement form on Friday after patient is confirmed to have kept her visit at the cancer care center.  She expressed appreciation for the resource of [facility].    Resources/Referrals Provided: Referral to Critical access hospital for chucks and adult  diapers      Travel to [place] reimbursement request for Oct. 2023 was submitted on 10/27/23

## 2023-10-25 NOTE — TELEPHONE ENCOUNTER
Spoke with patient, advised that her magnesium level is normal and that she does not need any additional magnesium supplement at this time. Instructed to maintain all follow up visits and to contact the office with any questions or concerns. Patient verbalized understanding of all information discussed.

## 2023-10-27 ENCOUNTER — HOSPITAL ENCOUNTER (OUTPATIENT)
Dept: ONCOLOGY | Facility: HOSPITAL | Age: 48
Discharge: HOME | End: 2023-10-27
Payer: COMMERCIAL

## 2023-10-27 DIAGNOSIS — C18.6 MALIGNANT NEOPLASM OF DESCENDING COLON: ICD-10-CM

## 2023-10-27 DIAGNOSIS — Z45.2 ENCOUNTER FOR ADJUSTMENT OR MANAGEMENT OF VASCULAR ACCESS DEVICE: Primary | ICD-10-CM

## 2023-10-27 PROCEDURE — 25010000002 HEPARIN LOCK FLUSH PER 10 UNITS: Performed by: INTERNAL MEDICINE

## 2023-10-27 RX ORDER — SODIUM CHLORIDE 0.9 % (FLUSH) 0.9 %
20 SYRINGE (ML) INJECTION AS NEEDED
OUTPATIENT
Start: 2023-10-27

## 2023-10-27 RX ORDER — SODIUM CHLORIDE 0.9 % (FLUSH) 0.9 %
20 SYRINGE (ML) INJECTION AS NEEDED
Status: DISCONTINUED | OUTPATIENT
Start: 2023-10-27 | End: 2023-10-28 | Stop reason: HOSPADM

## 2023-10-27 RX ORDER — HEPARIN SODIUM (PORCINE) LOCK FLUSH IV SOLN 100 UNIT/ML 100 UNIT/ML
500 SOLUTION INTRAVENOUS AS NEEDED
Status: DISCONTINUED | OUTPATIENT
Start: 2023-10-27 | End: 2023-10-28 | Stop reason: HOSPADM

## 2023-10-27 RX ORDER — HEPARIN SODIUM (PORCINE) LOCK FLUSH IV SOLN 100 UNIT/ML 100 UNIT/ML
500 SOLUTION INTRAVENOUS AS NEEDED
OUTPATIENT
Start: 2023-10-27

## 2023-10-27 RX ADMIN — Medication 20 ML: at 13:20

## 2023-10-27 RX ADMIN — HEPARIN SODIUM (PORCINE) LOCK FLUSH IV SOLN 100 UNIT/ML 500 UNITS: 100 SOLUTION at 13:15

## 2023-10-27 NOTE — NURSING NOTE
Patient came into clinic today for home infusion pump unhook. She c/o pain in R shoulder and burning at base of neck above VAD. Also tenderness around VAD. This RN and Johanna, RN closely assessed the are. VAD was flushed with saline without incident. VAD was hep locked and needle removed. Patient said she felt immediate relief once the Tegaderm was removed from the needle and needle was removed from VAD. Patient was instructed to apply warm compresses to area and to report to ED if pain increased, area became reddened or swollen or more tender.

## 2023-11-02 ENCOUNTER — DOCUMENTATION (OUTPATIENT)
Dept: GENETICS | Facility: HOSPITAL | Age: 48
End: 2023-11-02
Payer: COMMERCIAL

## 2023-11-02 NOTE — PROGRESS NOTES
Lilian Pelaez, a 48-year-old female, was referred for genetic counseling due to a personal and family history of cancer.  Genetic counseling was provided via telehealth. Ms. Pelaez was diagnosed with colon cancer at age 47.  Ms. Pelaez reports that she was diagnosed with a melanoma on her back and chest at age 46. Ms. Pelaez was interested in discussing her risk for a hereditary cancer syndrome. Ms. Pelaez opted to pursue a multi-gene panel, and [employer] CancerNext panel was ordered which analyzes 36 genes associated with hereditary cancer risk. Sample coordination is planned for 10/11/23 while Ms. Pelaez is onsite at [facility].  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 Testing was negative for germline mutations in the other 35 genes evaluated. These results were discussed with the patient by telephone on 11/2/23.        FAMILY HISTORY (see attached pedigree):    Mother:  Breast biopsy, not known if there was a cancer diagnosis  Sister:   GYN cancer, specific type not known  Mat. Uncle:  Esophageal cancer  Mat. Uncle:  Melanoma  Pat. Uncle:  Brain cancer  Pat. 1st cousin: Melanoma     Metastatic breast cancer  Mat. 1st cousin, once-removed: Colon cancer, >50    We do not have medical records confirming the diagnoses in Ms. Pelaez's family.    RISK ASSESSMENT: Ms. Pelaez's personal history of colon cancer led to concern regarding a hereditary cancer syndrome. Ms. Pelaez meets NCCN guidelines criteria for Love syndrome testing based on her personal history of colon cancer under age 50. Ms. Pelaez opted to pursue multigene panel testing via the CancerNext panel. These risk assessments are based on the family history information provided at the time of the appointment and could change in the future should new information be obtained.    GENETIC COUNSELING:  We reviewed the  family history information in detail. Cases of cancer follow three general patterns: sporadic, familial, and hereditary.  While most cancer is sporadic, some cases appear to occur in family clusters.  These cases are said to be familial and account for 10-20% of cancer cases.  Familial cases may be due to a combination of shared genes and environmental factors among family members.  In even fewer cases, the risk for cancer is inherited, and the genes responsible for the increased cancer risk are known.      Family histories typical of hereditary cancer syndromes usually include multiple first- and second-degree relatives diagnosed with cancer types that define a syndrome. These cases tend to be diagnosed at younger-than-expected ages and can be bilateral or multifocal. The cancer in these families follows an autosomal dominant inheritance pattern, which indicates the likely presence of a mutation in a cancer susceptibility gene. Children and siblings of an individual believed to carry this mutation have a 50% chance of inheriting that mutation, thereby inheriting the increased risk to develop cancer. These mutations can be passed down from the maternal or the paternal lineage.    The most common form of hereditary colorectal cancer is Love syndrome.  Love syndrome is caused by mutations in mismatch repair genes, (MLH1, MSH2, MSH6, PMS2, and EPCAM).  The lifetime risk for colon cancer for individuals with Love syndrome is as high as 80% if there is no intervention (i.e. removal of polyps detected on colonoscopy).  The specific risks vary by gene, but other risks can include gastric, urinary tract, small intestines, biliary tract, pancreatic, and brain cancer.  Women with Love syndrome also have an elevated risk for endometrial cancer and, in some cases, ovarian cancer.  We discussed that there are other less common hereditary colon cancer syndromes, and genes associated with other cancer risks. Based on Ms.  Benigno's personal history of cancer and her desire to get more information regarding her personal risks and risks to family members, genetic testing was pursued via a panel evaluating several other genes known to increase the risk for cancer.    GENETIC TESTING:  The risks, benefits and limitations of genetic testing and implications for clinical management following testing were reviewed. DNA test results can influence decisions regarding screening and prevention.  Genetic testing can have significant psychological implications for both individuals and families. Also discussed was the possibility of employment and insurance discrimination based on genetic test results and the federal and states laws that are in place to prevent this ([place]).         We discussed panel testing, which would involve testing 36 genes associated with hereditary cancer risk. The benefits and limitations of genetic testing were discussed. The implications of a positive or negative test result were discussed. We discussed the possibility that, in some cases, genetic test results may be ambiguous due to the identification of a genetic variant. These variants may or may not be associated with an increased cancer risk. With multigene panel testing, it is not uncommon for a variant of uncertain significance (VUS) to be identified.  If a VUS is identified, testing family members is not recommended and screening recommendations are made based on the family history. The laboratories that perform genetic testing work to reclassify the VUS and send out an amended report if and when a VUS is reclassified.  The majority of variant findings are ultimately reclassified to a negative result.     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  The presence of one MUTYH mutation indicates that Ms. Pelaez does NOT have MUTYH-Associated Polyposis (MAP), but is a carrier.   Unlike most hereditary cancer syndromes, MAP is inherited in an autosomal recessive manner; therefore, Ms. Pelaez being identified as having one MUTYH mutation indicates that she is a carrier for this condition.  The risk of colon cancer in individuals with one MUTYH mutation is unclear. The risk of colon cancer was not found to be significantly increased in large population-based studies, whereas family-based studies found a somewhat increased risk of colon cancer, ranging from a 5.6-10% lifetime risk in comparison to the general population risk of 3-5%.  However, the most recent NCCN guidelines (2023) state that there is not an increased risk of colon cancer in MUTYH carriers.      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  VUSs are frequently reported through multigene panel testing given the number of genes evaluated and the presence of genetic variation in the population.  The vast majority of VUS's are ultimately reclassified as benign variation, therefore this result is not clinically actionable and it is not recommended that relatives be tested for a VUS. If this variant is ever reclassified to a benign variant or a pathogenic mutation, a new report will be issued by the laboratory and released directly to the ordering physician, and our office and we will contact Ms. Pelaez at that time.     Based on Ms. Pelaez's test results, her siblings and children each have a 50% chance to have inherited the MUTYH mutation identified. The general population carrier frequency is 1-2%; therefore, it is possible for Ms. Pelaez's family members to have inherited an additional mutation from the other parent. Therefore, rather than just performing single site analysis for the identified mutation, full MUTYH analysis is indicated for family members. Genetic counseling and testing could be considered for Ms. Pelaez's family members.  For individuals found to have two MUTYH  mutations, screening colonoscopy begins at age 25.  Genetic testing for adult onset conditions is not recommended before age 18. We would be happy to see family members who live in the area in our clinic to further discuss this information and testing options. Relatives can call our office at [phone] for assistance in scheduling an appointment; however, a physician referral to our office will prompt our coordinator to contact patients to schedule an appointment.  For family members who live elsewhere, there are genetic counselors at most [facility] centers. They can find a genetic counselor by visiting the National Society of Genetic Counselors website at www.nsgc.org.  Relatives would need a copy of Ms. Pelaez's genetic test result to ensure they were being tested for the correct gene. Ms. Pelaez has given permission for her genetic test results to be used to facilitate genetic testing for her adult children should they be referred to our office for genetic counseling/testing.      SURVEILLANCE: Per NCCN guidelines, individuals who are carriers of one MUTYH mutation and have a family history of colon cancer in a first-degree relative should begin screening colonoscopy at age 40 or ten years prior to the youngest colorectal cancer diagnosis in the family and repeated every five years.  In this case, if Ms. Pelaez's relatives are found to also be carrier of one MUTYH mutation, or if they test negative and are not a carrier, screening should be guided by Ms. Pelaez's personal history.  Based on Ms. Pelaez's colon cancer diagnosis, relatives should start screening colonoscopy at age 37 (ten years prior to her age at diagnosis), and repeat every 5 years, or more frequently based on findings.  If any relatives were found to have two MUTYH mutations, they would start screening colonoscopy at age 25. This assessment is based on the information provided at time of consultation.     PLAN: Genetic counseling remains  available to Ms. Pelaez and her family. Ms. Pelaez should feel free to contact us with any questions or concerns at [phone].      Bessy Moore MS, [facility] [place], [facility]  Licensed Certified Genetic Counselor     Cc: MD Lilian London

## 2023-11-08 ENCOUNTER — DOCUMENTATION (OUTPATIENT)
Dept: ONCOLOGY | Facility: HOSPITAL | Age: 48
End: 2023-11-08
Payer: COMMERCIAL

## 2023-11-08 ENCOUNTER — OFFICE VISIT (OUTPATIENT)
Dept: ONCOLOGY | Facility: HOSPITAL | Age: 48
End: 2023-11-08
Payer: COMMERCIAL

## 2023-11-08 ENCOUNTER — HOSPITAL ENCOUNTER (OUTPATIENT)
Dept: ONCOLOGY | Facility: HOSPITAL | Age: 48
Discharge: HOME | End: 2023-11-08
Admitting: INTERNAL MEDICINE
Payer: COMMERCIAL

## 2023-11-08 VITALS
HEIGHT: 64 IN | DIASTOLIC BLOOD PRESSURE: 85 MMHG | TEMPERATURE: 97.1 F | SYSTOLIC BLOOD PRESSURE: 126 MMHG | HEART RATE: 79 BPM | RESPIRATION RATE: 18 BRPM | BODY MASS INDEX: 46.29 KG/M2 | OXYGEN SATURATION: 99 % | WEIGHT: 271.17 LBS

## 2023-11-08 VITALS
BODY MASS INDEX: 46.37 KG/M2 | HEART RATE: 79 BPM | WEIGHT: 271.61 LBS | SYSTOLIC BLOOD PRESSURE: 126 MMHG | RESPIRATION RATE: 18 BRPM | DIASTOLIC BLOOD PRESSURE: 85 MMHG | TEMPERATURE: 97.1 F | OXYGEN SATURATION: 99 % | HEIGHT: 64 IN

## 2023-11-08 DIAGNOSIS — C18.6 PRIMARY ADENOCARCINOMA OF DESCENDING COLON: ICD-10-CM

## 2023-11-08 DIAGNOSIS — E86.0 DEHYDRATION: ICD-10-CM

## 2023-11-08 DIAGNOSIS — C18.6 MALIGNANT NEOPLASM OF DESCENDING COLON: Primary | ICD-10-CM

## 2023-11-08 DIAGNOSIS — E86.0 DEHYDRATION: Primary | ICD-10-CM

## 2023-11-08 DIAGNOSIS — Z45.2 ENCOUNTER FOR ADJUSTMENT OR MANAGEMENT OF VASCULAR ACCESS DEVICE: ICD-10-CM

## 2023-11-08 LAB
ALBUMIN SERPL-MCNC: 4.2 G/DL (ref 3.5–5.2)
ALBUMIN/GLOB SERPL: 1.6 G/DL
ALP SERPL-CCNC: 104 U/L (ref 39–117)
ALT SERPL W P-5'-P-CCNC: 27 U/L (ref 1–33)
ANION GAP SERPL CALCULATED.3IONS-SCNC: 11.1 MMOL/L (ref 5–15)
AST SERPL-CCNC: 34 U/L (ref 1–32)
BASOPHILS # BLD AUTO: 0.03 10*3/MM3 (ref 0–0.2)
BASOPHILS NFR BLD AUTO: 0.7 % (ref 0–1.5)
BILIRUB SERPL-MCNC: 0.6 MG/DL (ref 0–1.2)
BUN SERPL-MCNC: 8 MG/DL (ref 6–20)
BUN/CREAT SERPL: 10.5 (ref 7–25)
CALCIUM SPEC-SCNC: 9.5 MG/DL (ref 8.6–10.5)
CEA SERPL-MCNC: 3.12 NG/ML
CHLORIDE SERPL-SCNC: 109 MMOL/L (ref 98–107)
CO2 SERPL-SCNC: 21.9 MMOL/L (ref 22–29)
CREAT SERPL-MCNC: 0.76 MG/DL (ref 0.57–1)
DEPRECATED RDW RBC AUTO: 52.5 FL (ref 37–54)
EGFRCR SERPLBLD CKD-EPI 2021: 96.8 ML/MIN/1.73
EOSINOPHIL # BLD AUTO: 0.04 10*3/MM3 (ref 0–0.4)
EOSINOPHIL NFR BLD AUTO: 0.9 % (ref 0.3–6.2)
ERYTHROCYTE [DISTWIDTH] IN BLOOD BY AUTOMATED COUNT: 15.5 % (ref 12.3–15.4)
GLOBULIN UR ELPH-MCNC: 2.6 GM/DL
GLUCOSE SERPL-MCNC: 96 MG/DL (ref 65–99)
HCT VFR BLD AUTO: 37 % (ref 34–46.6)
HGB BLD-MCNC: 12.8 G/DL (ref 12–15.9)
IMM GRANULOCYTES # BLD AUTO: 0.01 10*3/MM3 (ref 0–0.05)
IMM GRANULOCYTES NFR BLD AUTO: 0.2 % (ref 0–0.5)
LYMPHOCYTES # BLD AUTO: 2.11 10*3/MM3 (ref 0.7–3.1)
LYMPHOCYTES NFR BLD AUTO: 49.2 % (ref 19.6–45.3)
MCH RBC QN AUTO: 32.1 PG (ref 26.6–33)
MCHC RBC AUTO-ENTMCNC: 34.6 G/DL (ref 31.5–35.7)
MCV RBC AUTO: 92.7 FL (ref 79–97)
MONOCYTES # BLD AUTO: 0.9 10*3/MM3 (ref 0.1–0.9)
MONOCYTES NFR BLD AUTO: 21 % (ref 5–12)
NEUTROPHILS NFR BLD AUTO: 1.2 10*3/MM3 (ref 1.7–7)
NEUTROPHILS NFR BLD AUTO: 28 % (ref 42.7–76)
PLATELET # BLD AUTO: 175 10*3/MM3 (ref 140–450)
PMV BLD AUTO: 9.9 FL (ref 6–12)
POTASSIUM SERPL-SCNC: 4 MMOL/L (ref 3.5–5.2)
PROT SERPL-MCNC: 6.8 G/DL (ref 6–8.5)
RBC # BLD AUTO: 3.99 10*6/MM3 (ref 3.77–5.28)
SODIUM SERPL-SCNC: 142 MMOL/L (ref 136–145)
WBC NRBC COR # BLD: 4.29 10*3/MM3 (ref 3.4–10.8)

## 2023-11-08 PROCEDURE — 96375 TX/PRO/DX INJ NEW DRUG ADDON: CPT

## 2023-11-08 PROCEDURE — 0 DEXTROSE 5 % SOLUTION: Performed by: INTERNAL MEDICINE

## 2023-11-08 PROCEDURE — 0 DEXTROSE 5 % SOLUTION 250 ML FLEX CONT: Performed by: INTERNAL MEDICINE

## 2023-11-08 PROCEDURE — 25010000002 PALONOSETRON PER 25 MCG: Performed by: INTERNAL MEDICINE

## 2023-11-08 PROCEDURE — 85025 COMPLETE CBC W/AUTO DIFF WBC: CPT | Performed by: INTERNAL MEDICINE

## 2023-11-08 PROCEDURE — 96549 UNLISTED CHEMOTHERAPY PX: CPT

## 2023-11-08 PROCEDURE — 25010000002 DEXAMETHASONE SODIUM PHOSPHATE 120 MG/30ML SOLUTION: Performed by: INTERNAL MEDICINE

## 2023-11-08 PROCEDURE — G0498 CHEMO EXTEND IV INFUS W/PUMP: HCPCS

## 2023-11-08 PROCEDURE — 25810000003 SODIUM CHLORIDE 0.9 % SOLUTION: Performed by: INTERNAL MEDICINE

## 2023-11-08 PROCEDURE — 96415 CHEMO IV INFUSION ADDL HR: CPT

## 2023-11-08 PROCEDURE — 25010000002 FLUOROURACIL PER 500 MG: Performed by: INTERNAL MEDICINE

## 2023-11-08 PROCEDURE — 25010000002 OXALIPLATIN PER 0.5 MG: Performed by: INTERNAL MEDICINE

## 2023-11-08 PROCEDURE — 96411 CHEMO IV PUSH ADDL DRUG: CPT

## 2023-11-08 PROCEDURE — 25010000002 LEUCOVORIN CALCIUM PER 50 MG: Performed by: INTERNAL MEDICINE

## 2023-11-08 PROCEDURE — 96413 CHEMO IV INFUSION 1 HR: CPT

## 2023-11-08 PROCEDURE — 80053 COMPREHEN METABOLIC PANEL: CPT | Performed by: INTERNAL MEDICINE

## 2023-11-08 PROCEDURE — 96361 HYDRATE IV INFUSION ADD-ON: CPT

## 2023-11-08 PROCEDURE — 82378 CARCINOEMBRYONIC ANTIGEN: CPT | Performed by: INTERNAL MEDICINE

## 2023-11-08 RX ORDER — DEXTROSE MONOHYDRATE 50 MG/ML
250 INJECTION, SOLUTION INTRAVENOUS ONCE
Status: CANCELLED | OUTPATIENT
Start: 2023-11-08

## 2023-11-08 RX ORDER — CEFDINIR 300 MG/1
CAPSULE ORAL
COMMUNITY
Start: 2023-10-30

## 2023-11-08 RX ORDER — PALONOSETRON 0.05 MG/ML
0.25 INJECTION, SOLUTION INTRAVENOUS ONCE
Status: CANCELLED | OUTPATIENT
Start: 2023-11-08

## 2023-11-08 RX ORDER — DIPHENHYDRAMINE HYDROCHLORIDE 50 MG/ML
50 INJECTION INTRAMUSCULAR; INTRAVENOUS AS NEEDED
Status: CANCELLED | OUTPATIENT
Start: 2023-11-08

## 2023-11-08 RX ORDER — HEPARIN SODIUM (PORCINE) LOCK FLUSH IV SOLN 100 UNIT/ML 100 UNIT/ML
500 SOLUTION INTRAVENOUS AS NEEDED
Status: DISCONTINUED | OUTPATIENT
Start: 2023-11-08 | End: 2023-11-09 | Stop reason: HOSPADM

## 2023-11-08 RX ORDER — FLUOROURACIL 50 MG/ML
950 INJECTION, SOLUTION INTRAVENOUS ONCE
Status: COMPLETED | OUTPATIENT
Start: 2023-11-08 | End: 2023-11-08

## 2023-11-08 RX ORDER — SODIUM CHLORIDE 0.9 % (FLUSH) 0.9 %
20 SYRINGE (ML) INJECTION AS NEEDED
Status: CANCELLED | OUTPATIENT
Start: 2023-11-08

## 2023-11-08 RX ORDER — FAMOTIDINE 10 MG/ML
20 INJECTION, SOLUTION INTRAVENOUS AS NEEDED
Status: CANCELLED | OUTPATIENT
Start: 2023-11-08

## 2023-11-08 RX ORDER — DEXTROSE MONOHYDRATE 50 MG/ML
250 INJECTION, SOLUTION INTRAVENOUS ONCE
Status: COMPLETED | OUTPATIENT
Start: 2023-11-08 | End: 2023-11-08

## 2023-11-08 RX ORDER — SODIUM CHLORIDE 0.9 % (FLUSH) 0.9 %
20 SYRINGE (ML) INJECTION AS NEEDED
Status: DISCONTINUED | OUTPATIENT
Start: 2023-11-08 | End: 2023-11-09 | Stop reason: HOSPADM

## 2023-11-08 RX ORDER — PALONOSETRON 0.05 MG/ML
0.25 INJECTION, SOLUTION INTRAVENOUS ONCE
Status: COMPLETED | OUTPATIENT
Start: 2023-11-08 | End: 2023-11-08

## 2023-11-08 RX ORDER — HEPARIN SODIUM (PORCINE) LOCK FLUSH IV SOLN 100 UNIT/ML 100 UNIT/ML
500 SOLUTION INTRAVENOUS AS NEEDED
Status: CANCELLED | OUTPATIENT
Start: 2023-11-10

## 2023-11-08 RX ORDER — FLUOROURACIL 50 MG/ML
400 INJECTION, SOLUTION INTRAVENOUS ONCE
Status: CANCELLED | OUTPATIENT
Start: 2023-11-08

## 2023-11-08 RX ADMIN — FLUOROURACIL 5740 MG: 50 INJECTION, SOLUTION INTRAVENOUS at 13:57

## 2023-11-08 RX ADMIN — LEUCOVORIN CALCIUM 950 MG: 350 INJECTION, POWDER, LYOPHILIZED, FOR SOLUTION INTRAMUSCULAR; INTRAVENOUS at 11:38

## 2023-11-08 RX ADMIN — SODIUM CHLORIDE 1000 ML: 9 INJECTION, SOLUTION INTRAVENOUS at 09:57

## 2023-11-08 RX ADMIN — OXALIPLATIN 200 MG: 5 INJECTION, SOLUTION INTRAVENOUS at 11:38

## 2023-11-08 RX ADMIN — FLUOROURACIL 950 MG: 50 INJECTION, SOLUTION INTRAVENOUS at 13:48

## 2023-11-08 RX ADMIN — DEXAMETHASONE SODIUM PHOSPHATE 12 MG: 4 INJECTION, SOLUTION INTRA-ARTICULAR; INTRALESIONAL; INTRAMUSCULAR; INTRAVENOUS; SOFT TISSUE at 10:58

## 2023-11-08 RX ADMIN — PALONOSETRON 0.25 MG: 0.05 INJECTION, SOLUTION INTRAVENOUS at 10:55

## 2023-11-08 RX ADMIN — DEXTROSE MONOHYDRATE 250 ML: 50 INJECTION, SOLUTION INTRAVENOUS at 10:50

## 2023-11-08 NOTE — PROGRESS NOTES
Diagnosis: Colon cancer    Reason for Referral: Patient requested to meet with [facility]W.    Content of Visit: Patient requested a resource for assistance with utilities bills such as gas, electric, and water.  OSW assisted patient in completing [employer] application online.  Patient plans to follow-up with OSW when her application is received, reviewed, and instructions provided on what she needs to complete the application.  OSW assisted in alerting nursing staff that patient's next chemo was during Thanksgiving holiday.  Infusion nurse staff was planning to follow-up with oncologist on instructions for how that would be delivered.  Patient stated that she would like to advocate that she have her visit moved to the week after Thanksgiving.    Resources/Referrals Provided: [employer] to assist with utility bills

## 2023-11-08 NOTE — PROGRESS NOTES
Chief Complaint/Care Team   Adenocarcinoma of descending colon    Shelly Cash MD Stephens, Cassandra, MD    History of Present Illness     Diagnosis: Colon Adenocarcinoma S/p Left colectomy on 5/5/23, stage after resection [id number], stage III    Current Treatment: FOLFOX IV [id number] x 6 months, cycle 1 on 6/14/2023  Treatment delay secondary to pneumoperitoneum which developed in June 2023, patient cleared to resume chemotherapy in August 2023.    Previous Treatment: c-scope on 4/2023 biopsy revealed colon adenocarcinoma    Lilian Pelaez is a 48 y.o. female who presents to [facility] for discussion of newly diagnosed colon adenocarcinoma seen on biopsy of colon mass in the descending colon during colonoscopy performed in April 2023.     Staging scans revealed on 4/11/2023:  CT abdomen/pelvis without any metastatic disease in abdomen/pelvis  CT chest no evidence of metastatic disease in chest.    Patient underwent robotic resection of descending colon and splenic flexure by Dr. Rolando Liu on 5/5/2023.    Patient is a former smoker.   Patient reports family history of several relatives with other forms of cancer cancer on her mother side of the family, but denies any known history of colon cancer in her family.    Patient reports noticing some blood mixed in with stool prior to cancer diagnosis.      Patient received cycle 1 on 6/14/2023, cycle 2 was delayed secondary to development of pneumoperitoneum after cycle 1, patient was mated to the hospital very by her surgeon Dr. Liu who recommended delaying further cycles of chemotherapy until August 1, 2023.    Interval history: Patient here prior to cycle 9 of chemotherapy with FOLFOX.  Patient cleared by Dr. Rolando Liu to resume chemotherapy in August 2023 after developing pneumoperitoneum in June 2023.  She without report of fever, chills, SOA, melena, or v/d. She continues to report increased dry mouth, loss of  taste since beginning chemo, post nasal drip, along with fatigue. No report of other blood loss today. Pt with chemo induced nausea, vomiting and diarrhea well controlled with prescribed medications. Pt reports peripheral neuropathy in her fingertips, and feet, that do not impede her ability to perform ADLs or IADLs. Pt reports diagnosis of sinus infection prescribed cefdinir (1 day remaining) and pt has began to take multivitamin, pt reports improvement in sinus congesting symptoms today.    Review of Systems   Constitutional:  Positive for activity change, appetite change and fatigue. Negative for diaphoresis, fever, unexpected weight gain and unexpected weight loss.   HENT:  Positive for congestion, nosebleeds (PT had nosebleeds after treatment) and trouble swallowing (PT experiencing dry mouth which is causing her to not be able to eat or drink). Negative for hearing loss, mouth sores, sore throat, swollen glands and voice change.    Eyes:  Negative for blurred vision.   Respiratory:  Negative for cough, shortness of breath and wheezing.    Cardiovascular:  Negative for chest pain and palpitations.   Gastrointestinal:  Positive for nausea. Negative for abdominal pain, blood in stool, constipation, diarrhea and vomiting.   Endocrine: Positive for cold intolerance and heat intolerance.   Genitourinary:  Negative for difficulty urinating, dysuria, frequency, hematuria and urinary incontinence.   Musculoskeletal:  Negative for arthralgias, back pain and myalgias.   Skin:  Negative for rash, skin lesions and wound.   Neurological:  Negative for dizziness, seizures, weakness, numbness and headache.   Hematological:  Does not bruise/bleed easily.   Psychiatric/Behavioral:  Negative for depressed mood. The patient is nervous/anxious.    All other systems reviewed and are negative.       Oncology/Hematology History   Primary adenocarcinoma of descending colon   4/11/2023 Initial Diagnosis    Primary adenocarcinoma of  "descending colon     9/27/2023 -  Chemotherapy    OP SUPPORTIVE HYDRATION + ANTIEMETICS     Malignant neoplasm of descending colon   5/6/2023 Initial Diagnosis    Malignant neoplasm of descending colon     5/31/2023 Cancer Staged    Staging form: Colon And Rectum, AJCC 8th Edition  - Pathologic: Stage IIIB (pT3, pN1b, cM0) - Signed by Brian Marroquin MD on 5/31/2023 6/14/2023 -  Chemotherapy    OP COLON [id number] OXALIplatin / Leucovorin / Fluorouracil         Objective     Vitals:    11/08/23 0903   BP: 126/85   Pulse: 79   Resp: 18   Temp: 97.1 °F (36.2 °C)   TempSrc: Temporal   SpO2: 99%   Weight: 123 kg (271 lb 2.7 oz)   Height: 162.6 cm (64.02\")   PainSc: 0-No pain           ECOG score: 0         PHQ-9 Total Score:         Physical Exam  Vitals reviewed. Exam conducted with a chaperone present.   Constitutional:       General: She is not in acute distress.     Appearance: Normal appearance.   HENT:      Head: Normocephalic and atraumatic.      Mouth/Throat:      Mouth: Mucous membranes are dry.   Eyes:      Extraocular Movements: Extraocular movements intact.      Conjunctiva/sclera: Conjunctivae normal.   Pulmonary:      Effort: Pulmonary effort is normal.   Musculoskeletal:      Cervical back: Normal range of motion and neck supple.   Skin:     General: Skin is warm and dry.      Findings: No bruising.   Neurological:      Mental Status: She is oriented to person, place, and time.         Past Medical History     Past Medical History:   Diagnosis Date    Anesthesia     B/P bottomed out/UNSURE ON THE DATE    Anxiety     Asthma 1996    seasonal/NO INHALERS    Colon cancer 04/17/2023    DDD (degenerative disc disease), cervical     DDD (degenerative disc disease), lumbar     Depression     Diverticulitis of colon 2005    Scope was done in [facility]    GERD (gastroesophageal reflux disease)     Hypertension     Kidney stones     Melanoma     removed    Palpitation     FOLLOWS W/DR. ANTONIO  Q6 MONTHS, NO CP OR " SOA    Prolapse of female bladder      Current Outpatient Medications on File Prior to Visit   Medication Sig Dispense Refill    allopurinol (ZYLOPRIM) 100 MG tablet Take 1 tablet by mouth Daily.      ALPRAZolam (XANAX) 0.5 MG tablet Take 1 tablet by mouth At Night As Needed.      amLODIPine (NORVASC) 10 MG tablet Take 1 tablet by mouth Every Night.      aspirin 81 MG EC tablet Take 1 tablet every day by oral route.      cefdinir (OMNICEF) 300 MG capsule       Diclofenac Sodium (VOLTAREN) 1 % gel gel Apply 4 g topically to the appropriate area as directed 4 (Four) Times a Day As Needed.      docusate sodium 100 MG capsule Take 1 capsule by mouth Daily.      fluorouracil (EFUDEX) 5 % cream Apply 1 application  topically to the appropriate area as directed 2 (Two) Times a Day.      furosemide (LASIX) 20 MG tablet Take 1 tablet by mouth Daily.      hydroCHLOROthiazide (HYDRODIURIL) 25 MG tablet Take 1 tablet every day by oral route for 90 days.      HYDROcodone-acetaminophen (NORCO) 7.5-325 MG per tablet Take 1 tablet by mouth Every 8 (Eight) Hours As Needed.      Hydrocortisone, Perianal, (ANUSOL-HC) 2.5 % rectal cream       hyoscyamine (ANASPAZ,LEVSIN) 0.125 MG tablet TAKE ONE TABLET BY MOUTH FOUR TIMES A DAY WITH MEALS AND AT BEDTIME. 120 tablet 1    ketorolac (TORADOL) 10 MG tablet Take 1 tablet by mouth Every 6 (Six) Hours As Needed.      lamoTRIgine (LaMICtal) 25 MG tablet Take 1 tablet by mouth 2 (Two) Times a Day. Only takes at night      leucovorin 5 MG tablet Take  by mouth Every 6 (Six) Hours.      levoFLOXacin (Levaquin) 750 MG tablet Take 1 tablet by mouth Daily. 7 tablet 0    Lidocaine 4 % patch Apply 1 patch topically Daily.      lidocaine-prilocaine (EMLA) 2.5-2.5 % cream Apply 1 application topically to the appropriate area as directed Every 2 (Two) Hours As Needed for Mild Pain. 30 g 1    linaclotide (LINZESS) 290 MCG capsule capsule Take 1 capsule by mouth As Needed.      loperamide (Imodium A-D) 2  MG tablet Take 1 tablet by mouth 4 (Four) Times a Day As Needed for Diarrhea. Imodium - take 4 mg first dose, followed by 2 mg every 2-4 hours after each stool (MAX of 16 mg in 24 hour period) 60 tablet 1    loperamide (IMODIUM) 2 MG capsule       losartan (COZAAR) 100 MG tablet Take 1 tablet by mouth Every Night.      medroxyPROGESTERone (DEPO-PROVERA) 150 MG/ML injection Inject 1 mL into the appropriate muscle as directed by prescriber Every 3 (Three) Months.      metoclopramide (REGLAN) 10 MG tablet Take 1 tablet by mouth 3 (Three) Times a Day.      metoprolol succinate XL (TOPROL-XL) 25 MG 24 hr tablet Take 1 tablet by mouth Daily.      Multiple Vitamins-Minerals (b complex-C-E-zinc) tablet Take 1 tablet by mouth Daily.      Myrbetriq 50 MG tablet sustained-release 24 hour 24 hr tablet       naloxone (NARCAN) 4 MG/0.1ML nasal spray Take 1 spray every day by nasal route as needed.      nystatin (MYCOSTATIN) 100,000 unit/mL suspension Swish and swallow 5 mL 4 (Four) Times a Day. 473 mL 1    ondansetron (ZOFRAN) 8 MG tablet Take 1 tablet by mouth 3 (Three) Times a Day As Needed for Nausea or Vomiting. 30 tablet 5    OXALIPLATIN IV Infuse  into a venous catheter.      pantoprazole (PROTONIX) 40 MG EC tablet Take 1 tablet by mouth 2 (Two) Times a Day.      polyethylene glycol (GaviLyte-G) 236 g solution       prochlorperazine (COMPAZINE) 10 MG tablet Take 1 tablet by mouth Every 6 (Six) Hours As Needed for Nausea or Vomiting. 30 tablet 5    rOPINIRole (REQUIP) 1 MG tablet Take 1 tablet by mouth Every Night.      sertraline (ZOLOFT) 100 MG tablet Take 1 tablet by mouth Daily.      sodium chloride (Ocean Nasal Spray) 0.65 % nasal spray 1 spray into the nostril(s) as directed by provider 2 (Two) Times a Day As Needed for Congestion. 480 mL 4    sucralfate (CARAFATE) 1 g tablet Take 1 tablet by mouth 2 (Two) Times a Day.      tetracycline (ACHROMYCIN,SUMYCIN) 250 MG capsule       traMADol (Ultram) 50 MG tablet Take 1  tablet by mouth Every 6 (Six) Hours As Needed for Severe Pain or Moderate Pain. 5 tablet 0    vitamin D3 125 MCG (5000 UT) capsule capsule Take 2,000 Units by mouth Daily. (Patient not taking: Reported on 11/8/2023)       No current facility-administered medications on file prior to visit.      Allergies   Allergen Reactions    Hydromorphone Hives, Itching and GI Intolerance    Morphine Hives, Itching and Nausea And Vomiting    Oxybutynin Swelling and Angioedema           Oxycodone-Acetaminophen Itching and Nausea And Vomiting     Can not take any higher than 7.5    Penicillins Rash    Promethazine Nausea And Vomiting    Tylenol With Codeine #3 [Acetaminophen-Codeine] Itching and Nausea And Vomiting    Oxybutynin Chloride Angioedema     Past Surgical History:   Procedure Laterality Date    BREAST SURGERY  2016    Removed 8lbs of tissue, 2016    CHOLECYSTECTOMY      COLON RESECTION Left 05/05/2023    Procedure: RESECTION OF DESCENDING COLON AND SPLENIC FLEXURE TAKEDOWN WITH DAVINCI ROBOT;  Surgeon: Rolando Liu MD;  Location: [facility] OR [facility];  Service: Robotics - DaVinci;  Laterality: Left;    COLONOSCOPY N/A 04/04/2023    Procedure: COLONOSCOPY WITH POLYPECTOMY/SNARE/BIOPSIES/TATTOOING DESCENDING COLON MASS;  Surgeon: Deniz Dowd MD;  Location: [facility] ENDOSCOPY;  Service: Gastroenterology;  Laterality: N/A;  INCOMPLETE COLONOSCOPY  POOR BOWEL PREP  COLON POLYP  COLON MASS  DIVERTICULOSIS    COLONOSCOPY N/A 04/12/2023    Procedure: COLONOSCOPY with cold snare;  Surgeon: Deniz Dowd MD;  Location: [facility] ENDOSCOPY;  Service: Gastroenterology;  Laterality: N/A;  colon polyps, diverticulosis, colon mass, hemorrhoids      CYSTOSCOPY W/ URETERAL STENT PLACEMENT Left 05/05/2023    Procedure: Cystoscopy, left ureteral injection,;  Surgeon: Manjula Randolph MD;  Location: [facility];  Service: Urology;  Laterality: Left;    FRACTURE SURGERY  2001    Left knee    KNEE SURGERY Left     x4     LAPAROSCOPIC TUBAL LIGATION  1995    LAPAROSCOPIC TUBAL LIGATION      Reversed    REDUCTION MAMMAPLASTY  2009    8lbs of tissue was removed    TONSILLECTOMY      UPPER GASTROINTESTINAL ENDOSCOPY      VENOUS ACCESS DEVICE (PORT) INSERTION N/A 6/5/2023    Procedure: INSERTION VENOUS ACCESS DEVICE;  Surgeon: Rolando Liu MD;  [facility];  Service: General;  Laterality: N/A;     Social History     Socioeconomic History    Marital status:    Tobacco Use    Smoking status: Never     Passive exposure: Never    Smokeless tobacco: Never   Vaping Use    Vaping Use: Never used   Substance and Sexual Activity    Alcohol use: Never    Drug use: Never    Sexual activity: Defer     Family History   Problem Relation Age of Onset    Colon cancer Neg Hx        Results     Result Review   The following data was reviewed by: Brian Marroquin MD on 04/13/2023:  Lab Results   Component Value Date    HGB 12.8 11/08/2023    HCT 37.0 11/08/2023    MCV 92.7 11/08/2023     11/08/2023    WBC 4.29 11/08/2023    NEUTROABS 1.20 (L) 11/08/2023    LYMPHSABS 2.11 11/08/2023    MONOSABS 0.90 11/08/2023    EOSABS 0.04 11/08/2023    BASOSABS 0.03 11/08/2023     Lab Results   Component Value Date    GLUCOSE 108 (H) 10/25/2023    BUN 10 10/25/2023    CREATININE 0.77 10/25/2023     10/25/2023    K 3.7 10/25/2023     (H) 10/25/2023    CO2 26.3 10/25/2023    CALCIUM 9.3 10/25/2023    PROTEINTOT 6.7 10/25/2023    ALBUMIN 4.3 10/25/2023    BILITOT 0.6 10/25/2023    ALKPHOS 113 10/25/2023    AST 31 10/25/2023    ALT 24 10/25/2023     Lab Results   Component Value Date    MG 2.1 10/25/2023    PHOS 3.3 06/18/2023       Case Report   Surgical Pathology Report                         Case: [id number]                                   Authorizing Provider:  Deniz Dowd MD    Collected:           04/12/2023 09:41 AM           Ordering Location:     [facility] Received:            04/12/2023 11:41 AM                                   SUITES                                                                        Pathologist:           Niesha Torres DO                                                        Specimens:   1) - Large Intestine, Cecum, cecal polyp cold snare                                                  2) - Large Intestine, Sigmoid Colon, sigmoid colon polyp cold snare                        Clinical Information    Mass of colon   Final Diagnosis   1. Cecum polyp, biopsy:                            - Tubular adenoma        2. Sigmoid colon polyp, biopsy:                            - Tubular adenoma    Electronically signed by Niesha Torres DO on 4/13/2023 at 0840     Surgical Pathology Report                         Case: [id number]                                   Authorizing Provider:  Deniz Dowd MD    Collected:           04/04/2023 11:15 AM           Ordering Location:     [facility] Received:            04/04/2023 11:59 AM                                  SUITES                                                                        Pathologist:           Jennifer Mike MD                                                      Specimens:   1) - Large Intestine, Transverse Colon, TRANSVERSE COLON POLYP                                       2) - Large Intestine, Left / Descending Colon, DESCENDING COLON BIOPSIES                   Clinical Information    Constipation, unspecified constipation type   Final Diagnosis   1. Transverse colon polyp, biopsy:               - Fragments of tubular adenoma        2.  Descending colon, biopsy:               -Adenocarcinoma, moderately to poorly differentiated     Comment: Per policy, reflex testing has been ordered, and the results will be separately reported.     REMARKS: The above positive (malignant) diagnosis was called to April in Dr. Dowd's office at 09:09 EDT on 4/5/2023 by s.          Electronically signed by  Jennifer Mike MD on 4/5/2023 at 0935         Surgical Pathology Report                         Case: [id number]                                   Authorizing Provider:  Rolando Liu MD        Collected:           05/05/2023 03:06 PM           Ordering Location:     [facility]  Received:            05/08/2023 06:48 AM                                  OR                                                                            Pathologist:           Jt Florence MD                                                             Specimen:    Large Intestine, Left / Descending Colon, left colon                                       Clinical Information    Malignant neoplasm of descending colon   Final Diagnosis   Left colon, resection:               - Adenocarcinoma               - Three of thirty-three lymph nodes positive for carcinoma (3/33)               - See synoptic checklist   Electronically signed by Jt Florence MD on 5/11/2023 at 1508   Synoptic Checklist   COLON AND RECTUM: Resection, Including Transanal Disk Excision of Rectal Neoplasms  8th Edition - Protocol posted: 6/22/2022  COLON AND RECTUM: RESECTION - All Specimens  SPECIMEN   Procedure  Descending colon resection    TUMOR   Tumor Site  Descending colon    Histologic Type  Adenocarcinoma    Histologic Grade  G3, poorly differentiated    Tumor Size  Greatest dimension (Centimeters): 3.5 cm   Tumor Extent  Invades through muscularis propria into the pericolonic or perirectal tissue    Macroscopic Tumor Perforation  Not identified    Lymphovascular Invasion  Small vessel    Perineural Invasion  Present    Treatment Effect  No known presurgical therapy    MARGINS   Margin Status for Invasive Carcinoma  All margins negative for invasive carcinoma    Closest Margin(s) to Invasive Carcinoma  Radial (circumferential) or mesenteric    Distance from Invasive Carcinoma to Closest Margin  3.5 cm   Margin Status for Non-Invasive Tumor   All margins negative for high-grade dysplasia / intramucosal carcinoma and low-grade dysplasia    REGIONAL LYMPH NODES   Regional Lymph Node Status  Tumor present in regional lymph node(s)    Number of Lymph Nodes with Tumor  3    Number of Lymph Nodes Examined  33    Tumor Deposits  Present    Number of Tumor Deposits  1    PATHOLOGIC STAGE CLASSIFICATION (pTNM, AJCC 8th Edition)   Reporting of pT, pN, and (when applicable) pM categories is based on information available to the pathologist at the time the report is issued. As per the AJCC (Chapter 1, 8th Ed.) it is the managing physician's responsibility to establish the final pathologic stage based upon all pertinent information, including but potentially not limited to this pathology report.   pT Category  pT3    pN Category  pN1b    ADDITIONAL FINDINGS   Additional Findings  None identified    .          CT Abdomen Pelvis With Contrast    Result Date: 10/10/2023  Impression:  No evidence of abdominopelvic metastatic disease     NKECHI GARCIA MD       Electronically Signed and Approved By: NKECHI GARCIA MD on 10/10/2023 at 7:37             CT Chest With Contrast Diagnostic    Result Date: 10/10/2023  Impression:  No evidence of thoracic metastatic disease     NKECHI GARCIA MD       Electronically Signed and Approved By: NKECHI GARCIA MD on 10/10/2023 at 7:30              Assessment & Plan     Diagnoses and all orders for this visit:    1. Dehydration (Primary)  -     ONCBCN INFUSION APPOINTMENT REQUEST 01; Future    2. Primary adenocarcinoma of descending colon  -     ONCBCN INFUSION APPOINTMENT REQUEST 01; Future    Other orders  -     sodium chloride 0.9 % bolus 1,000 mL                  Lilian Pelaez is a 48 y.o. female who presents to [facility]OLOG[facility] ONCOLOGY evaluation prior to systemic therapy for stage III colon cancer, patient status post resection of descending colon splenic flexure by Dr. Rolando Liu on 5/5/2023.      Reviewed NCCN guidelines for her stage III cancer, discussed high risk features seen on pathology report and discussed CapeOx for 3 months versus FOLFOX for 3 to 6 months with patient and  today.  Patient agreed to plan to undergo FOLFOX IV chemotherapy every 2 weeks for 6 months.    Discussed restaging imaging scans obtained on 10/9/2023 which was without evidence of disease recurrence or metastatic disease.     Patient received cycle 1 on June 14, 2023, she developed pneumoperitoneum which her surgeon suspect was secondary to severe constipation from iron tablets, but patient was cleared to resume chemotherapy after 8/1/2023.    Iron deficiency anemia from GI blood loss  -Evidence of this on lab work from May 2023  - We will continue to monitor and will offer IV iron therapy in the future if she becomes iron deficient secondary to pneumoperitoneum that developed in June 2023 from severe constipation.    Chemotherapy-induced nausea  - Currently well controlled with Zofran, but prescribedCompazine as a backup option  - Patient also taking Reglan and was counseled regarding not taking Reglan with Zofran and Compazine together but rather patient to take other antiemetics at least 4 to 6 hours after Reglan dose.    Chemotherapy induced diarrhea  - Patient prescribed Imodium  -We will prescribe Lomotil if Imodium fails to work.    Muscle cramps  -checked Magnesium level which was normal  -continuing to monitor her electrolytes and replete as necessary  -suspect from dehydration, will administer 1L of NS with treatment today  -will continue to monitor    Pt with loss of taste, mouth with some evidence of thrush- ordered nystatin swish and spit, recommend pt continue    Recommend nasal ocean spray since pt with symptoms of dry nasal passages and nasal congestion from seasonal allergies, provided prescription for this today.    Ordered IVFs as suspect pt is dehydrated from decrease in oral intake for today's  chemotherapy infusion and each subsequent chemo infusion.    Labs reviewed and plan to proceed with cycle 9 day 1 on today.    Plan patient follow-up in the next 2 weeks for cycle 10 day 1 of chemotherapy.    Plan to re-scan pt after she completes chemotherapy in 3 months.    Patient verbalized understanding and agreement plan.    Please note that portions of this note were completed with a voice recognition program.    Electronically signed by Brian Marroquin MD, 11/08/23, 10:29 AM EST.        Follow Up     I spent 30 minutes caring for Lilian on this date of service. This time includes time spent by me in the following activities:preparing for the visit, reviewing tests, obtaining and/or reviewing a separately obtained history, performing a medically appropriate examination and/or evaluation , counseling and educating the patient/family/caregiver, ordering medications, tests, or procedures, documenting information in the medical record and care coordination.    This is an acute or chronic illness that poses a threat to life or bodily function. The above treatment plan involves a high risk of complications and/or mortality of patient management.    The patient was seen and examined. Work by the provider also included review and/or ordering of lab tests, review and/or ordering of radiology tests, review and/or ordering of medicine tests, discussion with other physicians or providers, independent review of data, obtaining old records, review/summation of old records, and/or other review.    I have reviewed the family history, social history, and past medical history for this patient. Previous information and data has been reviewed and updated as needed. I have reviewed and verified the chief complaint, history, and other documentation. The patient was interviewed and examined in the clinic and the chart reviewed. The previous observations, recommendations, and conclusions were reviewed including those of other providers.      The plan was discussed with the patient and/or family. The patient was given time to ask questions and these questions were answered. At the conclusion of their visit they had no additional questions or concerns and all questions were answered to their satisfaction.    Patient was given instructions and counseling regarding her condition or for health maintenance advice. Please see specific information pulled into the AVS if appropriate.

## 2023-11-09 ENCOUNTER — TELEPHONE (OUTPATIENT)
Dept: ONCOLOGY | Facility: HOSPITAL | Age: 48
End: 2023-11-09
Payer: COMMERCIAL

## 2023-11-09 NOTE — TELEPHONE ENCOUNTER
OSW received a phone call from patient requesting physician form be completed for [employer] application.  Patient stated [employer] has agreed to pay up to $1000 towards her gas, electric, and water.  OSW requested her colleague have the form signed by patient's oncologist and faxed to [employer].

## 2023-11-10 ENCOUNTER — HOSPITAL ENCOUNTER (OUTPATIENT)
Dept: ONCOLOGY | Facility: HOSPITAL | Age: 48
Discharge: HOME | End: 2023-11-10
Payer: COMMERCIAL

## 2023-11-10 ENCOUNTER — APPOINTMENT (OUTPATIENT)
Dept: CT IMAGING | Facility: HOSPITAL | Age: 48
End: 2023-11-10
Payer: COMMERCIAL

## 2023-11-10 ENCOUNTER — HOSPITAL ENCOUNTER (EMERGENCY)
Facility: HOSPITAL | Age: 48
Discharge: HOME | End: 2023-11-10
Attending: EMERGENCY MEDICINE
Payer: COMMERCIAL

## 2023-11-10 VITALS
HEART RATE: 75 BPM | WEIGHT: 276.9 LBS | TEMPERATURE: 98.1 F | DIASTOLIC BLOOD PRESSURE: 91 MMHG | OXYGEN SATURATION: 97 % | SYSTOLIC BLOOD PRESSURE: 161 MMHG | RESPIRATION RATE: 18 BRPM | BODY MASS INDEX: 43.46 KG/M2 | HEIGHT: 67 IN

## 2023-11-10 DIAGNOSIS — C18.6 MALIGNANT NEOPLASM OF DESCENDING COLON: ICD-10-CM

## 2023-11-10 DIAGNOSIS — Z45.2 ENCOUNTER FOR ADJUSTMENT OR MANAGEMENT OF VASCULAR ACCESS DEVICE: Primary | ICD-10-CM

## 2023-11-10 DIAGNOSIS — R60.0 LOCALIZED EDEMA: ICD-10-CM

## 2023-11-10 DIAGNOSIS — R07.89 CHEST WALL PAIN: Primary | ICD-10-CM

## 2023-11-10 LAB
ALBUMIN SERPL-MCNC: 4.5 G/DL (ref 3.5–5.2)
ALBUMIN/GLOB SERPL: 1.6 G/DL
ALP SERPL-CCNC: 106 U/L (ref 39–117)
ALT SERPL W P-5'-P-CCNC: 37 U/L (ref 1–33)
ANION GAP SERPL CALCULATED.3IONS-SCNC: 9.5 MMOL/L (ref 5–15)
AST SERPL-CCNC: 47 U/L (ref 1–32)
BASOPHILS # BLD AUTO: 0.04 10*3/MM3 (ref 0–0.2)
BASOPHILS NFR BLD AUTO: 0.9 % (ref 0–1.5)
BILIRUB SERPL-MCNC: 0.8 MG/DL (ref 0–1.2)
BUN SERPL-MCNC: 13 MG/DL (ref 6–20)
BUN/CREAT SERPL: 16.3 (ref 7–25)
CALCIUM SPEC-SCNC: 9.6 MG/DL (ref 8.6–10.5)
CHLORIDE SERPL-SCNC: 105 MMOL/L (ref 98–107)
CO2 SERPL-SCNC: 24.5 MMOL/L (ref 22–29)
CREAT SERPL-MCNC: 0.8 MG/DL (ref 0.57–1)
DEPRECATED RDW RBC AUTO: 53.1 FL (ref 37–54)
EGFRCR SERPLBLD CKD-EPI 2021: 91 ML/MIN/1.73
EOSINOPHIL # BLD AUTO: 0.06 10*3/MM3 (ref 0–0.4)
EOSINOPHIL NFR BLD AUTO: 1.4 % (ref 0.3–6.2)
ERYTHROCYTE [DISTWIDTH] IN BLOOD BY AUTOMATED COUNT: 15.6 % (ref 12.3–15.4)
GLOBULIN UR ELPH-MCNC: 2.8 GM/DL
GLUCOSE SERPL-MCNC: 98 MG/DL (ref 65–99)
HCT VFR BLD AUTO: 39.5 % (ref 34–46.6)
HGB BLD-MCNC: 13.6 G/DL (ref 12–15.9)
IMM GRANULOCYTES # BLD AUTO: 0.02 10*3/MM3 (ref 0–0.05)
IMM GRANULOCYTES NFR BLD AUTO: 0.5 % (ref 0–0.5)
INR PPP: 1.1 (ref 0.86–1.15)
LYMPHOCYTES # BLD AUTO: 2.17 10*3/MM3 (ref 0.7–3.1)
LYMPHOCYTES NFR BLD AUTO: 49.2 % (ref 19.6–45.3)
MCH RBC QN AUTO: 32.1 PG (ref 26.6–33)
MCHC RBC AUTO-ENTMCNC: 34.4 G/DL (ref 31.5–35.7)
MCV RBC AUTO: 93.2 FL (ref 79–97)
MONOCYTES # BLD AUTO: 0.36 10*3/MM3 (ref 0.1–0.9)
MONOCYTES NFR BLD AUTO: 8.2 % (ref 5–12)
NEUTROPHILS NFR BLD AUTO: 1.76 10*3/MM3 (ref 1.7–7)
NEUTROPHILS NFR BLD AUTO: 39.8 % (ref 42.7–76)
NRBC BLD AUTO-RTO: 0 /100 WBC (ref 0–0.2)
PLATELET # BLD AUTO: 160 10*3/MM3 (ref 140–450)
PMV BLD AUTO: 10.1 FL (ref 6–12)
POTASSIUM SERPL-SCNC: 3.7 MMOL/L (ref 3.5–5.2)
PROT SERPL-MCNC: 7.3 G/DL (ref 6–8.5)
PROTHROMBIN TIME: 14.4 SECONDS (ref 11.8–14.9)
RBC # BLD AUTO: 4.24 10*6/MM3 (ref 3.77–5.28)
SODIUM SERPL-SCNC: 139 MMOL/L (ref 136–145)
TROPONIN T SERPL HS-MCNC: <6 NG/L
WBC NRBC COR # BLD: 4.41 10*3/MM3 (ref 3.4–10.8)

## 2023-11-10 PROCEDURE — 85025 COMPLETE CBC W/AUTO DIFF WBC: CPT | Performed by: EMERGENCY MEDICINE

## 2023-11-10 PROCEDURE — 25010000002 HEPARIN LOCK FLUSH PER 10 UNITS: Performed by: INTERNAL MEDICINE

## 2023-11-10 PROCEDURE — 25010000002 HEPARIN LOCK FLUSH PER 10 UNITS: Performed by: EMERGENCY MEDICINE

## 2023-11-10 PROCEDURE — 25810000003 SODIUM CHLORIDE 0.9 % SOLUTION: Performed by: EMERGENCY MEDICINE

## 2023-11-10 PROCEDURE — 99285 EMERGENCY DEPT VISIT HI MDM: CPT

## 2023-11-10 PROCEDURE — 85610 PROTHROMBIN TIME: CPT | Performed by: EMERGENCY MEDICINE

## 2023-11-10 PROCEDURE — 96374 THER/PROPH/DIAG INJ IV PUSH: CPT

## 2023-11-10 PROCEDURE — 71260 CT THORAX DX C+: CPT

## 2023-11-10 PROCEDURE — 25510000001 IOPAMIDOL PER 1 ML: Performed by: EMERGENCY MEDICINE

## 2023-11-10 PROCEDURE — 80053 COMPREHEN METABOLIC PANEL: CPT | Performed by: EMERGENCY MEDICINE

## 2023-11-10 PROCEDURE — 84484 ASSAY OF TROPONIN QUANT: CPT | Performed by: EMERGENCY MEDICINE

## 2023-11-10 PROCEDURE — 25010000002 KETOROLAC TROMETHAMINE PER 15 MG: Performed by: EMERGENCY MEDICINE

## 2023-11-10 RX ORDER — HEPARIN SODIUM (PORCINE) LOCK FLUSH IV SOLN 100 UNIT/ML 100 UNIT/ML
500 SOLUTION INTRAVENOUS AS NEEDED
Status: DISCONTINUED | OUTPATIENT
Start: 2023-11-10 | End: 2023-11-11 | Stop reason: HOSPADM

## 2023-11-10 RX ORDER — HEPARIN SODIUM (PORCINE) LOCK FLUSH IV SOLN 100 UNIT/ML 100 UNIT/ML
500 SOLUTION INTRAVENOUS AS NEEDED
OUTPATIENT
Start: 2023-11-10

## 2023-11-10 RX ORDER — SODIUM CHLORIDE 0.9 % (FLUSH) 0.9 %
20 SYRINGE (ML) INJECTION AS NEEDED
Status: DISCONTINUED | OUTPATIENT
Start: 2023-11-10 | End: 2023-11-11 | Stop reason: HOSPADM

## 2023-11-10 RX ORDER — HEPARIN SODIUM (PORCINE) LOCK FLUSH IV SOLN 100 UNIT/ML 100 UNIT/ML
500 SOLUTION INTRAVENOUS ONCE
Status: COMPLETED | OUTPATIENT
Start: 2023-11-10 | End: 2023-11-10

## 2023-11-10 RX ORDER — KETOROLAC TROMETHAMINE 10 MG/1
10 TABLET, FILM COATED ORAL EVERY 6 HOURS PRN
Qty: 12 TABLET | Refills: 0 | Status: SHIPPED | OUTPATIENT
Start: 2023-11-10

## 2023-11-10 RX ORDER — KETOROLAC TROMETHAMINE 15 MG/ML
15 INJECTION, SOLUTION INTRAMUSCULAR; INTRAVENOUS ONCE
Status: COMPLETED | OUTPATIENT
Start: 2023-11-10 | End: 2023-11-10

## 2023-11-10 RX ORDER — SODIUM CHLORIDE 0.9 % (FLUSH) 0.9 %
20 SYRINGE (ML) INJECTION AS NEEDED
OUTPATIENT
Start: 2023-11-10

## 2023-11-10 RX ADMIN — HEPARIN SODIUM (PORCINE) LOCK FLUSH IV SOLN 100 UNIT/ML 500 UNITS: 100 SOLUTION at 13:48

## 2023-11-10 RX ADMIN — Medication 20 ML: at 13:48

## 2023-11-10 RX ADMIN — KETOROLAC TROMETHAMINE 15 MG: 15 INJECTION, SOLUTION INTRAMUSCULAR; INTRAVENOUS at 17:21

## 2023-11-10 RX ADMIN — Medication 500 UNITS: at 18:56

## 2023-11-10 RX ADMIN — SODIUM CHLORIDE 500 ML: 9 INJECTION, SOLUTION INTRAVENOUS at 17:21

## 2023-11-10 RX ADMIN — IOPAMIDOL 100 ML: 755 INJECTION, SOLUTION INTRAVENOUS at 17:44

## 2023-11-10 NOTE — TELEPHONE ENCOUNTER
The physician verification form through [employer] was signed by the oncologist and successfully faxed to [employer] this afternoon.

## 2023-11-10 NOTE — NURSING NOTE
1355: Pt here for her unhook 5FU pump. She report pain 8/10, having to take pain medicine for relief. PAC is on right side. Swelling noted above right clavicle towards right side of neck and right shoulder. Onset of pain last night. Had the same experience last cycle. Port flushes easily with positive blood return. Notified Dr. Marroquin.     1415: Dr. Marroquin at bedside. Due to sx, would like stat CT and port study. Pt f/u and is agreeable to plan.

## 2023-11-10 NOTE — ED PROVIDER NOTES
Time: 4:38 PM EST  Date of encounter:  11/10/2023  Independent Historian/Clinical History and Information was obtained by:   Patient    History is limited by: N/A    Chief Complaint: chest pain      History of Present Illness:  Patient is a 48 y.o. year old female who presents to the emergency department for evaluation of chest pain. The patient has a port placed in the left chest for chemotherapy.  She complains of pain to the left chest.around the port.  No fever, no cough and no other symptoms.  The pain is worse with movement and inspiration.    HPI    Patient Care Team  Primary Care Provider: Shelly Cash MD    Past Medical History:     Allergies   Allergen Reactions    Hydromorphone Hives, Itching and GI Intolerance    Morphine Hives, Itching and Nausea And Vomiting    Oxybutynin Swelling and Angioedema           Oxycodone-Acetaminophen Itching and Nausea And Vomiting     Can not take any higher than 7.5    Penicillins Rash    Promethazine Nausea And Vomiting    Tylenol With Codeine #3 [Acetaminophen-Codeine] Itching and Nausea And Vomiting    Oxybutynin Chloride Angioedema     Past Medical History:   Diagnosis Date    Anesthesia     B/P bottomed out/UNSURE ON THE DATE    Anxiety     Asthma 1996    seasonal/NO INHALERS    Colon cancer 04/17/2023    DDD (degenerative disc disease), cervical     DDD (degenerative disc disease), lumbar     Depression     Diverticulitis of colon 2005    Scope was done in [facility]    GERD (gastroesophageal reflux disease)     Hypertension     Kidney stones     Melanoma     removed    Palpitation     FOLLOWS W/DR. ANTONIO  Q6 MONTHS, NO CP OR SOA    Prolapse of female bladder      Past Surgical History:   Procedure Laterality Date    BREAST SURGERY  2016    Removed 8lbs of tissue, 2016    CHOLECYSTECTOMY      COLON RESECTION Left 05/05/2023    Procedure: RESECTION OF DESCENDING COLON AND SPLENIC FLEXURE TAKEDOWN WITH DAVINCI ROBOT;  Surgeon: Rolando Liu MD;  Location:  [facility] OR [facility];  Service: Robotics - DaVinci;  Laterality: Left;    COLONOSCOPY N/A 04/04/2023    Procedure: COLONOSCOPY WITH POLYPECTOMY/SNARE/BIOPSIES/TATTOOING DESCENDING COLON MASS;  Surgeon: Deniz Dowd MD;  Location: [facility] ENDOSCOPY;  Service: Gastroenterology;  Laterality: N/A;  INCOMPLETE COLONOSCOPY  POOR BOWEL PREP  COLON POLYP  COLON MASS  DIVERTICULOSIS    COLONOSCOPY N/A 04/12/2023    Procedure: COLONOSCOPY with cold snare;  Surgeon: Deniz Dowd MD;  Location: [facility] ENDOSCOPY;  Service: Gastroenterology;  Laterality: N/A;  colon polyps, diverticulosis, colon mass, hemorrhoids      CYSTOSCOPY W/ URETERAL STENT PLACEMENT Left 05/05/2023    Procedure: Cystoscopy, left ureteral injection,;  Surgeon: Manjula Randolph MD;  Location: [facility] OR [facility];  Service: Urology;  Laterality: Left;    FRACTURE SURGERY  2001    Left knee    KNEE SURGERY Left     x4    LAPAROSCOPIC TUBAL LIGATION  1995    LAPAROSCOPIC TUBAL LIGATION      Reversed    REDUCTION MAMMAPLASTY  2009    8lbs of tissue was removed    TONSILLECTOMY      UPPER GASTROINTESTINAL ENDOSCOPY      VENOUS ACCESS DEVICE (PORT) INSERTION N/A 6/5/2023    Procedure: INSERTION VENOUS ACCESS DEVICE;  Surgeon: Rolando Liu MD;  Location: [facility] OR [facility];  Service: General;  Laterality: N/A;     Family History   Problem Relation Age of Onset    Colon cancer Neg Hx        Home Medications:  Prior to Admission medications    Medication Sig Start Date End Date Taking? Authorizing Provider   allopurinol (ZYLOPRIM) 100 MG tablet Take 1 tablet by mouth Daily.    ProviderChar MD   ALPRAZolam (XANAX) 0.5 MG tablet Take 1 tablet by mouth At Night As Needed.    Char Flor MD   amLODIPine (NORVASC) 10 MG tablet Take 1 tablet by mouth Every Night.    Char Flor MD   aspirin 81 MG EC tablet Take 1 tablet every day by oral route.    Char Flor MD   cefdinir (OMNICEF) 300 MG capsule  10/30/23   Char Flor  MD   Diclofenac Sodium (VOLTAREN) 1 % gel gel Apply 4 g topically to the appropriate area as directed 4 (Four) Times a Day As Needed.    Char Flor MD   docusate sodium 100 MG capsule Take 1 capsule by mouth Daily.    Char Flor MD   fluorouracil (EFUDEX) 5 % cream Apply 1 application  topically to the appropriate area as directed 2 (Two) Times a Day.    Char Flor MD   furosemide (LASIX) 20 MG tablet Take 1 tablet by mouth Daily.    Char Flor MD   hydroCHLOROthiazide (HYDRODIURIL) 25 MG tablet Take 1 tablet every day by oral route for 90 days.    Char Flor MD   HYDROcodone-acetaminophen (NORCO) 7.5-325 MG per tablet Take 1 tablet by mouth Every 8 (Eight) Hours As Needed.    Char Flor MD   Hydrocortisone, Perianal, (ANUSOL-HC) 2.5 % rectal cream  6/28/23   Char Flor MD   hyoscyamine (ANASPAZ,LEVSIN) 0.125 MG tablet TAKE ONE TABLET BY MOUTH FOUR TIMES A DAY WITH MEALS AND AT BEDTIME. 8/1/23   Ya Preston APRN   ketorolac (TORADOL) 10 MG tablet Take 1 tablet by mouth Every 6 (Six) Hours As Needed.    Char Flor MD   lamoTRIgine (LaMICtal) 25 MG tablet Take 1 tablet by mouth 2 (Two) Times a Day. Only takes at night    Char Flor MD   leucovorin 5 MG tablet Take  by mouth Every 6 (Six) Hours.    Char Flor MD   levoFLOXacin (Levaquin) 750 MG tablet Take 1 tablet by mouth Daily. 6/18/23   Rolando Liu MD   Lidocaine 4 % patch Apply 1 patch topically Daily.    Char Flor MD   lidocaine-prilocaine (EMLA) 2.5-2.5 % cream Apply 1 application topically to the appropriate area as directed Every 2 (Two) Hours As Needed for Mild Pain. 6/14/23   Brian Marroquin MD   linaclotide (LINZESS) 290 MCG capsule capsule Take 1 capsule by mouth As Needed.    Char Flor MD   loperamide (Imodium A-D) 2 MG tablet Take 1 tablet by mouth 4 (Four) Times a Day As Needed for Diarrhea. Imodium - take 4  mg first dose, followed by 2 mg every 2-4 hours after each stool (MAX of 16 mg in 24 hour period) 8/16/23   Brian Marroquin MD   loperamide (IMODIUM) 2 MG capsule  8/16/23   Char Flor MD   losartan (COZAAR) 100 MG tablet Take 1 tablet by mouth Every Night.    Char Flor MD   medroxyPROGESTERone (DEPO-PROVERA) 150 MG/ML injection Inject 1 mL into the appropriate muscle as directed by prescriber Every 3 (Three) Months.    Char Flor MD   metoclopramide (REGLAN) 10 MG tablet Take 1 tablet by mouth 3 (Three) Times a Day.    Char Flor MD   metoprolol succinate XL (TOPROL-XL) 25 MG 24 hr tablet Take 1 tablet by mouth Daily.    Char Flor MD   Multiple Vitamins-Minerals (b complex-C-E-zinc) tablet Take 1 tablet by mouth Daily.    Char Flor MD   Myrbetriq 50 MG tablet sustained-release 24 hour 24 hr tablet  8/15/23   Char Flor MD   naloxone (NARCAN) 4 MG/0.1ML nasal spray Take 1 spray every day by nasal route as needed.    Char Flor MD   nystatin (MYCOSTATIN) 100,000 unit/mL suspension Swish and swallow 5 mL 4 (Four) Times a Day. 9/27/23   Brian Marroquin MD   ondansetron (ZOFRAN) 8 MG tablet Take 1 tablet by mouth 3 (Three) Times a Day As Needed for Nausea or Vomiting. 6/9/23   Brian Marroquin MD   OXALIPLATIN IV Infuse  into a venous catheter.    Char Flor MD   pantoprazole (PROTONIX) 40 MG EC tablet Take 1 tablet by mouth 2 (Two) Times a Day.    Char Flor MD   polyethylene glycol (GaviLyte-G) 236 g solution     Char Flor MD   prochlorperazine (COMPAZINE) 10 MG tablet Take 1 tablet by mouth Every 6 (Six) Hours As Needed for Nausea or Vomiting. 8/16/23   Brian Marroquin MD   rOPINIRole (REQUIP) 1 MG tablet Take 1 tablet by mouth Every Night. 3/13/23   Char Flor MD   sertraline (ZOLOFT) 100 MG tablet Take 1 tablet by mouth Daily.    Char Flor MD   sodium chloride (Ocean Nasal  "Spray) 0.65 % nasal spray 1 spray into the nostril(s) as directed by provider 2 (Two) Times a Day As Needed for Congestion. 10/25/23   Brian Marroquin MD   sucralfate (CARAFATE) 1 g tablet Take 1 tablet by mouth 2 (Two) Times a Day.    Char Flor MD   tetracycline (ACHROMYCIN,SUMYCIN) 250 MG capsule  8/12/23   Char Flor MD   traMADol (Ultram) 50 MG tablet Take 1 tablet by mouth Every 6 (Six) Hours As Needed for Severe Pain or Moderate Pain. 6/5/23 6/4/24  Rolando Liu MD   vitamin D3 125 MCG (5000 UT) capsule capsule Take 2,000 Units by mouth Daily.  Patient not taking: Reported on 11/8/2023    Char Flor MD        Social History:   Social History     Tobacco Use    Smoking status: Never     Passive exposure: Never    Smokeless tobacco: Never   Vaping Use    Vaping Use: Never used   Substance Use Topics    Alcohol use: Never    Drug use: Never         Review of Systems:  Review of Systems   Constitutional:  Negative for chills and fever.   HENT:  Negative for congestion, ear pain and sore throat.    Eyes:  Negative for pain.   Respiratory:  Negative for cough, chest tightness and shortness of breath.    Cardiovascular:  Positive for chest pain.   Gastrointestinal:  Negative for abdominal pain, diarrhea, nausea and vomiting.   Genitourinary:  Negative for flank pain and hematuria.   Musculoskeletal:  Negative for joint swelling.   Skin:  Negative for pallor.   Neurological:  Negative for seizures and headaches.   All other systems reviewed and are negative.       Physical Exam:  /91 (BP Location: Left arm, Patient Position: Sitting)   Pulse 75   Temp 98.1 °F (36.7 °C) (Oral)   Resp 18   Ht 170.2 cm (67\")   Wt 126 kg (276 lb 14.4 oz)   SpO2 97%   BMI 43.37 kg/m²     Physical Exam  Vitals and nursing note reviewed.   Constitutional:       General: She is not in acute distress.     Appearance: Normal appearance. She is not toxic-appearing.   HENT:      Head: " Normocephalic and atraumatic.      Mouth/Throat:      Mouth: Mucous membranes are moist.   Eyes:      General: No scleral icterus.  Cardiovascular:      Rate and Rhythm: Normal rate and regular rhythm.      Pulses: Normal pulses.      Heart sounds: Normal heart sounds.   Pulmonary:      Effort: Pulmonary effort is normal. No respiratory distress.      Breath sounds: Normal breath sounds.   Abdominal:      General: Abdomen is flat.      Palpations: Abdomen is soft.      Tenderness: There is no abdominal tenderness.   Musculoskeletal:         General: Normal range of motion.      Cervical back: Normal range of motion and neck supple.   Skin:     General: Skin is warm and dry.   Neurological:      Mental Status: She is alert and oriented to person, place, and time. Mental status is at baseline.                Procedures:  Procedures      Medical Decision Making:      Comorbidities that affect care:    Cancer    External Notes reviewed:    Previous Clinic Note: oncology clinic note      The following orders were placed and all results were independently analyzed by me:  Orders Placed This Encounter   Procedures    CT Chest With Contrast Diagnostic    Comprehensive Metabolic Panel    Protime-INR    CBC Auto Differential    High Sensitivity Troponin T    CBC & Differential       Medications Given in the Emergency Department:  Medications   sodium chloride 0.9 % bolus 500 mL (0 mL Intravenous Stopped 11/10/23 1837)   ketorolac (TORADOL) injection 15 mg (15 mg Intravenous Given 11/10/23 1721)   iopamidol (ISOVUE-370) 76 % injection 100 mL (100 mL Intravenous Given 11/10/23 1744)   heparin injection 500 Units (500 Units Intravenous Given 11/10/23 1856)        ED Course:         Labs:    Lab Results (last 24 hours)       Procedure Component Value Units Date/Time    CBC & Differential [[id number]]  (Abnormal) Collected: 11/10/23 1716    Specimen: Blood Updated: 11/10/23 1724    Narrative:      The following orders were created  for panel order CBC & Differential.  Procedure                               Abnormality         Status                     ---------                               -----------         ------                     CBC Auto Differential[[id number]]        Abnormal            Final result                 Please view results for these tests on the individual orders.    Comprehensive Metabolic Panel [[id number]]  (Abnormal) Collected: 11/10/23 1716    Specimen: Blood Updated: 11/10/23 1801     Glucose 98 mg/dL      BUN 13 mg/dL      Creatinine 0.80 mg/dL      Sodium 139 mmol/L      Potassium 3.7 mmol/L      Comment: Slight hemolysis detected by analyzer. Result may be falsely elevated.        Chloride 105 mmol/L      CO2 24.5 mmol/L      Calcium 9.6 mg/dL      Total Protein 7.3 g/dL      Albumin 4.5 g/dL      ALT (SGPT) 37 U/L      AST (SGOT) 47 U/L      Alkaline Phosphatase 106 U/L      Total Bilirubin 0.8 mg/dL      Globulin 2.8 gm/dL      A/G Ratio 1.6 g/dL      BUN/Creatinine Ratio 16.3     Anion Gap 9.5 mmol/L      eGFR 91.0 mL/min/1.73     Narrative:      GFR Normal >60  Chronic Kidney Disease <60  Kidney Failure <15      Protime-INR [[id number]]  (Normal) Collected: 11/10/23 1716    Specimen: Blood Updated: 11/10/23 1744     Protime 14.4 Seconds      INR 1.10    Narrative:      Suggested Therapeutic Ranges For Oral Anticoagulant Therapy:  Level of Therapy                      INR Target Range  Standard Dose                            2.0-3.0  High Dose                                2.5-3.5  Patients not receiving anticoagulant  Therapy Normal Range                     0.86-1.15    CBC Auto Differential [[id number]]  (Abnormal) Collected: 11/10/23 1716    Specimen: Blood Updated: 11/10/23 1724     WBC 4.41 10*3/mm3      RBC 4.24 10*6/mm3      Hemoglobin 13.6 g/dL      Hematocrit 39.5 %      MCV 93.2 fL      MCH 32.1 pg      MCHC 34.4 g/dL      RDW 15.6 %      RDW-SD 53.1 fl      MPV 10.1 fL      Platelets 160 10*3/mm3       Neutrophil % 39.8 %      Lymphocyte % 49.2 %      Monocyte % 8.2 %      Eosinophil % 1.4 %      Basophil % 0.9 %      Immature Grans % 0.5 %      Neutrophils, Absolute 1.76 10*3/mm3      Lymphocytes, Absolute 2.17 10*3/mm3      Monocytes, Absolute 0.36 10*3/mm3      Eosinophils, Absolute 0.06 10*3/mm3      Basophils, Absolute 0.04 10*3/mm3      Immature Grans, Absolute 0.02 10*3/mm3      nRBC 0.0 /100 WBC     High Sensitivity Troponin T [[id number]]  (Normal) Collected: 11/10/23 1716    Specimen: Blood Updated: 11/10/23 1836     HS Troponin T <6 ng/L     Narrative:      High Sensitive Troponin T Reference Range:  <14.0 ng/L- Negative Female for AMI  <22.0 ng/L- Negative Male for AMI  >=14 - Abnormal Female indicating possible myocardial injury.  >=22 - Abnormal Male indicating possible myocardial injury.   Clinicians would have to utilize clinical acumen, EKG, Troponin, and serial changes to determine if it is an Acute Myocardial Infarction or myocardial injury due to an underlying chronic condition.                  Imaging:    CT Chest With Contrast Diagnostic    Result Date: 11/10/2023  PROCEDURE: CT CHEST W CONTRAST DIAGNOSTIC  COMPARISON:  [facility], CT, CT CHEST W CONTRAST DIAGNOSTIC, 10/09/2023, 16:39.  INDICATIONS: Chest pain around port  TECHNIQUE: CT images were obtained with non-ionic intravenous contrast material.   PROTOCOL:   Standard imaging protocol performed    RADIATION:   DLP: 593.7 mGy*cm   Automated exposure control was utilized to minimize radiation dose. CONTRAST: 75 cc Isovue 370 I.V. LABS:   eGFR: >60 ml/min/1.73m2  FINDINGS:  Lung window images reveal stable 5 mm calcified granuloma in the right upper lobe.  No airspace disease is evident.  Mediastinal windows reveal no mediastinal, hilar, or axillary adenopathy.  Right jugular Port-A-Cath device is in place.  The tip is in the right brachiocephalic vein, in unchanged position.  No unusual fluid collection is evident.   Moderate degenerative spurring is seen in the thoracic spine.  Focal fatty infiltration in the left liver lobe adjacent to the falciform ligament is unchanged.        CT scan of the chest with IV contrast demonstrating no active disease.  Right jugular Port-A-Cath device is in unchanged position.     BILLY PHILLIPS MD       Electronically Signed and Approved By: BILLY PHILLIPS MD on 11/10/2023 at 18:14                Differential Diagnosis and Discussion:    Chest Pain:  Based on the patient's signs and symptoms, I considered aortic dissection, myocardial infaction, pulmonary embolism, cardiac tamponade, pericarditis, pneumothorax, musculoskeletal chest pain and other differential diagnosis as an etiology of the patient's chest pain.     All labs were reviewed and interpreted by me.    MDM     Amount and/or Complexity of Data Reviewed  Clinical lab tests: reviewed  Tests in the radiology section of CPT®: reviewed                 Patient Care Considerations:    CT ABDOMEN AND PELVIS: I considered ordering a CT scan of the abdomen and pelvis however the patient has no abdominal pain      Consultants/Shared Management Plan:    None    Social Determinants of Health:    Patient has presented with family members who are responsible, reliable and will ensure follow up care.      Disposition and Care Coordination:    Discharged: The patient is suitable and stable for discharge with no need for consideration of observation or admission.    I have explained discharge medications and the need for follow up with the patient/caretakers. This was also printed in the discharge instructions. Patient was discharged with the following medications and follow up:      Medication List        Changed      * ketorolac 10 MG tablet  Commonly known as: TORADOL  What changed: Another medication with the same name was added. Make sure you understand how and when to take each.     * ketorolac 10 MG tablet  Commonly known as: TORADOL  Take 1  tablet by mouth Every 6 (Six) Hours As Needed (Pain).  What changed: You were already taking a medication with the same name, and this prescription was added. Make sure you understand how and when to take each.           * This list has 2 medication(s) that are the same as other medications prescribed for you. Read the directions carefully, and ask your doctor or other care provider to review them with you.                   Where to Get Your Medications        These medications were sent to [facility] [id number] - [place], [place] - [address] [place] [address] AT [facility] [address]  - [phone]  - [phone] FX  [address], [place] [address]      Phone: [phone]   ketorolac 10 MG tablet      Shelly Cash MD  [address] [place] [address]  [phone]    In 3 days         Final diagnoses:   Chest wall pain        ED Disposition       ED Disposition   Discharge    Condition   Stable    Comment   --               This medical record created using voice recognition software.             Ton Delgadillo, DO  11/11/23 1508

## 2023-11-10 NOTE — Clinical Note
[facility] EMERGENCY ROOM  [address]  [place] [address]  Phone: [phone]    Lilian Pelaez was seen and treated in our emergency department on 11/10/2023.  She may return to work on 11/13/2023.         Thank you for choosing [facility].    Ton Delgadillo, DO

## 2023-11-10 NOTE — DISCHARGE INSTRUCTIONS
Apply moist heat to affected area 20 minutes at a time 4 times daily.  Take medication as directed.  Return for worsening symptoms.  Follow-up with your doctor on Monday

## 2023-11-13 ENCOUNTER — TELEPHONE (OUTPATIENT)
Dept: ONCOLOGY | Facility: HOSPITAL | Age: 48
End: 2023-11-13
Payer: COMMERCIAL

## 2023-11-13 NOTE — TELEPHONE ENCOUNTER
Advised patient to keep appointment for the port study which is scheduled 11/14 at 1pm. Advised to continue alternating Tylenol and Ibuprofen to help with the pain. Patient reports pain is severe and it starts at the sight of the port and travels up her chest to her neck and  shoulder where the catheter is located.  Advised we will follow up with further instructions after the portagram. Patient voiced understanding.

## 2023-11-13 NOTE — TELEPHONE ENCOUNTER
The pt called and states that she went to the ER as directed by Dr Marroquin for her right shoulder pain that could be caused by her port. The pt states that they did a CT Scan but did not run dye through her port. When questioned the pt reports right shoulder pain 10/10. When questioned the pt states that her pain started on Thursday night, the night before she was unhooked from her home infusion. When questioned the pt states that Toradol has not helped the pain. The pt states that she also tried Lortab but they made her drunk but did not help the pain. The pt states that she has been taking Tylenol and Ibuprofen Q4-6 HRS. The pt states that the Ibuprofen and Tylenol is the only thing that has helped but it only helps a very little. Her pain is still a 10/10.

## 2023-11-14 ENCOUNTER — HOSPITAL ENCOUNTER (OUTPATIENT)
Dept: INTERVENTIONAL RADIOLOGY/VASCULAR | Facility: HOSPITAL | Age: 48
Discharge: HOME | End: 2023-11-14
Admitting: INTERNAL MEDICINE
Payer: COMMERCIAL

## 2023-11-14 DIAGNOSIS — C18.6 MALIGNANT NEOPLASM OF DESCENDING COLON: ICD-10-CM

## 2023-11-14 DIAGNOSIS — R60.0 LOCALIZED EDEMA: ICD-10-CM

## 2023-11-14 PROCEDURE — 25510000001 IOPAMIDOL PER 1 ML: Performed by: INTERNAL MEDICINE

## 2023-11-14 PROCEDURE — 36598 INJ W/FLUOR EVAL CV DEVICE: CPT

## 2023-11-14 RX ADMIN — IOPAMIDOL 20 ML: 755 INJECTION, SOLUTION INTRAVENOUS at 13:45

## 2023-11-14 NOTE — TELEPHONE ENCOUNTER
The pt called and states that she had her Port Study done and it was showing a clot. The report has not been released yet. This nurse asked the pt to stay here in [facility] a little longer and give the report time to be signed and posted. Depending on the report she my need to go to the ER or just go to her pharm and  a med. The pt voiced understanding and will wait around town a little longer.

## 2023-11-14 NOTE — TELEPHONE ENCOUNTER
Reviewed results of portagram with IR and Dr. Marroquin. Portagram shows a fibrin sheath at the end of the catheter.  They report that the catheter flushes well and gives blood return, but is positional.  Advised patient this is not a clot. Advised we can treat this in the infusion center by injecting a medication in her port to break up the fibrin sheath. Also advised patient this does not typically cause pain at the port site, only difficulty flushing or drawing blood from port. Patient reports she is still having the pain which she again describes as constant pain that initiates at the port site and travels up her chest to her neck and shoulder blade. She states she had the same pain with her previous cycle of treatment and it lasted 4 days and went away. This time it started Thursday night and has not resolved yet. Advised to continue with current pain relief measures. Will discuss with Dr. Marroquin and call back for further instructions.

## 2023-11-29 ENCOUNTER — OFFICE VISIT (OUTPATIENT)
Dept: ONCOLOGY | Facility: HOSPITAL | Age: 48
End: 2023-11-29
Payer: COMMERCIAL

## 2023-11-29 ENCOUNTER — HOSPITAL ENCOUNTER (OUTPATIENT)
Dept: ONCOLOGY | Facility: HOSPITAL | Age: 48
Discharge: HOME | End: 2023-11-29
Payer: COMMERCIAL

## 2023-11-29 ENCOUNTER — DOCUMENTATION (OUTPATIENT)
Dept: NUTRITION | Facility: HOSPITAL | Age: 48
End: 2023-11-29
Payer: COMMERCIAL

## 2023-11-29 ENCOUNTER — DOCUMENTATION (OUTPATIENT)
Dept: ONCOLOGY | Facility: HOSPITAL | Age: 48
End: 2023-11-29
Payer: COMMERCIAL

## 2023-11-29 VITALS
HEART RATE: 80 BPM | OXYGEN SATURATION: 99 % | WEIGHT: 268.96 LBS | DIASTOLIC BLOOD PRESSURE: 83 MMHG | BODY MASS INDEX: 45.92 KG/M2 | HEIGHT: 64 IN | RESPIRATION RATE: 18 BRPM | TEMPERATURE: 97.7 F | SYSTOLIC BLOOD PRESSURE: 153 MMHG

## 2023-11-29 VITALS
SYSTOLIC BLOOD PRESSURE: 153 MMHG | RESPIRATION RATE: 18 BRPM | HEIGHT: 64 IN | BODY MASS INDEX: 45.92 KG/M2 | HEART RATE: 80 BPM | TEMPERATURE: 97.7 F | WEIGHT: 268.96 LBS | DIASTOLIC BLOOD PRESSURE: 83 MMHG | OXYGEN SATURATION: 99 %

## 2023-11-29 DIAGNOSIS — K52.1 CHEMOTHERAPY INDUCED DIARRHEA: ICD-10-CM

## 2023-11-29 DIAGNOSIS — Z45.2 ENCOUNTER FOR ADJUSTMENT OR MANAGEMENT OF VASCULAR ACCESS DEVICE: ICD-10-CM

## 2023-11-29 DIAGNOSIS — C18.6 MALIGNANT NEOPLASM OF DESCENDING COLON: Primary | ICD-10-CM

## 2023-11-29 DIAGNOSIS — T45.1X5A CHEMOTHERAPY INDUCED DIARRHEA: ICD-10-CM

## 2023-11-29 DIAGNOSIS — K21.9 GASTRIC REFLUX: Primary | ICD-10-CM

## 2023-11-29 DIAGNOSIS — C18.6 PRIMARY ADENOCARCINOMA OF DESCENDING COLON: ICD-10-CM

## 2023-11-29 LAB
ALBUMIN SERPL-MCNC: 4 G/DL (ref 3.5–5.2)
ALBUMIN/GLOB SERPL: 1.6 G/DL
ALP SERPL-CCNC: 100 U/L (ref 39–117)
ALT SERPL W P-5'-P-CCNC: 20 U/L (ref 1–33)
ANION GAP SERPL CALCULATED.3IONS-SCNC: 7.2 MMOL/L (ref 5–15)
AST SERPL-CCNC: 30 U/L (ref 1–32)
BASOPHILS # BLD AUTO: 0.02 10*3/MM3 (ref 0–0.2)
BASOPHILS NFR BLD AUTO: 0.4 % (ref 0–1.5)
BILIRUB SERPL-MCNC: 0.5 MG/DL (ref 0–1.2)
BUN SERPL-MCNC: 10 MG/DL (ref 6–20)
BUN/CREAT SERPL: 12.7 (ref 7–25)
CALCIUM SPEC-SCNC: 9 MG/DL (ref 8.6–10.5)
CEA SERPL-MCNC: 2.36 NG/ML
CHLORIDE SERPL-SCNC: 109 MMOL/L (ref 98–107)
CO2 SERPL-SCNC: 25.8 MMOL/L (ref 22–29)
CREAT SERPL-MCNC: 0.79 MG/DL (ref 0.57–1)
DEPRECATED RDW RBC AUTO: 50.6 FL (ref 37–54)
EGFRCR SERPLBLD CKD-EPI 2021: 92.4 ML/MIN/1.73
EOSINOPHIL # BLD AUTO: 0.07 10*3/MM3 (ref 0–0.4)
EOSINOPHIL NFR BLD AUTO: 1.3 % (ref 0.3–6.2)
ERYTHROCYTE [DISTWIDTH] IN BLOOD BY AUTOMATED COUNT: 14.4 % (ref 12.3–15.4)
GLOBULIN UR ELPH-MCNC: 2.5 GM/DL
GLUCOSE SERPL-MCNC: 98 MG/DL (ref 65–99)
HCT VFR BLD AUTO: 36.8 % (ref 34–46.6)
HGB BLD-MCNC: 12.6 G/DL (ref 12–15.9)
IMM GRANULOCYTES # BLD AUTO: 0.11 10*3/MM3 (ref 0–0.05)
IMM GRANULOCYTES NFR BLD AUTO: 2 % (ref 0–0.5)
LYMPHOCYTES # BLD AUTO: 2.21 10*3/MM3 (ref 0.7–3.1)
LYMPHOCYTES NFR BLD AUTO: 40 % (ref 19.6–45.3)
MCH RBC QN AUTO: 32.5 PG (ref 26.6–33)
MCHC RBC AUTO-ENTMCNC: 34.2 G/DL (ref 31.5–35.7)
MCV RBC AUTO: 94.8 FL (ref 79–97)
MONOCYTES # BLD AUTO: 1.13 10*3/MM3 (ref 0.1–0.9)
MONOCYTES NFR BLD AUTO: 20.4 % (ref 5–12)
NEUTROPHILS NFR BLD AUTO: 1.99 10*3/MM3 (ref 1.7–7)
NEUTROPHILS NFR BLD AUTO: 35.9 % (ref 42.7–76)
PLATELET # BLD AUTO: 184 10*3/MM3 (ref 140–450)
PMV BLD AUTO: 9.7 FL (ref 6–12)
POTASSIUM SERPL-SCNC: 3.7 MMOL/L (ref 3.5–5.2)
PROT SERPL-MCNC: 6.5 G/DL (ref 6–8.5)
RBC # BLD AUTO: 3.88 10*6/MM3 (ref 3.77–5.28)
SODIUM SERPL-SCNC: 142 MMOL/L (ref 136–145)
WBC NRBC COR # BLD AUTO: 5.53 10*3/MM3 (ref 3.4–10.8)

## 2023-11-29 PROCEDURE — 80053 COMPREHEN METABOLIC PANEL: CPT | Performed by: INTERNAL MEDICINE

## 2023-11-29 PROCEDURE — 0 DEXTROSE 5 % SOLUTION 250 ML FLEX CONT: Performed by: INTERNAL MEDICINE

## 2023-11-29 PROCEDURE — 0 DEXTROSE 5 % SOLUTION: Performed by: INTERNAL MEDICINE

## 2023-11-29 PROCEDURE — 25810000003 SODIUM CHLORIDE 0.9 % SOLUTION: Performed by: INTERNAL MEDICINE

## 2023-11-29 PROCEDURE — 96367 TX/PROPH/DG ADDL SEQ IV INF: CPT

## 2023-11-29 PROCEDURE — 36593 DECLOT VASCULAR DEVICE: CPT

## 2023-11-29 PROCEDURE — 96409 CHEMO IV PUSH SNGL DRUG: CPT

## 2023-11-29 PROCEDURE — 96360 HYDRATION IV INFUSION INIT: CPT

## 2023-11-29 PROCEDURE — 96361 HYDRATE IV INFUSION ADD-ON: CPT

## 2023-11-29 PROCEDURE — 96375 TX/PRO/DX INJ NEW DRUG ADDON: CPT

## 2023-11-29 PROCEDURE — 25010000002 LEUCOVORIN CALCIUM PER 50 MG: Performed by: INTERNAL MEDICINE

## 2023-11-29 PROCEDURE — 25010000002 DEXAMETHASONE SODIUM PHOSPHATE 120 MG/30ML SOLUTION: Performed by: INTERNAL MEDICINE

## 2023-11-29 PROCEDURE — 82378 CARCINOEMBRYONIC ANTIGEN: CPT | Performed by: INTERNAL MEDICINE

## 2023-11-29 PROCEDURE — 25010000002 FLUOROURACIL PER 500 MG: Performed by: INTERNAL MEDICINE

## 2023-11-29 PROCEDURE — 85025 COMPLETE CBC W/AUTO DIFF WBC: CPT | Performed by: INTERNAL MEDICINE

## 2023-11-29 PROCEDURE — 96365 THER/PROPH/DIAG IV INF INIT: CPT

## 2023-11-29 PROCEDURE — 25010000002 PALONOSETRON PER 25 MCG: Performed by: INTERNAL MEDICINE

## 2023-11-29 PROCEDURE — G0498 CHEMO EXTEND IV INFUS W/PUMP: HCPCS

## 2023-11-29 PROCEDURE — 25010000002 ALTEPLASE 2 MG RECONSTITUTED SOLUTION: Performed by: INTERNAL MEDICINE

## 2023-11-29 RX ORDER — FUROSEMIDE 40 MG/1
TABLET ORAL
COMMUNITY
Start: 2023-10-30

## 2023-11-29 RX ORDER — FAMOTIDINE 10 MG/ML
20 INJECTION, SOLUTION INTRAVENOUS AS NEEDED
Status: CANCELLED | OUTPATIENT
Start: 2023-11-29

## 2023-11-29 RX ORDER — PALONOSETRON 0.05 MG/ML
0.25 INJECTION, SOLUTION INTRAVENOUS ONCE
Status: CANCELLED | OUTPATIENT
Start: 2023-11-29

## 2023-11-29 RX ORDER — PALONOSETRON 0.05 MG/ML
0.25 INJECTION, SOLUTION INTRAVENOUS ONCE
Status: COMPLETED | OUTPATIENT
Start: 2023-11-29 | End: 2023-11-29

## 2023-11-29 RX ORDER — LOPERAMIDE HYDROCHLORIDE 2 MG/1
2 TABLET ORAL 4 TIMES DAILY PRN
Qty: 60 TABLET | Refills: 1 | Status: SHIPPED | OUTPATIENT
Start: 2023-11-29

## 2023-11-29 RX ORDER — DEXTROSE MONOHYDRATE 50 MG/ML
250 INJECTION, SOLUTION INTRAVENOUS ONCE
Status: COMPLETED | OUTPATIENT
Start: 2023-11-29 | End: 2023-11-29

## 2023-11-29 RX ORDER — DIPHENHYDRAMINE HYDROCHLORIDE 50 MG/ML
50 INJECTION INTRAMUSCULAR; INTRAVENOUS AS NEEDED
Status: CANCELLED | OUTPATIENT
Start: 2023-11-29

## 2023-11-29 RX ORDER — FLUOROURACIL 50 MG/ML
950 INJECTION, SOLUTION INTRAVENOUS ONCE
Status: COMPLETED | OUTPATIENT
Start: 2023-11-29 | End: 2023-11-29

## 2023-11-29 RX ORDER — DEXTROSE MONOHYDRATE 50 MG/ML
250 INJECTION, SOLUTION INTRAVENOUS ONCE
Status: CANCELLED | OUTPATIENT
Start: 2023-11-29

## 2023-11-29 RX ORDER — FLUOROURACIL 50 MG/ML
400 INJECTION, SOLUTION INTRAVENOUS ONCE
Status: CANCELLED | OUTPATIENT
Start: 2023-11-29

## 2023-11-29 RX ORDER — PANTOPRAZOLE SODIUM 40 MG/1
40 TABLET, DELAYED RELEASE ORAL 2 TIMES DAILY
Qty: 60 TABLET | Refills: 1 | Status: SHIPPED | OUTPATIENT
Start: 2023-11-29

## 2023-11-29 RX ADMIN — DEXTROSE MONOHYDRATE 250 ML: 50 INJECTION, SOLUTION INTRAVENOUS at 14:18

## 2023-11-29 RX ADMIN — PALONOSETRON 0.25 MG: 0.05 INJECTION, SOLUTION INTRAVENOUS at 13:08

## 2023-11-29 RX ADMIN — ALTEPLASE: 2.2 INJECTION, POWDER, LYOPHILIZED, FOR SOLUTION INTRAVENOUS at 11:24

## 2023-11-29 RX ADMIN — DEXAMETHASONE SODIUM PHOSPHATE 12 MG: 4 INJECTION, SOLUTION INTRA-ARTICULAR; INTRALESIONAL; INTRAMUSCULAR; INTRAVENOUS; SOFT TISSUE at 13:10

## 2023-11-29 RX ADMIN — SODIUM CHLORIDE 1000 ML: 9 INJECTION, SOLUTION INTRAVENOUS at 13:06

## 2023-11-29 RX ADMIN — FLUOROURACIL 5740 MG: 50 INJECTION, SOLUTION INTRAVENOUS at 15:00

## 2023-11-29 RX ADMIN — FLUOROURACIL 950 MG: 50 INJECTION, SOLUTION INTRAVENOUS at 14:56

## 2023-11-29 RX ADMIN — LEUCOVORIN CALCIUM 950 MG: 350 INJECTION, POWDER, LYOPHILIZED, FOR SOLUTION INTRAMUSCULAR; INTRAVENOUS at 14:19

## 2023-11-29 NOTE — PROGRESS NOTES
Diagnosis: Colon Cancer    Reason for Referral: Review patient's [employer] reimbursement form and obtain signature    Content of Visit: OSW met with patient to review [employer] [place] reimbursement form. Patient agreed and signed.  Patient was provided a Fall comfort kit through [employer] and agreed to complete survey.  Patient stated she was having difficulty with her port but had a good attitude when sharing her fear and frustration.  Patient stated she has 3 more treatments left.  Patient stated the organization [employer] provided her with one thousand dollars in assistance with her utilities.  Patient expressed appreciation for meeting with OSW and gratitude for the quality of care she has received thus far.    Resources/Referrals Provided: [employer] [place] and Fall comfort kit

## 2023-11-29 NOTE — PROGRESS NOTES
Reason:  Received notice from pt's infusion nurse that her vendor of oral nutrition shakes cannot order her Boost shakes at this time (due to ongoing nationwide shortages)    Prior Rx:  Boost (regular) BID    Vendor:  [facility]    Note:  Called [facility] to inquire about what types of Boost are available for them to order at this time.  [facility] offered 3 options:  Boost VHC, [employer] Peptide 1.5 or Compleat Standard 1.4 (plant-based shake).  Boost VHC will be substituted for pt's Boost Plus at this time.  Due to the VHC being higher in kcals & protein than Boost, pt will receive enough of the VHC to consume 1 container daily.  [facility] to send an SMN to pt's Oncologist to sign and to be returned back to [facility].  Communicated the above information to pt's infusion nurse.      Electronically signed by:  Piedad Marie RD  11/29/23 15:57 EST   
Add 39891 Cpt? (Important Note: In 2017 The Use Of 85849 Is Being Tracked By Cms To Determine Future Global Period Reimbursement For Global Periods): no
Detail Level: Detailed

## 2023-11-29 NOTE — PROGRESS NOTES
Chief Complaint/Care Team    Adenocarcinoma of descending colon    Shelly Cash MD Stephens, Cassandra, MD    History of Present Illness     Diagnosis: Colon Adenocarcinoma S/p Left colectomy on 5/5/23, stage after resection [id number], stage III    Current Treatment: FOLFOX IV [id number] x 6 months, cycle 1 on 6/14/2023  Treatment delay secondary to pneumoperitoneum which developed in June 2023, patient cleared to resume chemotherapy in August 2023.    Previous Treatment: c-scope on 4/2023 biopsy revealed colon adenocarcinoma    Lilian Pelaez is a 48 y.o. female who presents to [facility] HEMATOLOGY & ONCOLOGY for discussion of newly diagnosed colon adenocarcinoma seen on biopsy of colon mass in the descending colon during colonoscopy performed in April 2023.     Staging scans revealed on 4/11/2023:  CT abdomen/pelvis without any metastatic disease in abdomen/pelvis  CT chest no evidence of metastatic disease in chest.    Patient underwent robotic resection of descending colon and splenic flexure by Dr. Rolando Liu on 5/5/2023.    Patient is a former smoker.   Patient reports family history of several relatives with other forms of cancer cancer on her mother side of the family, but denies any known history of colon cancer in her family.    Patient reports noticing some blood mixed in with stool prior to cancer diagnosis.      Patient received cycle 1 on 6/14/2023, cycle 2 was delayed secondary to development of pneumoperitoneum after cycle 1, patient was mated to the hospital very by her surgeon Dr. Liu who recommended delaying further cycles of chemotherapy until August 1, 2023.    Interval history: Patient here prior to cycle 10 of chemotherapy with FOLFOX.  Patient cleared by Dr. Rolando Liu to resume chemotherapy in August 2023 after developing pneumoperitoneum in June 2023.  She without report of fever, chills, SOA, melena, or v/d. She continues to report increased dry mouth, loss of  taste since beginning chemo, post nasal drip, along with fatigue. No report of other blood loss today. Pt with chemo induced nausea, vomiting and diarrhea well controlled with prescribed medications. Pt reports peripheral neuropathy in her fingertips, and feet, that do not impede her ability to perform ADLs or IADLs. Pt reports increased shoulder pain after last 2 chemo infusions, pt underwent port study which revealed fibrin sheath, pt reports duration of pain symptoms have slowly progressed after last 2 infusions.     Review of Systems   Constitutional:  Positive for activity change, appetite change and fatigue. Negative for diaphoresis, fever, unexpected weight gain and unexpected weight loss.   HENT:  Positive for congestion, nosebleeds (PT had nosebleeds after treatment) and trouble swallowing (PT experiencing dry mouth which is causing her to not be able to eat or drink). Negative for hearing loss, mouth sores, sore throat, swollen glands and voice change.    Eyes:  Negative for blurred vision.   Respiratory:  Negative for cough, shortness of breath and wheezing.    Cardiovascular:  Negative for chest pain and palpitations.   Gastrointestinal:  Positive for nausea. Negative for abdominal pain, blood in stool, constipation, diarrhea and vomiting.   Endocrine: Positive for cold intolerance and heat intolerance.   Genitourinary:  Negative for difficulty urinating, dysuria, frequency, hematuria and urinary incontinence.   Musculoskeletal:  Negative for arthralgias, back pain and myalgias.   Skin:  Negative for rash, skin lesions and wound.   Neurological:  Positive for dizziness. Negative for seizures, weakness, numbness and headache.   Hematological:  Does not bruise/bleed easily.   Psychiatric/Behavioral:  Negative for depressed mood. The patient is nervous/anxious.    All other systems reviewed and are negative.       Oncology/Hematology History   Primary adenocarcinoma of descending colon   4/11/2023 Initial  "Diagnosis    Primary adenocarcinoma of descending colon     9/27/2023 -  Chemotherapy    OP SUPPORTIVE HYDRATION + ANTIEMETICS     Malignant neoplasm of descending colon   5/6/2023 Initial Diagnosis    Malignant neoplasm of descending colon     5/31/2023 Cancer Staged    Staging form: Colon And Rectum, AJCC 8th Edition  - Pathologic: Stage IIIB (pT3, pN1b, cM0) - Signed by Brian Marroquin MD on 5/31/2023 6/14/2023 -  Chemotherapy    OP COLON [id number] OXALIplatin / Leucovorin / Fluorouracil         Objective     Vitals:    11/29/23 1035   BP: 153/83   Pulse: 80   Resp: 18   Temp: 97.7 °F (36.5 °C)   TempSrc: Temporal   SpO2: 99%   Weight: 122 kg (268 lb 15.4 oz)   Height: 162.6 cm (64.02\")   PainSc: 0-No pain             ECOG score: 0         PHQ-9 Total Score:         Physical Exam  Vitals reviewed. Exam conducted with a chaperone present.   Constitutional:       General: She is not in acute distress.     Appearance: Normal appearance.   HENT:      Head: Normocephalic and atraumatic.      Mouth/Throat:      Mouth: Mucous membranes are dry.   Eyes:      Extraocular Movements: Extraocular movements intact.      Conjunctiva/sclera: Conjunctivae normal.   Pulmonary:      Effort: Pulmonary effort is normal.   Musculoskeletal:      Cervical back: Normal range of motion and neck supple.   Skin:     General: Skin is warm and dry.      Findings: No bruising.   Neurological:      Mental Status: She is oriented to person, place, and time.           Past Medical History     Past Medical History:   Diagnosis Date    Anesthesia     B/P bottomed out/UNSURE ON THE DATE    Anxiety     Asthma 1996    seasonal/NO INHALERS    Colon cancer 04/17/2023    DDD (degenerative disc disease), cervical     DDD (degenerative disc disease), lumbar     Depression     Diverticulitis of colon 2005    Scope was done in [facility]    GERD (gastroesophageal reflux disease)     Hypertension     Kidney stones     Melanoma     removed    Palpitation     " FOLLOWS W/DR. ANTONIO  Q6 MONTHS, NO CP OR SOA    Prolapse of female bladder      Current Outpatient Medications on File Prior to Visit   Medication Sig Dispense Refill    allopurinol (ZYLOPRIM) 100 MG tablet Take 1 tablet by mouth Daily.      ALPRAZolam (XANAX) 0.5 MG tablet Take 1 tablet by mouth At Night As Needed.      amLODIPine (NORVASC) 10 MG tablet Take 1 tablet by mouth Every Night.      Diclofenac Sodium (VOLTAREN) 1 % gel gel Apply 4 g topically to the appropriate area as directed 4 (Four) Times a Day As Needed.      docusate sodium 100 MG capsule Take 1 capsule by mouth Daily.      fluorouracil (EFUDEX) 5 % cream Apply 1 application  topically to the appropriate area as directed 2 (Two) Times a Day.      furosemide (LASIX) 20 MG tablet Take 1 tablet by mouth Daily.      furosemide (LASIX) 40 MG tablet       hydroCHLOROthiazide (HYDRODIURIL) 25 MG tablet Take 1 tablet every day by oral route for 90 days. (Patient not taking: Reported on 11/29/2023)      HYDROcodone-acetaminophen (NORCO) 7.5-325 MG per tablet Take 1 tablet by mouth Every 8 (Eight) Hours As Needed.      Hydrocortisone, Perianal, (ANUSOL-HC) 2.5 % rectal cream       hyoscyamine (ANASPAZ,LEVSIN) 0.125 MG tablet TAKE ONE TABLET BY MOUTH FOUR TIMES A DAY WITH MEALS AND AT BEDTIME. 120 tablet 1    ketorolac (TORADOL) 10 MG tablet Take 1 tablet by mouth Every 6 (Six) Hours As Needed.      ketorolac (TORADOL) 10 MG tablet Take 1 tablet by mouth Every 6 (Six) Hours As Needed (Pain). 12 tablet 0    lamoTRIgine (LaMICtal) 25 MG tablet Take 1 tablet by mouth 2 (Two) Times a Day. Only takes at night      leucovorin 5 MG tablet Take  by mouth Every 6 (Six) Hours.      levoFLOXacin (Levaquin) 750 MG tablet Take 1 tablet by mouth Daily. (Patient not taking: Reported on 11/29/2023) 7 tablet 0    Lidocaine 4 % patch Apply 1 patch topically Daily. (Patient not taking: Reported on 11/29/2023)      lidocaine-prilocaine (EMLA) 2.5-2.5 % cream Apply 1 application  topically to the appropriate area as directed Every 2 (Two) Hours As Needed for Mild Pain. 30 g 1    linaclotide (LINZESS) 290 MCG capsule capsule Take 1 capsule by mouth As Needed. (Patient not taking: Reported on 11/29/2023)      losartan (COZAAR) 100 MG tablet Take 1 tablet by mouth Every Night.      medroxyPROGESTERone (DEPO-PROVERA) 150 MG/ML injection Inject 1 mL into the appropriate muscle as directed by prescriber Every 3 (Three) Months.      metoclopramide (REGLAN) 10 MG tablet Take 1 tablet by mouth 3 (Three) Times a Day.      metoprolol succinate XL (TOPROL-XL) 25 MG 24 hr tablet Take 1 tablet by mouth Daily.      Multiple Vitamins-Minerals (b complex-C-E-zinc) tablet Take 1 tablet by mouth Daily.      Myrbetriq 50 MG tablet sustained-release 24 hour 24 hr tablet       naloxone (NARCAN) 4 MG/0.1ML nasal spray Take 1 spray every day by nasal route as needed.      nystatin (MYCOSTATIN) 100,000 unit/mL suspension Swish and swallow 5 mL 4 (Four) Times a Day. 473 mL 1    ondansetron (ZOFRAN) 8 MG tablet Take 1 tablet by mouth 3 (Three) Times a Day As Needed for Nausea or Vomiting. 30 tablet 5    OXALIPLATIN IV Infuse  into a venous catheter.      prochlorperazine (COMPAZINE) 10 MG tablet Take 1 tablet by mouth Every 6 (Six) Hours As Needed for Nausea or Vomiting. 30 tablet 5    rOPINIRole (REQUIP) 1 MG tablet Take 1 tablet by mouth Every Night.      sertraline (ZOLOFT) 100 MG tablet Take 0.5 tablets by mouth Daily.      sodium chloride (Ocean Nasal Spray) 0.65 % nasal spray 1 spray into the nostril(s) as directed by provider 2 (Two) Times a Day As Needed for Congestion. 480 mL 4    sucralfate (CARAFATE) 1 g tablet Take 1 tablet by mouth 2 (Two) Times a Day.      tetracycline (ACHROMYCIN,SUMYCIN) 250 MG capsule       traMADol (Ultram) 50 MG tablet Take 1 tablet by mouth Every 6 (Six) Hours As Needed for Severe Pain or Moderate Pain. 5 tablet 0    vitamin D3 125 MCG (5000 UT) capsule capsule Take 2,000 Units by  mouth Daily.      [DISCONTINUED] loperamide (Imodium A-D) 2 MG tablet Take 1 tablet by mouth 4 (Four) Times a Day As Needed for Diarrhea. Imodium - take 4 mg first dose, followed by 2 mg every 2-4 hours after each stool (MAX of 16 mg in 24 hour period) 60 tablet 1    [DISCONTINUED] loperamide (IMODIUM) 2 MG capsule       [DISCONTINUED] pantoprazole (PROTONIX) 40 MG EC tablet Take 1 tablet by mouth 2 (Two) Times a Day.      aspirin 81 MG EC tablet Take 1 tablet every day by oral route.      cefdinir (OMNICEF) 300 MG capsule       polyethylene glycol (GaviLyte-G) 236 g solution       Probiotic Product (Looxii PO) Take  by mouth Daily.       Current Facility-Administered Medications on File Prior to Visit   Medication Dose Route Frequency Provider Last Rate Last Admin    alteplase (CATHFLO/ACTIVASE) injection 2 mg  2 mg Intracatheter Q2H PRN Brian Marroquin MD   New Syringe/Cartridge at 11/29/23 1124    [COMPLETED] dexAMETHasone (DECADRON) IVPB 12 mg  12 mg Intravenous Once Brian Marroquin MD   Stopped at 11/29/23 1323    dextrose (D5W) 5 % infusion 250 mL  250 mL Intravenous Once Brian Marroquin MD        fluorouracil (ADRUCIL) 5,740 mg in sodium chloride 0.9 % 138 mL chemo infusion - FOR HOME USE  2,400 mg/m2 (Treatment Plan Recorded) Intravenous Once Brian Marroquin MD        fluorouracil (ADRUCIL) chemo injection 950 mg ( 19 mL)  950 mg Intravenous Once Brian Marroquin MD        leucovorin 950 mg in dextrose (D5W) 5 % 322.5 mL IVPB  950 mg Intravenous Once Brian Marroquin MD        [COMPLETED] palonosetron (ALOXI) injection 0.25 mg  0.25 mg Intravenous Once Brian Marroquin MD   0.25 mg at 11/29/23 1308    sodium chloride 0.9 % bolus 1,000 mL  1,000 mL Intravenous Once Brian Marroquin MD 1,000 mL/hr at 11/29/23 1306 1,000 mL at 11/29/23 1306      Allergies   Allergen Reactions    Hydromorphone Hives, Itching and GI Intolerance    Morphine Hives, Itching and Nausea And Vomiting    Oxybutynin Swelling and  Angioedema           Oxycodone-Acetaminophen Itching and Nausea And Vomiting     Can not take any higher than 7.5    Penicillins Rash    Promethazine Nausea And Vomiting    Tylenol With Codeine #3 [Acetaminophen-Codeine] Itching and Nausea And Vomiting    Oxybutynin Chloride Angioedema     Past Surgical History:   Procedure Laterality Date    BREAST SURGERY  2016    Removed 8lbs of tissue, 2016    CHOLECYSTECTOMY      COLON RESECTION Left 05/05/2023    Procedure: RESECTION OF DESCENDING COLON AND SPLENIC FLEXURE TAKEDOWN WITH DAVINCI ROBOT;  Surgeon: Rolando Liu MD;  Location: [facility] [place];  Service: Robotics - DaVinci;  Laterality: Left;    COLONOSCOPY N/A 04/04/2023    Procedure: COLONOSCOPY WITH POLYPECTOMY/SNARE/BIOPSIES/TATTOOING DESCENDING COLON MASS;  Surgeon: Deniz Dowd MD;  Location: [facility] ENDOSCOPY;  Service: Gastroenterology;  Laterality: N/A;  INCOMPLETE COLONOSCOPY  POOR BOWEL PREP  COLON POLYP  COLON MASS  DIVERTICULOSIS    COLONOSCOPY N/A 04/12/2023    Procedure: COLONOSCOPY with cold snare;  Surgeon: Deniz Dowd MD;  Location: [facility] ENDOSCOPY;  Service: Gastroenterology;  Laterality: N/A;  colon polyps, diverticulosis, colon mass, hemorrhoids      CYSTOSCOPY W/ URETERAL STENT PLACEMENT Left 05/05/2023    Procedure: Cystoscopy, left ureteral injection,;  Surgeon: Manjula Randolph MD;  Location: [facility] [place];  Service: Urology;  Laterality: Left;    FRACTURE SURGERY  2001    Left knee    KNEE SURGERY Left     x4    LAPAROSCOPIC TUBAL LIGATION  1995    LAPAROSCOPIC TUBAL LIGATION      Reversed    REDUCTION MAMMAPLASTY  2009    8lbs of tissue was removed    TONSILLECTOMY      UPPER GASTROINTESTINAL ENDOSCOPY      VENOUS ACCESS DEVICE (PORT) INSERTION N/A 6/5/2023    Procedure: INSERTION VENOUS ACCESS DEVICE;  Surgeon: Rolando Liu MD;  Location: [facility] [place];  Service: General;  Laterality: N/A;     Social History     Socioeconomic History    Marital status:     Tobacco Use    Smoking status: Never     Passive exposure: Never    Smokeless tobacco: Never   Vaping Use    Vaping Use: Never used   Substance and Sexual Activity    Alcohol use: Never    Drug use: Never    Sexual activity: Defer     Family History   Problem Relation Age of Onset    Colon cancer Neg Hx        Results     Result Review   The following data was reviewed by: Brian Marroquin MD on 04/13/2023:  Lab Results   Component Value Date    HGB 12.6 11/29/2023    HCT 36.8 11/29/2023    MCV 94.8 11/29/2023     11/29/2023    WBC 5.53 11/29/2023    NEUTROABS 1.99 11/29/2023    LYMPHSABS 2.21 11/29/2023    MONOSABS 1.13 (H) 11/29/2023    EOSABS 0.07 11/29/2023    BASOSABS 0.02 11/29/2023     Lab Results   Component Value Date    GLUCOSE 98 11/29/2023    BUN 10 11/29/2023    CREATININE 0.79 11/29/2023     11/29/2023    K 3.7 11/29/2023     (H) 11/29/2023    CO2 25.8 11/29/2023    CALCIUM 9.0 11/29/2023    PROTEINTOT 6.5 11/29/2023    ALBUMIN 4.0 11/29/2023    BILITOT 0.5 11/29/2023    ALKPHOS 100 11/29/2023    AST 30 11/29/2023    ALT 20 11/29/2023     Lab Results   Component Value Date    MG 2.1 10/25/2023    PHOS 3.3 06/18/2023       Case Report   Surgical Pathology Report                         Case: [id number]                                   Authorizing Provider:  Deniz Dowd MD    Collected:           04/12/2023 09:41 AM           Ordering Location:     [facility] Received:            04/12/2023 11:41 AM                                  SUITES                                                                        Pathologist:           Niesha Torres DO                                                        Specimens:   1) - Large Intestine, Cecum, cecal polyp cold snare                                                  2) - Large Intestine, Sigmoid Colon, sigmoid colon polyp cold snare                        Clinical Information    Mass of colon   Final  Diagnosis   1. Cecum polyp, biopsy:                            - Tubular adenoma        2. Sigmoid colon polyp, biopsy:                            - Tubular adenoma    Electronically signed by Niesha Torres DO on 4/13/2023 at 0840     Surgical Pathology Report                         Case: [id number]                                   Authorizing Provider:  Deniz Dowd MD    Collected:           04/04/2023 11:15 AM           Ordering Location:     [facility] Received:            04/04/2023 11:59 AM                                  SUITES                                                                        Pathologist:           Jennifer Mike MD                                                      Specimens:   1) - Large Intestine, Transverse Colon, TRANSVERSE COLON POLYP                                       2) - Large Intestine, Left / Descending Colon, DESCENDING COLON BIOPSIES                   Clinical Information    Constipation, unspecified constipation type   Final Diagnosis   1. Transverse colon polyp, biopsy:               - Fragments of tubular adenoma        2.  Descending colon, biopsy:               -Adenocarcinoma, moderately to poorly differentiated     Comment: Per policy, reflex testing has been ordered, and the results will be separately reported.     REMARKS: The above positive (malignant) diagnosis was called to April in Dr. Dowd's office at 09:09 EDT on 4/5/2023 by [facility].          Electronically signed by Jennifer Mike MD on 4/5/2023 at 0935         Surgical Pathology Report                         Case: [id number]                                   Authorizing Provider:  Rolando Liu MD        Collected:           05/05/2023 03:06 PM           Ordering Location:     [facility]  Received:            05/08/2023 06:48 AM                                  OR                                                                             Pathologist:           Jt Florence MD                                                             Specimen:    Large Intestine, Left / Descending Colon, left colon                                       Clinical Information    Malignant neoplasm of descending colon   Final Diagnosis   Left colon, resection:               - Adenocarcinoma               - Three of thirty-three lymph nodes positive for carcinoma (3/33)               - See synoptic checklist   Electronically signed by Jt Florence MD on 5/11/2023 at 1508   Synoptic Checklist   COLON AND RECTUM: Resection, Including Transanal Disk Excision of Rectal Neoplasms  8th Edition - Protocol posted: 6/22/2022  COLON AND RECTUM: RESECTION - All Specimens  SPECIMEN   Procedure  Descending colon resection    TUMOR   Tumor Site  Descending colon    Histologic Type  Adenocarcinoma    Histologic Grade  G3, poorly differentiated    Tumor Size  Greatest dimension (Centimeters): 3.5 cm   Tumor Extent  Invades through muscularis propria into the pericolonic or perirectal tissue    Macroscopic Tumor Perforation  Not identified    Lymphovascular Invasion  Small vessel    Perineural Invasion  Present    Treatment Effect  No known presurgical therapy    MARGINS   Margin Status for Invasive Carcinoma  All margins negative for invasive carcinoma    Closest Margin(s) to Invasive Carcinoma  Radial (circumferential) or mesenteric    Distance from Invasive Carcinoma to Closest Margin  3.5 cm   Margin Status for Non-Invasive Tumor  All margins negative for high-grade dysplasia / intramucosal carcinoma and low-grade dysplasia    REGIONAL LYMPH NODES   Regional Lymph Node Status  Tumor present in regional lymph node(s)    Number of Lymph Nodes with Tumor  3    Number of Lymph Nodes Examined  33    Tumor Deposits  Present    Number of Tumor Deposits  1    PATHOLOGIC STAGE CLASSIFICATION (pTNM, AJCC 8th Edition)   Reporting of pT, pN, and (when applicable) pM categories is based on  information available to the pathologist at the time the report is issued. As per the AJCC (Chapter 1, 8th Ed.) it is the managing physician's responsibility to establish the final pathologic stage based upon all pertinent information, including but potentially not limited to this pathology report.   pT Category  pT3    pN Category  pN1b    ADDITIONAL FINDINGS   Additional Findings  None identified    .          IR Port Study Including Fluoro    Result Date: 11/15/2023  Impression: Right IJ vein port infusion catheter demonstrated large fibrin sheath/thrombus at the distal aspect of the catheter.  Consider instillation of Cathflo.       CLIFTON WOODSON       Electronically Signed and Approved By: Allan Woods M.D. on 11/15/2023 at 8:57             CT Chest With Contrast Diagnostic    Result Date: 11/10/2023  Impression:   CT scan of the chest with IV contrast demonstrating no active disease.  Right jugular Port-A-Cath device is in unchanged position.     BILLY PHILLIPS MD       Electronically Signed and Approved By: BILLY PHILLIPS MD on 11/10/2023 at 18:14              Assessment & Plan     Diagnoses and all orders for this visit:    1. Gastric reflux (Primary)  -     pantoprazole (PROTONIX) 40 MG EC tablet; Take 1 tablet by mouth 2 (Two) Times a Day.  Dispense: 60 tablet; Refill: 1    2. Primary adenocarcinoma of descending colon  -     loperamide (Imodium A-D) 2 MG tablet; Take 1 tablet by mouth 4 (Four) Times a Day As Needed for Diarrhea. Imodium - take 4 mg first dose, followed by 2 mg every 2-4 hours after each stool (MAX of 16 mg in 24 hour period)  Dispense: 60 tablet; Refill: 1    3. Chemotherapy induced diarrhea  -     loperamide (Imodium A-D) 2 MG tablet; Take 1 tablet by mouth 4 (Four) Times a Day As Needed for Diarrhea. Imodium - take 4 mg first dose, followed by 2 mg every 2-4 hours after each stool (MAX of 16 mg in 24 hour period)  Dispense: 60 tablet; Refill: 1    Other orders  -     Cancel: dextrose  (D5W) 5 % infusion 250 mL  -     Cancel: palonosetron (ALOXI) injection 0.25 mg  -     Cancel: dexAMETHasone (DECADRON) 12 mg in sodium chloride 0.9 % IVPB  -     Cancel: leucovorin 960 mg in dextrose (D5W) 5 % 346 mL IVPB  -     Cancel: fluorouracil (ADRUCIL) chemo injection 960 mg  -     Cancel: fluorouracil (ADRUCIL) 5,740 mg in sodium chloride 0.9 % 114.8 mL chemo infusion - FOR HOME USE  -     Cancel: sodium chloride 0.9 % bolus 1,000 mL  -     Hydrocortisone Sod Suc (PF) (Solu-CORTEF) injection 100 mg  -     diphenhydrAMINE (BENADRYL) injection 50 mg  -     famotidine (PEPCID) injection 20 mg                    Lilian Pelaez is a 48 y.o. female who presents to [facility] HEMATOLOGY & ONCOLOGY evaluation prior to systemic therapy for stage III colon cancer, patient status post resection of descending colon splenic flexure by Dr. Rolando Liu on 5/5/2023.     Reviewed NCCN guidelines for her stage III cancer, discussed high risk features seen on pathology report and discussed CapeOx for 3 months versus FOLFOX for 3 to 6 months with patient and  today.  Patient agreed to plan to undergo FOLFOX IV chemotherapy every 2 weeks for 6 months.    Discussed restaging imaging scans obtained on 10/9/2023 which was without evidence of disease recurrence or metastatic disease.     Patient received cycle 1 on June 14, 2023, she developed pneumoperitoneum which her surgeon suspect was secondary to severe constipation from iron tablets, but patient was cleared to resume chemotherapy after 8/1/2023.    Iron deficiency anemia from GI blood loss  -Evidence of this on lab work from May 2023  - We will continue to monitor and will offer IV iron therapy in the future if she becomes iron deficient secondary to pneumoperitoneum that developed in June 2023 from severe constipation.    Chemotherapy-induced nausea  - Currently well controlled with Zofran, but prescribedCompazine as a backup option  - Patient also  taking Reglan and was counseled regarding not taking Reglan with Zofran and Compazine together but rather patient to take other antiemetics at least 4 to 6 hours after Reglan dose.    Chemotherapy induced diarrhea  - Patient prescribed Imodium  -We will prescribe Lomotil if Imodium fails to work.    Muscle cramps  -checked Magnesium level which was normal  -continuing to monitor her electrolytes and replete as necessary  -suspect from dehydration, will administer 1L of NS with treatment today  -will continue to monitor    Pt with loss of taste, mouth with some evidence of thrush- ordered nystatin swish and spit, recommend pt continue, also recommended pt take pantoprazole BID to see if that helps with acid reflux symptoms.    Recommend nasal ocean spray since pt with symptoms of dry nasal passages and nasal congestion from seasonal allergies, provided prescription for this today.    Ordered IVFs as suspect pt is dehydrated from decrease in oral intake for today's chemotherapy infusion and each subsequent chemo infusion.    Given shoulder pain after last chemo infusion (which as become prolonged lasting more days after last 2 infusions, pt underwent port study which was negative revealed fibrin sheath), discussed with pharmacist and shared plan to continue with 5FU infusion alone, will discontinue oxaliplatin starting with cycle 10.    Labs reviewed and plan to proceed with cycle 10 day 1 on today.    Plan patient follow-up in the next 2 weeks for cycle 11 day 1 of chemotherapy. Plan for 12 cycles.    Plan to re-scan pt 3 months after she completes chemotherapy.    Patient verbalized understanding and agreement plan.    Please note that portions of this note were completed with a voice recognition program.      Electronically signed by Biran Marroquin MD, 11/29/23, 2:06 PM EST.        Follow Up     I spent 30 minutes caring for Lilian on this date of service. This time includes time spent by me in the following  activities:preparing for the visit, reviewing tests, obtaining and/or reviewing a separately obtained history, performing a medically appropriate examination and/or evaluation , counseling and educating the patient/family/caregiver, ordering medications, tests, or procedures, documenting information in the medical record and care coordination.    This is an acute or chronic illness that poses a threat to life or bodily function. The above treatment plan involves a high risk of complications and/or mortality of patient management.    The patient was seen and examined. Work by the provider also included review and/or ordering of lab tests, review and/or ordering of radiology tests, review and/or ordering of medicine tests, discussion with other physicians or providers, independent review of data, obtaining old records, review/summation of old records, and/or other review.    I have reviewed the family history, social history, and past medical history for this patient. Previous information and data has been reviewed and updated as needed. I have reviewed and verified the chief complaint, history, and other documentation. The patient was interviewed and examined in the clinic and the chart reviewed. The previous observations, recommendations, and conclusions were reviewed including those of other providers.     The plan was discussed with the patient and/or family. The patient was given time to ask questions and these questions were answered. At the conclusion of their visit they had no additional questions or concerns and all questions were answered to their satisfaction.    Patient was given instructions and counseling regarding her condition or for health maintenance advice. Please see specific information pulled into the AVS if appropriate.

## 2023-12-01 ENCOUNTER — HOSPITAL ENCOUNTER (OUTPATIENT)
Dept: ONCOLOGY | Facility: HOSPITAL | Age: 48
Discharge: HOME | End: 2023-12-01
Payer: COMMERCIAL

## 2023-12-01 DIAGNOSIS — C18.6 MALIGNANT NEOPLASM OF DESCENDING COLON: ICD-10-CM

## 2023-12-01 DIAGNOSIS — Z45.2 ENCOUNTER FOR ADJUSTMENT OR MANAGEMENT OF VASCULAR ACCESS DEVICE: Primary | ICD-10-CM

## 2023-12-01 PROCEDURE — 25010000002 HEPARIN LOCK FLUSH PER 10 UNITS: Performed by: INTERNAL MEDICINE

## 2023-12-01 RX ORDER — HEPARIN SODIUM (PORCINE) LOCK FLUSH IV SOLN 100 UNIT/ML 100 UNIT/ML
500 SOLUTION INTRAVENOUS AS NEEDED
OUTPATIENT
Start: 2023-12-01

## 2023-12-01 RX ORDER — SODIUM CHLORIDE 0.9 % (FLUSH) 0.9 %
20 SYRINGE (ML) INJECTION AS NEEDED
Status: DISCONTINUED | OUTPATIENT
Start: 2023-12-01 | End: 2023-12-02 | Stop reason: HOSPADM

## 2023-12-01 RX ORDER — SODIUM CHLORIDE 0.9 % (FLUSH) 0.9 %
20 SYRINGE (ML) INJECTION AS NEEDED
OUTPATIENT
Start: 2023-12-01

## 2023-12-01 RX ORDER — HEPARIN SODIUM (PORCINE) LOCK FLUSH IV SOLN 100 UNIT/ML 100 UNIT/ML
500 SOLUTION INTRAVENOUS AS NEEDED
Status: DISCONTINUED | OUTPATIENT
Start: 2023-12-01 | End: 2023-12-02 | Stop reason: HOSPADM

## 2023-12-01 RX ADMIN — HEPARIN SODIUM (PORCINE) LOCK FLUSH IV SOLN 100 UNIT/ML 500 UNITS: 100 SOLUTION at 15:04

## 2023-12-01 RX ADMIN — Medication 20 ML: at 15:03

## 2023-12-13 ENCOUNTER — OFFICE VISIT (OUTPATIENT)
Dept: ONCOLOGY | Facility: HOSPITAL | Age: 48
End: 2023-12-13
Payer: COMMERCIAL

## 2023-12-13 ENCOUNTER — HOSPITAL ENCOUNTER (OUTPATIENT)
Dept: ONCOLOGY | Facility: HOSPITAL | Age: 48
Discharge: HOME | End: 2023-12-13
Admitting: INTERNAL MEDICINE
Payer: COMMERCIAL

## 2023-12-13 VITALS
TEMPERATURE: 97 F | HEART RATE: 78 BPM | OXYGEN SATURATION: 98 % | WEIGHT: 266.76 LBS | BODY MASS INDEX: 45.54 KG/M2 | SYSTOLIC BLOOD PRESSURE: 117 MMHG | RESPIRATION RATE: 15 BRPM | HEIGHT: 64 IN | DIASTOLIC BLOOD PRESSURE: 48 MMHG

## 2023-12-13 VITALS
DIASTOLIC BLOOD PRESSURE: 48 MMHG | TEMPERATURE: 97 F | HEIGHT: 64 IN | SYSTOLIC BLOOD PRESSURE: 117 MMHG | OXYGEN SATURATION: 98 % | WEIGHT: 266.76 LBS | RESPIRATION RATE: 15 BRPM | BODY MASS INDEX: 45.54 KG/M2 | HEART RATE: 78 BPM

## 2023-12-13 DIAGNOSIS — C18.6 PRIMARY ADENOCARCINOMA OF DESCENDING COLON: Primary | ICD-10-CM

## 2023-12-13 DIAGNOSIS — C18.6 MALIGNANT NEOPLASM OF DESCENDING COLON: Primary | ICD-10-CM

## 2023-12-13 DIAGNOSIS — Z45.2 ENCOUNTER FOR ADJUSTMENT OR MANAGEMENT OF VASCULAR ACCESS DEVICE: ICD-10-CM

## 2023-12-13 LAB
ALBUMIN SERPL-MCNC: 4.1 G/DL (ref 3.5–5.2)
ALBUMIN/GLOB SERPL: 1.6 G/DL
ALP SERPL-CCNC: 110 U/L (ref 39–117)
ALT SERPL W P-5'-P-CCNC: 18 U/L (ref 1–33)
ANION GAP SERPL CALCULATED.3IONS-SCNC: 7.3 MMOL/L (ref 5–15)
AST SERPL-CCNC: 24 U/L (ref 1–32)
BASOPHILS # BLD AUTO: 0.03 10*3/MM3 (ref 0–0.2)
BASOPHILS NFR BLD AUTO: 0.6 % (ref 0–1.5)
BILIRUB SERPL-MCNC: 0.5 MG/DL (ref 0–1.2)
BUN SERPL-MCNC: 9 MG/DL (ref 6–20)
BUN/CREAT SERPL: 11.8 (ref 7–25)
CALCIUM SPEC-SCNC: 9.7 MG/DL (ref 8.6–10.5)
CEA SERPL-MCNC: 1.75 NG/ML
CHLORIDE SERPL-SCNC: 108 MMOL/L (ref 98–107)
CO2 SERPL-SCNC: 23.7 MMOL/L (ref 22–29)
CREAT SERPL-MCNC: 0.76 MG/DL (ref 0.57–1)
DEPRECATED RDW RBC AUTO: 47.1 FL (ref 37–54)
EGFRCR SERPLBLD CKD-EPI 2021: 96.8 ML/MIN/1.73
EOSINOPHIL # BLD AUTO: 0.1 10*3/MM3 (ref 0–0.4)
EOSINOPHIL NFR BLD AUTO: 2.2 % (ref 0.3–6.2)
ERYTHROCYTE [DISTWIDTH] IN BLOOD BY AUTOMATED COUNT: 13.7 % (ref 12.3–15.4)
GLOBULIN UR ELPH-MCNC: 2.5 GM/DL
GLUCOSE SERPL-MCNC: 97 MG/DL (ref 65–99)
HCT VFR BLD AUTO: 35.7 % (ref 34–46.6)
HGB BLD-MCNC: 12.5 G/DL (ref 12–15.9)
IMM GRANULOCYTES # BLD AUTO: 0.01 10*3/MM3 (ref 0–0.05)
IMM GRANULOCYTES NFR BLD AUTO: 0.2 % (ref 0–0.5)
LYMPHOCYTES # BLD AUTO: 1.63 10*3/MM3 (ref 0.7–3.1)
LYMPHOCYTES NFR BLD AUTO: 35.2 % (ref 19.6–45.3)
MCH RBC QN AUTO: 33.1 PG (ref 26.6–33)
MCHC RBC AUTO-ENTMCNC: 35 G/DL (ref 31.5–35.7)
MCV RBC AUTO: 94.4 FL (ref 79–97)
MONOCYTES # BLD AUTO: 0.6 10*3/MM3 (ref 0.1–0.9)
MONOCYTES NFR BLD AUTO: 13 % (ref 5–12)
NEUTROPHILS NFR BLD AUTO: 2.26 10*3/MM3 (ref 1.7–7)
NEUTROPHILS NFR BLD AUTO: 48.8 % (ref 42.7–76)
PLATELET # BLD AUTO: 178 10*3/MM3 (ref 140–450)
PMV BLD AUTO: 9.7 FL (ref 6–12)
POTASSIUM SERPL-SCNC: 4.1 MMOL/L (ref 3.5–5.2)
PROT SERPL-MCNC: 6.6 G/DL (ref 6–8.5)
RBC # BLD AUTO: 3.78 10*6/MM3 (ref 3.77–5.28)
SODIUM SERPL-SCNC: 139 MMOL/L (ref 136–145)
WBC NRBC COR # BLD AUTO: 4.63 10*3/MM3 (ref 3.4–10.8)

## 2023-12-13 PROCEDURE — 96368 THER/DIAG CONCURRENT INF: CPT

## 2023-12-13 PROCEDURE — 25810000003 SODIUM CHLORIDE 0.9 % SOLUTION: Performed by: INTERNAL MEDICINE

## 2023-12-13 PROCEDURE — 36593 DECLOT VASCULAR DEVICE: CPT

## 2023-12-13 PROCEDURE — 25010000002 PALONOSETRON PER 25 MCG: Performed by: INTERNAL MEDICINE

## 2023-12-13 PROCEDURE — 96413 CHEMO IV INFUSION 1 HR: CPT

## 2023-12-13 PROCEDURE — 80053 COMPREHEN METABOLIC PANEL: CPT | Performed by: INTERNAL MEDICINE

## 2023-12-13 PROCEDURE — 25010000002 DEXAMETHASONE SODIUM PHOSPHATE 120 MG/30ML SOLUTION: Performed by: INTERNAL MEDICINE

## 2023-12-13 PROCEDURE — 0 DEXTROSE 5 % SOLUTION: Performed by: INTERNAL MEDICINE

## 2023-12-13 PROCEDURE — 96411 CHEMO IV PUSH ADDL DRUG: CPT

## 2023-12-13 PROCEDURE — 82378 CARCINOEMBRYONIC ANTIGEN: CPT | Performed by: INTERNAL MEDICINE

## 2023-12-13 PROCEDURE — 96415 CHEMO IV INFUSION ADDL HR: CPT

## 2023-12-13 PROCEDURE — 85025 COMPLETE CBC W/AUTO DIFF WBC: CPT | Performed by: INTERNAL MEDICINE

## 2023-12-13 PROCEDURE — 96375 TX/PRO/DX INJ NEW DRUG ADDON: CPT

## 2023-12-13 PROCEDURE — 25010000002 HEPARIN LOCK FLUSH PER 10 UNITS: Performed by: INTERNAL MEDICINE

## 2023-12-13 PROCEDURE — 0 DEXTROSE 5 % SOLUTION 250 ML FLEX CONT: Performed by: INTERNAL MEDICINE

## 2023-12-13 PROCEDURE — 96360 HYDRATION IV INFUSION INIT: CPT

## 2023-12-13 PROCEDURE — 25010000002 OXALIPLATIN PER 0.5 MG: Performed by: INTERNAL MEDICINE

## 2023-12-13 PROCEDURE — 25010000002 LEUCOVORIN CALCIUM PER 50 MG: Performed by: INTERNAL MEDICINE

## 2023-12-13 PROCEDURE — 25010000002 ALTEPLASE 2 MG RECONSTITUTED SOLUTION: Performed by: INTERNAL MEDICINE

## 2023-12-13 PROCEDURE — 25010000002 FLUOROURACIL PER 500 MG: Performed by: INTERNAL MEDICINE

## 2023-12-13 RX ORDER — HEPARIN SODIUM (PORCINE) LOCK FLUSH IV SOLN 100 UNIT/ML 100 UNIT/ML
500 SOLUTION INTRAVENOUS AS NEEDED
OUTPATIENT
Start: 2023-12-13

## 2023-12-13 RX ORDER — PALONOSETRON 0.05 MG/ML
0.25 INJECTION, SOLUTION INTRAVENOUS ONCE
Status: CANCELLED | OUTPATIENT
Start: 2023-12-13

## 2023-12-13 RX ORDER — FLUOROURACIL 50 MG/ML
950 INJECTION, SOLUTION INTRAVENOUS ONCE
Status: COMPLETED | OUTPATIENT
Start: 2023-12-13 | End: 2023-12-13

## 2023-12-13 RX ORDER — FAMOTIDINE 10 MG/ML
20 INJECTION, SOLUTION INTRAVENOUS AS NEEDED
Status: CANCELLED | OUTPATIENT
Start: 2023-12-13

## 2023-12-13 RX ORDER — HEPARIN SODIUM (PORCINE) LOCK FLUSH IV SOLN 100 UNIT/ML 100 UNIT/ML
500 SOLUTION INTRAVENOUS AS NEEDED
Status: DISCONTINUED | OUTPATIENT
Start: 2023-12-13 | End: 2023-12-14 | Stop reason: HOSPADM

## 2023-12-13 RX ORDER — DIPHENHYDRAMINE HYDROCHLORIDE 50 MG/ML
50 INJECTION INTRAMUSCULAR; INTRAVENOUS AS NEEDED
Status: DISCONTINUED | OUTPATIENT
Start: 2023-12-13 | End: 2023-12-14 | Stop reason: HOSPADM

## 2023-12-13 RX ORDER — DEXTROSE MONOHYDRATE 50 MG/ML
250 INJECTION, SOLUTION INTRAVENOUS ONCE
Status: CANCELLED | OUTPATIENT
Start: 2023-12-13

## 2023-12-13 RX ORDER — SODIUM CHLORIDE 0.9 % (FLUSH) 0.9 %
20 SYRINGE (ML) INJECTION AS NEEDED
Status: DISCONTINUED | OUTPATIENT
Start: 2023-12-13 | End: 2023-12-14 | Stop reason: HOSPADM

## 2023-12-13 RX ORDER — SODIUM CHLORIDE 0.9 % (FLUSH) 0.9 %
20 SYRINGE (ML) INJECTION AS NEEDED
OUTPATIENT
Start: 2023-12-13

## 2023-12-13 RX ORDER — FAMOTIDINE 10 MG/ML
20 INJECTION, SOLUTION INTRAVENOUS AS NEEDED
Status: DISCONTINUED | OUTPATIENT
Start: 2023-12-13 | End: 2023-12-14 | Stop reason: HOSPADM

## 2023-12-13 RX ORDER — PALONOSETRON 0.05 MG/ML
0.25 INJECTION, SOLUTION INTRAVENOUS ONCE
Status: COMPLETED | OUTPATIENT
Start: 2023-12-13 | End: 2023-12-13

## 2023-12-13 RX ORDER — FLUOROURACIL 50 MG/ML
400 INJECTION, SOLUTION INTRAVENOUS ONCE
Status: CANCELLED | OUTPATIENT
Start: 2023-12-13

## 2023-12-13 RX ORDER — DEXTROSE MONOHYDRATE 50 MG/ML
250 INJECTION, SOLUTION INTRAVENOUS ONCE
Status: COMPLETED | OUTPATIENT
Start: 2023-12-13 | End: 2023-12-13

## 2023-12-13 RX ORDER — DIPHENHYDRAMINE HYDROCHLORIDE 50 MG/ML
50 INJECTION INTRAMUSCULAR; INTRAVENOUS AS NEEDED
Status: CANCELLED | OUTPATIENT
Start: 2023-12-13

## 2023-12-13 RX ADMIN — HEPARIN SODIUM (PORCINE) LOCK FLUSH IV SOLN 100 UNIT/ML 500 UNITS: 100 SOLUTION at 13:16

## 2023-12-13 RX ADMIN — OXALIPLATIN 200 MG: 5 INJECTION, SOLUTION INTRAVENOUS at 10:54

## 2023-12-13 RX ADMIN — DEXTROSE MONOHYDRATE 250 ML: 50 INJECTION, SOLUTION INTRAVENOUS at 10:11

## 2023-12-13 RX ADMIN — DEXAMETHASONE SODIUM PHOSPHATE 12 MG: 4 INJECTION, SOLUTION INTRA-ARTICULAR; INTRALESIONAL; INTRAMUSCULAR; INTRAVENOUS; SOFT TISSUE at 10:16

## 2023-12-13 RX ADMIN — HEPARIN SODIUM (PORCINE) LOCK FLUSH IV SOLN 100 UNIT/ML 500 UNITS: 100 SOLUTION at 08:24

## 2023-12-13 RX ADMIN — LEUCOVORIN CALCIUM 950 MG: 350 INJECTION, POWDER, LYOPHILIZED, FOR SOLUTION INTRAMUSCULAR; INTRAVENOUS at 10:54

## 2023-12-13 RX ADMIN — ALTEPLASE: 2.2 INJECTION, POWDER, LYOPHILIZED, FOR SOLUTION INTRAVENOUS at 09:26

## 2023-12-13 RX ADMIN — FLUOROURACIL 950 MG: 50 INJECTION, SOLUTION INTRAVENOUS at 13:08

## 2023-12-13 RX ADMIN — Medication 20 ML: at 13:16

## 2023-12-13 RX ADMIN — PALONOSETRON HYDROCHLORIDE 0.25 MG: 0.25 INJECTION INTRAVENOUS at 10:13

## 2023-12-13 RX ADMIN — SODIUM CHLORIDE 1000 ML: 9 INJECTION, SOLUTION INTRAVENOUS at 09:55

## 2023-12-13 NOTE — PROGRESS NOTES
Chief Complaint/Care Team   Adenocarcinoma of descending colon    Shelly Cash MD Stephens, Cassandra, MD    History of Present Illness     Diagnosis: Colon Adenocarcinoma S/p Left colectomy on 5/5/23, stage after resection [id number], stage III    Current Treatment: FOLFOX IV [id number] x 6 months, cycle 1 on 6/14/2023  Treatment delay secondary to pneumoperitoneum which developed in June 2023, patient cleared to resume chemotherapy in August 2023.    Previous Treatment: c-scope on 4/2023 biopsy revealed colon adenocarcinoma    Lilian Pelaez is a 48 y.o. female who presents to [facility] HEMATOLOGY & ONCOLOGY for discussion of newly diagnosed colon adenocarcinoma seen on biopsy of colon mass in the descending colon during colonoscopy performed in April 2023.     Staging scans revealed on 4/11/2023:  CT abdomen/pelvis without any metastatic disease in abdomen/pelvis  CT chest no evidence of metastatic disease in chest.    Patient underwent robotic resection of descending colon and splenic flexure by Dr. Rolando Liu on 5/5/2023.    Patient is a former smoker.   Patient reports family history of several relatives with other forms of cancer cancer on her mother side of the family, but denies any known history of colon cancer in her family.    Patient reports noticing some blood mixed in with stool prior to cancer diagnosis.      Patient received cycle 1 on 6/14/2023, cycle 2 was delayed secondary to development of pneumoperitoneum after cycle 1, patient was mated to the hospital very by her surgeon Dr. Liu who recommended delaying further cycles of chemotherapy until August 1, 2023.    Interval history: Patient here prior to cycle 11 of chemotherapy with FOLFOX.  Patient cleared by Dr. Rolando Liu to resume chemotherapy in August 2023 after developing pneumoperitoneum in June 2023.  She without report of fever, chills, SOA, melena, or blood loss. She continues to report increased dry mouth,  loss of taste since beginning chemo, post nasal drip, along with fatigue. Pt with chemo induced nausea, vomiting and diarrhea well controlled with prescribed medications. Pt reports peripheral neuropathy in her fingertips, and feet, that do not impede her ability to perform ADLs or IADLs. Pt reports increased shoulder pain after last 2 chemo infusions, pt underwent port study which revealed fibrin sheath, pt reports duration of pain symptoms have slowly progressed after last 2 infusions. Pt reports continued pain in her right shoulder during continuous 5FU infusion, reports she is not able to tolerate the 5 FU pump any longer due to pain and reports she was close to going to ER after last 5FU pump infusion.     Review of Systems   Constitutional:  Positive for activity change, appetite change and fatigue. Negative for diaphoresis, fever, unexpected weight gain and unexpected weight loss.   HENT:  Positive for congestion, nosebleeds (PT had nosebleeds after treatment) and trouble swallowing (PT experiencing dry mouth which is causing her to not be able to eat or drink). Negative for hearing loss, mouth sores, sore throat, swollen glands and voice change.    Eyes:  Negative for blurred vision.   Respiratory:  Negative for cough, shortness of breath and wheezing.    Cardiovascular:  Negative for chest pain and palpitations.   Gastrointestinal:  Positive for nausea. Negative for abdominal pain, blood in stool, constipation, diarrhea and vomiting.   Endocrine: Positive for cold intolerance and heat intolerance.   Genitourinary:  Negative for difficulty urinating, dysuria, frequency, hematuria and urinary incontinence.   Musculoskeletal:  Negative for arthralgias, back pain and myalgias.   Skin:  Negative for rash, skin lesions and wound.   Neurological:  Positive for dizziness. Negative for seizures, weakness, numbness and headache.   Hematological:  Does not bruise/bleed easily.   Psychiatric/Behavioral:  Negative for  "depressed mood. The patient is nervous/anxious.    All other systems reviewed and are negative.       Oncology/Hematology History   Primary adenocarcinoma of descending colon   4/11/2023 Initial Diagnosis    Primary adenocarcinoma of descending colon     9/27/2023 -  Chemotherapy    OP SUPPORTIVE HYDRATION + ANTIEMETICS     Malignant neoplasm of descending colon   5/6/2023 Initial Diagnosis    Malignant neoplasm of descending colon     5/31/2023 Cancer Staged    Staging form: Colon And Rectum, AJCC 8th Edition  - Pathologic: Stage IIIB (pT3, pN1b, cM0) - Signed by Brian Marroquin MD on 5/31/2023 6/14/2023 -  Chemotherapy    OP COLON [id number] OXALIplatin / Leucovorin / Fluorouracil         Objective     Vitals:    12/13/23 0838   BP: 117/48   Pulse: 78   Resp: 15   Temp: 97 °F (36.1 °C)   TempSrc: Temporal   SpO2: 98%   Weight: 121 kg (266 lb 12.1 oz)   Height: 162.6 cm (64.02\")   PainSc: 0-No pain               ECOG score: 0         PHQ-9 Total Score:         Physical Exam  Vitals reviewed. Exam conducted with a chaperone present.   Constitutional:       General: She is not in acute distress.     Appearance: Normal appearance.   HENT:      Head: Normocephalic and atraumatic.      Mouth/Throat:      Mouth: Mucous membranes are dry.   Eyes:      Extraocular Movements: Extraocular movements intact.      Conjunctiva/sclera: Conjunctivae normal.   Pulmonary:      Effort: Pulmonary effort is normal.   Musculoskeletal:      Cervical back: Normal range of motion and neck supple.   Skin:     General: Skin is warm and dry.      Findings: No bruising.   Neurological:      Mental Status: She is oriented to person, place, and time.           Past Medical History     Past Medical History:   Diagnosis Date    Anesthesia     B/P bottomed out/UNSURE ON THE DATE    Anxiety     Asthma 1996    seasonal/NO INHALERS    Colon cancer 04/17/2023    DDD (degenerative disc disease), cervical     DDD (degenerative disc disease), lumbar     " Depression     Diverticulitis of colon 2005    Scope was done in [facility]    GERD (gastroesophageal reflux disease)     Hypertension     Kidney stones     Melanoma     removed    Palpitation     FOLLOWS W/DR. ANTONIO  Q6 MONTHS, NO CP OR SOA    Prolapse of female bladder      Current Outpatient Medications on File Prior to Visit   Medication Sig Dispense Refill    allopurinol (ZYLOPRIM) 100 MG tablet Take 1 tablet by mouth Daily.      ALPRAZolam (XANAX) 0.5 MG tablet Take 1 tablet by mouth At Night As Needed.      amLODIPine (NORVASC) 10 MG tablet Take 1 tablet by mouth Every Night.      Diclofenac Sodium (VOLTAREN) 1 % gel gel Apply 4 g topically to the appropriate area as directed 4 (Four) Times a Day As Needed.      docusate sodium 100 MG capsule Take 1 capsule by mouth Daily.      fluorouracil (EFUDEX) 5 % cream Apply 1 application  topically to the appropriate area as directed 2 (Two) Times a Day.      furosemide (LASIX) 20 MG tablet Take 1 tablet by mouth Daily.      furosemide (LASIX) 40 MG tablet       HYDROcodone-acetaminophen (NORCO) 7.5-325 MG per tablet Take 1 tablet by mouth Every 8 (Eight) Hours As Needed.      Hydrocortisone, Perianal, (ANUSOL-HC) 2.5 % rectal cream       hyoscyamine (ANASPAZ,LEVSIN) 0.125 MG tablet TAKE ONE TABLET BY MOUTH FOUR TIMES A DAY WITH MEALS AND AT BEDTIME. 120 tablet 1    ketorolac (TORADOL) 10 MG tablet Take 1 tablet by mouth Every 6 (Six) Hours As Needed.      ketorolac (TORADOL) 10 MG tablet Take 1 tablet by mouth Every 6 (Six) Hours As Needed (Pain). 12 tablet 0    lamoTRIgine (LaMICtal) 25 MG tablet Take 1 tablet by mouth 2 (Two) Times a Day. Only takes at night      leucovorin 5 MG tablet Take  by mouth Every 6 (Six) Hours.      lidocaine-prilocaine (EMLA) 2.5-2.5 % cream Apply 1 application topically to the appropriate area as directed Every 2 (Two) Hours As Needed for Mild Pain. 30 g 1    loperamide (Imodium A-D) 2 MG tablet Take 1 tablet by mouth 4 (Four) Times a  Day As Needed for Diarrhea. Imodium - take 4 mg first dose, followed by 2 mg every 2-4 hours after each stool (MAX of 16 mg in 24 hour period) 60 tablet 1    losartan (COZAAR) 100 MG tablet Take 1 tablet by mouth Every Night.      medroxyPROGESTERone (DEPO-PROVERA) 150 MG/ML injection Inject 1 mL into the appropriate muscle as directed by prescriber Every 3 (Three) Months.      metoclopramide (REGLAN) 10 MG tablet Take 1 tablet by mouth 3 (Three) Times a Day.      metoprolol succinate XL (TOPROL-XL) 25 MG 24 hr tablet Take 1 tablet by mouth Daily.      Multiple Vitamins-Minerals (b complex-C-E-zinc) tablet Take 1 tablet by mouth Daily.      Myrbetriq 50 MG tablet sustained-release 24 hour 24 hr tablet       naloxone (NARCAN) 4 MG/0.1ML nasal spray Take 1 spray every day by nasal route as needed.      nystatin (MYCOSTATIN) 100,000 unit/mL suspension Swish and swallow 5 mL 4 (Four) Times a Day. 473 mL 1    ondansetron (ZOFRAN) 8 MG tablet Take 1 tablet by mouth 3 (Three) Times a Day As Needed for Nausea or Vomiting. 30 tablet 5    OXALIPLATIN IV Infuse  into a venous catheter.      pantoprazole (PROTONIX) 40 MG EC tablet Take 1 tablet by mouth 2 (Two) Times a Day. 60 tablet 1    Probiotic Product (INTREorg SYSTEMS PO) Take  by mouth Daily.      prochlorperazine (COMPAZINE) 10 MG tablet Take 1 tablet by mouth Every 6 (Six) Hours As Needed for Nausea or Vomiting. 30 tablet 5    rOPINIRole (REQUIP) 1 MG tablet Take 1 tablet by mouth Every Night.      sertraline (ZOLOFT) 100 MG tablet Take 0.5 tablets by mouth Daily.      sodium chloride (Ocean Nasal Spray) 0.65 % nasal spray 1 spray into the nostril(s) as directed by provider 2 (Two) Times a Day As Needed for Congestion. 480 mL 4    sucralfate (CARAFATE) 1 g tablet Take 1 tablet by mouth 2 (Two) Times a Day.      tetracycline (ACHROMYCIN,SUMYCIN) 250 MG capsule       traMADol (Ultram) 50 MG tablet Take 1 tablet by mouth Every 6 (Six) Hours As Needed for Severe Pain  or Moderate Pain. 5 tablet 0    vitamin D3 125 MCG (5000 UT) capsule capsule Take 2,000 Units by mouth Daily.      aspirin 81 MG EC tablet Take 1 tablet every day by oral route.      cefdinir (OMNICEF) 300 MG capsule       hydroCHLOROthiazide (HYDRODIURIL) 25 MG tablet Take 1 tablet every day by oral route for 90 days. (Patient not taking: Reported on 11/29/2023)      levoFLOXacin (Levaquin) 750 MG tablet Take 1 tablet by mouth Daily. (Patient not taking: Reported on 11/29/2023) 7 tablet 0    Lidocaine 4 % patch Apply 1 patch topically Daily. (Patient not taking: Reported on 11/29/2023)      linaclotide (LINZESS) 290 MCG capsule capsule Take 1 capsule by mouth As Needed. (Patient not taking: Reported on 11/29/2023)      polyethylene glycol (GaviLyte-G) 236 g solution        Current Facility-Administered Medications on File Prior to Visit   Medication Dose Route Frequency Provider Last Rate Last Admin    alteplase (CATHFLO/ACTIVASE) injection 2 mg  2 mg Intracatheter Q2H PRN Brian Marroquin MD   New Syringe/Cartridge at 12/13/23 0926    heparin injection 500 Units  500 Units Intravenous PRN Brian Marroquin MD   500 Units at 12/13/23 0824    sodium chloride 0.9 % bolus 1,000 mL  1,000 mL Intravenous Once Brian Marroquin MD        sodium chloride 0.9 % flush 20 mL  20 mL Intravenous PRN Brian Marroquin MD          Allergies   Allergen Reactions    Hydromorphone Hives, Itching and GI Intolerance    Morphine Hives, Itching and Nausea And Vomiting    Oxybutynin Swelling and Angioedema           Oxycodone-Acetaminophen Itching and Nausea And Vomiting     Can not take any higher than 7.5    Penicillins Rash    Promethazine Nausea And Vomiting    Tylenol With Codeine #3 [Acetaminophen-Codeine] Itching and Nausea And Vomiting    Oxybutynin Chloride Angioedema     Past Surgical History:   Procedure Laterality Date    BREAST SURGERY  2016    Removed 8lbs of tissue, 2016    CHOLECYSTECTOMY      COLON RESECTION Left 05/05/2023     Procedure: RESECTION OF DESCENDING COLON AND SPLENIC FLEXURE TAKEDOWN WITH DAVINCI ROBOT;  Surgeon: Rolando Liu MD;  Location: [facility] [place];  Service: Robotics - DaVinci;  Laterality: Left;    COLONOSCOPY N/A 04/04/2023    Procedure: COLONOSCOPY WITH POLYPECTOMY/SNARE/BIOPSIES/TATTOOING DESCENDING COLON MASS;  Surgeon: Deniz Dowd MD;  Location: [facility] ENDOSCOPY;  Service: Gastroenterology;  Laterality: N/A;  INCOMPLETE COLONOSCOPY  POOR BOWEL PREP  COLON POLYP  COLON MASS  DIVERTICULOSIS    COLONOSCOPY N/A 04/12/2023    Procedure: COLONOSCOPY with cold snare;  Surgeon: Deniz Dowd MD;  Location: [facility] ENDOSCOPY;  Service: Gastroenterology;  Laterality: N/A;  colon polyps, diverticulosis, colon mass, hemorrhoids      CYSTOSCOPY W/ URETERAL STENT PLACEMENT Left 05/05/2023    Procedure: Cystoscopy, left ureteral injection,;  Surgeon: Manjula Randolph MD;  Location: [facility] [place];  Service: Urology;  Laterality: Left;    FRACTURE SURGERY  2001    Left knee    KNEE SURGERY Left     x4    LAPAROSCOPIC TUBAL LIGATION  1995    LAPAROSCOPIC TUBAL LIGATION      Reversed    REDUCTION MAMMAPLASTY  2009    8lbs of tissue was removed    TONSILLECTOMY      UPPER GASTROINTESTINAL ENDOSCOPY      VENOUS ACCESS DEVICE (PORT) INSERTION N/A 6/5/2023    Procedure: INSERTION VENOUS ACCESS DEVICE;  Surgeon: Rolando Liu MD;  Location: [facility];  Service: General;  Laterality: N/A;     Social History     Socioeconomic History    Marital status:    Tobacco Use    Smoking status: Never     Passive exposure: Never    Smokeless tobacco: Never   Vaping Use    Vaping Use: Never used   Substance and Sexual Activity    Alcohol use: Never    Drug use: Never    Sexual activity: Defer     Family History   Problem Relation Age of Onset    Colon cancer Neg Hx        Results     Result Review   The following data was reviewed by: Brian Marroquin MD on 04/13/2023:  Lab Results   Component Value Date    HGB 12.5  12/13/2023    HCT 35.7 12/13/2023    MCV 94.4 12/13/2023     12/13/2023    WBC 4.63 12/13/2023    NEUTROABS 2.26 12/13/2023    LYMPHSABS 1.63 12/13/2023    MONOSABS 0.60 12/13/2023    EOSABS 0.10 12/13/2023    BASOSABS 0.03 12/13/2023     Lab Results   Component Value Date    GLUCOSE 97 12/13/2023    BUN 9 12/13/2023    CREATININE 0.76 12/13/2023     12/13/2023    K 4.1 12/13/2023     (H) 12/13/2023    CO2 23.7 12/13/2023    CALCIUM 9.7 12/13/2023    PROTEINTOT 6.6 12/13/2023    ALBUMIN 4.1 12/13/2023    BILITOT 0.5 12/13/2023    ALKPHOS 110 12/13/2023    AST 24 12/13/2023    ALT 18 12/13/2023     Lab Results   Component Value Date    MG 2.1 10/25/2023    PHOS 3.3 06/18/2023       Case Report   Surgical Pathology Report                         Case: [id number]                                   Authorizing Provider:  Deniz Dowd MD    Collected:           04/12/2023 09:41 AM           Ordering Location:     [facility] Received:            04/12/2023 11:41 AM                                  SUITES                                                                        Pathologist:           Niesha Torres DO                                                        Specimens:   1) - Large Intestine, Cecum, cecal polyp cold snare                                                  2) - Large Intestine, Sigmoid Colon, sigmoid colon polyp cold snare                        Clinical Information    Mass of colon   Final Diagnosis   1. Cecum polyp, biopsy:                            - Tubular adenoma        2. Sigmoid colon polyp, biopsy:                            - Tubular adenoma    Electronically signed by Niesha Torres DO on 4/13/2023 at 0840     Surgical Pathology Report                         Case: [id number]                                   Authorizing Provider:  Deinz Dowd MD    Collected:           04/04/2023 11:15 AM           Ordering Location:      [facility] Received:            04/04/2023 11:59 AM                                  SUITES                                                                        Pathologist:           Jennifer Mike MD                                                      Specimens:   1) - Large Intestine, Transverse Colon, TRANSVERSE COLON POLYP                                       2) - Large Intestine, Left / Descending Colon, DESCENDING COLON BIOPSIES                   Clinical Information    Constipation, unspecified constipation type   Final Diagnosis   1. Transverse colon polyp, biopsy:               - Fragments of tubular adenoma        2.  Descending colon, biopsy:               -Adenocarcinoma, moderately to poorly differentiated     Comment: Per policy, reflex testing has been ordered, and the results will be separately reported.     REMARKS: The above positive (malignant) diagnosis was called to April in Dr. Dowd's office at 09:09 EDT on 4/5/2023 by s.          Electronically signed by Jennifer Mike MD on 4/5/2023 at 0964         Surgical Pathology Report                         Case: [id number]                                   Authorizing Provider:  Rolando Liu MD        Collected:           05/05/2023 03:06 PM           Ordering Location:     [facility]  Received:            05/08/2023 06:48 AM                                  OR                                                                            Pathologist:           Jt Florence MD                                                             Specimen:    Large Intestine, Left / Descending Colon, left colon                                       Clinical Information    Malignant neoplasm of descending colon   Final Diagnosis   Left colon, resection:               - Adenocarcinoma               - Three of thirty-three lymph nodes positive for carcinoma (3/33)               - See synoptic checklist    Electronically signed by Jt Florence MD on 5/11/2023 at 1508   Synoptic Checklist   COLON AND RECTUM: Resection, Including Transanal Disk Excision of Rectal Neoplasms  8th Edition - Protocol posted: 6/22/2022  COLON AND RECTUM: RESECTION - All Specimens  SPECIMEN   Procedure  Descending colon resection    TUMOR   Tumor Site  Descending colon    Histologic Type  Adenocarcinoma    Histologic Grade  G3, poorly differentiated    Tumor Size  Greatest dimension (Centimeters): 3.5 cm   Tumor Extent  Invades through muscularis propria into the pericolonic or perirectal tissue    Macroscopic Tumor Perforation  Not identified    Lymphovascular Invasion  Small vessel    Perineural Invasion  Present    Treatment Effect  No known presurgical therapy    MARGINS   Margin Status for Invasive Carcinoma  All margins negative for invasive carcinoma    Closest Margin(s) to Invasive Carcinoma  Radial (circumferential) or mesenteric    Distance from Invasive Carcinoma to Closest Margin  3.5 cm   Margin Status for Non-Invasive Tumor  All margins negative for high-grade dysplasia / intramucosal carcinoma and low-grade dysplasia    REGIONAL LYMPH NODES   Regional Lymph Node Status  Tumor present in regional lymph node(s)    Number of Lymph Nodes with Tumor  3    Number of Lymph Nodes Examined  33    Tumor Deposits  Present    Number of Tumor Deposits  1    PATHOLOGIC STAGE CLASSIFICATION (pTNM, AJCC 8th Edition)   Reporting of pT, pN, and (when applicable) pM categories is based on information available to the pathologist at the time the report is issued. As per the AJCC (Chapter 1, 8th Ed.) it is the managing physician's responsibility to establish the final pathologic stage based upon all pertinent information, including but potentially not limited to this pathology report.   pT Category  pT3    pN Category  pN1b    ADDITIONAL FINDINGS   Additional Findings  None identified    .          IR Port Study Including Fluoro    Result Date:  11/15/2023  Impression: Right IJ vein port infusion catheter demonstrated large fibrin sheath/thrombus at the distal aspect of the catheter.  Consider instillation of Cathflo.       CLIFTON WOODSON       Electronically Signed and Approved By: Allan Woods M.D. on 11/15/2023 at 8:57              Assessment & Plan     Diagnoses and all orders for this visit:    1. Primary adenocarcinoma of descending colon (Primary)    Other orders  -     Cancel: sodium chloride 0.9 % bolus 1,000 mL  -     dextrose (D5W) 5 % infusion 250 mL  -     palonosetron (ALOXI) injection 0.25 mg  -     dexAMETHasone (DECADRON) 12 mg in sodium chloride 0.9 % IVPB  -     OXALIplatin (ELOXATIN) 200 mg in dextrose (D5W) 5 % 290 mL chemo IVPB  -     leucovorin 960 mg in dextrose (D5W) 5 % 346 mL IVPB  -     fluorouracil (ADRUCIL) chemo injection 960 mg  -     Hydrocortisone Sod Suc (PF) (Solu-CORTEF) injection 100 mg  -     diphenhydrAMINE (BENADRYL) injection 50 mg  -     famotidine (PEPCID) injection 20 mg                      Lilian Pelaez is a 48 y.o. female who presents to [facility] HEMATOLOGY & ONCOLOGY evaluation prior to systemic therapy for stage III colon cancer, patient status post resection of descending colon splenic flexure by Dr. Rolando Liu on 5/5/2023.     Reviewed NCCN guidelines for her stage III cancer, discussed high risk features seen on pathology report and discussed CapeOx for 3 months versus FOLFOX for 3 to 6 months with patient and  today.  Patient agreed to plan to undergo FOLFOX IV chemotherapy every 2 weeks for 6 months.    Discussed restaging imaging scans obtained on 10/9/2023 which was without evidence of disease recurrence or metastatic disease.     Patient received cycle 1 on June 14, 2023, she developed pneumoperitoneum which her surgeon suspect was secondary to severe constipation from iron tablets, but patient was cleared to resume chemotherapy after 8/1/2023.    Iron deficiency anemia  from GI blood loss  -Evidence of this on lab work from May 2023  - We will continue to monitor and will offer IV iron therapy in the future if she becomes iron deficient secondary to pneumoperitoneum that developed in June 2023 from severe constipation.    Chemotherapy-induced nausea  - Currently well controlled with Zofran, but prescribedCompazine as a backup option  - Patient also taking Reglan and was counseled regarding not taking Reglan with Zofran and Compazine together but rather patient to take other antiemetics at least 4 to 6 hours after Reglan dose.    Chemotherapy induced diarrhea  - Patient prescribed Imodium  -We will prescribe Lomotil if Imodium fails to work.    Muscle cramps  -continuing to monitor her electrolytes and replete as necessary  -suspect from dehydration, will administer 1L of NS with treatment today  -will continue to monitor    Pt with loss of taste, mouth with some evidence of thrush- ordered nystatin swish and spit, recommend pt continue, also recommended pt take pantoprazole BID to see if that helps with acid reflux symptoms.    Recommend nasal ocean spray since pt with symptoms of dry nasal passages and nasal congestion from seasonal allergies, provided prescription for this today.    Ordered IVFs as suspect pt is dehydrated from decrease in oral intake for today's chemotherapy infusion and each subsequent chemo infusion.    Given shoulder pain after last chemo infusion (which as become prolonged lasting more days after last 2 infusions, pt underwent port study which was negative revealed fibrin sheath), discussed with pharmacist and shared plan to continue with 5FU infusion alone, will discontinue oxaliplatin starting with cycle 10. But pt with continued shoulder pain despite oxaliplatin being discontinued thus plan to proceed with 5FU bolus infusion alone (no 5 FU pump infusion) and oxaliplatin for the last 2 cycles of chemotherapy.     Labs reviewed and plan to proceed with  cycle 11 day 1 on today (with modifications listed above).    Plan patient follow-up in the next 2 weeks for cycle 12 day 1 of chemotherapy. Plan for 12 cycles.    Plan to re-scan pt 3 months after she completes chemotherapy.    Patient verbalized understanding and agreement plan.    Please note that portions of this note were completed with a voice recognition program.      Electronically signed by Brian Marroquin MD, 12/13/23, 9:55 AM EST.      Follow Up     I spent 30 minutes caring for Lilian on this date of service. This time includes time spent by me in the following activities:preparing for the visit, reviewing tests, obtaining and/or reviewing a separately obtained history, performing a medically appropriate examination and/or evaluation , counseling and educating the patient/family/caregiver, ordering medications, tests, or procedures, documenting information in the medical record and care coordination.    This is an acute or chronic illness that poses a threat to life or bodily function. The above treatment plan involves a high risk of complications and/or mortality of patient management.    The patient was seen and examined. Work by the provider also included review and/or ordering of lab tests, review and/or ordering of radiology tests, review and/or ordering of medicine tests, discussion with other physicians or providers, independent review of data, obtaining old records, review/summation of old records, and/or other review.    I have reviewed the family history, social history, and past medical history for this patient. Previous information and data has been reviewed and updated as needed. I have reviewed and verified the chief complaint, history, and other documentation. The patient was interviewed and examined in the clinic and the chart reviewed. The previous observations, recommendations, and conclusions were reviewed including those of other providers.     The plan was discussed with the patient  and/or family. The patient was given time to ask questions and these questions were answered. At the conclusion of their visit they had no additional questions or concerns and all questions were answered to their satisfaction.    Patient was given instructions and counseling regarding her condition or for health maintenance advice. Please see specific information pulled into the AVS if appropriate.

## 2023-12-27 ENCOUNTER — HOSPITAL ENCOUNTER (OUTPATIENT)
Dept: ONCOLOGY | Facility: HOSPITAL | Age: 48
Discharge: HOME | End: 2023-12-27
Admitting: INTERNAL MEDICINE
Payer: COMMERCIAL

## 2023-12-27 ENCOUNTER — OFFICE VISIT (OUTPATIENT)
Dept: ONCOLOGY | Facility: HOSPITAL | Age: 48
End: 2023-12-27
Payer: COMMERCIAL

## 2023-12-27 ENCOUNTER — DOCUMENTATION (OUTPATIENT)
Dept: ONCOLOGY | Facility: HOSPITAL | Age: 48
End: 2023-12-27
Payer: COMMERCIAL

## 2023-12-27 VITALS
BODY MASS INDEX: 45.17 KG/M2 | WEIGHT: 264.55 LBS | OXYGEN SATURATION: 100 % | SYSTOLIC BLOOD PRESSURE: 135 MMHG | TEMPERATURE: 97.9 F | DIASTOLIC BLOOD PRESSURE: 88 MMHG | HEIGHT: 64 IN | RESPIRATION RATE: 18 BRPM | HEART RATE: 78 BPM

## 2023-12-27 VITALS
SYSTOLIC BLOOD PRESSURE: 135 MMHG | TEMPERATURE: 97.9 F | OXYGEN SATURATION: 100 % | RESPIRATION RATE: 18 BRPM | DIASTOLIC BLOOD PRESSURE: 88 MMHG | HEART RATE: 78 BPM | WEIGHT: 264.6 LBS | BODY MASS INDEX: 45.4 KG/M2

## 2023-12-27 DIAGNOSIS — E87.6 HYPOKALEMIA: ICD-10-CM

## 2023-12-27 DIAGNOSIS — C18.6 MALIGNANT NEOPLASM OF DESCENDING COLON: Primary | ICD-10-CM

## 2023-12-27 DIAGNOSIS — G62.0 CHEMOTHERAPY-INDUCED NEUROPATHY: ICD-10-CM

## 2023-12-27 DIAGNOSIS — T45.1X5A CHEMOTHERAPY-INDUCED NEUROPATHY: ICD-10-CM

## 2023-12-27 DIAGNOSIS — Z45.2 ENCOUNTER FOR ADJUSTMENT OR MANAGEMENT OF VASCULAR ACCESS DEVICE: ICD-10-CM

## 2023-12-27 LAB
ALBUMIN SERPL-MCNC: 4.1 G/DL (ref 3.5–5.2)
ALBUMIN/GLOB SERPL: 1.7 G/DL
ALP SERPL-CCNC: 111 U/L (ref 39–117)
ALT SERPL W P-5'-P-CCNC: 24 U/L (ref 1–33)
ANION GAP SERPL CALCULATED.3IONS-SCNC: 9.1 MMOL/L (ref 5–15)
AST SERPL-CCNC: 31 U/L (ref 1–32)
BASOPHILS # BLD AUTO: 0.03 10*3/MM3 (ref 0–0.2)
BASOPHILS NFR BLD AUTO: 0.5 % (ref 0–1.5)
BILIRUB SERPL-MCNC: 0.5 MG/DL (ref 0–1.2)
BUN SERPL-MCNC: 7 MG/DL (ref 6–20)
BUN/CREAT SERPL: 10.3 (ref 7–25)
CALCIUM SPEC-SCNC: 9.2 MG/DL (ref 8.6–10.5)
CEA SERPL-MCNC: 2.15 NG/ML
CHLORIDE SERPL-SCNC: 108 MMOL/L (ref 98–107)
CO2 SERPL-SCNC: 22.9 MMOL/L (ref 22–29)
CREAT SERPL-MCNC: 0.68 MG/DL (ref 0.57–1)
DEPRECATED RDW RBC AUTO: 47.1 FL (ref 37–54)
EGFRCR SERPLBLD CKD-EPI 2021: 107.6 ML/MIN/1.73
EOSINOPHIL # BLD AUTO: 0.08 10*3/MM3 (ref 0–0.4)
EOSINOPHIL NFR BLD AUTO: 1.4 % (ref 0.3–6.2)
ERYTHROCYTE [DISTWIDTH] IN BLOOD BY AUTOMATED COUNT: 13.9 % (ref 12.3–15.4)
GLOBULIN UR ELPH-MCNC: 2.4 GM/DL
GLUCOSE SERPL-MCNC: 105 MG/DL (ref 65–99)
HCT VFR BLD AUTO: 35.8 % (ref 34–46.6)
HGB BLD-MCNC: 12.6 G/DL (ref 12–15.9)
IMM GRANULOCYTES # BLD AUTO: 0.03 10*3/MM3 (ref 0–0.05)
IMM GRANULOCYTES NFR BLD AUTO: 0.5 % (ref 0–0.5)
LYMPHOCYTES # BLD AUTO: 2.02 10*3/MM3 (ref 0.7–3.1)
LYMPHOCYTES NFR BLD AUTO: 34.9 % (ref 19.6–45.3)
MCH RBC QN AUTO: 32.8 PG (ref 26.6–33)
MCHC RBC AUTO-ENTMCNC: 35.2 G/DL (ref 31.5–35.7)
MCV RBC AUTO: 93.2 FL (ref 79–97)
MONOCYTES # BLD AUTO: 0.92 10*3/MM3 (ref 0.1–0.9)
MONOCYTES NFR BLD AUTO: 15.9 % (ref 5–12)
NEUTROPHILS NFR BLD AUTO: 2.71 10*3/MM3 (ref 1.7–7)
NEUTROPHILS NFR BLD AUTO: 46.8 % (ref 42.7–76)
PLATELET # BLD AUTO: 150 10*3/MM3 (ref 140–450)
PMV BLD AUTO: 10 FL (ref 6–12)
POTASSIUM SERPL-SCNC: 3.4 MMOL/L (ref 3.5–5.2)
PROT SERPL-MCNC: 6.5 G/DL (ref 6–8.5)
RBC # BLD AUTO: 3.84 10*6/MM3 (ref 3.77–5.28)
SODIUM SERPL-SCNC: 140 MMOL/L (ref 136–145)
WBC NRBC COR # BLD AUTO: 5.79 10*3/MM3 (ref 3.4–10.8)

## 2023-12-27 PROCEDURE — 80053 COMPREHEN METABOLIC PANEL: CPT | Performed by: INTERNAL MEDICINE

## 2023-12-27 PROCEDURE — 96415 CHEMO IV INFUSION ADDL HR: CPT

## 2023-12-27 PROCEDURE — 0 DEXTROSE 5 % SOLUTION: Performed by: INTERNAL MEDICINE

## 2023-12-27 PROCEDURE — 82378 CARCINOEMBRYONIC ANTIGEN: CPT | Performed by: INTERNAL MEDICINE

## 2023-12-27 PROCEDURE — 25010000002 FLUOROURACIL PER 500 MG: Performed by: INTERNAL MEDICINE

## 2023-12-27 PROCEDURE — 96549 UNLISTED CHEMOTHERAPY PX: CPT

## 2023-12-27 PROCEDURE — 96375 TX/PRO/DX INJ NEW DRUG ADDON: CPT

## 2023-12-27 PROCEDURE — 25010000002 OXALIPLATIN PER 0.5 MG: Performed by: INTERNAL MEDICINE

## 2023-12-27 PROCEDURE — 25010000002 HEPARIN LOCK FLUSH PER 10 UNITS: Performed by: INTERNAL MEDICINE

## 2023-12-27 PROCEDURE — 25010000002 LEUCOVORIN CALCIUM PER 50 MG: Performed by: INTERNAL MEDICINE

## 2023-12-27 PROCEDURE — 96368 THER/DIAG CONCURRENT INF: CPT

## 2023-12-27 PROCEDURE — 96413 CHEMO IV INFUSION 1 HR: CPT

## 2023-12-27 PROCEDURE — 25010000002 PALONOSETRON PER 25 MCG: Performed by: INTERNAL MEDICINE

## 2023-12-27 PROCEDURE — 85025 COMPLETE CBC W/AUTO DIFF WBC: CPT | Performed by: INTERNAL MEDICINE

## 2023-12-27 PROCEDURE — 25010000002 DEXAMETHASONE SODIUM PHOSPHATE 120 MG/30ML SOLUTION: Performed by: INTERNAL MEDICINE

## 2023-12-27 PROCEDURE — 0 DEXTROSE 5 % SOLUTION 250 ML FLEX CONT: Performed by: INTERNAL MEDICINE

## 2023-12-27 PROCEDURE — 96411 CHEMO IV PUSH ADDL DRUG: CPT

## 2023-12-27 RX ORDER — DIPHENHYDRAMINE HYDROCHLORIDE 50 MG/ML
50 INJECTION INTRAMUSCULAR; INTRAVENOUS AS NEEDED
Status: DISCONTINUED | OUTPATIENT
Start: 2023-12-27 | End: 2023-12-28 | Stop reason: HOSPADM

## 2023-12-27 RX ORDER — FLUOROURACIL 50 MG/ML
950 INJECTION, SOLUTION INTRAVENOUS ONCE
Status: COMPLETED | OUTPATIENT
Start: 2023-12-27 | End: 2023-12-27

## 2023-12-27 RX ORDER — FAMOTIDINE 10 MG/ML
20 INJECTION, SOLUTION INTRAVENOUS AS NEEDED
Status: DISCONTINUED | OUTPATIENT
Start: 2023-12-27 | End: 2023-12-28 | Stop reason: HOSPADM

## 2023-12-27 RX ORDER — HEPARIN SODIUM (PORCINE) LOCK FLUSH IV SOLN 100 UNIT/ML 100 UNIT/ML
500 SOLUTION INTRAVENOUS AS NEEDED
Status: DISCONTINUED | OUTPATIENT
Start: 2023-12-27 | End: 2023-12-28 | Stop reason: HOSPADM

## 2023-12-27 RX ORDER — FLUOROURACIL 50 MG/ML
400 INJECTION, SOLUTION INTRAVENOUS ONCE
Status: CANCELLED | OUTPATIENT
Start: 2023-12-27

## 2023-12-27 RX ORDER — ONDANSETRON HYDROCHLORIDE 8 MG/1
8 TABLET, FILM COATED ORAL 3 TIMES DAILY PRN
Qty: 30 TABLET | Refills: 5 | Status: SHIPPED | OUTPATIENT
Start: 2023-12-27

## 2023-12-27 RX ORDER — DIPHENHYDRAMINE HYDROCHLORIDE 50 MG/ML
50 INJECTION INTRAMUSCULAR; INTRAVENOUS AS NEEDED
Status: CANCELLED | OUTPATIENT
Start: 2023-12-27

## 2023-12-27 RX ORDER — SODIUM CHLORIDE 0.9 % (FLUSH) 0.9 %
20 SYRINGE (ML) INJECTION AS NEEDED
OUTPATIENT
Start: 2023-12-27

## 2023-12-27 RX ORDER — FAMOTIDINE 10 MG/ML
20 INJECTION, SOLUTION INTRAVENOUS AS NEEDED
Status: CANCELLED | OUTPATIENT
Start: 2023-12-27

## 2023-12-27 RX ORDER — DEXTROSE MONOHYDRATE 50 MG/ML
250 INJECTION, SOLUTION INTRAVENOUS ONCE
Status: COMPLETED | OUTPATIENT
Start: 2023-12-27 | End: 2023-12-27

## 2023-12-27 RX ORDER — GABAPENTIN 300 MG/1
300 CAPSULE ORAL 2 TIMES DAILY
Qty: 60 CAPSULE | Refills: 0 | Status: SHIPPED | OUTPATIENT
Start: 2023-12-27

## 2023-12-27 RX ORDER — PALONOSETRON 0.05 MG/ML
0.25 INJECTION, SOLUTION INTRAVENOUS ONCE
Status: CANCELLED | OUTPATIENT
Start: 2023-12-27

## 2023-12-27 RX ORDER — PALONOSETRON 0.05 MG/ML
0.25 INJECTION, SOLUTION INTRAVENOUS ONCE
Status: COMPLETED | OUTPATIENT
Start: 2023-12-27 | End: 2023-12-27

## 2023-12-27 RX ORDER — HEPARIN SODIUM (PORCINE) LOCK FLUSH IV SOLN 100 UNIT/ML 100 UNIT/ML
500 SOLUTION INTRAVENOUS AS NEEDED
OUTPATIENT
Start: 2023-12-27

## 2023-12-27 RX ORDER — DEXTROSE MONOHYDRATE 50 MG/ML
250 INJECTION, SOLUTION INTRAVENOUS ONCE
Status: CANCELLED | OUTPATIENT
Start: 2023-12-27

## 2023-12-27 RX ORDER — SODIUM CHLORIDE 0.9 % (FLUSH) 0.9 %
20 SYRINGE (ML) INJECTION AS NEEDED
Status: DISCONTINUED | OUTPATIENT
Start: 2023-12-27 | End: 2023-12-28 | Stop reason: HOSPADM

## 2023-12-27 RX ORDER — POTASSIUM CHLORIDE 750 MG/1
20 TABLET, EXTENDED RELEASE ORAL DAILY
Qty: 28 TABLET | Refills: 0 | Status: SHIPPED | OUTPATIENT
Start: 2023-12-27

## 2023-12-27 RX ADMIN — FLUOROURACIL 950 MG: 50 INJECTION, SOLUTION INTRAVENOUS at 13:30

## 2023-12-27 RX ADMIN — PALONOSETRON HYDROCHLORIDE 0.25 MG: 0.25 INJECTION INTRAVENOUS at 10:41

## 2023-12-27 RX ADMIN — DEXTROSE MONOHYDRATE 250 ML: 50 INJECTION, SOLUTION INTRAVENOUS at 10:26

## 2023-12-27 RX ADMIN — DEXAMETHASONE SODIUM PHOSPHATE 12 MG: 4 INJECTION, SOLUTION INTRA-ARTICULAR; INTRALESIONAL; INTRAMUSCULAR; INTRAVENOUS; SOFT TISSUE at 10:45

## 2023-12-27 RX ADMIN — OXALIPLATIN 200 MG: 5 INJECTION, SOLUTION INTRAVENOUS at 11:15

## 2023-12-27 RX ADMIN — HEPARIN SODIUM (PORCINE) LOCK FLUSH IV SOLN 100 UNIT/ML 500 UNITS: 100 SOLUTION at 13:43

## 2023-12-27 RX ADMIN — LEUCOVORIN CALCIUM 950 MG: 350 INJECTION, POWDER, LYOPHILIZED, FOR SOLUTION INTRAMUSCULAR; INTRAVENOUS at 11:15

## 2023-12-27 RX ADMIN — Medication 20 ML: at 13:43

## 2023-12-27 NOTE — PROGRESS NOTES
Chief Complaint/Care Team   Primary adenocarcinoma of descending colon    Shelly Cash MD Stephens, Cassandra, MD    History of Present Illness     Diagnosis: Colon Adenocarcinoma S/p Left colectomy on 5/5/23, stage after resection [id number], stage III    Current Treatment: FOLFOX IV [id number] x 6 months, cycle 1 on 6/14/2023  Treatment delay secondary to pneumoperitoneum which developed in June 2023, patient cleared to resume chemotherapy in August 2023.    Previous Treatment: c-scope on 4/2023 biopsy revealed colon adenocarcinoma    Lilian Pelaez is a 48 y.o. female who presents to [facility] HEMATOLOGY & ONCOLOGY for discussion of newly diagnosed colon adenocarcinoma seen on biopsy of colon mass in the descending colon during colonoscopy performed in April 2023.     Staging scans revealed on 4/11/2023:  CT abdomen/pelvis without any metastatic disease in abdomen/pelvis  CT chest no evidence of metastatic disease in chest.    Patient underwent robotic resection of descending colon and splenic flexure by Dr. Rolando Liu on 5/5/2023.    Patient is a former smoker.   Patient reports family history of several relatives with other forms of cancer cancer on her mother side of the family, but denies any known history of colon cancer in her family.    Patient reports noticing some blood mixed in with stool prior to cancer diagnosis.      Patient received cycle 1 on 6/14/2023, cycle 2 was delayed secondary to development of pneumoperitoneum after cycle 1, patient was mated to the hospital very by her surgeon Dr. Liu who recommended delaying further cycles of chemotherapy until August 1, 2023.    Interval history: Patient here prior to cycle 12 of chemotherapy with FOLFOX.  Patient cleared by Dr. Rolando Liu to resume chemotherapy in August 2023 after developing pneumoperitoneum in June 2023.  She without report of fever, chills, SOA, melena, or blood loss. She reports recovering from stomach bug  that resulted in her having several episodes of n/v/d on 12/24/2023. She reports feeling better today and denies any n/v within the lasts 24 hours. Pt denies experiencing shoulder pain with removal of 5FU continuous infusion with last cycle of chemotherapy. She continues to have peripheral neuropathy in her hands and feet, reports falling (didn't hit her head) due to not being able to feel the ground.     Review of Systems   Constitutional:  Positive for activity change, appetite change and fatigue. Negative for diaphoresis, fever, unexpected weight gain and unexpected weight loss.   HENT:  Negative for congestion, hearing loss, mouth sores, nosebleeds, sore throat, swollen glands, trouble swallowing and voice change.    Eyes:  Negative for blurred vision.   Respiratory:  Negative for cough, shortness of breath and wheezing.    Cardiovascular:  Negative for chest pain and palpitations.   Gastrointestinal:  Positive for nausea. Negative for abdominal pain, blood in stool, constipation, diarrhea and vomiting.   Endocrine: Positive for cold intolerance. Negative for heat intolerance.   Genitourinary:  Negative for difficulty urinating, dysuria, frequency, hematuria and urinary incontinence.   Musculoskeletal:  Negative for arthralgias, back pain and myalgias.   Skin:  Negative for rash, skin lesions and wound.   Neurological:  Positive for dizziness. Negative for seizures, weakness, numbness and headache.   Hematological:  Does not bruise/bleed easily.   Psychiatric/Behavioral:  Negative for depressed mood. The patient is nervous/anxious.    All other systems reviewed and are negative.       Oncology/Hematology History   Primary adenocarcinoma of descending colon   4/11/2023 Initial Diagnosis    Primary adenocarcinoma of descending colon     9/27/2023 -  Chemotherapy    OP SUPPORTIVE HYDRATION + ANTIEMETICS     Malignant neoplasm of descending colon   5/6/2023 Initial Diagnosis    Malignant neoplasm of descending  "colon     5/31/2023 Cancer Staged    Staging form: Colon And Rectum, AJCC 8th Edition  - Pathologic: Stage IIIB (pT3, pN1b, cM0) - Signed by Brian Marroquin MD on 5/31/2023 6/14/2023 -  Chemotherapy    OP COLON [id number] OXALIplatin / Leucovorin / Fluorouracil         Objective     Vitals:    12/27/23 0915   BP: 135/88   Pulse: 78   Resp: 18   Temp: 97.9 °F (36.6 °C)   SpO2: 100%   Weight: 120 kg (264 lb 8.8 oz)   Height: 162.6 cm (64.02\")   PainSc:   5   PainLoc: Back                 ECOG score: 0         PHQ-9 Total Score:         Physical Exam  Vitals reviewed. Exam conducted with a chaperone present.   Constitutional:       General: She is not in acute distress.     Appearance: Normal appearance.   HENT:      Head: Normocephalic and atraumatic.      Mouth/Throat:      Mouth: Mucous membranes are dry.   Eyes:      Extraocular Movements: Extraocular movements intact.      Conjunctiva/sclera: Conjunctivae normal.   Pulmonary:      Effort: Pulmonary effort is normal.   Musculoskeletal:      Cervical back: Normal range of motion and neck supple.   Skin:     General: Skin is warm and dry.      Findings: No bruising.   Neurological:      Mental Status: She is oriented to person, place, and time.           Past Medical History     Past Medical History:   Diagnosis Date    Anesthesia     B/P bottomed out/UNSURE ON THE DATE    Anxiety     Asthma 1996    seasonal/NO INHALERS    Colon cancer 04/17/2023    DDD (degenerative disc disease), cervical     DDD (degenerative disc disease), lumbar     Depression     Diverticulitis of colon 2005    Scope was done in [facility]    GERD (gastroesophageal reflux disease)     Hypertension     Kidney stones     Melanoma     removed    Palpitation     FOLLOWS W/DR. ANTONIO  Q6 MONTHS, NO CP OR SOA    Prolapse of female bladder      Current Outpatient Medications on File Prior to Visit   Medication Sig Dispense Refill    allopurinol (ZYLOPRIM) 100 MG tablet Take 1 tablet by mouth Daily.   "    ALPRAZolam (XANAX) 0.5 MG tablet Take 1 tablet by mouth At Night As Needed.      amLODIPine (NORVASC) 10 MG tablet Take 1 tablet by mouth Every Night.      Diclofenac Sodium (VOLTAREN) 1 % gel gel Apply 4 g topically to the appropriate area as directed 4 (Four) Times a Day As Needed.      docusate sodium 100 MG capsule Take 1 capsule by mouth Daily.      fluorouracil (EFUDEX) 5 % cream Apply 1 application  topically to the appropriate area as directed 2 (Two) Times a Day.      furosemide (LASIX) 20 MG tablet Take 1 tablet by mouth Daily.      furosemide (LASIX) 40 MG tablet       HYDROcodone-acetaminophen (NORCO) 7.5-325 MG per tablet Take 1 tablet by mouth Every 8 (Eight) Hours As Needed.      Hydrocortisone, Perianal, (ANUSOL-HC) 2.5 % rectal cream       hyoscyamine (ANASPAZ,LEVSIN) 0.125 MG tablet TAKE ONE TABLET BY MOUTH FOUR TIMES A DAY WITH MEALS AND AT BEDTIME. 120 tablet 1    ketorolac (TORADOL) 10 MG tablet Take 1 tablet by mouth Every 6 (Six) Hours As Needed.      ketorolac (TORADOL) 10 MG tablet Take 1 tablet by mouth Every 6 (Six) Hours As Needed (Pain). 12 tablet 0    lamoTRIgine (LaMICtal) 25 MG tablet Take 1 tablet by mouth 2 (Two) Times a Day. Only takes at night      leucovorin 5 MG tablet Take  by mouth Every 6 (Six) Hours.      lidocaine-prilocaine (EMLA) 2.5-2.5 % cream Apply 1 application topically to the appropriate area as directed Every 2 (Two) Hours As Needed for Mild Pain. 30 g 1    loperamide (Imodium A-D) 2 MG tablet Take 1 tablet by mouth 4 (Four) Times a Day As Needed for Diarrhea. Imodium - take 4 mg first dose, followed by 2 mg every 2-4 hours after each stool (MAX of 16 mg in 24 hour period) 60 tablet 1    losartan (COZAAR) 100 MG tablet Take 1 tablet by mouth Every Night.      medroxyPROGESTERone (DEPO-PROVERA) 150 MG/ML injection Inject 1 mL into the appropriate muscle as directed by prescriber Every 3 (Three) Months.      metoclopramide (REGLAN) 10 MG tablet Take 1 tablet by  mouth 3 (Three) Times a Day.      metoprolol succinate XL (TOPROL-XL) 25 MG 24 hr tablet Take 1 tablet by mouth Daily.      Multiple Vitamins-Minerals (b complex-C-E-zinc) tablet Take 1 tablet by mouth Daily.      Myrbetriq 50 MG tablet sustained-release 24 hour 24 hr tablet       naloxone (NARCAN) 4 MG/0.1ML nasal spray Take 1 spray every day by nasal route as needed.      nystatin (MYCOSTATIN) 100,000 unit/mL suspension Swish and swallow 5 mL 4 (Four) Times a Day. 473 mL 1    OXALIPLATIN IV Infuse  into a venous catheter.      pantoprazole (PROTONIX) 40 MG EC tablet Take 1 tablet by mouth 2 (Two) Times a Day. 60 tablet 1    Probiotic Product (ContaAzul PO) Take  by mouth Daily.      prochlorperazine (COMPAZINE) 10 MG tablet Take 1 tablet by mouth Every 6 (Six) Hours As Needed for Nausea or Vomiting. 30 tablet 5    rOPINIRole (REQUIP) 1 MG tablet Take 1 tablet by mouth Every Night.      sertraline (ZOLOFT) 100 MG tablet Take 0.5 tablets by mouth Daily.      sodium chloride (Ocean Nasal Spray) 0.65 % nasal spray 1 spray into the nostril(s) as directed by provider 2 (Two) Times a Day As Needed for Congestion. 480 mL 4    sucralfate (CARAFATE) 1 g tablet Take 1 tablet by mouth 2 (Two) Times a Day.      tetracycline (ACHROMYCIN,SUMYCIN) 250 MG capsule       traMADol (Ultram) 50 MG tablet Take 1 tablet by mouth Every 6 (Six) Hours As Needed for Severe Pain or Moderate Pain. 5 tablet 0    vitamin D3 125 MCG (5000 UT) capsule capsule Take 2,000 Units by mouth Daily.      [DISCONTINUED] ondansetron (ZOFRAN) 8 MG tablet Take 1 tablet by mouth 3 (Three) Times a Day As Needed for Nausea or Vomiting. 30 tablet 5    aspirin 81 MG EC tablet Take 1 tablet every day by oral route.      cefdinir (OMNICEF) 300 MG capsule       hydroCHLOROthiazide (HYDRODIURIL) 25 MG tablet Take 1 tablet every day by oral route for 90 days. (Patient not taking: Reported on 11/29/2023)      levoFLOXacin (Levaquin) 750 MG tablet Take 1  tablet by mouth Daily. (Patient not taking: Reported on 11/29/2023) 7 tablet 0    Lidocaine 4 % patch Apply 1 patch topically Daily. (Patient not taking: Reported on 11/29/2023)      linaclotide (LINZESS) 290 MCG capsule capsule Take 1 capsule by mouth As Needed. (Patient not taking: Reported on 11/29/2023)      polyethylene glycol (GaviLyte-G) 236 g solution        Current Facility-Administered Medications on File Prior to Visit   Medication Dose Route Frequency Provider Last Rate Last Admin    [COMPLETED] dexAMETHasone (DECADRON) IVPB 12 mg  12 mg Intravenous Once Brian Marroquin MD   Stopped at 12/27/23 1100    [COMPLETED] dextrose (D5W) 5 % infusion 250 mL  250 mL Intravenous Once Brian Marroquin MD 20 mL/hr at 12/27/23 1026 250 mL at 12/27/23 1026    diphenhydrAMINE (BENADRYL) injection 50 mg  50 mg Intravenous PRN Brian Marroquin MD        famotidine (PEPCID) injection 20 mg  20 mg Intravenous PRN Brian Marroquin MD        fluorouracil (ADRUCIL) chemo injection 950 mg ( 19 mL)  950 mg Intravenous Once Brian Marroquin MD        heparin injection 500 Units  500 Units Intravenous PRN Brian Marroquin MD        Hydrocortisone Sod Suc (PF) (Solu-CORTEF) injection 100 mg  100 mg Intravenous PRN Brian Marroquin MD        [COMPLETED] leucovorin 950 mg in dextrose (D5W) 5 % 322.5 mL IVPB  950 mg Intravenous Once Brian Marroquin MD   950 mg at 12/27/23 1115    [COMPLETED] OXALIplatin (ELOXATIN) 200 mg in dextrose (D5W) 5 % 315 mL chemo IVPB  200 mg Intravenous Once Brian Marroquin MD   Stopped at 12/27/23 1315    [COMPLETED] palonosetron (ALOXI) injection 0.25 mg  0.25 mg Intravenous Once Brian Marroquin MD   0.25 mg at 12/27/23 1041    sodium chloride 0.9 % bolus 1,000 mL  1,000 mL Intravenous Once Brian Marroquin MD        sodium chloride 0.9 % flush 20 mL  20 mL Intravenous PRN Brian Marroquin MD          Allergies   Allergen Reactions    Hydromorphone Hives, Itching and GI Intolerance    Morphine Hives, Itching and Nausea And  Vomiting    Oxybutynin Swelling and Angioedema           Oxycodone-Acetaminophen Itching and Nausea And Vomiting     Can not take any higher than 7.5    Penicillins Rash    Promethazine Nausea And Vomiting    Tylenol With Codeine #3 [Acetaminophen-Codeine] Itching and Nausea And Vomiting    Oxybutynin Chloride Angioedema     Past Surgical History:   Procedure Laterality Date    BREAST SURGERY  2016    Removed 8lbs of tissue, 2016    CHOLECYSTECTOMY      COLON RESECTION Left 05/05/2023    Procedure: RESECTION OF DESCENDING COLON AND SPLENIC FLEXURE TAKEDOWN WITH DAVINCI ROBOT;  Surgeon: Rolando Liu MD;  [place] [facility] [place] [facility] [place]  Service: Robotics - DaVinci;  Laterality: Left;    COLONOSCOPY N/A 04/04/2023    Procedure: COLONOSCOPY WITH POLYPECTOMY/SNARE/BIOPSIES/TATTOOING DESCENDING COLON MASS;  Surgeon: Deniz Dowd MD;  Location: [facility] ENDOSCOPY;  Service: Gastroenterology;  Laterality: N/A;  INCOMPLETE COLONOSCOPY  POOR BOWEL PREP  COLON POLYP  COLON MASS  DIVERTICULOSIS    COLONOSCOPY N/A 04/12/2023    Procedure: COLONOSCOPY with cold snare;  Surgeon: Deniz Dowd MD;  Location: [facility] ENDOSCOPY;  Service: Gastroenterology;  Laterality: N/A;  colon polyps, diverticulosis, colon mass, hemorrhoids      CYSTOSCOPY W/ URETERAL STENT PLACEMENT Left 05/05/2023    Procedure: Cystoscopy, left ureteral injection,;  Surgeon: Manjula Randolph MD;  [place] [facility] [place] [facility] [place]  Service: Urology;  Laterality: Left;    FRACTURE SURGERY  2001    Left knee    KNEE SURGERY Left     x4    LAPAROSCOPIC TUBAL LIGATION  1995    LAPAROSCOPIC TUBAL LIGATION      Reversed    REDUCTION MAMMAPLASTY  2009    8lbs of tissue was removed    TONSILLECTOMY      UPPER GASTROINTESTINAL ENDOSCOPY      VENOUS ACCESS DEVICE (PORT) INSERTION N/A 6/5/2023    Procedure: INSERTION VENOUS ACCESS DEVICE;  Surgeon: Rolando Liu MD;  [place] [facility] [place] [facility] [place]  Service: General;  Laterality: N/A;     Social History     Socioeconomic  History    Marital status:    Tobacco Use    Smoking status: Never     Passive exposure: Never    Smokeless tobacco: Never   Vaping Use    Vaping Use: Never used   Substance and Sexual Activity    Alcohol use: Never    Drug use: Never    Sexual activity: Defer     Family History   Problem Relation Age of Onset    Colon cancer Neg Hx        Results     Result Review   The following data was reviewed by: Brian Marroquin MD on 04/13/2023:  Lab Results   Component Value Date    HGB 12.6 12/27/2023    HCT 35.8 12/27/2023    MCV 93.2 12/27/2023     12/27/2023    WBC 5.79 12/27/2023    NEUTROABS 2.71 12/27/2023    LYMPHSABS 2.02 12/27/2023    MONOSABS 0.92 (H) 12/27/2023    EOSABS 0.08 12/27/2023    BASOSABS 0.03 12/27/2023     Lab Results   Component Value Date    GLUCOSE 105 (H) 12/27/2023    BUN 7 12/27/2023    CREATININE 0.68 12/27/2023     12/27/2023    K 3.4 (L) 12/27/2023     (H) 12/27/2023    CO2 22.9 12/27/2023    CALCIUM 9.2 12/27/2023    PROTEINTOT 6.5 12/27/2023    ALBUMIN 4.1 12/27/2023    BILITOT 0.5 12/27/2023    ALKPHOS 111 12/27/2023    AST 31 12/27/2023    ALT 24 12/27/2023     Lab Results   Component Value Date    MG 2.1 10/25/2023    PHOS 3.3 06/18/2023       Case Report   Surgical Pathology Report                         Case: [id number]                                   Authorizing Provider:  Deniz Dowd MD    Collected:           04/12/2023 09:41 AM           Ordering Location:     [facility] Received:            04/12/2023 11:41 AM                                  SUITES                                                                        Pathologist:           Niesha Torres DO                                                        Specimens:   1) - Large Intestine, Cecum, cecal polyp cold snare                                                  2) - Large Intestine, Sigmoid Colon, sigmoid colon polyp cold snare                        Clinical  Information    Mass of colon   Final Diagnosis   1. Cecum polyp, biopsy:                            - Tubular adenoma        2. Sigmoid colon polyp, biopsy:                            - Tubular adenoma    Electronically signed by Niesha Torres DO on 4/13/2023 at 0840     Surgical Pathology Report                         Case: [id number]                                   Authorizing Provider:  Deniz Dowd MD    Collected:           04/04/2023 11:15 AM           Ordering Location:     [facility] Received:            04/04/2023 11:59 AM                                  SUITES                                                                        Pathologist:           Jennifer Mike MD                                                      Specimens:   1) - Large Intestine, Transverse Colon, TRANSVERSE COLON POLYP                                       2) - Large Intestine, Left / Descending Colon, DESCENDING COLON BIOPSIES                   Clinical Information    Constipation, unspecified constipation type   Final Diagnosis   1. Transverse colon polyp, biopsy:               - Fragments of tubular adenoma        2.  Descending colon, biopsy:               -Adenocarcinoma, moderately to poorly differentiated     Comment: Per policy, reflex testing has been ordered, and the results will be separately reported.     REMARKS: The above positive (malignant) diagnosis was called to April in Dr. Dowd's office at 09:09 EDT on 4/5/2023 by s.          Electronically signed by Jennifer Mike MD on 4/5/2023 at 0935         Surgical Pathology Report                         Case: [id number]                                   Authorizing Provider:  Rolando Liu MD        Collected:           05/05/2023 03:06 PM           Ordering Location:     [facility]  Received:            05/08/2023 06:48 AM                                  OR                                                                             Pathologist:           Jt Florence MD                                                             Specimen:    Large Intestine, Left / Descending Colon, left colon                                       Clinical Information    Malignant neoplasm of descending colon   Final Diagnosis   Left colon, resection:               - Adenocarcinoma               - Three of thirty-three lymph nodes positive for carcinoma (3/33)               - See synoptic checklist   Electronically signed by Jt Florence MD on 5/11/2023 at 1508   Synoptic Checklist   COLON AND RECTUM: Resection, Including Transanal Disk Excision of Rectal Neoplasms  8th Edition - Protocol posted: 6/22/2022  COLON AND RECTUM: RESECTION - All Specimens  SPECIMEN   Procedure  Descending colon resection    TUMOR   Tumor Site  Descending colon    Histologic Type  Adenocarcinoma    Histologic Grade  G3, poorly differentiated    Tumor Size  Greatest dimension (Centimeters): 3.5 cm   Tumor Extent  Invades through muscularis propria into the pericolonic or perirectal tissue    Macroscopic Tumor Perforation  Not identified    Lymphovascular Invasion  Small vessel    Perineural Invasion  Present    Treatment Effect  No known presurgical therapy    MARGINS   Margin Status for Invasive Carcinoma  All margins negative for invasive carcinoma    Closest Margin(s) to Invasive Carcinoma  Radial (circumferential) or mesenteric    Distance from Invasive Carcinoma to Closest Margin  3.5 cm   Margin Status for Non-Invasive Tumor  All margins negative for high-grade dysplasia / intramucosal carcinoma and low-grade dysplasia    REGIONAL LYMPH NODES   Regional Lymph Node Status  Tumor present in regional lymph node(s)    Number of Lymph Nodes with Tumor  3    Number of Lymph Nodes Examined  33    Tumor Deposits  Present    Number of Tumor Deposits  1    PATHOLOGIC STAGE CLASSIFICATION (pTNM, AJCC 8th Edition)   Reporting of pT, pN, and (when  applicable) pM categories is based on information available to the pathologist at the time the report is issued. As per the AJCC (Chapter 1, 8th Ed.) it is the managing physician's responsibility to establish the final pathologic stage based upon all pertinent information, including but potentially not limited to this pathology report.   pT Category  pT3    pN Category  pN1b    ADDITIONAL FINDINGS   Additional Findings  None identified    .               Assessment & Plan     Diagnoses and all orders for this visit:    1. Malignant neoplasm of descending colon (Primary)  -     potassium chloride (K-DUR,KLOR-CON) 10 MEQ CR tablet; Take 2 tablets by mouth Daily.  Dispense: 28 tablet; Refill: 0  -     Magnesium; Future  -     ondansetron (ZOFRAN) 8 MG tablet; Take 1 tablet by mouth 3 (Three) Times a Day As Needed for Nausea or Vomiting.  Dispense: 30 tablet; Refill: 5  -     CBC & Differential; Future  -     Comprehensive Metabolic Panel; Future  -     Magnesium; Future  -     Phosphorus; Future  -     CT chest w contrast; Future  -     CT abdomen pelvis w contrast; Future  -     CBC & Differential; Future  -     Comprehensive Metabolic Panel; Future  -     Magnesium; Future  -     Phosphorus; Future  -     CEA; Future  -     gabapentin (NEURONTIN) 300 MG capsule; Take 1 capsule by mouth 2 (Two) Times a Day.  Dispense: 60 capsule; Refill: 0    2. Chemotherapy-induced neuropathy  -     gabapentin (NEURONTIN) 300 MG capsule; Take 1 capsule by mouth 2 (Two) Times a Day.  Dispense: 60 capsule; Refill: 0    3. Hypokalemia  -     potassium chloride (K-DUR,KLOR-CON) 10 MEQ CR tablet; Take 2 tablets by mouth Daily.  Dispense: 28 tablet; Refill: 0  -     Magnesium; Future    Other orders  -     Cancel: dextrose (D5W) 5 % infusion 250 mL  -     Cancel: palonosetron (ALOXI) injection 0.25 mg  -     Cancel: dexAMETHasone (DECADRON) 12 mg in sodium chloride 0.9 % IVPB  -     Cancel: OXALIplatin (ELOXATIN) 200 mg in dextrose (D5W) 5  % 290 mL chemo IVPB  -     Cancel: leucovorin 960 mg in dextrose (D5W) 5 % 346 mL IVPB  -     Cancel: fluorouracil (ADRUCIL) chemo injection 960 mg  -     Cancel: sodium chloride 0.9 % bolus 1,000 mL  -     Cancel: Hydrocortisone Sod Suc (PF) (Solu-CORTEF) injection 100 mg  -     Cancel: diphenhydrAMINE (BENADRYL) injection 50 mg  -     Cancel: famotidine (PEPCID) injection 20 mg                        Lilian Pelaez is a 48 y.o. female who presents to [facility] HEMATOLOGY & ONCOLOGY evaluation prior to systemic therapy for stage III colon cancer, patient status post resection of descending colon splenic flexure by Dr. Rolando Liu on 5/5/2023.     Reviewed NCCN guidelines for her stage III cancer, discussed high risk features seen on pathology report and discussed CapeOx for 3 months versus FOLFOX for 3 to 6 months with patient and  today.  Patient agreed to plan to undergo FOLFOX IV chemotherapy every 2 weeks for 6 months.    Discussed restaging imaging scans obtained on 10/9/2023 which was without evidence of disease recurrence or metastatic disease.     Patient received cycle 1 on June 14, 2023, she developed pneumoperitoneum which her surgeon suspect was secondary to severe constipation from iron tablets, but patient was cleared to resume chemotherapy after 8/1/2023.    Iron deficiency anemia from GI blood loss  -Evidence of this on lab work from May 2023  - We will continue to monitor and will offer IV iron therapy in the future if she becomes iron deficient secondary to pneumoperitoneum that developed in June 2023 from severe constipation.    Chemotherapy-induced nausea  - Currently well controlled with Zofran, but prescribedCompazine as a backup option  - Patient also taking Reglan and was counseled regarding not taking Reglan with Zofran and Compazine together but rather patient to take other antiemetics at least 4 to 6 hours after Reglan dose.    Chemotherapy induced diarrhea  -  Patient prescribed Imodium  -We will prescribe Lomotil if Imodium fails to work.    Hypokalemia  -secondary to chemo and gastroenteritis  -K of 3.4  -ordered KCL 20meQ PO QD  -plan to recheck K and Mag next week with NP    Chemo induced peripheral neuropathy  -grade 2 today  -prescribed gabapentin 300 mg PO BID    Muscle cramps  -continuing to monitor her electrolytes and replete as necessary  -suspect from dehydration, will administer 1L of NS with treatment today  -will continue to monitor    Pt with loss of taste, mouth with some evidence of thrush- ordered nystatin swish and spit, recommend pt continue, also recommended pt take pantoprazole BID to see if that helps with acid reflux symptoms.    Recommend nasal ocean spray since pt with symptoms of dry nasal passages and nasal congestion from seasonal allergies, provided prescription for this today.    Ordered IVFs as suspect pt is dehydrated from decrease in oral intake for today's chemotherapy infusion and each subsequent chemo infusion.    Given shoulder pain after last chemo infusion (which as become prolonged lasting more days after last 2 infusions, pt underwent port study which was negative revealed fibrin sheath), discussed with pharmacist and shared plan to continue with 5FU infusion alone, will discontinue oxaliplatin starting with cycle 10. But pt with continued shoulder pain despite oxaliplatin being discontinued thus plan to proceed with 5FU bolus infusion alone (no 5 FU pump infusion) and oxaliplatin for the last 2 cycles of chemotherapy.     Labs reviewed and plan to proceed with cycle 12 day 1 on today (with modifications listed above).    Plan patient follow-up in 1 week with NP for lab check (Magnesium and K) and with me in 4 weeks with restaging imaging scans.      Patient verbalized understanding and agreement plan.    Please note that portions of this note were completed with a voice recognition program.    Electronically signed by Brian  MD Cara, 12/27/23, 1:24 PM EST.          Follow Up     I spent 30 minutes caring for Lilian on this date of service. This time includes time spent by me in the following activities:preparing for the visit, reviewing tests, obtaining and/or reviewing a separately obtained history, performing a medically appropriate examination and/or evaluation , counseling and educating the patient/family/caregiver, ordering medications, tests, or procedures, documenting information in the medical record and care coordination.    This is an acute or chronic illness that poses a threat to life or bodily function. The above treatment plan involves a high risk of complications and/or mortality of patient management.    The patient was seen and examined. Work by the provider also included review and/or ordering of lab tests, review and/or ordering of radiology tests, review and/or ordering of medicine tests, discussion with other physicians or providers, independent review of data, obtaining old records, review/summation of old records, and/or other review.    I have reviewed the family history, social history, and past medical history for this patient. Previous information and data has been reviewed and updated as needed. I have reviewed and verified the chief complaint, history, and other documentation. The patient was interviewed and examined in the clinic and the chart reviewed. The previous observations, recommendations, and conclusions were reviewed including those of other providers.     The plan was discussed with the patient and/or family. The patient was given time to ask questions and these questions were answered. At the conclusion of their visit they had no additional questions or concerns and all questions were answered to their satisfaction.    Patient was given instructions and counseling regarding her condition or for health maintenance advice. Please see specific information pulled into the AVS if appropriate.        History of Present Illness  Review of Systems   Constitutional:  Positive for activity change, appetite change and fatigue. Negative for diaphoresis, fever, weight gain and weight loss.   HENT:  Negative for congestion, hearing loss, mouth sores, nosebleeds, sore throat, trouble swallowing and voice change.    Eyes:  Negative for blurred vision.   Respiratory:  Negative for cough, shortness of breath and wheezing.    Cardiovascular:  Negative for chest pain and palpitations.   Gastrointestinal:  Positive for nausea. Negative for abdominal pain, blood in stool, constipation, diarrhea and vomiting.   Endocrine: Positive for cold intolerance. Negative for heat intolerance.   Genitourinary:  Negative for bladder incontinence, difficulty urinating, dysuria, frequency and hematuria.   Musculoskeletal:  Negative for arthralgias, back pain and myalgias.   Skin:  Negative for rash and wound.   Neurological:  Positive for dizziness. Negative for seizures, weakness and numbness.   Hematological:  Does not bruise/bleed easily.   Psychiatric/Behavioral:  The patient is nervous/anxious.    All other systems reviewed and are negative.

## 2023-12-27 NOTE — PROGRESS NOTES
Diagnosis: Colon cancer    Reason for Referral: Travel expenses    Content of Visit: OSW met with Mrs. Pelaez, her spouse and adolescent child in the medical oncology infusion center this morning to assist her in signing her [employer] reimbursement form for appointments attended on 12/1/23, 12/13/23, and 12/27/23. OSW submitted the completed form to Pilar at [facility] [place] this morning. Mrs. Pelaez presented in a pleasant mood this morning, reporting today is her last treatment. Joined Mrs. Pelaez in celebrating this joyful news. She reports her family has been enjoying the holiday season thus far and spoiling their boys. Mrs. Pelaez did not identify any additional needs at this time. OSW provided Mrs. Pelaez with a Fall comfort kit donated by the [employer], as she reported she hasn't received one yet. She is agreeable to being contacted by them to discuss their support programs and services and is interested in participating if offered here locally. Encouraged OSW support remains available.     Pilar at [facility] [place] confirmed she sent $60.72 to Mrs. Pelaez' PayPal account.     Resources/Referrals Provided: Travel to [place]; Fall comfort kit through [employer]

## 2024-01-04 ENCOUNTER — OFFICE VISIT (OUTPATIENT)
Dept: ONCOLOGY | Facility: HOSPITAL | Age: 49
End: 2024-01-04
Payer: COMMERCIAL

## 2024-01-04 ENCOUNTER — HOSPITAL ENCOUNTER (OUTPATIENT)
Dept: ONCOLOGY | Facility: HOSPITAL | Age: 49
Discharge: HOME | End: 2024-01-04
Admitting: INTERNAL MEDICINE
Payer: COMMERCIAL

## 2024-01-04 VITALS
RESPIRATION RATE: 18 BRPM | WEIGHT: 258.82 LBS | OXYGEN SATURATION: 99 % | SYSTOLIC BLOOD PRESSURE: 165 MMHG | HEIGHT: 64 IN | TEMPERATURE: 98 F | HEART RATE: 100 BPM | BODY MASS INDEX: 44.19 KG/M2 | DIASTOLIC BLOOD PRESSURE: 86 MMHG

## 2024-01-04 DIAGNOSIS — G62.0 CHEMOTHERAPY-INDUCED NEUROPATHY: ICD-10-CM

## 2024-01-04 DIAGNOSIS — M54.6 BILATERAL THORACIC BACK PAIN, UNSPECIFIED CHRONICITY: ICD-10-CM

## 2024-01-04 DIAGNOSIS — C18.6 MALIGNANT NEOPLASM OF DESCENDING COLON: Primary | ICD-10-CM

## 2024-01-04 DIAGNOSIS — E87.6 HYPOKALEMIA: ICD-10-CM

## 2024-01-04 DIAGNOSIS — C18.6 MALIGNANT NEOPLASM OF DESCENDING COLON: ICD-10-CM

## 2024-01-04 DIAGNOSIS — Z45.2 ENCOUNTER FOR ADJUSTMENT OR MANAGEMENT OF VASCULAR ACCESS DEVICE: Primary | ICD-10-CM

## 2024-01-04 DIAGNOSIS — T45.1X5A CHEMOTHERAPY-INDUCED NEUROPATHY: ICD-10-CM

## 2024-01-04 LAB
ALBUMIN SERPL-MCNC: 4.4 G/DL (ref 3.5–5.2)
ALBUMIN/GLOB SERPL: 1.7 G/DL
ALP SERPL-CCNC: 119 U/L (ref 39–117)
ALT SERPL W P-5'-P-CCNC: 22 U/L (ref 1–33)
ANION GAP SERPL CALCULATED.3IONS-SCNC: 9.4 MMOL/L (ref 5–15)
AST SERPL-CCNC: 30 U/L (ref 1–32)
BASOPHILS # BLD AUTO: 0.04 10*3/MM3 (ref 0–0.2)
BASOPHILS NFR BLD AUTO: 0.6 % (ref 0–1.5)
BILIRUB SERPL-MCNC: 0.5 MG/DL (ref 0–1.2)
BUN SERPL-MCNC: 10 MG/DL (ref 6–20)
BUN/CREAT SERPL: 13.9 (ref 7–25)
CALCIUM SPEC-SCNC: 10.1 MG/DL (ref 8.6–10.5)
CHLORIDE SERPL-SCNC: 105 MMOL/L (ref 98–107)
CO2 SERPL-SCNC: 25.6 MMOL/L (ref 22–29)
CREAT SERPL-MCNC: 0.72 MG/DL (ref 0.57–1)
DEPRECATED RDW RBC AUTO: 44.3 FL (ref 37–54)
EGFRCR SERPLBLD CKD-EPI 2021: 103.3 ML/MIN/1.73
EOSINOPHIL # BLD AUTO: 0.1 10*3/MM3 (ref 0–0.4)
EOSINOPHIL NFR BLD AUTO: 1.4 % (ref 0.3–6.2)
ERYTHROCYTE [DISTWIDTH] IN BLOOD BY AUTOMATED COUNT: 13.2 % (ref 12.3–15.4)
GLOBULIN UR ELPH-MCNC: 2.6 GM/DL
GLUCOSE SERPL-MCNC: 109 MG/DL (ref 65–99)
HCT VFR BLD AUTO: 38.3 % (ref 34–46.6)
HGB BLD-MCNC: 13.2 G/DL (ref 12–15.9)
IMM GRANULOCYTES # BLD AUTO: 0.1 10*3/MM3 (ref 0–0.05)
IMM GRANULOCYTES NFR BLD AUTO: 1.4 % (ref 0–0.5)
LYMPHOCYTES # BLD AUTO: 2.61 10*3/MM3 (ref 0.7–3.1)
LYMPHOCYTES NFR BLD AUTO: 36.2 % (ref 19.6–45.3)
MAGNESIUM SERPL-MCNC: 2.1 MG/DL (ref 1.6–2.6)
MCH RBC QN AUTO: 32.4 PG (ref 26.6–33)
MCHC RBC AUTO-ENTMCNC: 34.5 G/DL (ref 31.5–35.7)
MCV RBC AUTO: 93.9 FL (ref 79–97)
MONOCYTES # BLD AUTO: 0.95 10*3/MM3 (ref 0.1–0.9)
MONOCYTES NFR BLD AUTO: 13.2 % (ref 5–12)
NEUTROPHILS NFR BLD AUTO: 3.41 10*3/MM3 (ref 1.7–7)
NEUTROPHILS NFR BLD AUTO: 47.2 % (ref 42.7–76)
PHOSPHATE SERPL-MCNC: 4.1 MG/DL (ref 2.5–4.5)
PLATELET # BLD AUTO: 156 10*3/MM3 (ref 140–450)
PMV BLD AUTO: 9.4 FL (ref 6–12)
POTASSIUM SERPL-SCNC: 4 MMOL/L (ref 3.5–5.2)
PROT SERPL-MCNC: 7 G/DL (ref 6–8.5)
RBC # BLD AUTO: 4.08 10*6/MM3 (ref 3.77–5.28)
SODIUM SERPL-SCNC: 140 MMOL/L (ref 136–145)
WBC NRBC COR # BLD AUTO: 7.21 10*3/MM3 (ref 3.4–10.8)

## 2024-01-04 PROCEDURE — 25010000002 HEPARIN LOCK FLUSH PER 10 UNITS: Performed by: INTERNAL MEDICINE

## 2024-01-04 PROCEDURE — G0463 HOSPITAL OUTPT CLINIC VISIT: HCPCS

## 2024-01-04 PROCEDURE — 83735 ASSAY OF MAGNESIUM: CPT | Performed by: INTERNAL MEDICINE

## 2024-01-04 PROCEDURE — 96523 IRRIG DRUG DELIVERY DEVICE: CPT

## 2024-01-04 PROCEDURE — 80053 COMPREHEN METABOLIC PANEL: CPT | Performed by: INTERNAL MEDICINE

## 2024-01-04 PROCEDURE — 36415 COLL VENOUS BLD VENIPUNCTURE: CPT

## 2024-01-04 PROCEDURE — 84100 ASSAY OF PHOSPHORUS: CPT | Performed by: INTERNAL MEDICINE

## 2024-01-04 PROCEDURE — 85025 COMPLETE CBC W/AUTO DIFF WBC: CPT | Performed by: INTERNAL MEDICINE

## 2024-01-04 RX ORDER — SODIUM CHLORIDE 0.9 % (FLUSH) 0.9 %
20 SYRINGE (ML) INJECTION AS NEEDED
OUTPATIENT
Start: 2024-01-04

## 2024-01-04 RX ORDER — HEPARIN SODIUM (PORCINE) LOCK FLUSH IV SOLN 100 UNIT/ML 100 UNIT/ML
500 SOLUTION INTRAVENOUS AS NEEDED
OUTPATIENT
Start: 2024-01-04

## 2024-01-04 RX ORDER — HEPARIN SODIUM (PORCINE) LOCK FLUSH IV SOLN 100 UNIT/ML 100 UNIT/ML
500 SOLUTION INTRAVENOUS AS NEEDED
Status: DISCONTINUED | OUTPATIENT
Start: 2024-01-04 | End: 2024-01-05 | Stop reason: HOSPADM

## 2024-01-04 RX ORDER — SODIUM CHLORIDE 0.9 % (FLUSH) 0.9 %
20 SYRINGE (ML) INJECTION AS NEEDED
Status: DISCONTINUED | OUTPATIENT
Start: 2024-01-04 | End: 2024-01-05 | Stop reason: HOSPADM

## 2024-01-04 RX ADMIN — Medication 20 ML: at 10:05

## 2024-01-04 RX ADMIN — HEPARIN SODIUM (PORCINE) LOCK FLUSH IV SOLN 100 UNIT/ML 500 UNITS: 100 SOLUTION at 10:05

## 2024-01-04 NOTE — PROGRESS NOTES
Chief Complaint/Reason for Referral:   FOLLOW UP     Shelly Cash MD Stephens, Cassandra, MD      Subjective    History of Present Illness    Ms. Lilian ferreira presents for follow up for lab check after last chemotherapy on 12/27/2023 with FOLFOX. She is done with chemotherapy currently. Has CT scans pending for the end of the month and then follow up with Dr. Marroquin.     Reports still with mild nausea. Using nausea meds as directed. Diarrhea has improved. Reports she fell in the bathroom 2 treatments ago against the toilet. Reports she had vision loss in the left eye after this, but this has not happened since then. Denies any current headaches, vision changes / or loss. Denies any further falls. Does report intermittent dizziness. Afebrile. BP is elevated today at 165 / 86. Does report pain to the middle of back after she fell against the toilet. Did not have any X-rays after this. Reports she is drinking fluids ok, but probably needs to drink more.     Reports was just able to start the gabapentin for the neuropathy.     She had labs prior to this visit. CBC is normal with WBC, H/H and platelet counts. CMP is normal except for mild elevation in the glucose. Potassium is normal now. Creatinine is normal. Electrolytes all ok today.       Cancer Staging   Malignant neoplasm of descending colon  Staging form: Colon And Rectum, AJCC 8th Edition  - Pathologic: Stage IIIB (pT3, pN1b, cM0) - Signed by Brian Marroquin MD on 5/31/2023       Treatment intent: curative    Oncology/Hematology History   Primary adenocarcinoma of descending colon   4/11/2023 Initial Diagnosis    Primary adenocarcinoma of descending colon     9/27/2023 -  Chemotherapy    OP SUPPORTIVE HYDRATION + ANTIEMETICS     Malignant neoplasm of descending colon   5/6/2023 Initial Diagnosis    Malignant neoplasm of descending colon     5/31/2023 Cancer Staged    Staging form: Colon And Rectum, AJCC 8th Edition  - Pathologic: Stage IIIB (pT3, pN1b,  cM0) - Signed by Brian Marroquin MD on 5/31/2023 6/14/2023 -  Chemotherapy    OP COLON [id number] OXALIplatin / Leucovorin / Fluorouracil         Review of Systems   Constitutional:  Positive for fatigue.   Musculoskeletal:  Positive for back pain.   Neurological:  Positive for dizziness.   All other systems reviewed and are negative.      Current Outpatient Medications on File Prior to Visit   Medication Sig Dispense Refill    allopurinol (ZYLOPRIM) 100 MG tablet Take 1 tablet by mouth Daily.      ALPRAZolam (XANAX) 0.5 MG tablet Take 1 tablet by mouth At Night As Needed.      amLODIPine (NORVASC) 10 MG tablet Take 1 tablet by mouth Every Night.      Diclofenac Sodium (VOLTAREN) 1 % gel gel Apply 4 g topically to the appropriate area as directed 4 (Four) Times a Day As Needed.      docusate sodium 100 MG capsule Take 1 capsule by mouth Daily.      fluorouracil (EFUDEX) 5 % cream Apply 1 application  topically to the appropriate area as directed 2 (Two) Times a Day.      furosemide (LASIX) 20 MG tablet Take 1 tablet by mouth Daily.      furosemide (LASIX) 40 MG tablet       gabapentin (NEURONTIN) 300 MG capsule Take 1 capsule by mouth 2 (Two) Times a Day. 60 capsule 0    HYDROcodone-acetaminophen (NORCO) 7.5-325 MG per tablet Take 1 tablet by mouth Every 8 (Eight) Hours As Needed.      Hydrocortisone, Perianal, (ANUSOL-HC) 2.5 % rectal cream       hyoscyamine (ANASPAZ,LEVSIN) 0.125 MG tablet TAKE ONE TABLET BY MOUTH FOUR TIMES A DAY WITH MEALS AND AT BEDTIME. 120 tablet 1    ketorolac (TORADOL) 10 MG tablet Take 1 tablet by mouth Every 6 (Six) Hours As Needed.      ketorolac (TORADOL) 10 MG tablet Take 1 tablet by mouth Every 6 (Six) Hours As Needed (Pain). 12 tablet 0    lamoTRIgine (LaMICtal) 25 MG tablet Take 1 tablet by mouth 2 (Two) Times a Day. Only takes at night      leucovorin 5 MG tablet Take  by mouth Every 6 (Six) Hours.      lidocaine-prilocaine (EMLA) 2.5-2.5 % cream Apply 1 application topically to  the appropriate area as directed Every 2 (Two) Hours As Needed for Mild Pain. 30 g 1    loperamide (Imodium A-D) 2 MG tablet Take 1 tablet by mouth 4 (Four) Times a Day As Needed for Diarrhea. Imodium - take 4 mg first dose, followed by 2 mg every 2-4 hours after each stool (MAX of 16 mg in 24 hour period) 60 tablet 1    losartan (COZAAR) 100 MG tablet Take 1 tablet by mouth Every Night.      medroxyPROGESTERone (DEPO-PROVERA) 150 MG/ML injection Inject 1 mL into the appropriate muscle as directed by prescriber Every 3 (Three) Months.      metoclopramide (REGLAN) 10 MG tablet Take 1 tablet by mouth 3 (Three) Times a Day.      metoprolol succinate XL (TOPROL-XL) 25 MG 24 hr tablet Take 1 tablet by mouth Daily.      Multiple Vitamins-Minerals (b complex-C-E-zinc) tablet Take 1 tablet by mouth Daily.      Myrbetriq 50 MG tablet sustained-release 24 hour 24 hr tablet       naloxone (NARCAN) 4 MG/0.1ML nasal spray Take 1 spray every day by nasal route as needed.      nystatin (MYCOSTATIN) 100,000 unit/mL suspension Swish and swallow 5 mL 4 (Four) Times a Day. 473 mL 1    ondansetron (ZOFRAN) 8 MG tablet Take 1 tablet by mouth 3 (Three) Times a Day As Needed for Nausea or Vomiting. 30 tablet 5    OXALIPLATIN IV Infuse  into a venous catheter.      pantoprazole (PROTONIX) 40 MG EC tablet Take 1 tablet by mouth 2 (Two) Times a Day. 60 tablet 1    potassium chloride (K-DUR,KLOR-CON) 10 MEQ CR tablet Take 2 tablets by mouth Daily. 28 tablet 0    Probiotic Product (myQaa PO) Take  by mouth Daily.      prochlorperazine (COMPAZINE) 10 MG tablet Take 1 tablet by mouth Every 6 (Six) Hours As Needed for Nausea or Vomiting. 30 tablet 5    rOPINIRole (REQUIP) 1 MG tablet Take 1 tablet by mouth Every Night.      sertraline (ZOLOFT) 100 MG tablet Take 0.5 tablets by mouth Daily.      sodium chloride (Ocean Nasal Spray) 0.65 % nasal spray 1 spray into the nostril(s) as directed by provider 2 (Two) Times a Day As Needed  for Congestion. 480 mL 4    sucralfate (CARAFATE) 1 g tablet Take 1 tablet by mouth 2 (Two) Times a Day.      tetracycline (ACHROMYCIN,SUMYCIN) 250 MG capsule       traMADol (Ultram) 50 MG tablet Take 1 tablet by mouth Every 6 (Six) Hours As Needed for Severe Pain or Moderate Pain. 5 tablet 0    vitamin D3 125 MCG (5000 UT) capsule capsule Take 2,000 Units by mouth Daily.      aspirin 81 MG EC tablet Take 1 tablet every day by oral route.      cefdinir (OMNICEF) 300 MG capsule       hydroCHLOROthiazide (HYDRODIURIL) 25 MG tablet Take 1 tablet every day by oral route for 90 days. (Patient not taking: Reported on 11/29/2023)      levoFLOXacin (Levaquin) 750 MG tablet Take 1 tablet by mouth Daily. (Patient not taking: Reported on 11/29/2023) 7 tablet 0    Lidocaine 4 % patch Apply 1 patch topically Daily. (Patient not taking: Reported on 11/29/2023)      linaclotide (LINZESS) 290 MCG capsule capsule Take 1 capsule by mouth As Needed. (Patient not taking: Reported on 11/29/2023)      polyethylene glycol (GaviLyte-G) 236 g solution        No current facility-administered medications on file prior to visit.       Allergies   Allergen Reactions    Hydromorphone Hives, Itching and GI Intolerance    Morphine Hives, Itching and Nausea And Vomiting    Oxybutynin Swelling and Angioedema           Oxycodone-Acetaminophen Itching and Nausea And Vomiting     Can not take any higher than 7.5    Penicillins Rash    Promethazine Nausea And Vomiting    Tylenol With Codeine #3 [Acetaminophen-Codeine] Itching and Nausea And Vomiting    Oxybutynin Chloride Angioedema     Past Medical History:   Diagnosis Date    Anesthesia     B/P bottomed out/UNSURE ON THE DATE    Anxiety     Asthma 1996    seasonal/NO INHALERS    Colon cancer 04/17/2023    DDD (degenerative disc disease), cervical     DDD (degenerative disc disease), lumbar     Depression     Diverticulitis of colon 2005    Scope was done in [facility]    GERD (gastroesophageal reflux  disease)     Hypertension     Kidney stones     Melanoma     removed    Palpitation     FOLLOWS W/DR. ANTONIO  Q6 MONTHS, NO CP OR SOA    Prolapse of female bladder      Past Surgical History:   Procedure Laterality Date    BREAST SURGERY  2016    Removed 8lbs of tissue, 2016    CHOLECYSTECTOMY      COLON RESECTION Left 05/05/2023    Procedure: RESECTION OF DESCENDING COLON AND SPLENIC FLEXURE TAKEDOWN WITH DAVINCI ROBOT;  Surgeon: Rolando Liu MD;  Location: [facility] OR [facility] [place];  Service: Robotics - DaVinci;  Laterality: Left;    COLONOSCOPY N/A 04/04/2023    Procedure: COLONOSCOPY WITH POLYPECTOMY/SNARE/BIOPSIES/TATTOOING DESCENDING COLON MASS;  Surgeon: Deniz Dowd MD;  Location: [facility] ENDOSCOPY;  Service: Gastroenterology;  Laterality: N/A;  INCOMPLETE COLONOSCOPY  POOR BOWEL PREP  COLON POLYP  COLON MASS  DIVERTICULOSIS    COLONOSCOPY N/A 04/12/2023    Procedure: COLONOSCOPY with cold snare;  Surgeon: Deinz Dowd MD;  Location: [facility] ENDOSCOPY;  Service: Gastroenterology;  Laterality: N/A;  colon polyps, diverticulosis, colon mass, hemorrhoids      CYSTOSCOPY W/ URETERAL STENT PLACEMENT Left 05/05/2023    Procedure: Cystoscopy, left ureteral injection,;  Surgeon: Manjula Randolph MD;  Location: [facility] OR [facility] [place];  Service: Urology;  Laterality: Left;    FRACTURE SURGERY  2001    Left knee    KNEE SURGERY Left     x4    LAPAROSCOPIC TUBAL LIGATION  1995    LAPAROSCOPIC TUBAL LIGATION      Reversed    REDUCTION MAMMAPLASTY  2009    8lbs of tissue was removed    TONSILLECTOMY      UPPER GASTROINTESTINAL ENDOSCOPY      VENOUS ACCESS DEVICE (PORT) INSERTION N/A 6/5/2023    Procedure: INSERTION VENOUS ACCESS DEVICE;  Surgeon: Rolando Liu MD;  Location: [facility] OR [facility] [place];  Service: General;  Laterality: N/A;     Social History     Socioeconomic History    Marital status:    Tobacco Use    Smoking status: Never     Passive exposure: Never    Smokeless tobacco: Never   Vaping Use    Vaping Use:  Never used   Substance and Sexual Activity    Alcohol use: Never    Drug use: Never    Sexual activity: Defer     Family History   Problem Relation Age of Onset    Colon cancer Neg Hx      Immunization History   Administered Date(s) Administered    COVID-19 (PFIZER) Purple Cap Monovalent 08/19/2021, 09/09/2021    Tdap 02/01/2018       Tobacco Use: Low Risk  (1/4/2024)    Patient History     Smoking Tobacco Use: Never     Smokeless Tobacco Use: Never     Passive Exposure: Never       Objective     Physical Exam  Vitals and nursing note reviewed.   Constitutional:       Appearance: Normal appearance. She is obese.   HENT:      Head: Normocephalic.      Nose: Nose normal.      Mouth/Throat:      Mouth: Mucous membranes are moist.   Eyes:      Pupils: Pupils are equal, round, and reactive to light.   Cardiovascular:      Rate and Rhythm: Normal rate and regular rhythm.      Pulses: Normal pulses.      Heart sounds: Normal heart sounds. No murmur heard.  Pulmonary:      Effort: Pulmonary effort is normal. No respiratory distress.      Breath sounds: Normal breath sounds.   Abdominal:      General: Bowel sounds are normal. There is no distension.      Palpations: Abdomen is soft.      Tenderness: There is no abdominal tenderness.   Musculoskeletal:         General: Normal range of motion.      Cervical back: Normal range of motion and neck supple.   Skin:     General: Skin is warm and dry.      Capillary Refill: Capillary refill takes less than 2 seconds.   Neurological:      General: No focal deficit present.      Mental Status: She is alert and oriented to person, place, and time.   Psychiatric:         Mood and Affect: Mood normal.         Behavior: Behavior normal.         Thought Content: Thought content normal.         Judgment: Judgment normal.         Vitals:    01/04/24 1114   BP: 165/86   Pulse: 100   Resp: 18   Temp: 98 °F (36.7 °C)   TempSrc: Temporal   SpO2: 99%   Weight: 117 kg (258 lb 13.1 oz)   Height:  "162.6 cm (64.02\")   PainSc: 10-Worst pain ever   PainLoc: Back       Wt Readings from Last 3 Encounters:   01/04/24 117 kg (258 lb 13.1 oz)   12/27/23 120 kg (264 lb 9.6 oz)   12/27/23 120 kg (264 lb 8.8 oz)                 ECOG score: 0         ECOG: (0) Fully Active - Able to Carry On All Pre-disease Performance Without Restriction  Fall Risk Assessment was completed, and patient is at high risk for falls.  PHQ-9 Total Score: 0       The patient is  experiencing fatigue. Fatigue score: 5    PT/OT Functional Screening: PT fx screen : No needs identified  Speech Functional Screening: Speech fx screen : No needs identified  Rehab to be ordered: Rehab to be ordered : No needs identified        Result Review :  The following data was reviewed by: CHANTELLE Sky on 01/04/2024:  Lab Results   Component Value Date    HGB 13.2 01/04/2024    HCT 38.3 01/04/2024    MCV 93.9 01/04/2024     01/04/2024    WBC 7.21 01/04/2024    NEUTROABS 3.41 01/04/2024    LYMPHSABS 2.61 01/04/2024    MONOSABS 0.95 (H) 01/04/2024    EOSABS 0.10 01/04/2024    BASOSABS 0.04 01/04/2024     Lab Results   Component Value Date    GLUCOSE 109 (H) 01/04/2024    BUN 10 01/04/2024    CREATININE 0.72 01/04/2024     01/04/2024    K 4.0 01/04/2024     01/04/2024    CO2 25.6 01/04/2024    CALCIUM 10.1 01/04/2024    PROTEINTOT 7.0 01/04/2024    ALBUMIN 4.4 01/04/2024    BILITOT 0.5 01/04/2024    ALKPHOS 119 (H) 01/04/2024    AST 30 01/04/2024    ALT 22 01/04/2024     Lab Results   Component Value Date    FERRITIN 131.90 05/31/2023     Lab Results   Component Value Date    IRON 77 05/31/2023    LABIRON 19 (L) 05/31/2023    TRANSFERRIN 273 05/31/2023    TIBC 407 05/31/2023     Lab Results   Component Value Date    FERRITIN 131.90 05/31/2023     Lab Results   Component Value Date    CEA 2.15 12/27/2023       IR Port Study Including Fluoro    Result Date: 11/15/2023  Right IJ vein port infusion catheter demonstrated large fibrin " sheath/thrombus at the distal aspect of the catheter.  Consider instillation of Cathflo.       CLIFTON WOODSON       Electronically Signed and Approved By: Allan Woods M.D. on 11/15/2023 at 8:57             CT Chest With Contrast Diagnostic    Result Date: 11/10/2023    CT scan of the chest with IV contrast demonstrating no active disease.  Right jugular Port-A-Cath device is in unchanged position.     BILLY PHILLIPS MD       Electronically Signed and Approved By: BILLY PHILLIPS MD on 11/10/2023 at 18:14             CT Abdomen Pelvis With Contrast    Result Date: 10/10/2023   No evidence of abdominopelvic metastatic disease     NKECHI GARCIA MD       Electronically Signed and Approved By: NKECHI GARCIA MD on 10/10/2023 at 7:37             CT Chest With Contrast Diagnostic    Result Date: 10/10/2023   No evidence of thoracic metastatic disease     NKECHI GARCIA MD       Electronically Signed and Approved By: NKECHI GARCIA MD on 10/10/2023 at 7:30                Assessment and Plan:  Diagnoses and all orders for this visit:    1. Malignant neoplasm of descending colon (Primary)    2. Chemotherapy-induced neuropathy    3. Hypokalemia    4. Bilateral thoracic back pain, unspecified chronicity    Stage 3C colon cancer left sided. Just completed 12 cycles of FOLFOX on 12/27/2023. Has restaging scans scheduled for end of January and then follow up with Dr. Marroquin.     Neuropathy to hand and feet: Just started the Neurontin after last treatment. Has not noticed any improvement yet.     Diarrhea improving.     Hypokalemia: K level of 3.4 with treatment last week. Was given oral potassium replacement. Potassium has normalized now. CMP is normal today.    Thoracic back pain since fall 2 weeks ago. Fell against the toilet. Did not get any X-rays. Still has complaints of pain today. She will follow up with PCP for X-rays. OK to use tylenol. Adequate hydration as well. Reports vision loss of the left eye after the fall 2  weeks ago, but has not happened since the fall. Denies any headaches. States she did not hit head with fall.     Hypertension: She is on BP meds. Needs to follow up with PCP for blood pressure control.         I spent 20 minutes caring for Lilian on this date of service. This time includes time spent by me in the following activities:preparing for the visit, reviewing tests, performing a medically appropriate examination and/or evaluation , counseling and educating the patient/family/caregiver, ordering medications, tests, or procedures, referring and communicating with other health care professionals , documenting information in the medical record, independently interpreting results and communicating that information with the patient/family/caregiver, and care coordination    Patient Follow Up: follow up in 3 weeks with Dr. Marroquin for scan results.     Patient was given instructions and counseling regarding her condition or for health maintenance advice. Please see specific information pulled into the AVS if appropriate.     Mireya Lama, APRN    1/4/2024    .[email or web address]

## 2024-01-29 ENCOUNTER — HOSPITAL ENCOUNTER (OUTPATIENT)
Dept: CT IMAGING | Facility: HOSPITAL | Age: 49
Discharge: HOME | End: 2024-01-29
Admitting: INTERNAL MEDICINE
Payer: COMMERCIAL

## 2024-01-29 DIAGNOSIS — C18.6 MALIGNANT NEOPLASM OF DESCENDING COLON: ICD-10-CM

## 2024-01-29 LAB
ALBUMIN SERPL-MCNC: 4.5 G/DL (ref 3.5–5.2)
ALBUMIN/GLOB SERPL: 1.4 G/DL
ALP SERPL-CCNC: 111 U/L (ref 39–117)
ALT SERPL W P-5'-P-CCNC: 18 U/L (ref 1–33)
ANION GAP SERPL CALCULATED.3IONS-SCNC: 11.2 MMOL/L (ref 5–15)
AST SERPL-CCNC: 24 U/L (ref 1–32)
BASOPHILS # BLD AUTO: 0.06 10*3/MM3 (ref 0–0.2)
BASOPHILS NFR BLD AUTO: 0.8 % (ref 0–1.5)
BILIRUB SERPL-MCNC: 0.6 MG/DL (ref 0–1.2)
BUN SERPL-MCNC: 11 MG/DL (ref 6–20)
BUN/CREAT SERPL: 14.1 (ref 7–25)
CALCIUM SPEC-SCNC: 10.3 MG/DL (ref 8.6–10.5)
CEA SERPL-MCNC: 2.01 NG/ML
CHLORIDE SERPL-SCNC: 105 MMOL/L (ref 98–107)
CO2 SERPL-SCNC: 23.8 MMOL/L (ref 22–29)
CREAT SERPL-MCNC: 0.78 MG/DL (ref 0.57–1)
DEPRECATED RDW RBC AUTO: 42.1 FL (ref 37–54)
EGFRCR SERPLBLD CKD-EPI 2021: 93.8 ML/MIN/1.73
EOSINOPHIL # BLD AUTO: 0.12 10*3/MM3 (ref 0–0.4)
EOSINOPHIL NFR BLD AUTO: 1.7 % (ref 0.3–6.2)
ERYTHROCYTE [DISTWIDTH] IN BLOOD BY AUTOMATED COUNT: 12.4 % (ref 12.3–15.4)
GLOBULIN UR ELPH-MCNC: 3.3 GM/DL
GLUCOSE SERPL-MCNC: 91 MG/DL (ref 65–99)
HCT VFR BLD AUTO: 42.8 % (ref 34–46.6)
HGB BLD-MCNC: 14.9 G/DL (ref 12–15.9)
IMM GRANULOCYTES # BLD AUTO: 0.03 10*3/MM3 (ref 0–0.05)
IMM GRANULOCYTES NFR BLD AUTO: 0.4 % (ref 0–0.5)
LYMPHOCYTES # BLD AUTO: 2.71 10*3/MM3 (ref 0.7–3.1)
LYMPHOCYTES NFR BLD AUTO: 37.6 % (ref 19.6–45.3)
MAGNESIUM SERPL-MCNC: 2.1 MG/DL (ref 1.6–2.6)
MCH RBC QN AUTO: 32.3 PG (ref 26.6–33)
MCHC RBC AUTO-ENTMCNC: 34.8 G/DL (ref 31.5–35.7)
MCV RBC AUTO: 92.6 FL (ref 79–97)
MONOCYTES # BLD AUTO: 0.66 10*3/MM3 (ref 0.1–0.9)
MONOCYTES NFR BLD AUTO: 9.2 % (ref 5–12)
NEUTROPHILS NFR BLD AUTO: 3.63 10*3/MM3 (ref 1.7–7)
NEUTROPHILS NFR BLD AUTO: 50.3 % (ref 42.7–76)
NRBC BLD AUTO-RTO: 0 /100 WBC (ref 0–0.2)
PHOSPHATE SERPL-MCNC: 3.8 MG/DL (ref 2.5–4.5)
PLATELET # BLD AUTO: 182 10*3/MM3 (ref 140–450)
PMV BLD AUTO: 9.8 FL (ref 6–12)
POTASSIUM SERPL-SCNC: 3.8 MMOL/L (ref 3.5–5.2)
PROT SERPL-MCNC: 7.8 G/DL (ref 6–8.5)
RBC # BLD AUTO: 4.62 10*6/MM3 (ref 3.77–5.28)
SODIUM SERPL-SCNC: 140 MMOL/L (ref 136–145)
WBC NRBC COR # BLD AUTO: 7.21 10*3/MM3 (ref 3.4–10.8)

## 2024-01-29 PROCEDURE — 85025 COMPLETE CBC W/AUTO DIFF WBC: CPT | Performed by: INTERNAL MEDICINE

## 2024-01-29 PROCEDURE — 25510000001 IOPAMIDOL PER 1 ML: Performed by: INTERNAL MEDICINE

## 2024-01-29 PROCEDURE — 74177 CT ABD & PELVIS W/CONTRAST: CPT

## 2024-01-29 PROCEDURE — 84100 ASSAY OF PHOSPHORUS: CPT | Performed by: INTERNAL MEDICINE

## 2024-01-29 PROCEDURE — 71260 CT THORAX DX C+: CPT

## 2024-01-29 PROCEDURE — 82378 CARCINOEMBRYONIC ANTIGEN: CPT | Performed by: INTERNAL MEDICINE

## 2024-01-29 PROCEDURE — 80053 COMPREHEN METABOLIC PANEL: CPT | Performed by: INTERNAL MEDICINE

## 2024-01-29 PROCEDURE — 83735 ASSAY OF MAGNESIUM: CPT | Performed by: INTERNAL MEDICINE

## 2024-01-29 RX ADMIN — IOPAMIDOL 100 ML: 755 INJECTION, SOLUTION INTRAVENOUS at 13:37

## 2024-01-31 ENCOUNTER — OFFICE VISIT (OUTPATIENT)
Dept: ONCOLOGY | Facility: HOSPITAL | Age: 49
End: 2024-01-31
Payer: COMMERCIAL

## 2024-01-31 ENCOUNTER — HOSPITAL ENCOUNTER (OUTPATIENT)
Dept: ONCOLOGY | Facility: HOSPITAL | Age: 49
Discharge: HOME | End: 2024-01-31
Payer: COMMERCIAL

## 2024-01-31 VITALS
HEART RATE: 89 BPM | SYSTOLIC BLOOD PRESSURE: 138 MMHG | BODY MASS INDEX: 44.9 KG/M2 | DIASTOLIC BLOOD PRESSURE: 82 MMHG | WEIGHT: 263.01 LBS | HEIGHT: 64 IN | RESPIRATION RATE: 16 BRPM | OXYGEN SATURATION: 94 % | TEMPERATURE: 97.5 F

## 2024-01-31 DIAGNOSIS — Z45.2 ENCOUNTER FOR ADJUSTMENT OR MANAGEMENT OF VASCULAR ACCESS DEVICE: Primary | ICD-10-CM

## 2024-01-31 DIAGNOSIS — R93.89 ABNORMAL CT OF THE CHEST: ICD-10-CM

## 2024-01-31 DIAGNOSIS — R06.02 SHORTNESS OF BREATH: ICD-10-CM

## 2024-01-31 DIAGNOSIS — C18.6 MALIGNANT NEOPLASM OF DESCENDING COLON: Primary | ICD-10-CM

## 2024-01-31 PROCEDURE — 96523 IRRIG DRUG DELIVERY DEVICE: CPT

## 2024-01-31 PROCEDURE — 25010000002 HEPARIN LOCK FLUSH PER 10 UNITS: Performed by: INTERNAL MEDICINE

## 2024-01-31 RX ORDER — HEPARIN SODIUM (PORCINE) LOCK FLUSH IV SOLN 100 UNIT/ML 100 UNIT/ML
500 SOLUTION INTRAVENOUS AS NEEDED
Status: DISCONTINUED | OUTPATIENT
Start: 2024-01-31 | End: 2024-02-01 | Stop reason: HOSPADM

## 2024-01-31 RX ORDER — SODIUM CHLORIDE 0.9 % (FLUSH) 0.9 %
20 SYRINGE (ML) INJECTION AS NEEDED
OUTPATIENT
Start: 2024-01-31

## 2024-01-31 RX ORDER — SODIUM CHLORIDE 0.9 % (FLUSH) 0.9 %
20 SYRINGE (ML) INJECTION AS NEEDED
Status: DISCONTINUED | OUTPATIENT
Start: 2024-01-31 | End: 2024-02-01 | Stop reason: HOSPADM

## 2024-01-31 RX ORDER — HEPARIN SODIUM (PORCINE) LOCK FLUSH IV SOLN 100 UNIT/ML 100 UNIT/ML
500 SOLUTION INTRAVENOUS AS NEEDED
OUTPATIENT
Start: 2024-01-31

## 2024-01-31 RX ORDER — CEFDINIR 300 MG/1
CAPSULE ORAL
COMMUNITY
Start: 2024-01-30

## 2024-01-31 RX ORDER — ALBUTEROL SULFATE 0.63 MG/3ML
SOLUTION RESPIRATORY (INHALATION)
COMMUNITY
Start: 2024-01-30

## 2024-01-31 RX ORDER — PREDNISONE 20 MG/1
TABLET ORAL
COMMUNITY

## 2024-01-31 RX ADMIN — Medication 20 ML: at 09:14

## 2024-01-31 RX ADMIN — HEPARIN SODIUM (PORCINE) LOCK FLUSH IV SOLN 100 UNIT/ML 500 UNITS: 100 SOLUTION at 09:14

## 2024-01-31 NOTE — PROGRESS NOTES
Chief Complaint/Care Team    FOLLOW UP 1 - Ok to see [employer] Adenocarcinoma o    Shelly Cash MD Stephens, Cassandra, MD    History of Present Illness     Diagnosis: Colon Adenocarcinoma S/p Left colectomy on 5/5/23, stage after resection [id number], stage III    Current Treatment: FOLFOX IV [id number] x 6 months, cycle 1 on 6/14/2023  Treatment delay secondary to pneumoperitoneum which developed in June 2023, patient cleared to resume chemotherapy in August 2023.    Previous Treatment: c-scope on 4/2023 biopsy revealed colon adenocarcinoma    Lilian Pelaez is a 48 y.o. female who presents to [facility] for discussion of newly diagnosed colon adenocarcinoma seen on biopsy of colon mass in the descending colon during colonoscopy performed in April 2023.     Staging scans revealed on 4/11/2023:  CT abdomen/pelvis without any metastatic disease in abdomen/pelvis  CT chest no evidence of metastatic disease in chest.    Patient underwent robotic resection of descending colon and splenic flexure by Dr. Rolando Liu on 5/5/2023.    Patient is a former smoker.   Patient reports family history of several relatives with other forms of cancer cancer on her mother side of the family, but denies any known history of colon cancer in her family.    Patient reports noticing some blood mixed in with stool prior to cancer diagnosis.      Patient received cycle 1 on 6/14/2023, cycle 2 was delayed secondary to development of pneumoperitoneum after cycle 1, patient was mated to the hospital very by her surgeon Dr. Liu who recommended delaying further cycles of chemotherapy until August 1, 2023.    Interval history: Patient here for follow up after completing 12 cycles of chemotherapy with FOLFOX.   She without report of fever, chills, SOA, melena, or blood loss. She reports irritation for chemoport and would like for it to be removed. She reports her PCP started her on antibiotics  given concern for pneumonia, she reports increased SOA with her daily activities. She is here to discuss restaging imaging to assess for cancer recurrence. She is here with her dtr and grandson.     Review of Systems   Constitutional:  Positive for activity change, appetite change and fatigue. Negative for diaphoresis, fever, unexpected weight gain and unexpected weight loss.   HENT:  Negative for congestion, hearing loss, mouth sores, nosebleeds, sore throat, swollen glands, trouble swallowing and voice change.    Eyes:  Negative for blurred vision.   Respiratory:  Negative for cough, shortness of breath and wheezing.    Cardiovascular:  Negative for chest pain and palpitations.   Gastrointestinal:  Positive for nausea. Negative for abdominal pain, blood in stool, constipation, diarrhea and vomiting.   Endocrine: Positive for cold intolerance. Negative for heat intolerance.   Genitourinary:  Negative for difficulty urinating, dysuria, frequency, hematuria and urinary incontinence.   Musculoskeletal:  Positive for back pain. Negative for arthralgias and myalgias.   Skin:  Negative for rash, skin lesions and wound.   Neurological:  Positive for dizziness. Negative for seizures, weakness, numbness and headache.   Hematological:  Does not bruise/bleed easily.   Psychiatric/Behavioral:  Negative for depressed mood. The patient is nervous/anxious.    All other systems reviewed and are negative.       Oncology/Hematology History   Primary adenocarcinoma of descending colon   4/11/2023 Initial Diagnosis    Primary adenocarcinoma of descending colon     9/27/2023 -  Chemotherapy    OP SUPPORTIVE HYDRATION + ANTIEMETICS     Malignant neoplasm of descending colon   5/6/2023 Initial Diagnosis    Malignant neoplasm of descending colon     5/31/2023 Cancer Staged    Staging form: Colon And Rectum, AJCC 8th Edition  - Pathologic: Stage IIIB (pT3, pN1b, cM0) - Signed by Brian Marroquin MD on 5/31/2023 6/14/2023 -  Chemotherapy  "   OP COLON mFOLFOX6 OXALIplatin / Leucovorin / Fluorouracil         Objective     Vitals:    01/31/24 0935   BP: 138/82   Pulse: 89   Resp: 16   Temp: 97.5 °F (36.4 °C)   TempSrc: Temporal   SpO2: 94%   Weight: 119 kg (263 lb 0.1 oz)   Height: 162.6 cm (64.02\")   PainSc:   4   PainLoc: Back       ECOG score: 0         PHQ-9 Total Score:         Physical Exam  Vitals reviewed. Exam conducted with a chaperone present.   Constitutional:       General: She is not in acute distress.     Appearance: Normal appearance.   HENT:      Head: Normocephalic and atraumatic.      Mouth/Throat:      Mouth: Mucous membranes are dry.   Eyes:      Extraocular Movements: Extraocular movements intact.      Conjunctiva/sclera: Conjunctivae normal.   Pulmonary:      Effort: Pulmonary effort is normal.   Musculoskeletal:      Cervical back: Normal range of motion and neck supple.   Skin:     General: Skin is warm and dry.      Findings: No bruising.   Neurological:      Mental Status: She is oriented to person, place, and time.           Past Medical History     Past Medical History:   Diagnosis Date    Anesthesia     B/P bottomed out/UNSURE ON THE DATE    Anxiety     Asthma 1996    seasonal/NO INHALERS    Colon cancer 04/17/2023    DDD (degenerative disc disease), cervical     DDD (degenerative disc disease), lumbar     Depression     Diverticulitis of colon 2005    Scope was done in [facility]    GERD (gastroesophageal reflux disease)     Hypertension     Kidney stones     Melanoma     removed    Palpitation     FOLLOWS W/DR. ANTONIO  Q6 MONTHS, NO CP OR SOA    Prolapse of female bladder      Current Outpatient Medications on File Prior to Visit   Medication Sig Dispense Refill    albuterol (ACCUNEB) 0.63 MG/3ML nebulizer solution       allopurinol (ZYLOPRIM) 100 MG tablet Take 1 tablet by mouth Daily.      ALPRAZolam (XANAX) 0.5 MG tablet Take 1 tablet by mouth At Night As Needed.      amLODIPine (NORVASC) 10 MG tablet Take 1 tablet by " mouth Every Night.      cefdinir (OMNICEF) 300 MG capsule       Diclofenac Sodium (VOLTAREN) 1 % gel gel Apply 4 g topically to the appropriate area as directed 4 (Four) Times a Day As Needed.      docusate sodium 100 MG capsule Take 1 capsule by mouth Daily.      fluorouracil (EFUDEX) 5 % cream Apply 1 Application topically to the appropriate area as directed 2 (Two) Times a Day.      furosemide (LASIX) 20 MG tablet Take 1 tablet by mouth Daily.      furosemide (LASIX) 40 MG tablet       gabapentin (NEURONTIN) 300 MG capsule Take 1 capsule by mouth 2 (Two) Times a Day. 60 capsule 0    HYDROcodone-acetaminophen (NORCO) 7.5-325 MG per tablet Take 1 tablet by mouth Every 8 (Eight) Hours As Needed.      Hydrocortisone, Perianal, (ANUSOL-HC) 2.5 % rectal cream       hyoscyamine (ANASPAZ,LEVSIN) 0.125 MG tablet TAKE ONE TABLET BY MOUTH FOUR TIMES A DAY WITH MEALS AND AT BEDTIME. 120 tablet 1    ketorolac (TORADOL) 10 MG tablet Take 1 tablet by mouth Every 6 (Six) Hours As Needed.      ketorolac (TORADOL) 10 MG tablet Take 1 tablet by mouth Every 6 (Six) Hours As Needed (Pain). 12 tablet 0    lamoTRIgine (LaMICtal) 25 MG tablet Take 1 tablet by mouth 2 (Two) Times a Day. Only takes at night      leucovorin 5 MG tablet Take  by mouth Every 6 (Six) Hours.      lidocaine-prilocaine (EMLA) 2.5-2.5 % cream Apply 1 application topically to the appropriate area as directed Every 2 (Two) Hours As Needed for Mild Pain. 30 g 1    loperamide (Imodium A-D) 2 MG tablet Take 1 tablet by mouth 4 (Four) Times a Day As Needed for Diarrhea. Imodium - take 4 mg first dose, followed by 2 mg every 2-4 hours after each stool (MAX of 16 mg in 24 hour period) 60 tablet 1    losartan (COZAAR) 100 MG tablet Take 1 tablet by mouth Every Night.      medroxyPROGESTERone (DEPO-PROVERA) 150 MG/ML injection Inject 1 mL into the appropriate muscle as directed by prescriber Every 3 (Three) Months.      metoclopramide (REGLAN) 10 MG tablet Take 1 tablet by  mouth 3 (Three) Times a Day.      metoprolol succinate XL (TOPROL-XL) 25 MG 24 hr tablet Take 1 tablet by mouth Daily.      Multiple Vitamins-Minerals (b complex-C-E-zinc) tablet Take 1 tablet by mouth Daily.      Myrbetriq 50 MG tablet sustained-release 24 hour 24 hr tablet       naloxone (NARCAN) 4 MG/0.1ML nasal spray Take 1 spray every day by nasal route as needed.      nystatin (MYCOSTATIN) 100,000 unit/mL suspension Swish and swallow 5 mL 4 (Four) Times a Day. 473 mL 1    ondansetron (ZOFRAN) 8 MG tablet Take 1 tablet by mouth 3 (Three) Times a Day As Needed for Nausea or Vomiting. 30 tablet 5    OXALIPLATIN IV Infuse  into a venous catheter.      pantoprazole (PROTONIX) 40 MG EC tablet Take 1 tablet by mouth 2 (Two) Times a Day. 60 tablet 1    potassium chloride (K-DUR,KLOR-CON) 10 MEQ CR tablet Take 2 tablets by mouth Daily. 28 tablet 0    predniSONE (DELTASONE) 20 MG tablet Take 3 tablets every day by oral route for 5 days.      Probiotic Product ([employer] PO) Take  by mouth Daily.      prochlorperazine (COMPAZINE) 10 MG tablet Take 1 tablet by mouth Every 6 (Six) Hours As Needed for Nausea or Vomiting. 30 tablet 5    rOPINIRole (REQUIP) 1 MG tablet Take 1 tablet by mouth Every Night.      sertraline (ZOLOFT) 100 MG tablet Take 0.5 tablets by mouth Daily.      sodium chloride (Ocean Nasal Spray) 0.65 % nasal spray 1 spray into the nostril(s) as directed by provider 2 (Two) Times a Day As Needed for Congestion. 480 mL 4    sucralfate (CARAFATE) 1 g tablet Take 1 tablet by mouth 2 (Two) Times a Day.      tetracycline (ACHROMYCIN,SUMYCIN) 250 MG capsule       traMADol (Ultram) 50 MG tablet Take 1 tablet by mouth Every 6 (Six) Hours As Needed for Severe Pain or Moderate Pain. 5 tablet 0    vitamin D3 125 MCG (5000 UT) capsule capsule Take 2,000 Units by mouth Daily.      hydroCHLOROthiazide (HYDRODIURIL) 25 MG tablet Take 1 tablet every day by oral route for 90 days. (Patient not taking: Reported  on 11/29/2023)      levoFLOXacin (Levaquin) 750 MG tablet Take 1 tablet by mouth Daily. (Patient not taking: Reported on 11/29/2023) 7 tablet 0    Lidocaine 4 % patch Apply 1 patch topically Daily. (Patient not taking: Reported on 11/29/2023)      linaclotide (LINZESS) 290 MCG capsule capsule Take 1 capsule by mouth As Needed. (Patient not taking: Reported on 11/29/2023)       No current facility-administered medications on file prior to visit.      Allergies   Allergen Reactions    Hydromorphone Hives, Itching and GI Intolerance    Morphine Hives, Itching and Nausea And Vomiting    Oxybutynin Swelling and Angioedema           Oxycodone-Acetaminophen Itching and Nausea And Vomiting     Can not take any higher than 7.5    Penicillins Rash    Promethazine Nausea And Vomiting    Tylenol With Codeine #3 [Acetaminophen-Codeine] Itching and Nausea And Vomiting    Oxybutynin Chloride Angioedema     Past Surgical History:   Procedure Laterality Date    BREAST SURGERY  2016    Removed 8lbs of tissue, 2016    CHOLECYSTECTOMY      COLON RESECTION Left 05/05/2023    Procedure: RESECTION OF DESCENDING COLON AND SPLENIC FLEXURE TAKEDOWN WITH DAVINCI ROBOT;  Surgeon: Rolando Liu MD;  Location: [facility];  Service: Robotics - DaVinci;  Laterality: Left;    COLONOSCOPY N/A 04/04/2023    Procedure: COLONOSCOPY WITH POLYPECTOMY/SNARE/BIOPSIES/TATTOOING DESCENDING COLON MASS;  Surgeon: Deniz Dowd MD;  Location: [facility] ENDOSCOPY;  Service: Gastroenterology;  Laterality: N/A;  INCOMPLETE COLONOSCOPY  POOR BOWEL PREP  COLON POLYP  COLON MASS  DIVERTICULOSIS    COLONOSCOPY N/A 04/12/2023    Procedure: COLONOSCOPY with cold snare;  Surgeon: Deniz Dowd MD;  Location: [facility] ENDOSCOPY;  Service: Gastroenterology;  Laterality: N/A;  colon polyps, diverticulosis, colon mass, hemorrhoids      CYSTOSCOPY W/ URETERAL STENT PLACEMENT Left 05/05/2023    Procedure: Cystoscopy, left ureteral injection,;  Surgeon: Tomasa  MD Manjula;  Location: [facility];  Service: Urology;  Laterality: Left;    FRACTURE SURGERY  2001    Left knee    KNEE SURGERY Left     x4    LAPAROSCOPIC TUBAL LIGATION  1995    LAPAROSCOPIC TUBAL LIGATION      Reversed    REDUCTION MAMMAPLASTY  2009    8lbs of tissue was removed    TONSILLECTOMY      UPPER GASTROINTESTINAL ENDOSCOPY      VENOUS ACCESS DEVICE (PORT) INSERTION N/A 6/5/2023    Procedure: INSERTION VENOUS ACCESS DEVICE;  Surgeon: Rolando Liu MD;  Location: [facility];  Service: General;  Laterality: N/A;     Social History     Socioeconomic History    Marital status:    Tobacco Use    Smoking status: Never     Passive exposure: Never    Smokeless tobacco: Never   Vaping Use    Vaping Use: Never used   Substance and Sexual Activity    Alcohol use: Never    Drug use: Never    Sexual activity: Defer     Family History   Problem Relation Age of Onset    Colon cancer Neg Hx        Results     Result Review   The following data was reviewed by: Brian Marroquin MD on 04/13/2023:  Lab Results   Component Value Date    HGB 14.9 01/29/2024    HCT 42.8 01/29/2024    MCV 92.6 01/29/2024     01/29/2024    WBC 7.21 01/29/2024    NEUTROABS 3.63 01/29/2024    LYMPHSABS 2.71 01/29/2024    MONOSABS 0.66 01/29/2024    EOSABS 0.12 01/29/2024    BASOSABS 0.06 01/29/2024     Lab Results   Component Value Date    GLUCOSE 91 01/29/2024    BUN 11 01/29/2024    CREATININE 0.78 01/29/2024     01/29/2024    K 3.8 01/29/2024     01/29/2024    CO2 23.8 01/29/2024    CALCIUM 10.3 01/29/2024    PROTEINTOT 7.8 01/29/2024    ALBUMIN 4.5 01/29/2024    BILITOT 0.6 01/29/2024    ALKPHOS 111 01/29/2024    AST 24 01/29/2024    ALT 18 01/29/2024     Lab Results   Component Value Date    MG 2.1 01/29/2024    PHOS 3.8 01/29/2024       Case Report   Surgical Pathology Report                         Case: [id number]                                   Authorizing Provider:  Deniz Dowd MD     Collected:           04/12/2023 09:41 AM           Ordering Location:     [facility] Received:            04/12/2023 11:41 AM                                  SUITES                                                                        Pathologist:           Niesha Torres DO                                                        Specimens:   1) - Large Intestine, Cecum, cecal polyp cold snare                                                  2) - Large Intestine, Sigmoid Colon, sigmoid colon polyp cold snare                        Clinical Information    Mass of colon   Final Diagnosis   1. Cecum polyp, biopsy:                            - Tubular adenoma        2. Sigmoid colon polyp, biopsy:                            - Tubular adenoma    Electronically signed by Niesha Torres DO on 4/13/2023 at 0840     Surgical Pathology Report                         Case: [id number]                                   Authorizing Provider:  Deniz Dowd MD    Collected:           04/04/2023 11:15 AM           Ordering Location:     [facility] Received:            04/04/2023 11:59 AM                                  SUITES                                                                        Pathologist:           Jennifer Mike MD                                                      Specimens:   1) - Large Intestine, Transverse Colon, TRANSVERSE COLON POLYP                                       2) - Large Intestine, Left / Descending Colon, DESCENDING COLON BIOPSIES                   Clinical Information    Constipation, unspecified constipation type   Final Diagnosis   1. Transverse colon polyp, biopsy:               - Fragments of tubular adenoma        2.  Descending colon, biopsy:               -Adenocarcinoma, moderately to poorly differentiated     Comment: Per policy, reflex testing has been ordered, and the results will be separately reported.     REMARKS: The  above positive (malignant) diagnosis was called to April in Dr. Dowd's office at 09:09 EDT on 4/5/2023 by bjs.          Electronically signed by Jennifer Mike MD on 4/5/2023 at 0957         Surgical Pathology Report                         Case: [id number]                                   Authorizing Provider:  Rolando Liu MD        Collected:           05/05/2023 03:06 PM           Ordering Location:     [facility]  Received:            05/08/2023 06:48 AM                                  OR                                                                            Pathologist:           Jt [facility] MD                                                             Specimen:    Large Intestine, Left / Descending Colon, left colon                                       Clinical Information    Malignant neoplasm of descending colon   Final Diagnosis   Left colon, resection:               - Adenocarcinoma               - Three of thirty-three lymph nodes positive for carcinoma (3/33)               - See synoptic checklist   Electronically signed by Jt Florence MD on 5/11/2023 at 1508   Synoptic Checklist   COLON AND RECTUM: Resection, Including Transanal Disk Excision of Rectal Neoplasms  8th Edition - Protocol posted: 6/22/2022  COLON AND RECTUM: RESECTION - All Specimens  SPECIMEN   Procedure  Descending colon resection    TUMOR   Tumor Site  Descending colon    Histologic Type  Adenocarcinoma    Histologic Grade  G3, poorly differentiated    Tumor Size  Greatest dimension (Centimeters): 3.5 cm   Tumor Extent  Invades through muscularis propria into the pericolonic or perirectal tissue    Macroscopic Tumor Perforation  Not identified    Lymphovascular Invasion  Small vessel    Perineural Invasion  Present    Treatment Effect  No known presurgical therapy    MARGINS   Margin Status for Invasive Carcinoma  All margins negative for invasive carcinoma    Closest Margin(s) to Invasive  Carcinoma  Radial (circumferential) or mesenteric    Distance from Invasive Carcinoma to Closest Margin  3.5 cm   Margin Status for Non-Invasive Tumor  All margins negative for high-grade dysplasia / intramucosal carcinoma and low-grade dysplasia    REGIONAL LYMPH NODES   Regional Lymph Node Status  Tumor present in regional lymph node(s)    Number of Lymph Nodes with Tumor  3    Number of Lymph Nodes Examined  33    Tumor Deposits  Present    Number of Tumor Deposits  1    PATHOLOGIC STAGE CLASSIFICATION (pTNM, AJCC 8th Edition)   Reporting of pT, pN, and (when applicable) pM categories is based on information available to the pathologist at the time the report is issued. As per the AJCC (Chapter 1, 8th Ed.) it is the managing physician's responsibility to establish the final pathologic stage based upon all pertinent information, including but potentially not limited to this pathology report.   pT Category  pT3    pN Category  pN1b    ADDITIONAL FINDINGS   Additional Findings  None identified    .          CT Abdomen Pelvis With Contrast    Result Date: 1/29/2024  Impression:  No evidence of abdominopelvic metastatic disease     NKECHI GARCIA MD       Electronically Signed and Approved By: NKECHI GARCIA MD on 1/29/2024 at 16:50             CT Chest With Contrast Diagnostic    Result Date: 1/29/2024  Impression:    1. No evidence of thoracic metastatic disease  2. New elevation of the right hemidiaphragm, with secondary right middle and right lower lobe atelectasis.  Correlate for diaphragmatic dysfunction  3. New ground-glass infiltrates in the upper lobes, likely infectious/inflammatory including possible viral pneumonia     NKECHI GARCIA MD       Electronically Signed and Approved By: NKECHI GARCIA MD on 1/29/2024 at 16:45              Assessment & Plan     Diagnoses and all orders for this visit:    1. Malignant neoplasm of descending colon (Primary)  -     Ambulatory Referral to Pulmonology  -      Ambulatory Referral to General Surgery  -     CT chest w contrast; Future  -     CT abdomen pelvis w contrast; Future  -     CBC & Differential; Future  -     Comprehensive Metabolic Panel; Future  -     CEA; Future    2. Abnormal CT of the chest  -     Ambulatory Referral to Pulmonology    3. Shortness of breath  -     Ambulatory Referral to Pulmonology          Lilian Pelaez is a 48 y.o. female who presents to [facility] evaluation prior to systemic therapy for stage III colon cancer, patient status post resection of descending colon splenic flexure by Dr. Rolando Liu on 5/5/2023.     Based on NCCN guidelines for her stage III colon cancer, discussed high risk features seen on pathology report and discussed CapeOx for 3 months versus FOLFOX for 3 to 6 months with patient and  today.  Patient agreed to plan to undergo FOLFOX IV chemotherapy every 2 weeks for 6 months.    Patient received cycle 1 on June 14, 2023, she developed pneumoperitoneum which her surgeon suspect was secondary to severe constipation from iron tablets, but patient was cleared to resume chemotherapy after 8/1/2023, she completed 12 cycles of chemotherapy on 12/27/2024.    Discussed restaging imaging scans obtained on 1/29/2023 which was without evidence of disease recurrence or metastatic disease, but did reveal new elevation of right hemidiaphragm with secondary lower lobe atelectasis, new ground glass infiltrates in the upper lobes, likely infectious including possible viral pneumonia.    Dyspnea  -worse with activity  -referred pt to pulmonology for further evaluation and treatment  -pt has received antibiotics for suspected pneumonia    -Pt requesting chemoport removal, referred pt to general surgeon today for chemport removal.     Since most recent scans were negative for disease recurrence and CEA remains low, shared plan to rescan pt in 4 months with CT CAP.    Patient verbalized  understanding and agreement plan.    Please note that portions of this note were completed with a voice recognition program.    Electronically signed by Brian Marroquin MD, 01/31/24, 2:35 PM EST.      Follow Up     I spent 30 minutes caring for Lilian on this date of service. This time includes time spent by me in the following activities:preparing for the visit, reviewing tests, obtaining and/or reviewing a separately obtained history, performing a medically appropriate examination and/or evaluation , counseling and educating the patient/family/caregiver, ordering medications, tests, or procedures, documenting information in the medical record and care coordination.    This is an acute or chronic illness that poses a threat to life or bodily function. The above treatment plan involves a high risk of complications and/or mortality of patient management.    The patient was seen and examined. Work by the provider also included review and/or ordering of lab tests, review and/or ordering of radiology tests, review and/or ordering of medicine tests, discussion with other physicians or providers, independent review of data, obtaining old records, review/summation of old records, and/or other review.    I have reviewed the family history, social history, and past medical history for this patient. Previous information and data has been reviewed and updated as needed. I have reviewed and verified the chief complaint, history, and other documentation. The patient was interviewed and examined in the clinic and the chart reviewed. The previous observations, recommendations, and conclusions were reviewed including those of other providers.     The plan was discussed with the patient and/or family. The patient was given time to ask questions and these questions were answered. At the conclusion of their visit they had no additional questions or concerns and all questions were answered to their satisfaction.    Patient was given  instructions and counseling regarding her condition or for health maintenance advice. Please see specific information pulled into the AVS if appropriate.

## 2024-02-02 DIAGNOSIS — T45.1X5A CHEMOTHERAPY-INDUCED NEUROPATHY: ICD-10-CM

## 2024-02-02 DIAGNOSIS — C18.6 MALIGNANT NEOPLASM OF DESCENDING COLON: ICD-10-CM

## 2024-02-02 DIAGNOSIS — G62.0 CHEMOTHERAPY-INDUCED NEUROPATHY: ICD-10-CM

## 2024-02-02 DIAGNOSIS — K21.9 GASTRIC REFLUX: ICD-10-CM

## 2024-02-05 RX ORDER — PANTOPRAZOLE SODIUM 40 MG/1
40 TABLET, DELAYED RELEASE ORAL 2 TIMES DAILY
Qty: 60 TABLET | Refills: 1 | Status: SHIPPED | OUTPATIENT
Start: 2024-02-05

## 2024-02-05 RX ORDER — GABAPENTIN 300 MG/1
300 CAPSULE ORAL 2 TIMES DAILY
Qty: 60 CAPSULE | Refills: 2 | Status: SHIPPED | OUTPATIENT
Start: 2024-02-05

## 2024-02-06 ENCOUNTER — OFFICE VISIT (OUTPATIENT)
Dept: SURGERY | Facility: CLINIC | Age: 49
End: 2024-02-06
Payer: COMMERCIAL

## 2024-02-06 ENCOUNTER — PREP FOR SURGERY (OUTPATIENT)
Dept: OTHER | Facility: HOSPITAL | Age: 49
End: 2024-02-06
Payer: COMMERCIAL

## 2024-02-06 VITALS
DIASTOLIC BLOOD PRESSURE: 96 MMHG | HEART RATE: 100 BPM | RESPIRATION RATE: 16 BRPM | SYSTOLIC BLOOD PRESSURE: 125 MMHG | HEIGHT: 67 IN | BODY MASS INDEX: 41.44 KG/M2 | WEIGHT: 264 LBS

## 2024-02-06 DIAGNOSIS — Z95.828 PORT-A-CATH IN PLACE: Primary | ICD-10-CM

## 2024-02-06 PROCEDURE — 3080F DIAST BP >= 90 MM HG: CPT | Performed by: SURGERY

## 2024-02-06 PROCEDURE — 3074F SYST BP LT 130 MM HG: CPT | Performed by: SURGERY

## 2024-02-06 PROCEDURE — 1159F MED LIST DOCD IN RCRD: CPT | Performed by: SURGERY

## 2024-02-06 PROCEDURE — 99212 OFFICE O/P EST SF 10 MIN: CPT | Performed by: SURGERY

## 2024-02-06 PROCEDURE — 1160F RVW MEDS BY RX/DR IN RCRD: CPT | Performed by: SURGERY

## 2024-02-06 NOTE — H&P (VIEW-ONLY)
Chief Complaint:  Port removal consult    Primary Care Provider: Shelly Cash MD    Referring Provider: Brian Marroquin MD    History of Present Illness  Lilian Pelaez is a 48 y.o. female referred by Brian Marroquin MD to have a port removed.  The port was placed in June 2023 and used to treat colorectal cancer.  However, the port has had issues with fiber and see several times and no longer works.  Patient says she no longer needs it anyway.  She wants to have it removed.    Allergies: Hydromorphone, Morphine, Oxybutynin, Oxycodone-acetaminophen, Penicillins, Promethazine, Tylenol with codeine #3 [acetaminophen-codeine], and Oxybutynin chloride    Outpatient Medications Marked as Taking for the 2/6/24 encounter (Office Visit) with Rolando Liu MD   Medication Sig Dispense Refill   • albuterol (ACCUNEB) 0.63 MG/3ML nebulizer solution      • allopurinol (ZYLOPRIM) 100 MG tablet Take 1 tablet by mouth Daily.     • ALPRAZolam (XANAX) 0.5 MG tablet Take 1 tablet by mouth At Night As Needed.     • amLODIPine (NORVASC) 10 MG tablet Take 1 tablet by mouth Every Night.     • cefdinir (OMNICEF) 300 MG capsule      • Diclofenac Sodium (VOLTAREN) 1 % gel gel Apply 4 g topically to the appropriate area as directed 4 (Four) Times a Day As Needed.     • docusate sodium 100 MG capsule Take 1 capsule by mouth Daily.     • fluorouracil (EFUDEX) 5 % cream Apply 1 Application topically to the appropriate area as directed 2 (Two) Times a Day.     • furosemide (LASIX) 20 MG tablet Take 1 tablet by mouth Daily.     • gabapentin (NEURONTIN) 300 MG capsule TAKE 1 CAPSULE BY MOUTH TWICE A DAY 60 capsule 2   • HYDROcodone-acetaminophen (NORCO) 7.5-325 MG per tablet Take 1 tablet by mouth Every 8 (Eight) Hours As Needed.     • Hydrocortisone, Perianal, (ANUSOL-HC) 2.5 % rectal cream      • hyoscyamine (ANASPAZ,LEVSIN) 0.125 MG tablet TAKE ONE TABLET BY MOUTH FOUR TIMES A DAY WITH MEALS AND AT BEDTIME. 120 tablet 1   • ketorolac  (TORADOL) 10 MG tablet Take 1 tablet by mouth Every 6 (Six) Hours As Needed.     • lamoTRIgine (LaMICtal) 25 MG tablet Take 1 tablet by mouth 2 (Two) Times a Day. Only takes at night     • Lidocaine 4 % patch Apply 1 patch topically Daily.     • lidocaine-prilocaine (EMLA) 2.5-2.5 % cream Apply 1 application topically to the appropriate area as directed Every 2 (Two) Hours As Needed for Mild Pain. 30 g 1   • loperamide (Imodium A-D) 2 MG tablet Take 1 tablet by mouth 4 (Four) Times a Day As Needed for Diarrhea. Imodium - take 4 mg first dose, followed by 2 mg every 2-4 hours after each stool (MAX of 16 mg in 24 hour period) 60 tablet 1   • losartan (COZAAR) 100 MG tablet Take 1 tablet by mouth Every Night.     • medroxyPROGESTERone (DEPO-PROVERA) 150 MG/ML injection Inject 1 mL into the appropriate muscle as directed by prescriber Every 3 (Three) Months.     • metoclopramide (REGLAN) 10 MG tablet Take 1 tablet by mouth 3 (Three) Times a Day.     • metoprolol succinate XL (TOPROL-XL) 25 MG 24 hr tablet Take 1 tablet by mouth Daily.     • Multiple Vitamins-Minerals (b complex-C-E-zinc) tablet Take 1 tablet by mouth Daily.     • Myrbetriq 50 MG tablet sustained-release 24 hour 24 hr tablet      • naloxone (NARCAN) 4 MG/0.1ML nasal spray Take 1 spray every day by nasal route as needed.     • ondansetron (ZOFRAN) 8 MG tablet Take 1 tablet by mouth 3 (Three) Times a Day As Needed for Nausea or Vomiting. 30 tablet 5   • pantoprazole (PROTONIX) 40 MG EC tablet TAKE 1 TABLET BY MOUTH TWICE A DAY 60 tablet 1   • predniSONE (DELTASONE) 20 MG tablet Take 3 tablets every day by oral route for 5 days.     • Probiotic Product (HihoCoder PO) Take  by mouth Daily.     • prochlorperazine (COMPAZINE) 10 MG tablet Take 1 tablet by mouth Every 6 (Six) Hours As Needed for Nausea or Vomiting. 30 tablet 5   • rOPINIRole (REQUIP) 1 MG tablet Take 1 tablet by mouth Every Night.     • sertraline (ZOLOFT) 100 MG tablet Take 0.5  tablets by mouth Daily.     • sodium chloride (Ocean Nasal Spray) 0.65 % nasal spray 1 spray into the nostril(s) as directed by provider 2 (Two) Times a Day As Needed for Congestion. 480 mL 4   • sucralfate (CARAFATE) 1 g tablet Take 1 tablet by mouth 2 (Two) Times a Day.         Past Medical History:   • Anesthesia    B/P bottomed out/UNSURE ON THE DATE   • Anxiety   • Asthma    seasonal/NO INHALERS   • Colon cancer   • DDD (degenerative disc disease), cervical   • DDD (degenerative disc disease), lumbar   • Depression   • Diverticulitis of colon    Scope was done in [facility]   • GERD (gastroesophageal reflux disease)   • Hypertension   • Kidney stones   • Melanoma    removed   • Palpitation    FOLLOWS W/DR. ANTONIO  Q6 MONTHS, NO CP OR SOA   • Prolapse of female bladder        Past Surgical History:   • BREAST SURGERY    Removed 8lbs of tissue, 2016   • CHOLECYSTECTOMY   • COLON RESECTION    Procedure: RESECTION OF DESCENDING COLON AND SPLENIC FLEXURE TAKEDOWN WITH DAVINCI ROBOT;  Surgeon: Rolando Liu MD;  Location: [facility] OR [facility];  Service: Robotics - DaVinci;  Laterality: Left;   • COLONOSCOPY    Procedure: COLONOSCOPY WITH POLYPECTOMY/SNARE/BIOPSIES/TATTOOING DESCENDING COLON MASS;  Surgeon: Deniz Dowd MD;  Location: [facility] ENDOSCOPY;  Service: Gastroenterology;  Laterality: N/A;  INCOMPLETE COLONOSCOPY  POOR BOWEL PREP  COLON POLYP  COLON MASS  DIVERTICULOSIS   • COLONOSCOPY    Procedure: COLONOSCOPY with cold snare;  Surgeon: Deniz Dowd MD;  Location: [facility] ENDOSCOPY;  Service: Gastroenterology;  Laterality: N/A;  colon polyps, diverticulosis, colon mass, hemorrhoids     • CYSTOSCOPY W/ URETERAL STENT PLACEMENT    Procedure: Cystoscopy, left ureteral injection,;  Surgeon: Manjula Randolph MD;  Location: [facility] OR [facility];  Service: Urology;  Laterality: Left;   • FRACTURE SURGERY    Left knee   • KNEE SURGERY    x4   • LAPAROSCOPIC TUBAL LIGATION   • LAPAROSCOPIC TUBAL LIGATION     "Reversed   • REDUCTION MAMMAPLASTY    8lbs of tissue was removed   • TONSILLECTOMY   • UPPER GASTROINTESTINAL ENDOSCOPY   • VENOUS ACCESS DEVICE (PORT) INSERTION    Procedure: INSERTION VENOUS ACCESS DEVICE;  Surgeon: Rolando Liu MD;  [facility];  Service: General;  Laterality: N/A;       Family History:   Family History   Problem Relation Age of Onset   • Colon cancer Neg Hx         Social History:  Social History     Tobacco Use   • Smoking status: Never     Passive exposure: Never   • Smokeless tobacco: Never   Substance Use Topics   • Alcohol use: Never       Objective     Vital Signs:  /96 (BP Location: Left arm, Patient Position: Sitting, Cuff Size: Adult) Comment (BP Location): Lower  Pulse 100   Resp 16   Ht 170.2 cm (67\")   Wt 120 kg (264 lb)   BMI 41.35 kg/m²   Respiratory:  breathing not labored, respiratory effort appears normal  Cardiovascular:  heart regular rate  Skin and subcutaneous tissue:  port palpable at right upper chest  Musculoskeletal: moving all extremities symmetrically and purposefully  Neurologic:  no obvious motor or sensory deficits, speech clear  Psychiatric:  judgment and insight intact      Assessment:  Port-A-Cath in place    Plan:  Port-a-catheter removal    Discussion: Indications, options, risks, benefits, and expected outcomes of planned surgery were discussed with the patient and she agrees to proceed.    Rolando Liu MD  02/06/2024    Electronically signed by Rolando Liu MD, 02/06/24, 9:01 AM EST.      "

## 2024-02-06 NOTE — PROGRESS NOTES
Chief Complaint:  Port removal consult    Primary Care Provider: Shelly Cash MD    Referring Provider: Brian Marroquin MD    History of Present Illness  Lilian Pelaez is a 48 y.o. female referred by Brian Marroquin MD to have a port removed.  The port was placed in June 2023 and used to treat colorectal cancer.  However, the port has had issues with fiber and see several times and no longer works.  Patient says she no longer needs it anyway.  She wants to have it removed.    Allergies: Hydromorphone, Morphine, Oxybutynin, Oxycodone-acetaminophen, Penicillins, Promethazine, Tylenol with codeine #3 [acetaminophen-codeine], and Oxybutynin chloride    Outpatient Medications Marked as Taking for the 2/6/24 encounter (Office Visit) with Rolando Liu MD   Medication Sig Dispense Refill   • albuterol (ACCUNEB) 0.63 MG/3ML nebulizer solution      • allopurinol (ZYLOPRIM) 100 MG tablet Take 1 tablet by mouth Daily.     • ALPRAZolam (XANAX) 0.5 MG tablet Take 1 tablet by mouth At Night As Needed.     • amLODIPine (NORVASC) 10 MG tablet Take 1 tablet by mouth Every Night.     • cefdinir (OMNICEF) 300 MG capsule      • Diclofenac Sodium (VOLTAREN) 1 % gel gel Apply 4 g topically to the appropriate area as directed 4 (Four) Times a Day As Needed.     • docusate sodium 100 MG capsule Take 1 capsule by mouth Daily.     • fluorouracil (EFUDEX) 5 % cream Apply 1 Application topically to the appropriate area as directed 2 (Two) Times a Day.     • furosemide (LASIX) 20 MG tablet Take 1 tablet by mouth Daily.     • gabapentin (NEURONTIN) 300 MG capsule TAKE 1 CAPSULE BY MOUTH TWICE A DAY 60 capsule 2   • HYDROcodone-acetaminophen (NORCO) 7.5-325 MG per tablet Take 1 tablet by mouth Every 8 (Eight) Hours As Needed.     • Hydrocortisone, Perianal, (ANUSOL-HC) 2.5 % rectal cream      • hyoscyamine (ANASPAZ,LEVSIN) 0.125 MG tablet TAKE ONE TABLET BY MOUTH FOUR TIMES A DAY WITH MEALS AND AT BEDTIME. 120 tablet 1   • ketorolac  (TORADOL) 10 MG tablet Take 1 tablet by mouth Every 6 (Six) Hours As Needed.     • lamoTRIgine (LaMICtal) 25 MG tablet Take 1 tablet by mouth 2 (Two) Times a Day. Only takes at night     • Lidocaine 4 % patch Apply 1 patch topically Daily.     • lidocaine-prilocaine (EMLA) 2.5-2.5 % cream Apply 1 application topically to the appropriate area as directed Every 2 (Two) Hours As Needed for Mild Pain. 30 g 1   • loperamide (Imodium A-D) 2 MG tablet Take 1 tablet by mouth 4 (Four) Times a Day As Needed for Diarrhea. Imodium - take 4 mg first dose, followed by 2 mg every 2-4 hours after each stool (MAX of 16 mg in 24 hour period) 60 tablet 1   • losartan (COZAAR) 100 MG tablet Take 1 tablet by mouth Every Night.     • medroxyPROGESTERone (DEPO-PROVERA) 150 MG/ML injection Inject 1 mL into the appropriate muscle as directed by prescriber Every 3 (Three) Months.     • metoclopramide (REGLAN) 10 MG tablet Take 1 tablet by mouth 3 (Three) Times a Day.     • metoprolol succinate XL (TOPROL-XL) 25 MG 24 hr tablet Take 1 tablet by mouth Daily.     • Multiple Vitamins-Minerals (b complex-C-E-zinc) tablet Take 1 tablet by mouth Daily.     • Myrbetriq 50 MG tablet sustained-release 24 hour 24 hr tablet      • naloxone (NARCAN) 4 MG/0.1ML nasal spray Take 1 spray every day by nasal route as needed.     • ondansetron (ZOFRAN) 8 MG tablet Take 1 tablet by mouth 3 (Three) Times a Day As Needed for Nausea or Vomiting. 30 tablet 5   • pantoprazole (PROTONIX) 40 MG EC tablet TAKE 1 TABLET BY MOUTH TWICE A DAY 60 tablet 1   • predniSONE (DELTASONE) 20 MG tablet Take 3 tablets every day by oral route for 5 days.     • Probiotic Product (PingTune PO) Take  by mouth Daily.     • prochlorperazine (COMPAZINE) 10 MG tablet Take 1 tablet by mouth Every 6 (Six) Hours As Needed for Nausea or Vomiting. 30 tablet 5   • rOPINIRole (REQUIP) 1 MG tablet Take 1 tablet by mouth Every Night.     • sertraline (ZOLOFT) 100 MG tablet Take 0.5  tablets by mouth Daily.     • sodium chloride (Ocean Nasal Spray) 0.65 % nasal spray 1 spray into the nostril(s) as directed by provider 2 (Two) Times a Day As Needed for Congestion. 480 mL 4   • sucralfate (CARAFATE) 1 g tablet Take 1 tablet by mouth 2 (Two) Times a Day.         Past Medical History:   • Anesthesia    B/P bottomed out/UNSURE ON THE DATE   • Anxiety   • Asthma    seasonal/NO INHALERS   • Colon cancer   • DDD (degenerative disc disease), cervical   • DDD (degenerative disc disease), lumbar   • Depression   • Diverticulitis of colon    Scope was done in [facility]   • GERD (gastroesophageal reflux disease)   • Hypertension   • Kidney stones   • Melanoma    removed   • Palpitation    FOLLOWS W/DR. ANTONIO  Q6 MONTHS, NO CP OR SOA   • Prolapse of female bladder        Past Surgical History:   • BREAST SURGERY    Removed 8lbs of tissue, 2016   • CHOLECYSTECTOMY   • COLON RESECTION    Procedure: RESECTION OF DESCENDING COLON AND SPLENIC FLEXURE TAKEDOWN WITH DAVINCI ROBOT;  Surgeon: Rolando Liu MD;  Location: [facility] OR [facility];  Service: Robotics - DaVinci;  Laterality: Left;   • COLONOSCOPY    Procedure: COLONOSCOPY WITH POLYPECTOMY/SNARE/BIOPSIES/TATTOOING DESCENDING COLON MASS;  Surgeon: Deniz Dowd MD;  Location: [facility] ENDOSCOPY;  Service: Gastroenterology;  Laterality: N/A;  INCOMPLETE COLONOSCOPY  POOR BOWEL PREP  COLON POLYP  COLON MASS  DIVERTICULOSIS   • COLONOSCOPY    Procedure: COLONOSCOPY with cold snare;  Surgeon: Deniz Dowd MD;  Location: [facility] ENDOSCOPY;  Service: Gastroenterology;  Laterality: N/A;  colon polyps, diverticulosis, colon mass, hemorrhoids     • CYSTOSCOPY W/ URETERAL STENT PLACEMENT    Procedure: Cystoscopy, left ureteral injection,;  Surgeon: Manjula Randolph MD;  Location: [facility] OR [facility];  Service: Urology;  Laterality: Left;   • FRACTURE SURGERY    Left knee   • KNEE SURGERY    x4   • LAPAROSCOPIC TUBAL LIGATION   • LAPAROSCOPIC TUBAL LIGATION     "Reversed   • REDUCTION MAMMAPLASTY    8lbs of tissue was removed   • TONSILLECTOMY   • UPPER GASTROINTESTINAL ENDOSCOPY   • VENOUS ACCESS DEVICE (PORT) INSERTION    Procedure: INSERTION VENOUS ACCESS DEVICE;  Surgeon: Rolando Liu MD;  [place] [facility] [place] [facility]  Service: General;  Laterality: N/A;       Family History:   Family History   Problem Relation Age of Onset   • Colon cancer Neg Hx         Social History:  Social History     Tobacco Use   • Smoking status: Never     Passive exposure: Never   • Smokeless tobacco: Never   Substance Use Topics   • Alcohol use: Never       Objective     Vital Signs:  /96 (BP Location: Left arm, Patient Position: Sitting, Cuff Size: Adult) Comment (BP Location): Lower  Pulse 100   Resp 16   Ht 170.2 cm (67\")   Wt 120 kg (264 lb)   BMI 41.35 kg/m²   Respiratory:  breathing not labored, respiratory effort appears normal  Cardiovascular:  heart regular rate  Skin and subcutaneous tissue:  port palpable at right upper chest  Musculoskeletal: moving all extremities symmetrically and purposefully  Neurologic:  no obvious motor or sensory deficits, speech clear  Psychiatric:  judgment and insight intact      Assessment:  Port-A-Cath in place    Plan:  Port-a-catheter removal    Discussion: Indications, options, risks, benefits, and expected outcomes of planned surgery were discussed with the patient and she agrees to proceed.    Rolando Liu MD  02/06/2024    Electronically signed by Rolando Liu MD, 02/06/24, 9:01 AM EST.      "

## 2024-02-07 NOTE — PRE-PROCEDURE INSTRUCTIONS
IMPORTANT INSTRUCTIONS - PRE-ADMISSION TESTING  DO NOT EAT OR CHEW anything after midnight the night before your procedure.    You may have CLEAR liquids up to __2__ hours prior to ARRIVAL time.   Take the following medications the morning of your procedure with JUST A SIP OF WATER:  __METOPROLOL, ZOFRAN, REGLAN, PROTONIX, COMPAZINE LAMICTAL, HYDROCODONE, _____________    DO NOT BRING your medications to the hospital with you, UNLESS something has changed since your PRE-Admission Testing appointment.  Hold all vitamins, supplements, and NSAIDS (Non- steroidal anti-inflammatory meds) for one week prior to surgery (you MAY take Tylenol or Acetaminophen).  If you are diabetic, check your blood sugar the morning of your procedure. If it is less than 70 or if you are feeling symptomatic, call the following number for further instructions: [phone]_.  Use your inhalers/nebulizers as usual, the morning of your procedure. BRING YOUR INHALERS with you.   Bring your CPAP or BIPAP to hospital, ONLY IF YOU WILL BE SPENDING THE NIGHT.   Make sure you have a ride home and have someone who will stay with you the day of your procedure after you go home.  If you have any questions, please call your Pre-Admission Testing Nurse, __URI__ at [phone]______.   Per anesthesia request, do not smoke for 24 hours before your procedure or as instructed by your surgeon.      Clear Liquid Diet        Find out when you need to start a clear liquid diet.   Think of “clear liquids” as anything you could read a newspaper through. This includes things like water, broth, sports drinks, or tea WITHOUT any kind of milk or cream.           Once you are told to start a clear liquid diet, only drink these things until 2 hours before arrival to the hospital or when the hospital says to stop. Total volume limitation: 8 oz.       Clear liquids you CAN drink:   Water   Clear broth: beef, chicken, vegetable, or bone broth with nothing in it    Gatorade   Lemonade or Remi-aid   Soda   Tea, coffee (NO cream or honey)   Jell-O (without fruit)   Popsicles (without fruit or cream)   Italian ices   Juice without pulp: apple, white, grape   You may use salt, pepper, and sugar    Do NOT drink:   Milk or cream   Soy milk, almond milk, coconut milk, or other non-dairy drinks and   creamers   Milkshakes or smoothies   Tomato juice   Orange juice   Grapefruit juice   Cream soups or any other than broth         Clear Liquid Diet:  Do NOT eat any solid food.  Do NOT eat or suck on mints or candy.  Do NOT chew gum.  Do NOT drink thick liquids like milk or juice with pulp in it.  Do NOT add milk, cream, or anything like soy milk or almond milk to coffee or tea.       PREOPERATIVE (BEFORE SURGERY)              BATHING INSTRUCTIONS  Instructions:    You will need to shower 1  time utilizing the soap provided; at the times indicated   below:     2/8/24      Wash your hair and face with normal shampoo and soap, rinse it well before using the surgical soap.      In the shower, wet the skin completely with water from your neck to your feet. Apply the cleanser to your   body ONLY FROM THE NECK TO YOUR FEET.     Do NOT USE THE CLEANSER ON YOUR FACE, HEAD, OR GENITAL (PRIVATE) AREAS.   Keep it out of your eyes, ears, and mouth because of the risk of injury to those areas.      Scrub with a clean washcloth for each bath utilizing the soap provided from the top of your body to the   bottom starting at the neck area.      Pay close attention to your armpits, groin area, and the site of surgery.      Wash your body gently for 5 minutes. Stand outside the stream or turn off the water while scrubbing your   body. Do NOT wash with your regular soap after the surgical cleanser is used.      RINSE THE CLEANSER OFF COMPLETELY with plenty of water. Rinse the area again thoroughly.      Dry off with a clean towel. The surgical soap can cause dryness; however do NOT APPLY LOTION,   CREAM,  POWDER, and/or DEODORANT AFTER SHOWERING.     Be sure to where clean clothes after showering.      Ensure CLEAN BED LINENS AFTER FIRST wash with the surgical soap.      NO PETS ALLOWED IN THE BED with you after utilizing the surgical soap.

## 2024-02-08 ENCOUNTER — ANESTHESIA (OUTPATIENT)
Dept: PERIOP | Facility: HOSPITAL | Age: 49
End: 2024-02-08
Payer: COMMERCIAL

## 2024-02-08 ENCOUNTER — HOSPITAL ENCOUNTER (OUTPATIENT)
Facility: HOSPITAL | Age: 49
Setting detail: HOSPITAL OUTPATIENT SURGERY
Discharge: HOME | End: 2024-02-08
Attending: SURGERY | Admitting: SURGERY
Payer: COMMERCIAL

## 2024-02-08 ENCOUNTER — ANESTHESIA EVENT (OUTPATIENT)
Dept: PERIOP | Facility: HOSPITAL | Age: 49
End: 2024-02-08
Payer: COMMERCIAL

## 2024-02-08 VITALS
HEART RATE: 72 BPM | RESPIRATION RATE: 18 BRPM | SYSTOLIC BLOOD PRESSURE: 134 MMHG | OXYGEN SATURATION: 97 % | WEIGHT: 264.11 LBS | TEMPERATURE: 97.6 F | DIASTOLIC BLOOD PRESSURE: 86 MMHG | HEIGHT: 67 IN | BODY MASS INDEX: 41.45 KG/M2

## 2024-02-08 PROBLEM — Z95.828 PORT-A-CATH IN PLACE: Status: RESOLVED | Noted: 2024-02-06 | Resolved: 2024-02-08

## 2024-02-08 LAB — B-HCG UR QL: NEGATIVE

## 2024-02-08 PROCEDURE — 36590 REMOVAL TUNNELED CV CATH: CPT | Performed by: SURGERY

## 2024-02-08 PROCEDURE — 25010000002 MIDAZOLAM PER 1MG: Performed by: ANESTHESIOLOGY

## 2024-02-08 PROCEDURE — 25010000002 BUPIVACAINE (PF) 0.25 % SOLUTION: Performed by: SURGERY

## 2024-02-08 PROCEDURE — 25810000003 LACTATED RINGERS PER 1000 ML: Performed by: ANESTHESIOLOGY

## 2024-02-08 PROCEDURE — 25010000002 PROPOFOL 10 MG/ML EMULSION: Performed by: NURSE ANESTHETIST, CERTIFIED REGISTERED

## 2024-02-08 PROCEDURE — 81025 URINE PREGNANCY TEST: CPT | Performed by: ANESTHESIOLOGY

## 2024-02-08 RX ORDER — SODIUM CHLORIDE, SODIUM LACTATE, POTASSIUM CHLORIDE, CALCIUM CHLORIDE 600; 310; 30; 20 MG/100ML; MG/100ML; MG/100ML; MG/100ML
9 INJECTION, SOLUTION INTRAVENOUS CONTINUOUS PRN
Status: DISCONTINUED | OUTPATIENT
Start: 2024-02-08 | End: 2024-02-08 | Stop reason: HOSPADM

## 2024-02-08 RX ORDER — ONDANSETRON 2 MG/ML
4 INJECTION INTRAMUSCULAR; INTRAVENOUS ONCE AS NEEDED
Status: DISCONTINUED | OUTPATIENT
Start: 2024-02-08 | End: 2024-02-08 | Stop reason: HOSPADM

## 2024-02-08 RX ORDER — HYDROCODONE BITARTRATE AND ACETAMINOPHEN 7.5; 325 MG/1; MG/1
1 TABLET ORAL ONCE AS NEEDED
Status: DISCONTINUED | OUTPATIENT
Start: 2024-02-08 | End: 2024-02-08 | Stop reason: HOSPADM

## 2024-02-08 RX ORDER — MEPERIDINE HYDROCHLORIDE 25 MG/ML
12.5 INJECTION INTRAMUSCULAR; INTRAVENOUS; SUBCUTANEOUS
Status: DISCONTINUED | OUTPATIENT
Start: 2024-02-08 | End: 2024-02-08 | Stop reason: HOSPADM

## 2024-02-08 RX ORDER — LIDOCAINE HYDROCHLORIDE 20 MG/ML
INJECTION, SOLUTION EPIDURAL; INFILTRATION; INTRACAUDAL; PERINEURAL AS NEEDED
Status: DISCONTINUED | OUTPATIENT
Start: 2024-02-08 | End: 2024-02-08 | Stop reason: SURG

## 2024-02-08 RX ORDER — MIDAZOLAM HYDROCHLORIDE 2 MG/2ML
2 INJECTION, SOLUTION INTRAMUSCULAR; INTRAVENOUS ONCE
Status: COMPLETED | OUTPATIENT
Start: 2024-02-08 | End: 2024-02-08

## 2024-02-08 RX ORDER — MAGNESIUM HYDROXIDE 1200 MG/15ML
LIQUID ORAL AS NEEDED
Status: DISCONTINUED | OUTPATIENT
Start: 2024-02-08 | End: 2024-02-08 | Stop reason: HOSPADM

## 2024-02-08 RX ORDER — PROMETHAZINE HYDROCHLORIDE 25 MG/1
25 SUPPOSITORY RECTAL ONCE AS NEEDED
Status: DISCONTINUED | OUTPATIENT
Start: 2024-02-08 | End: 2024-02-08 | Stop reason: HOSPADM

## 2024-02-08 RX ORDER — PROMETHAZINE HYDROCHLORIDE 12.5 MG/1
25 TABLET ORAL ONCE AS NEEDED
Status: DISCONTINUED | OUTPATIENT
Start: 2024-02-08 | End: 2024-02-08 | Stop reason: HOSPADM

## 2024-02-08 RX ORDER — PROPOFOL 10 MG/ML
VIAL (ML) INTRAVENOUS AS NEEDED
Status: DISCONTINUED | OUTPATIENT
Start: 2024-02-08 | End: 2024-02-08 | Stop reason: SURG

## 2024-02-08 RX ORDER — BUPIVACAINE HYDROCHLORIDE 2.5 MG/ML
INJECTION, SOLUTION EPIDURAL; INFILTRATION; INTRACAUDAL AS NEEDED
Status: DISCONTINUED | OUTPATIENT
Start: 2024-02-08 | End: 2024-02-08 | Stop reason: HOSPADM

## 2024-02-08 RX ORDER — ACETAMINOPHEN 500 MG
1000 TABLET ORAL ONCE
Status: COMPLETED | OUTPATIENT
Start: 2024-02-08 | End: 2024-02-08

## 2024-02-08 RX ADMIN — PROPOFOL 50 MG: 10 INJECTION, EMULSION INTRAVENOUS at 10:16

## 2024-02-08 RX ADMIN — MIDAZOLAM HYDROCHLORIDE 2 MG: 1 INJECTION, SOLUTION INTRAMUSCULAR; INTRAVENOUS at 09:41

## 2024-02-08 RX ADMIN — PROPOFOL 50 MG: 10 INJECTION, EMULSION INTRAVENOUS at 10:38

## 2024-02-08 RX ADMIN — PROPOFOL 200 MCG/KG/MIN: 10 INJECTION, EMULSION INTRAVENOUS at 10:19

## 2024-02-08 RX ADMIN — SODIUM CHLORIDE, POTASSIUM CHLORIDE, SODIUM LACTATE AND CALCIUM CHLORIDE 9 ML/HR: 600; 310; 30; 20 INJECTION, SOLUTION INTRAVENOUS at 09:43

## 2024-02-08 RX ADMIN — ACETAMINOPHEN 1000 MG: 500 TABLET ORAL at 09:41

## 2024-02-08 RX ADMIN — LIDOCAINE HYDROCHLORIDE 80 MG: 20 INJECTION, SOLUTION EPIDURAL; INFILTRATION; INTRACAUDAL; PERINEURAL at 10:16

## 2024-02-08 NOTE — OP NOTE
Operative Report    Patient Name:  Lilian Pelaez  YOB: 1975    Date of Surgery:  2/8/2024     Pre-op Diagnosis:   Port-A-Cath in place [Z95.828]       Post-op Diagnosis:   Post-Op Diagnosis Codes:     * Port-A-Cath in place [Z95.828]    Procedure:  REMOVAL VENOUS ACCESS DEVICE    Staff:  Surgeon(s):  Rolando Liu MD    Assistant: Ike Peraza CSA    Anesthesia: Monitored Anesthesia Care    Estimated Blood Loss: 5 mL    Complications:  None    Drains:  None    Packing:  None    Implants:  None    Specimen: None     Indications: 48 y.o. female who has a venous access device in place.  The venous access device is no longer needed and the patient wants to have it removed.     Findings:  Port with attached catheter removed.  Catheter examined and it was completely intact.    Description of Procedure:  The patient was taken to the operating room and placed supine on the operative table.  Timeout was performed.  MAC anesthesia was administered.  Patient was prepped and draped in the usual fashion.  Local anesthesia was infiltrated into the skin and subcutaneous tissue where the port is located.  An incision was made through the old surgical scar and deepened into the subcutaneous tissue.  The port and attached catheter were identified and dissected free from the chest wall tissue and removed.  The catheter was examined and it was completely intact.  There was adequate hemostasis.  The wound was closed with buried absorbable suture followed by appropriate dressings.  The patient did well and was transported to the recovery area in stable condition.  Sponge, needle, and instrument counts were correct.    Assistant: Ike Peraza CSA  was responsible for performing the following activities: retraction, closing, and placing dressing, and his skilled assistance was necessary for the success of this case.     Rolando Liu MD     Date: 2/8/2024  Time: 11:10 EST    Electronically signed by Rolando  ALEXANDRU Liu MD, 02/08/24, 11:10 AM EST.

## 2024-02-08 NOTE — ANESTHESIA POSTPROCEDURE EVALUATION
Patient: Lilian Pelaez    Procedure Summary       Date: 02/08/24 Room / Location: [facility] OR 01 / [facility] MAIN OR    Anesthesia Start: 1014 Anesthesia Stop: 1053    Procedure: REMOVAL VENOUS ACCESS DEVICE (Chest) Diagnosis:       Port-A-Cath in place      (Port-A-Cath in place [Z95.828])    Surgeons: Roalndo Liu MD Provider: Aakash Thornton MD    Anesthesia Type: general, MAC ASA Status: 3            Anesthesia Type: general, MAC    Vitals  Vitals Value Taken Time   /88 02/08/24 1117   Temp 36.7 °C (98 °F) 02/08/24 1120   Pulse 63 02/08/24 1122   Resp 16 02/08/24 1120   SpO2 93 % 02/08/24 1122   Vitals shown include unfiled device data.        Post Anesthesia Care and Evaluation    Patient location during evaluation: bedside  Patient participation: complete - patient participated  Level of consciousness: awake  Pain score: 2  Pain management: adequate    Airway patency: patent  PONV Status: none  Cardiovascular status: acceptable and stable  Respiratory status: acceptable  Hydration status: acceptable    Comments: An Anesthesiologist personally participated in the most demanding procedures (including induction and emergence if applicable) in the anesthesia plan, monitored the course of anesthesia administration at frequent intervals and remained physically present and available for immediate diagnosis and treatment of emergencies.

## 2024-02-08 NOTE — ANESTHESIA PREPROCEDURE EVALUATION
Anesthesia Evaluation     Patient summary reviewed and Nursing notes reviewed   no history of anesthetic complications:   NPO Solid Status: > 8 hours  NPO Liquid Status: > 2 hours           Airway   Mallampati: II  TM distance: >3 FB  Neck ROM: full  No difficulty expected  Dental      Pulmonary - normal exam    breath sounds clear to auscultation  (+) asthma,shortness of breath  Cardiovascular - normal exam  Exercise tolerance: good (4-7 METS)    Rhythm: regular  Rate: normal    (+) hypertension      Neuro/Psych  (+) psychiatric history Anxiety, Depression and PTSD  GI/Hepatic/Renal/Endo    (+) morbid obesity    Musculoskeletal     (+) chronic pain, neck pain  Abdominal    Substance History      OB/GYN          Other   arthritis,   history of cancer remission    ROS/Med Hx Other: HX COLON CA-RESECTION 2023,HTN, RIGHT MIDDLE RIGHT LOWER LOBE ATELECTASIS, ELEATION OF RIGHT HEMIDIAPHRAGM PER CT SCAN 1/29/24 PT ON ABX PROPHYLACTIC FROM ONCOLOGY.  METS>4. NO CP- SOA BUT USING NEB AND STATES FEELS BETTER. ELM               Anesthesia Plan    ASA 3     general and MAC   total IV anesthesia  (Patient understands anesthesia not responsible for dental damage.)  intravenous induction     Anesthetic plan, risks, benefits, and alternatives have been provided, discussed and informed consent has been obtained with: patient.    Plan discussed with CRNA.    CODE STATUS:

## 2024-02-08 NOTE — DISCHARGE INSTRUCTIONS
Dr. Liu's Instructions          DIET  Resume your regular diet.     ACTIVITY  No specific activity restrictions.     WOUND CARE & SHOWERING/BATHING  Your incision is covered with a plastic type material that will flake off on its own in the next few weeks.  If the plastic type material has not flaked off on its own by 2 weeks after your surgery then use tweezers to pull it off.  You have sutures in your incision but they are placed below the level of the skin and they will slowly dissolve (they do not need to be removed).  The skin around your incision will likely have some bruising.  This is normal.  You can shower beginning tomorrow but wait two weeks after your surgery before taking any tub baths.       HOME MEDICATIONS  Resume all of your normal home medications.     PAIN CONTROL  Take Tylenol or Motrin for any pain.      FOLLOW-UP VISIT & QUESTIONS/CONCERNS  Call Dr. Diane office at [phone] and schedule a follow-up appointment for about two weeks after your surgery date.  However, if you are doing fine and don´t have any concerns then simply cancel the follow-up appointment.  Should you have any questions or concerns, please call Dr. Diane office.                DISCHARGE INSTRUCTIONS  SURGICAL / AMBULATORY  PROCEDURES      For your surgery you had:  General anesthesia (you may have a sore throat for the first 24 hours)  Monitored anesthesia Care  You may experience dizziness, drowsiness, or light-headedness for several hours following surgery/procedure.  Do not stay alone today or tonight.  Limit your activity for 24 hours.  Resume your diet slowly.  Follow whatever special dietary instructions you may have been given by your doctor.  You should not drive or operate machinery, drink alcohol, or sign legally binding documents for 24 hours or while you are taking pain medication.     NOTIFY YOUR DOCTOR IF YOU EXPERIENCE ANY OF THE FOLLOWING:  Temperature greater than 101 degrees  Fahrenheit  Shaking Chills  Redness or excessive drainage from incision  Nausea, vomiting and/or pain that is not controlled by prescribed medications  Increase in bleeding or bleeding that is excessive  Unable to urinate in 6 hours after surgery  If unable to reach your doctor, please go to the closest Emergency Room  Skin glue will flake off in 10-14 days. May use tweezers to remove remaining glue after 14 days.  You may shower tomorrow.  Apply an ice pack 24-48 hours.  Medications per physician instructions as indicated on Discharge Medication Information Sheet.    SPECIAL INSTRUCTIONS:  Last dose of pain medication was given at:

## 2024-03-04 ENCOUNTER — OFFICE VISIT (OUTPATIENT)
Dept: PULMONOLOGY | Facility: CLINIC | Age: 49
End: 2024-03-04
Payer: COMMERCIAL

## 2024-03-04 VITALS
BODY MASS INDEX: 43.63 KG/M2 | WEIGHT: 278 LBS | DIASTOLIC BLOOD PRESSURE: 86 MMHG | RESPIRATION RATE: 18 BRPM | OXYGEN SATURATION: 97 % | HEART RATE: 84 BPM | HEIGHT: 67 IN | SYSTOLIC BLOOD PRESSURE: 127 MMHG | TEMPERATURE: 98.4 F

## 2024-03-04 DIAGNOSIS — R06.02 SOB (SHORTNESS OF BREATH): ICD-10-CM

## 2024-03-04 DIAGNOSIS — C18.6 MALIGNANT NEOPLASM OF DESCENDING COLON: Primary | ICD-10-CM

## 2024-03-04 DIAGNOSIS — J98.11 ATELECTASIS: ICD-10-CM

## 2024-03-04 PROCEDURE — 3079F DIAST BP 80-89 MM HG: CPT | Performed by: INTERNAL MEDICINE

## 2024-03-04 PROCEDURE — 99204 OFFICE O/P NEW MOD 45 MIN: CPT | Performed by: INTERNAL MEDICINE

## 2024-03-04 PROCEDURE — 3074F SYST BP LT 130 MM HG: CPT | Performed by: INTERNAL MEDICINE

## 2024-03-04 PROCEDURE — 1159F MED LIST DOCD IN RCRD: CPT | Performed by: INTERNAL MEDICINE

## 2024-03-04 PROCEDURE — 1160F RVW MEDS BY RX/DR IN RCRD: CPT | Performed by: INTERNAL MEDICINE

## 2024-03-04 NOTE — PROGRESS NOTES
"Chief Complaint  Establish Care (New Patient ), Atelectasis, and Shortness of Breath    Subjective        Lilian Pelaez presents to [facility] PULMONARY & CRITICAL CARE MEDICINE  History of Present Illness  Pleasant female with history of colon cancer status posttreatment here today to be evaluated for an abnormal CT scan.  Patient went a CT scan of the chest that showed right lower lobe atelectasis and a very high riding diaphragm.  The patient recently finished chemotherapy for her colon cancer she was plagued by significant neuropathy she does also have a history of cervical radiculopathy and degenerative disc disease of the spine.  States that recently her breathing has improved since using bronchodilator therapies  Objective   Vital Signs:  /86 (BP Location: Left arm, Patient Position: Sitting, Cuff Size: Adult)   Pulse 84   Temp 98.4 °F (36.9 °C) (Temporal)   Resp 18   Ht 170.2 cm (67\")   Wt 126 kg (278 lb)   SpO2 97% Comment: room air  BMI 43.54 kg/m²   Estimated body mass index is 43.54 kg/m² as calculated from the following:    Height as of this encounter: 170.2 cm (67\").    Weight as of this encounter: 126 kg (278 lb).               Physical Exam   Vital Signs Reviewed  General WDWN, Alert, NAD.    Chest:  good aeration, clear to auscultation bilaterally, tympanic to percussion bilaterally, no work of breathing noted  CV: RRR, no MGR, .  EXT:  no clubbing, no cyanosis, no edema, no joint tenderness  Neuro:  A&Ox3, CN grossly intact, no focal deficits.  Skin: No rashes or lesions noted  Result Review :        Data reviewed : Radiologic studies reviewed recent CT scan showing right lower lobe atelectasis and high riding right-sided diaphragm, reviewed prior scan showing normal appearance of the lungs this was in the fall 2023             Assessment and Plan     Diagnoses and all orders for this visit:    1. Malignant neoplasm of descending colon (Primary)    2. Atelectasis  -     " FL Sniff Test; Future    3. SOB (shortness of breath)  -     Complete PFT - Pre & Post Bronchodilator; Future    Plan  After reviewing CT imaging it appears that the patient has atelectatic right lower lobe and a very high riding diaphragm likely due to diaphragmatic dysfunction.    Patient did have significant neuropathy while on chemotherapy and still has symptoms, this could be one of the reasons however the patient also has significant degenerative disc disease of the cervical spine and prior history of cervical radiculopathy.    On physical exam today that.  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  Patient was given instructions and counseling regarding her condition or for health maintenance advice. Please see specific information pulled into the AVS if appropriate.

## 2024-03-11 ENCOUNTER — TELEPHONE (OUTPATIENT)
Dept: ONCOLOGY | Facility: HOSPITAL | Age: 49
End: 2024-03-11
Payer: COMMERCIAL

## 2024-03-11 NOTE — TELEPHONE ENCOUNTER
OSW contacted patient. Patient requested if TRAVEL TO [place] would help with her having scans completed. OSW informed patient travel to [place] assist with gas funds only during the treatment period. Patient stated she has completed treatment but is being evaluated if she has lung cancer. OSW asked patient to contact her if she has to have further treatment and a new application can be submitted. Patient voiced understanding.

## 2024-03-11 NOTE — TELEPHONE ENCOUNTER
Caller: Lilian Pelaez    Relationship: Self    Best call back number: [phone]    What is the best time to reach you: ANYTIME    Who are you requesting to speak with (clinical staff, provider,  specific staff member): ARCHIE      :     What was the call regarding: REQUEST A CALL BACK , NO OTHER DETAILS GIVEN     Is it okay if the provider responds through [facility]: NO

## 2024-03-12 ENCOUNTER — PATIENT ROUNDING (BHMG ONLY) (OUTPATIENT)
Dept: PULMONOLOGY | Facility: CLINIC | Age: 49
End: 2024-03-12
Payer: COMMERCIAL

## 2024-03-12 NOTE — PROGRESS NOTES
March 12, 2024    Hello, may I speak with Lilian Pelaez?    My name is Gladys      [facility] am  [facility]  [address]  [address]  [place] [address] [phone].    Before we get started may I verify your date of birth? 1975    I am calling to officially welcome you to our practice and ask about your recent visit. Is this a good time to talk? My chart message sent for patient rounding.

## 2024-03-26 ENCOUNTER — HOSPITAL ENCOUNTER (OUTPATIENT)
Dept: RESPIRATORY THERAPY | Facility: HOSPITAL | Age: 49
Discharge: HOME | End: 2024-03-26
Admitting: INTERNAL MEDICINE
Payer: COMMERCIAL

## 2024-03-26 DIAGNOSIS — R06.02 SOB (SHORTNESS OF BREATH): ICD-10-CM

## 2024-03-26 PROCEDURE — 94729 DIFFUSING CAPACITY: CPT

## 2024-03-26 PROCEDURE — 94726 PLETHYSMOGRAPHY LUNG VOLUMES: CPT

## 2024-03-26 PROCEDURE — 94060 EVALUATION OF WHEEZING: CPT

## 2024-03-26 RX ORDER — ALBUTEROL SULFATE 2.5 MG/3ML
2.5 SOLUTION RESPIRATORY (INHALATION) ONCE
Status: COMPLETED | OUTPATIENT
Start: 2024-03-26 | End: 2024-03-26

## 2024-03-26 RX ADMIN — ALBUTEROL SULFATE 2.5 MG: 2.5 SOLUTION RESPIRATORY (INHALATION) at 08:58

## 2024-04-09 DIAGNOSIS — J98.11 ATELECTASIS: Primary | ICD-10-CM

## 2024-04-16 ENCOUNTER — OFFICE VISIT (OUTPATIENT)
Dept: PULMONOLOGY | Facility: CLINIC | Age: 49
End: 2024-04-16
Payer: COMMERCIAL

## 2024-04-16 VITALS
SYSTOLIC BLOOD PRESSURE: 150 MMHG | WEIGHT: 280 LBS | RESPIRATION RATE: 16 BRPM | HEART RATE: 76 BPM | BODY MASS INDEX: 43.95 KG/M2 | HEIGHT: 67 IN | TEMPERATURE: 97.6 F | DIASTOLIC BLOOD PRESSURE: 94 MMHG | OXYGEN SATURATION: 97 %

## 2024-04-16 DIAGNOSIS — F17.211 NICOTINE DEPENDENCE, CIGARETTES, IN REMISSION: ICD-10-CM

## 2024-04-16 DIAGNOSIS — J98.11 ATELECTASIS: ICD-10-CM

## 2024-04-16 DIAGNOSIS — R06.02 SHORTNESS OF BREATH: Primary | ICD-10-CM

## 2024-04-16 RX ORDER — GABAPENTIN 600 MG/1
1 TABLET ORAL EVERY 12 HOURS SCHEDULED
COMMUNITY
Start: 2024-03-25

## 2024-04-16 RX ORDER — MIRABEGRON 25 MG/1
TABLET, FILM COATED, EXTENDED RELEASE ORAL
COMMUNITY
Start: 2024-03-29

## 2024-04-16 RX ORDER — CYCLOBENZAPRINE HCL 10 MG
TABLET ORAL 2 TIMES DAILY PRN
COMMUNITY
Start: 2024-04-08

## 2024-04-16 RX ORDER — SPIRONOLACTONE 25 MG/1
1 TABLET ORAL DAILY
COMMUNITY

## 2024-04-16 NOTE — PROGRESS NOTES
Primary Care Provider  Shelly Cash MD     Referring Provider  No ref. provider found     Chief Complaint  Shortness of Breath (With exertion ) and Follow-up (6 week f/up - PFT results. )    Subjective          Lilian Pelaez presents to [facility] PULMONARY & CRITICAL CARE MEDICINE  History of Present Illness  Lilian Pelaez is a 48 y.o. female patient of Dr. English here for management of shortness of breath, atelectasis and tobacco use of cigarettes in remission.    Recently had a pulmonary function test on 3/26/2024.  This shows no obstruction or response to bronchodilator therapy.  Patient's total lung capacity is 71% and residual volume is 73%.  This does show a mild restriction.  Patient's possible diaphragmatic paralysis could be an explanation for this.  I have discussed findings with the patient and her family today in office.  Her sniff test is not scheduled until 5/20/2024.  She is scheduled to have a repeat chest CT on 5/15/2024.  States that her shortness of breath is mild in severity, worse with exertion and improved with rest.  She is does state that the albuterol helps with her symptoms.  States that her symptoms are worse also when she bends over.  She denies any fevers, chills or cough.  She denies using any antibiotics or steroids for her lungs.  Overall, she states that she is doing okay and has no additional respiratory questions or concerns at this time.  She is able to perform her ADLs without difficulty.  She is up-to-date with her COVID but declines flu and pneumonia vaccines.     Her history of smoking is   Tobacco Use: Medium Risk (4/16/2024)    Patient History     Smoking Tobacco Use: Former     Smokeless Tobacco Use: Never     Passive Exposure: Never   .    Review of Systems   Constitutional:  Negative for chills, fatigue, fever, unexpected weight gain and unexpected weight loss.   HENT:  Congestion: Nasal.    Respiratory:  Positive for shortness of breath. Negative  for apnea, cough and wheezing.         Negative for Hemoptysis     Cardiovascular:  Negative for chest pain, palpitations and leg swelling.   Skin:         Negative for cyanosis      Sleep: Negative for Excessive daytime sleepiness  Negative for morning headaches  Negative for Snoring    Family History   Problem Relation Age of Onset    Cancer Mother         Breast    Hypertension Mother     Diabetes Maternal Grandmother         Passed away    Colon cancer Neg Hx     Malig Hyperthermia Neg Hx         Social History     Socioeconomic History    Marital status:    Tobacco Use    Smoking status: Former     Current packs/day: 0.00     Average packs/day: 0.5 packs/day for 2.1 years (1.1 ttl pk-yrs)     Types: Cigarettes     Start date: 3/5/1991     Quit date: 1993     Years since quittin.9     Passive exposure: Never    Smokeless tobacco: Never    Tobacco comments:     When i was 16 right after my daughter was born   Vaping Use    Vaping status: Never Used   Substance and Sexual Activity    Alcohol use: Never    Drug use: Never    Sexual activity: Not Currently     Partners: Male     Birth control/protection: Depo-provera, Same-sex partner, Injection        Past Medical History:   Diagnosis Date    Anesthesia     B/P bottomed out/UNSURE ON THE DATE    Anxiety     Asthma     seasonal/NO INHALERS    Atelectasis of right lung     PER CT SCAN 2024    Colon cancer 2023    DDD (degenerative disc disease), cervical     DDD (degenerative disc disease), lumbar     Depression     Diverticulitis of colon     Scope was done in [facility]    GERD (gastroesophageal reflux disease)     Hypertension     Kidney stones     Melanoma     removed    Palpitation     FOLLOWS W/DR. ANTONIO  Q6 MONTHS, NO CP OR SOA    Prolapse of female bladder         Immunization History   Administered Date(s) Administered    COVID-19 (PFIZER) Purple Cap Monovalent 2021, 2021    Tdap 2018         Allergies    Allergen Reactions    Hydromorphone Hives, Itching and GI Intolerance    Morphine Hives, Itching and Nausea And Vomiting    Oxybutynin Swelling and Angioedema           Oxycodone-Acetaminophen Itching and Nausea And Vomiting     Can not take any higher than 7.5    Penicillins Rash    Promethazine Nausea And Vomiting    Tylenol With Codeine #3 [Acetaminophen-Codeine] Itching and Nausea And Vomiting    Oxybutynin Chloride Angioedema          Current Outpatient Medications:     albuterol (ACCUNEB) 0.63 MG/3ML nebulizer solution, , Disp: , Rfl:     allopurinol (ZYLOPRIM) 100 MG tablet, Take 1 tablet by mouth Daily., Disp: , Rfl:     ALPRAZolam (XANAX) 0.5 MG tablet, Take 1 tablet by mouth At Night As Needed., Disp: , Rfl:     amLODIPine (NORVASC) 10 MG tablet, Take 1 tablet by mouth Every Night., Disp: , Rfl:     cyclobenzaprine (FLEXERIL) 10 MG tablet, 2 (Two) Times a Day As Needed., Disp: , Rfl:     Diclofenac Sodium (VOLTAREN) 1 % gel gel, Apply 4 g topically to the appropriate area as directed 4 (Four) Times a Day As Needed., Disp: , Rfl:     docusate sodium 100 MG capsule, Take 1 capsule by mouth Daily., Disp: , Rfl:     furosemide (LASIX) 20 MG tablet, Take 1 tablet by mouth Daily., Disp: , Rfl:     gabapentin (NEURONTIN) 600 MG tablet, Take 1 tablet by mouth Every 12 (Twelve) Hours., Disp: , Rfl:     HYDROcodone-acetaminophen (NORCO) 7.5-325 MG per tablet, Take 1 tablet by mouth Every 8 (Eight) Hours As Needed., Disp: , Rfl:     Hydrocortisone, Perianal, (ANUSOL-HC) 2.5 % rectal cream, , Disp: , Rfl:     hyoscyamine (ANASPAZ,LEVSIN) 0.125 MG tablet, TAKE ONE TABLET BY MOUTH FOUR TIMES A DAY WITH MEALS AND AT BEDTIME., Disp: 120 tablet, Rfl: 1    ketorolac (TORADOL) 10 MG tablet, Take 1 tablet by mouth Every 6 (Six) Hours As Needed., Disp: , Rfl:     lamoTRIgine (LaMICtal) 25 MG tablet, Take 1 tablet by mouth 2 (Two) Times a Day. Only takes at night, Disp: , Rfl:     lidocaine-prilocaine (EMLA) 2.5-2.5 % cream,  Apply 1 application topically to the appropriate area as directed Every 2 (Two) Hours As Needed for Mild Pain., Disp: 30 g, Rfl: 1    loperamide (Imodium A-D) 2 MG tablet, Take 1 tablet by mouth 4 (Four) Times a Day As Needed for Diarrhea. Imodium - take 4 mg first dose, followed by 2 mg every 2-4 hours after each stool (MAX of 16 mg in 24 hour period), Disp: 60 tablet, Rfl: 1    losartan (COZAAR) 100 MG tablet, Take 1 tablet by mouth Every Night., Disp: , Rfl:     medroxyPROGESTERone (DEPO-PROVERA) 150 MG/ML injection, Inject 1 mL into the appropriate muscle as directed by prescriber Every 3 (Three) Months., Disp: , Rfl:     metoclopramide (REGLAN) 10 MG tablet, Take 1 tablet by mouth 3 (Three) Times a Day., Disp: , Rfl:     metoprolol succinate XL (TOPROL-XL) 25 MG 24 hr tablet, Take 1 tablet by mouth Daily., Disp: , Rfl:     Multiple Vitamins-Minerals (b complex-C-E-zinc) tablet, Take 1 tablet by mouth Daily., Disp: , Rfl:     Myrbetriq 50 MG tablet sustained-release 24 hour 24 hr tablet, Take 50 mg by mouth Every Night., Disp: , Rfl:     naloxone (NARCAN) 4 MG/0.1ML nasal spray, Take 1 spray every day by nasal route as needed., Disp: , Rfl:     ondansetron (ZOFRAN) 8 MG tablet, Take 1 tablet by mouth 3 (Three) Times a Day As Needed for Nausea or Vomiting., Disp: 30 tablet, Rfl: 5    pantoprazole (PROTONIX) 40 MG EC tablet, TAKE 1 TABLET BY MOUTH TWICE A DAY, Disp: 60 tablet, Rfl: 1    predniSONE (DELTASONE) 20 MG tablet, Only taking 2 tablets a day, Disp: , Rfl:     Probiotic Product (VictorOps PO), Take  by mouth Daily., Disp: , Rfl:     prochlorperazine (COMPAZINE) 10 MG tablet, Take 1 tablet by mouth Every 6 (Six) Hours As Needed for Nausea or Vomiting., Disp: 30 tablet, Rfl: 5    rOPINIRole (REQUIP) 1 MG tablet, Take 1 tablet by mouth Every Night., Disp: , Rfl:     sertraline (ZOLOFT) 100 MG tablet, Take 0.5 tablets by mouth Every Night., Disp: , Rfl:     sodium chloride (Ocean Nasal Spray) 0.65  % nasal spray, 1 spray into the nostril(s) as directed by provider 2 (Two) Times a Day As Needed for Congestion., Disp: 480 mL, Rfl: 4    spironolactone (ALDACTONE) 25 MG tablet, Take 1 tablet by mouth Daily., Disp: , Rfl:     cefdinir (OMNICEF) 300 MG capsule, Take 1 capsule by mouth Daily. DUE TO LUNG ATELECTASIS (Patient not taking: Reported on 3/4/2024), Disp: , Rfl:     gabapentin (NEURONTIN) 300 MG capsule, TAKE 1 CAPSULE BY MOUTH TWICE A DAY (Patient not taking: Reported on 4/16/2024), Disp: 60 capsule, Rfl: 2    Myrbetriq 25 MG tablet sustained-release 24 hour 24 hr tablet, , Disp: , Rfl:      Objective   Physical Exam  Constitutional:       General: She is not in acute distress.     Appearance: Normal appearance. She is overweight.   HENT:      Right Ear: Hearing normal.      Left Ear: Hearing normal.      Nose: No nasal tenderness or congestion.      Mouth/Throat:      Mouth: Mucous membranes are moist. No oral lesions.   Eyes:      Extraocular Movements: Extraocular movements intact.      Pupils: Pupils are equal, round, and reactive to light.   Neck:      Thyroid: No thyroid mass or thyromegaly.   Cardiovascular:      Rate and Rhythm: Normal rate and regular rhythm.      Pulses: Normal pulses.      Heart sounds: Normal heart sounds. No murmur heard.  Pulmonary:      Effort: Pulmonary effort is normal.      Breath sounds: Normal breath sounds. No wheezing, rhonchi or rales.   Musculoskeletal:      Cervical back: Neck supple.      Right lower leg: No edema.      Left lower leg: No edema.   Lymphadenopathy:      Cervical: No cervical adenopathy.      Upper Body:      Right upper body: No axillary adenopathy.   Skin:     General: Skin is warm and dry.      Findings: No lesion or rash.   Neurological:      General: No focal deficit present.      Mental Status: She is alert and oriented to person, place, and time.   Psychiatric:         Mood and Affect: Affect normal. Mood is not anxious or depressed.  "        Vital Signs:   /94 (BP Location: Right arm, Patient Position: Sitting, Cuff Size: Adult) Comment (BP Location): lower arm  Pulse 76   Temp 97.6 °F (36.4 °C) (Oral)   Resp 16   Ht 170.2 cm (67\")   Wt 127 kg (280 lb)   SpO2 97% Comment: RA  BMI 43.85 kg/m²        Result Review :   The following data was reviewed by: CHANTELLE Grant on 04/16/2024:  CMP          12/27/2023    08:45 1/4/2024    10:03 1/29/2024    13:25   CMP   Glucose 105  109  91    BUN 7  10  11    Creatinine 0.68  0.72  0.78    EGFR 107.6  103.3  93.8    Sodium 140  140  140    Potassium 3.4  4.0  3.8    Chloride 108  105  105    Calcium 9.2  10.1  10.3    Total Protein 6.5  7.0  7.8    Albumin 4.1  4.4  4.5    Globulin 2.4  2.6  3.3    Total Bilirubin 0.5  0.5  0.6    Alkaline Phosphatase 111  119  111    AST (SGOT) 31  30  24    ALT (SGPT) 24  22  18    Albumin/Globulin Ratio 1.7  1.7  1.4    BUN/Creatinine Ratio 10.3  13.9  14.1    Anion Gap 9.1  9.4  11.2      CBC w/diff          12/27/2023    08:45 1/4/2024    10:03 1/29/2024    13:25   CBC w/Diff   WBC 5.79  7.21  7.21    RBC 3.84  4.08  4.62    Hemoglobin 12.6  13.2  14.9    Hematocrit 35.8  38.3  42.8    MCV 93.2  93.9  92.6    MCH 32.8  32.4  32.3    MCHC 35.2  34.5  34.8    RDW 13.9  13.2  12.4    Platelets 150  156  182    Neutrophil Rel % 46.8  47.2  50.3    Immature Granulocyte Rel % 0.5  1.4  0.4    Lymphocyte Rel % 34.9  36.2  37.6    Monocyte Rel % 15.9  13.2  9.2    Eosinophil Rel % 1.4  1.4  1.7    Basophil Rel % 0.5  0.6  0.8      Data reviewed : Radiologic studies chest CT 1/29/2024, pulmonary function test 3/26/2024 and Dr. English's last office note    Procedures        Assessment and Plan    Diagnoses and all orders for this visit:    1. Shortness of breath (Primary)  Comments:  Continue albuterol    2. Atelectasis  Comments:  review on next CT in May    3. Nicotine dependence, cigarettes, in remission          Follow Up   Return in about 6 weeks " (around 5/28/2024) for Recheck with Manjula.  Patient was given instructions and counseling regarding her condition or for health maintenance advice. Please see specific information pulled into the AVS if appropriate.

## 2024-05-15 ENCOUNTER — HOSPITAL ENCOUNTER (OUTPATIENT)
Dept: CT IMAGING | Facility: HOSPITAL | Age: 49
Discharge: HOME | End: 2024-05-15
Admitting: INTERNAL MEDICINE
Payer: COMMERCIAL

## 2024-05-15 DIAGNOSIS — C18.6 MALIGNANT NEOPLASM OF DESCENDING COLON: ICD-10-CM

## 2024-05-15 LAB
CREAT BLDA-MCNC: 0.9 MG/DL (ref 0.6–1.3)
EGFRCR SERPLBLD CKD-EPI 2021: 79 ML/MIN/1.73

## 2024-05-15 PROCEDURE — 74177 CT ABD & PELVIS W/CONTRAST: CPT

## 2024-05-15 PROCEDURE — 25510000001 IOPAMIDOL PER 1 ML: Performed by: INTERNAL MEDICINE

## 2024-05-15 PROCEDURE — 71260 CT THORAX DX C+: CPT

## 2024-05-15 PROCEDURE — 82565 ASSAY OF CREATININE: CPT

## 2024-05-15 RX ADMIN — IOPAMIDOL 100 ML: 755 INJECTION, SOLUTION INTRAVENOUS at 11:24

## 2024-05-16 ENCOUNTER — LAB (OUTPATIENT)
Dept: ONCOLOGY | Facility: HOSPITAL | Age: 49
End: 2024-05-16
Payer: COMMERCIAL

## 2024-05-16 ENCOUNTER — OFFICE VISIT (OUTPATIENT)
Dept: ONCOLOGY | Facility: HOSPITAL | Age: 49
End: 2024-05-16
Payer: COMMERCIAL

## 2024-05-16 VITALS
WEIGHT: 288.8 LBS | OXYGEN SATURATION: 98 % | DIASTOLIC BLOOD PRESSURE: 94 MMHG | RESPIRATION RATE: 18 BRPM | HEART RATE: 93 BPM | BODY MASS INDEX: 45.23 KG/M2 | TEMPERATURE: 98 F | SYSTOLIC BLOOD PRESSURE: 138 MMHG

## 2024-05-16 DIAGNOSIS — C18.6 MALIGNANT NEOPLASM OF DESCENDING COLON: Primary | ICD-10-CM

## 2024-05-16 DIAGNOSIS — C18.6 MALIGNANT NEOPLASM OF DESCENDING COLON: ICD-10-CM

## 2024-05-16 DIAGNOSIS — R93.89 ABNORMAL CT SCAN: ICD-10-CM

## 2024-05-16 LAB
ALBUMIN SERPL-MCNC: 4.3 G/DL (ref 3.5–5.2)
ALBUMIN/GLOB SERPL: 1.2 G/DL
ALP SERPL-CCNC: 123 U/L (ref 39–117)
ALT SERPL W P-5'-P-CCNC: 15 U/L (ref 1–33)
ANION GAP SERPL CALCULATED.3IONS-SCNC: 12.1 MMOL/L (ref 5–15)
AST SERPL-CCNC: 17 U/L (ref 1–32)
BASOPHILS # BLD AUTO: 0.03 10*3/MM3 (ref 0–0.2)
BASOPHILS NFR BLD AUTO: 0.4 % (ref 0–1.5)
BILIRUB SERPL-MCNC: 0.4 MG/DL (ref 0–1.2)
BUN SERPL-MCNC: 14 MG/DL (ref 6–20)
BUN/CREAT SERPL: 18.9 (ref 7–25)
CALCIUM SPEC-SCNC: 10.1 MG/DL (ref 8.6–10.5)
CEA SERPL-MCNC: 1.99 NG/ML
CHLORIDE SERPL-SCNC: 104 MMOL/L (ref 98–107)
CO2 SERPL-SCNC: 23.9 MMOL/L (ref 22–29)
CREAT SERPL-MCNC: 0.74 MG/DL (ref 0.57–1)
DEPRECATED RDW RBC AUTO: 38.3 FL (ref 37–54)
EGFRCR SERPLBLD CKD-EPI 2021: 99.9 ML/MIN/1.73
EOSINOPHIL # BLD AUTO: 0.08 10*3/MM3 (ref 0–0.4)
EOSINOPHIL NFR BLD AUTO: 0.9 % (ref 0.3–6.2)
ERYTHROCYTE [DISTWIDTH] IN BLOOD BY AUTOMATED COUNT: 11.7 % (ref 12.3–15.4)
GLOBULIN UR ELPH-MCNC: 3.5 GM/DL
GLUCOSE SERPL-MCNC: 85 MG/DL (ref 65–99)
HCT VFR BLD AUTO: 45.1 % (ref 34–46.6)
HGB BLD-MCNC: 15.3 G/DL (ref 12–15.9)
IMM GRANULOCYTES # BLD AUTO: 0.06 10*3/MM3 (ref 0–0.05)
IMM GRANULOCYTES NFR BLD AUTO: 0.7 % (ref 0–0.5)
LYMPHOCYTES # BLD AUTO: 3.17 10*3/MM3 (ref 0.7–3.1)
LYMPHOCYTES NFR BLD AUTO: 37.3 % (ref 19.6–45.3)
MCH RBC QN AUTO: 29.8 PG (ref 26.6–33)
MCHC RBC AUTO-ENTMCNC: 33.9 G/DL (ref 31.5–35.7)
MCV RBC AUTO: 87.7 FL (ref 79–97)
MONOCYTES # BLD AUTO: 0.75 10*3/MM3 (ref 0.1–0.9)
MONOCYTES NFR BLD AUTO: 8.8 % (ref 5–12)
NEUTROPHILS NFR BLD AUTO: 4.41 10*3/MM3 (ref 1.7–7)
NEUTROPHILS NFR BLD AUTO: 51.9 % (ref 42.7–76)
PLATELET # BLD AUTO: 243 10*3/MM3 (ref 140–450)
PMV BLD AUTO: 9.5 FL (ref 6–12)
POTASSIUM SERPL-SCNC: 4.1 MMOL/L (ref 3.5–5.2)
PROT SERPL-MCNC: 7.8 G/DL (ref 6–8.5)
RBC # BLD AUTO: 5.14 10*6/MM3 (ref 3.77–5.28)
SODIUM SERPL-SCNC: 140 MMOL/L (ref 136–145)
WBC NRBC COR # BLD AUTO: 8.5 10*3/MM3 (ref 3.4–10.8)

## 2024-05-16 PROCEDURE — 36415 COLL VENOUS BLD VENIPUNCTURE: CPT

## 2024-05-16 PROCEDURE — 80053 COMPREHEN METABOLIC PANEL: CPT

## 2024-05-16 PROCEDURE — 82378 CARCINOEMBRYONIC ANTIGEN: CPT

## 2024-05-16 PROCEDURE — G0463 HOSPITAL OUTPT CLINIC VISIT: HCPCS | Performed by: INTERNAL MEDICINE

## 2024-05-16 PROCEDURE — 85025 COMPLETE CBC W/AUTO DIFF WBC: CPT

## 2024-05-16 NOTE — PROGRESS NOTES
Chief Complaint/Care Team   No chief complaint on file.    Shelly Cash MD Stephens, Cassandra, MD    History of Present Illness     Diagnosis: Colon Adenocarcinoma S/p Left colectomy on 5/5/23, stage after resection [id number], stage III    Current Treatment: Active Surveillance    Previous Treatment: c-scope on 4/2023 biopsy revealed colon adenocarcinoma  -FOLFOX IV [id number] x 6 months, cycle 1 on 6/14/2023  Treatment delay secondary to pneumoperitoneum which developed in June 2023, patient cleared to resume chemotherapy in August 2023, pt completed this therapy on 12/2023    Lilian Pelaez is a 48 y.o. female who presents to [facility] HEMATOLOGY [facility] ONCOLOGY for discussion of newly diagnosed colon adenocarcinoma seen on biopsy of colon mass in the descending colon during colonoscopy performed in April 2023.     Staging scans revealed on 4/11/2023:  CT abdomen/pelvis without any metastatic disease in abdomen/pelvis  CT chest no evidence of metastatic disease in chest.    Patient underwent robotic resection of descending colon and splenic flexure by Dr. Rolando Liu on 5/5/2023.    Patient is a former smoker.   Patient reports family history of several relatives with other forms of cancer cancer on her mother side of the family, but denies any known history of colon cancer in her family.    Patient reports noticing some blood mixed in with stool prior to cancer diagnosis.      Patient received cycle 1 on 6/14/2023, cycle 2 was delayed secondary to development of pneumoperitoneum after cycle 1, patient was mated to the hospital very by her surgeon Dr. Liu who recommended delaying further cycles of chemotherapy until August 1, 2023.    Interval history: Patient here for follow up after completing 12 cycles of chemotherapy with FOLFOX in 12/2023. She is without report of fever, chills, SOA, melena, or blood loss. She reports irritation for chemoport and thus had it removed. She is here to  discuss restaging imaging to assess for cancer recurrence.     Review of Systems   Constitutional:  Positive for activity change, appetite change and fatigue. Negative for diaphoresis, fever, unexpected weight gain and unexpected weight loss.   HENT:  Negative for congestion, hearing loss, mouth sores, nosebleeds, sore throat, swollen glands, trouble swallowing and voice change.    Eyes:  Negative for blurred vision.   Respiratory:  Negative for cough, shortness of breath and wheezing.    Cardiovascular:  Negative for chest pain and palpitations.   Gastrointestinal:  Positive for nausea. Negative for abdominal pain, blood in stool, constipation, diarrhea and vomiting.   Endocrine: Positive for cold intolerance. Negative for heat intolerance.   Genitourinary:  Negative for difficulty urinating, dysuria, frequency, hematuria and urinary incontinence.   Musculoskeletal:  Positive for back pain. Negative for arthralgias and myalgias.   Skin:  Negative for rash, skin lesions and wound.   Neurological:  Positive for dizziness. Negative for seizures, weakness, numbness and headache.   Hematological:  Does not bruise/bleed easily.   Psychiatric/Behavioral:  Negative for depressed mood. The patient is nervous/anxious.    All other systems reviewed and are negative.       Oncology/Hematology History   Primary adenocarcinoma of descending colon   4/11/2023 Initial Diagnosis    Primary adenocarcinoma of descending colon     9/27/2023 -  Chemotherapy    OP SUPPORTIVE HYDRATION + ANTIEMETICS     Malignant neoplasm of descending colon   5/6/2023 Initial Diagnosis    Malignant neoplasm of descending colon     5/31/2023 Cancer Staged    Staging form: Colon And Rectum, AJCC 8th Edition  - Pathologic: Stage IIIB (pT3, pN1b, cM0) - Signed by Brian Marroquin MD on 5/31/2023 6/14/2023 -  Chemotherapy    OP COLON mFOLFOX6 OXALIplatin / Leucovorin / Fluorouracil         Objective     Vitals:    05/16/24 1110   BP: 138/94   Pulse: 93    Resp: 18   Temp: 98 °F (36.7 °C)   TempSrc: Temporal   SpO2: 98%   Weight: 131 kg (288 lb 12.8 oz)       ECOG score: 0         PHQ-9 Total Score:         Physical Exam  Vitals reviewed. Exam conducted with a chaperone present.   Constitutional:       General: She is not in acute distress.     Appearance: Normal appearance.   HENT:      Head: Normocephalic and atraumatic.      Mouth/Throat:      Mouth: Mucous membranes are dry.   Eyes:      Extraocular Movements: Extraocular movements intact.      Conjunctiva/sclera: Conjunctivae normal.   Pulmonary:      Effort: Pulmonary effort is normal.   Musculoskeletal:      Cervical back: Normal range of motion and neck supple.   Skin:     General: Skin is warm and dry.      Findings: No bruising.   Neurological:      Mental Status: She is oriented to person, place, and time.           Past Medical History     Past Medical History:   Diagnosis Date    Anesthesia     B/P bottomed out/UNSURE ON THE DATE    Anxiety     Asthma 1996    seasonal/NO INHALERS    Atelectasis of right lung     PER CT SCAN 1/2024    Colon cancer 04/17/2023    DDD (degenerative disc disease), cervical     DDD (degenerative disc disease), lumbar     Depression     Diverticulitis of colon 2005    Scope was done in [facility]    GERD (gastroesophageal reflux disease)     Hypertension     Kidney stones     Melanoma     removed    Palpitation     FOLLOWS W/DR. ANTONIO  Q6 MONTHS, NO CP OR SOA    Prolapse of female bladder      Current Outpatient Medications on File Prior to Visit   Medication Sig Dispense Refill    albuterol (ACCUNEB) 0.63 MG/3ML nebulizer solution       allopurinol (ZYLOPRIM) 100 MG tablet Take 1 tablet by mouth Daily.      ALPRAZolam (XANAX) 0.5 MG tablet Take 1 tablet by mouth At Night As Needed.      amLODIPine (NORVASC) 10 MG tablet Take 1 tablet by mouth Every Night.      cefdinir (OMNICEF) 300 MG capsule Take 1 capsule by mouth Daily. DUE TO LUNG ATELECTASIS (Patient not taking:  Reported on 3/4/2024)      cyclobenzaprine (FLEXERIL) 10 MG tablet 2 (Two) Times a Day As Needed.      Diclofenac Sodium (VOLTAREN) 1 % gel gel Apply 4 g topically to the appropriate area as directed 4 (Four) Times a Day As Needed.      docusate sodium 100 MG capsule Take 1 capsule by mouth Daily.      furosemide (LASIX) 20 MG tablet Take 1 tablet by mouth Daily.      gabapentin (NEURONTIN) 300 MG capsule TAKE 1 CAPSULE BY MOUTH TWICE A DAY (Patient not taking: Reported on 4/16/2024) 60 capsule 2    gabapentin (NEURONTIN) 600 MG tablet Take 1 tablet by mouth Every 12 (Twelve) Hours.      HYDROcodone-acetaminophen (NORCO) 7.5-325 MG per tablet Take 1 tablet by mouth Every 8 (Eight) Hours As Needed.      Hydrocortisone, Perianal, (ANUSOL-HC) 2.5 % rectal cream       hyoscyamine (ANASPAZ,LEVSIN) 0.125 MG tablet TAKE ONE TABLET BY MOUTH FOUR TIMES A DAY WITH MEALS AND AT BEDTIME. 120 tablet 1    ketorolac (TORADOL) 10 MG tablet Take 1 tablet by mouth Every 6 (Six) Hours As Needed.      lamoTRIgine (LaMICtal) 25 MG tablet Take 1 tablet by mouth 2 (Two) Times a Day. Only takes at night      lidocaine-prilocaine (EMLA) 2.5-2.5 % cream Apply 1 application topically to the appropriate area as directed Every 2 (Two) Hours As Needed for Mild Pain. 30 g 1    loperamide (Imodium A-D) 2 MG tablet Take 1 tablet by mouth 4 (Four) Times a Day As Needed for Diarrhea. Imodium - take 4 mg first dose, followed by 2 mg every 2-4 hours after each stool (MAX of 16 mg in 24 hour period) 60 tablet 1    losartan (COZAAR) 100 MG tablet Take 1 tablet by mouth Every Night.      medroxyPROGESTERone (DEPO-PROVERA) 150 MG/ML injection Inject 1 mL into the appropriate muscle as directed by prescriber Every 3 (Three) Months.      metoclopramide (REGLAN) 10 MG tablet Take 1 tablet by mouth 3 (Three) Times a Day.      metoprolol succinate XL (TOPROL-XL) 25 MG 24 hr tablet Take 1 tablet by mouth Daily.      Multiple Vitamins-Minerals (b  complex-C-E-zinc) tablet Take 1 tablet by mouth Daily.      Myrbetriq 25 MG tablet sustained-release 24 hour 24 hr tablet  (Patient not taking: Reported on 4/16/2024)      Myrbetriq 50 MG tablet sustained-release 24 hour 24 hr tablet Take 50 mg by mouth Every Night.      naloxone (NARCAN) 4 MG/0.1ML nasal spray Take 1 spray every day by nasal route as needed.      ondansetron (ZOFRAN) 8 MG tablet Take 1 tablet by mouth 3 (Three) Times a Day As Needed for Nausea or Vomiting. 30 tablet 5    pantoprazole (PROTONIX) 40 MG EC tablet TAKE 1 TABLET BY MOUTH TWICE A DAY 60 tablet 1    predniSONE (DELTASONE) 20 MG tablet Only taking 2 tablets a day      Probiotic Product (vBrand PO) Take  by mouth Daily.      prochlorperazine (COMPAZINE) 10 MG tablet Take 1 tablet by mouth Every 6 (Six) Hours As Needed for Nausea or Vomiting. 30 tablet 5    rOPINIRole (REQUIP) 1 MG tablet Take 1 tablet by mouth Every Night.      sertraline (ZOLOFT) 100 MG tablet Take 0.5 tablets by mouth Every Night.      sodium chloride (Ocean Nasal Spray) 0.65 % nasal spray 1 spray into the nostril(s) as directed by provider 2 (Two) Times a Day As Needed for Congestion. 480 mL 4    spironolactone (ALDACTONE) 25 MG tablet Take 1 tablet by mouth Daily.       No current facility-administered medications on file prior to visit.      Allergies   Allergen Reactions    Hydromorphone Hives, Itching and GI Intolerance    Morphine Hives, Itching and Nausea And Vomiting    Oxybutynin Swelling and Angioedema           Oxycodone-Acetaminophen Itching and Nausea And Vomiting     Can not take any higher than 7.5    Penicillins Rash    Promethazine Nausea And Vomiting    Tylenol With Codeine #3 [Acetaminophen-Codeine] Itching and Nausea And Vomiting    Oxybutynin Chloride Angioedema     Past Surgical History:   Procedure Laterality Date    BREAST SURGERY  2016    Removed 8lbs of tissue, 2016    CHOLECYSTECTOMY      COLON RESECTION Left 05/05/2023     Procedure: RESECTION OF DESCENDING COLON AND SPLENIC FLEXURE TAKEDOWN WITH DAVINCI ROBOT;  Surgeon: Rolando Liu MD;  [place] [facility] [place] [facility] [place]  Service: Robotics - DaVinci;  Laterality: Left;    COLONOSCOPY N/A 2023    Procedure: COLONOSCOPY WITH POLYPECTOMY/SNARE/BIOPSIES/TATTOOING DESCENDING COLON MASS;  Surgeon: Deniz Dowd MD;  Location: [facility] ENDOSCOPY;  Service: Gastroenterology;  Laterality: N/A;  INCOMPLETE COLONOSCOPY  POOR BOWEL PREP  COLON POLYP  COLON MASS  DIVERTICULOSIS    COLONOSCOPY N/A 2023    Procedure: COLONOSCOPY with cold snare;  Surgeon: Deniz Dowd MD;  Location: [facility] ENDOSCOPY;  Service: Gastroenterology;  Laterality: N/A;  colon polyps, diverticulosis, colon mass, hemorrhoids      CYSTOSCOPY W/ URETERAL STENT PLACEMENT Left 2023    Procedure: Cystoscopy, left ureteral injection,;  Surgeon: Manjula Randolph MD;  [place] [facility] [place] [facility] [place]  Service: Urology;  Laterality: Left;    FRACTURE SURGERY      Left knee    KNEE SURGERY Left     x4    LAPAROSCOPIC TUBAL LIGATION      LAPAROSCOPIC TUBAL LIGATION      Reversed    REDUCTION MAMMAPLASTY      8lbs of tissue was removed    TONSILLECTOMY      UPPER GASTROINTESTINAL ENDOSCOPY      VENOUS ACCESS DEVICE (PORT) INSERTION N/A 2023    Procedure: INSERTION VENOUS ACCESS DEVICE;  Surgeon: Rolando Liu MD;  [place] [facility] [place] [facility] [place]  Service: General;  Laterality: N/A;    VENOUS ACCESS DEVICE (PORT) REMOVAL N/A 2024    Procedure: REMOVAL VENOUS ACCESS DEVICE;  Surgeon: Rolando Liu MD;  Location: [facility] MAIN OR;  Service: General;  Laterality: N/A;     Social History     Socioeconomic History    Marital status:    Tobacco Use    Smoking status: Former     Current packs/day: 0.00     Average packs/day: 0.5 packs/day for 2.1 years (1.1 ttl pk-yrs)     Types: Cigarettes     Start date: 3/5/1991     Quit date: 1993     Years since quittin.0     Passive exposure: Never     Smokeless tobacco: Never    Tobacco comments:     When i was 16 right after my daughter was born   Vaping Use    Vaping status: Never Used   Substance and Sexual Activity    Alcohol use: Never    Drug use: Never    Sexual activity: Not Currently     Partners: Male     Birth control/protection: Depo-provera, Same-sex partner, Injection     Family History   Problem Relation Age of Onset    Cancer Mother         Breast    Hypertension Mother     Diabetes Maternal Grandmother         Passed away    Colon cancer Neg Hx     Malig Hyperthermia Neg Hx        Results     Result Review   The following data was reviewed by: Brian Marroquin MD on 04/13/2023:  Lab Results   Component Value Date    HGB 15.3 05/16/2024    HCT 45.1 05/16/2024    MCV 87.7 05/16/2024     05/16/2024    WBC 8.50 05/16/2024    NEUTROABS 4.41 05/16/2024    LYMPHSABS 3.17 (H) 05/16/2024    MONOSABS 0.75 05/16/2024    EOSABS 0.08 05/16/2024    BASOSABS 0.03 05/16/2024     Lab Results   Component Value Date    GLUCOSE 85 05/16/2024    BUN 14 05/16/2024    CREATININE 0.74 05/16/2024     05/16/2024    K 4.1 05/16/2024     05/16/2024    CO2 23.9 05/16/2024    CALCIUM 10.1 05/16/2024    PROTEINTOT 7.8 05/16/2024    ALBUMIN 4.3 05/16/2024    BILITOT 0.4 05/16/2024    ALKPHOS 123 (H) 05/16/2024    AST 17 05/16/2024    ALT 15 05/16/2024     Lab Results   Component Value Date    MG 2.1 01/29/2024    PHOS 3.8 01/29/2024       Case Report   Surgical Pathology Report                         Case: [id number]                                   Authorizing Provider:  Deniz Dowd MD    Collected:           04/12/2023 09:41 AM           Ordering Location:     [facility] Received:            04/12/2023 11:41 AM                                  SUITES                                                                        Pathologist:           Niesha Torres DO                                                        Specimens:    1) - Large Intestine, Cecum, cecal polyp cold snare                                                  2) - Large Intestine, Sigmoid Colon, sigmoid colon polyp cold snare                        Clinical Information    Mass of colon   Final Diagnosis   1. Cecum polyp, biopsy:                            - Tubular adenoma        2. Sigmoid colon polyp, biopsy:                            - Tubular adenoma    Electronically signed by Niesha Torres DO on 4/13/2023 at 0840     Surgical Pathology Report                         Case: [id number]                                   Authorizing Provider:  Deniz Dowd MD    Collected:           04/04/2023 11:15 AM           Ordering Location:     [facility] Received:            04/04/2023 11:59 AM                                  SUITES                                                                        Pathologist:           Jennifer Mike MD                                                      Specimens:   1) - Large Intestine, Transverse Colon, TRANSVERSE COLON POLYP                                       2) - Large Intestine, Left / Descending Colon, DESCENDING COLON BIOPSIES                   Clinical Information    Constipation, unspecified constipation type   Final Diagnosis   1. Transverse colon polyp, biopsy:               - Fragments of tubular adenoma        2.  Descending colon, biopsy:               -Adenocarcinoma, moderately to poorly differentiated     Comment: Per policy, reflex testing has been ordered, and the results will be separately reported.     REMARKS: The above positive (malignant) diagnosis was called to April in Dr. Dowd's office at 09:09 EDT on 4/5/2023 by [facility].          Electronically signed by Jennifer Mike MD on 4/5/2023 at 0911         Surgical Pathology Report                         Case: [id number]                                   Authorizing Provider:  Rolando Liu MD        Collected:            05/05/2023 03:06 PM           Ordering Location:     [facility]  Received:            05/08/2023 06:48 AM                                  OR                                                                            Pathologist:           Jt Florence MD                                                             Specimen:    Large Intestine, Left / Descending Colon, left colon                                       Clinical Information    Malignant neoplasm of descending colon   Final Diagnosis   Left colon, resection:               - Adenocarcinoma               - Three of thirty-three lymph nodes positive for carcinoma (3/33)               - See synoptic checklist   Electronically signed by Jt Florence MD on 5/11/2023 at 1508   Synoptic Checklist   COLON AND RECTUM: Resection, Including Transanal Disk Excision of Rectal Neoplasms  8th Edition - Protocol posted: 6/22/2022  COLON AND RECTUM: RESECTION - All Specimens  SPECIMEN   Procedure  Descending colon resection    TUMOR   Tumor Site  Descending colon    Histologic Type  Adenocarcinoma    Histologic Grade  G3, poorly differentiated    Tumor Size  Greatest dimension (Centimeters): 3.5 cm   Tumor Extent  Invades through muscularis propria into the pericolonic or perirectal tissue    Macroscopic Tumor Perforation  Not identified    Lymphovascular Invasion  Small vessel    Perineural Invasion  Present    Treatment Effect  No known presurgical therapy    MARGINS   Margin Status for Invasive Carcinoma  All margins negative for invasive carcinoma    Closest Margin(s) to Invasive Carcinoma  Radial (circumferential) or mesenteric    Distance from Invasive Carcinoma to Closest Margin  3.5 cm   Margin Status for Non-Invasive Tumor  All margins negative for high-grade dysplasia / intramucosal carcinoma and low-grade dysplasia    REGIONAL LYMPH NODES   Regional Lymph Node Status  Tumor present in regional lymph node(s)    Number of Lymph Nodes with  Tumor  3    Number of Lymph Nodes Examined  33    Tumor Deposits  Present    Number of Tumor Deposits  1    PATHOLOGIC STAGE CLASSIFICATION (pTNM, AJCC 8th Edition)   Reporting of pT, pN, and (when applicable) pM categories is based on information available to the pathologist at the time the report is issued. As per the AJCC (Chapter 1, 8th Ed.) it is the managing physician's responsibility to establish the final pathologic stage based upon all pertinent information, including but potentially not limited to this pathology report.   pT Category  pT3    pN Category  pN1b    ADDITIONAL FINDINGS   Additional Findings  None identified    .          CT Chest With Contrast Diagnostic    Result Date: 5/16/2024  Impression: Impression:  1. No evidence of intrathoracic or abdominopelvic metastatic disease 2. Interval resolution of pulmonary groundglass infiltrates and diaphragmatic elevation 3. Indeterminate approximately 1 cm sclerotic focus in the T8 vertebral body. Osteoblastic metastasis not excluded. Consider nuclear medicine bone scan to assess for possible active metastatic lesion. No other suspicious bone lesions are demonstrated    Electronically Signed ByAdi Mcmillan On:5/16/2024 8:26 AM      CT Abdomen Pelvis With Contrast    Result Date: 5/16/2024  Impression: Impression:  1. No evidence of intrathoracic or abdominopelvic metastatic disease 2. Interval resolution of pulmonary groundglass infiltrates and diaphragmatic elevation 3. Indeterminate approximately 1 cm sclerotic focus in the T8 vertebral body. Osteoblastic metastasis not excluded. Consider nuclear medicine bone scan to assess for possible active metastatic lesion. No other suspicious bone lesions are demonstrated    Electronically Signed ByAdi Mcmillan On:5/16/2024 8:26 AM       Assessment & Plan     Diagnoses and all orders for this visit:    1. Malignant neoplasm of descending colon (Primary)  -     CT Needle Biopsy Bone Superficial; Future  -      Tissue Pathology Exam; Standing  -     NM Bone Scan Whole Body; Future  -     CBC & Differential; Future  -     Comprehensive Metabolic Panel; Future  -     CEA; Future    2. Abnormal CT scan  -     CT Needle Biopsy Bone Superficial; Future  -     Tissue Pathology Exam; Standing          Lilian Pelaez is a 48 y.o. female who presents to [facility] HEMATOLOGY [facility] ONCOLOGY evaluation prior to systemic therapy for stage III colon cancer, patient status post resection of descending colon splenic flexure by Dr. Rolando Liu on 5/5/2023.     Based on NCCN guidelines for her stage III colon cancer, discussed high risk features seen on pathology report and discussed CapeOx for 3 months versus FOLFOX for 3 to 6 months with patient and  today.  Patient agreed to plan to undergo FOLFOX IV chemotherapy every 2 weeks for 6 months.    Patient received cycle 1 on June 14, 2023, she developed pneumoperitoneum which her surgeon suspect was secondary to severe constipation from iron tablets, but patient was cleared to resume chemotherapy after 8/1/2023, she completed 12 cycles of chemotherapy on 12/27/2024.    Restaging imaging scans obtained on 1/29/2023 which was without evidence of disease recurrence or metastatic disease, but did reveal new elevation of right hemidiaphragm with secondary lower lobe atelectasis, new ground glass infiltrates in the upper lobes, likely infectious including possible viral pneumonia.    Restaging imaging from 5/15/2024 revealed an indeterminate approximately 1 cm sclerotic focus in T8 vertebral body, since osteoblastic metastasis cannot be excluded, refer patient for bone scan for further assessment, will recommend biopsy if suspicious on bone scan, otherwise no evidence of intrathoracic or abdominal pelvic metastatic disease, interval resolution of pulmonary groundglass infiltrates and diaphragm elevation.    -Will consider referral to radiation oncology if metastatic disease  confirmed with biopsy    -Pt requesting chemoport removal, referred pt to general surgeon today for chemport removal.     CEA from 5/16/24 was 1.99, close to last value in January which was 2.01, CBC was normal, CMP showed elevation of alkaline phosphatase but otherwise was normal.    Plan for patient follow-up in 1 month after NM bone scan and labs.    Patient verbalized understanding and agreement plan.    Please note that portions of this note were completed with a voice recognition program.    Electronically signed by Brian Marroquin MD, 05/17/24, 4:11 PM EDT.        Follow Up     I spent 30 minutes caring for Lilian on this date of service. This time includes time spent by me in the following activities:preparing for the visit, reviewing tests, obtaining and/or reviewing a separately obtained history, performing a medically appropriate examination and/or evaluation , counseling and educating the patient/family/caregiver, ordering medications, tests, or procedures, documenting information in the medical record and care coordination.    This is an acute or chronic illness that poses a threat to life or bodily function. The above treatment plan involves a high risk of complications and/or mortality of patient management.    The patient was seen and examined. Work by the provider also included review and/or ordering of lab tests, review and/or ordering of radiology tests, review and/or ordering of medicine tests, discussion with other physicians or providers, independent review of data, obtaining old records, review/summation of old records, and/or other review.    I have reviewed the family history, social history, and past medical history for this patient. Previous information and data has been reviewed and updated as needed. I have reviewed and verified the chief complaint, history, and other documentation. The patient was interviewed and examined in the clinic and the chart reviewed. The previous observations,  recommendations, and conclusions were reviewed including those of other providers.     The plan was discussed with the patient and/or family. The patient was given time to ask questions and these questions were answered. At the conclusion of their visit they had no additional questions or concerns and all questions were answered to their satisfaction.    Patient was given instructions and counseling regarding her condition or for health maintenance advice. Please see specific information pulled into the AVS if appropriate.

## 2024-05-20 ENCOUNTER — HOSPITAL ENCOUNTER (OUTPATIENT)
Dept: GENERAL RADIOLOGY | Facility: HOSPITAL | Age: 49
Discharge: HOME | End: 2024-05-20
Admitting: INTERNAL MEDICINE
Payer: COMMERCIAL

## 2024-05-20 DIAGNOSIS — J98.11 ATELECTASIS: ICD-10-CM

## 2024-05-20 PROCEDURE — 76000 FLUOROSCOPY <1 HR PHYS/QHP: CPT

## 2024-05-22 ENCOUNTER — HOSPITAL ENCOUNTER (OUTPATIENT)
Dept: NUCLEAR MEDICINE | Facility: HOSPITAL | Age: 49
Discharge: HOME | End: 2024-05-22
Payer: COMMERCIAL

## 2024-05-22 DIAGNOSIS — C18.6 MALIGNANT NEOPLASM OF DESCENDING COLON: ICD-10-CM

## 2024-05-22 PROCEDURE — 0 TECHNETIUM MEDRONATE KIT: Performed by: INTERNAL MEDICINE

## 2024-05-22 PROCEDURE — 78306 BONE IMAGING WHOLE BODY: CPT

## 2024-05-22 PROCEDURE — A9503 TC99M MEDRONATE: HCPCS | Performed by: INTERNAL MEDICINE

## 2024-05-22 RX ORDER — TC 99M MEDRONATE 20 MG/10ML
21.4 INJECTION, POWDER, LYOPHILIZED, FOR SOLUTION INTRAVENOUS
Status: COMPLETED | OUTPATIENT
Start: 2024-05-22 | End: 2024-05-22

## 2024-05-22 RX ADMIN — TC 99M MEDRONATE 21.4 MILLICURIE: 20 INJECTION, POWDER, LYOPHILIZED, FOR SOLUTION INTRAVENOUS at 12:10

## 2024-05-28 ENCOUNTER — OFFICE VISIT (OUTPATIENT)
Dept: PULMONOLOGY | Facility: CLINIC | Age: 49
End: 2024-05-28
Payer: COMMERCIAL

## 2024-05-28 ENCOUNTER — TELEPHONE (OUTPATIENT)
Dept: ONCOLOGY | Facility: HOSPITAL | Age: 49
End: 2024-05-28

## 2024-05-28 VITALS
HEART RATE: 90 BPM | OXYGEN SATURATION: 98 % | HEIGHT: 67 IN | RESPIRATION RATE: 16 BRPM | SYSTOLIC BLOOD PRESSURE: 128 MMHG | TEMPERATURE: 97.6 F | WEIGHT: 288 LBS | DIASTOLIC BLOOD PRESSURE: 71 MMHG | BODY MASS INDEX: 45.2 KG/M2

## 2024-05-28 DIAGNOSIS — J98.6 ELEVATED HEMIDIAPHRAGM: ICD-10-CM

## 2024-05-28 DIAGNOSIS — R06.02 SOB (SHORTNESS OF BREATH): Primary | ICD-10-CM

## 2024-05-28 DIAGNOSIS — E66.01 CLASS 3 SEVERE OBESITY WITHOUT SERIOUS COMORBIDITY WITH BODY MASS INDEX (BMI) OF 45.0 TO 49.9 IN ADULT, UNSPECIFIED OBESITY TYPE: ICD-10-CM

## 2024-05-28 PROCEDURE — 99213 OFFICE O/P EST LOW 20 MIN: CPT | Performed by: NURSE PRACTITIONER

## 2024-05-28 PROCEDURE — 3074F SYST BP LT 130 MM HG: CPT | Performed by: NURSE PRACTITIONER

## 2024-05-28 PROCEDURE — 1159F MED LIST DOCD IN RCRD: CPT | Performed by: NURSE PRACTITIONER

## 2024-05-28 PROCEDURE — 3078F DIAST BP <80 MM HG: CPT | Performed by: NURSE PRACTITIONER

## 2024-05-28 PROCEDURE — 1160F RVW MEDS BY RX/DR IN RCRD: CPT | Performed by: NURSE PRACTITIONER

## 2024-05-28 NOTE — PROGRESS NOTES
Primary Care Provider  Shelly Cash MD     Referring Provider  No ref. provider found     Chief Complaint  Shortness of Breath (No more than normal ) and Follow-up (6 week f/up; PFT and Sniff Test Results. )    Subjective          Lilian Pelaez presents to [facility]  History of Present Illness  Lilian Pelaez is a 48 y.o. female patient of Dr. English here for management of shortness of breath, atelectasis and tobacco use of cigarettes in remission.    Patient states she is doing okay since her last office visit.  She denies using any antibiotics or steroids for her lungs.  She denies any current fevers, chills or cough.  Patient's shortness of breath is mild in severity, worse with exertion and improved with rest.  She will intermittently use albuterol if needed.  She states that she does have difficulties bending over or walking long distances.  Patient had a sniff test on 5/20/2024 which did not show any evidence of right hemidiaphragm paralysis.  Patient does have bilateral hemidiaphragms that elevate with expiration and depress with inspiration.  There is elevation of the right hemidiaphragm.  She also had a chest CT on 5/15/2024 which showed no evidence of intrathoracic or abdomen pelvic metastatic disease.  There is interval resolution in the pulmonary groundglass infiltrates and diaphragmatic elevation.  I have discussed findings with patient today in office.  Overall, she has no additional respiratory concerns.  She is able to perform her ADLs without difficulty.  She is up-to-date with her COVID but declines other pulmonary vaccinations.     Her history of smoking is   Tobacco Use: Medium Risk (5/28/2024)    Patient History     Smoking Tobacco Use: Former     Smokeless Tobacco Use: Never     Passive Exposure: Never   .    Review of Systems   Constitutional:  Negative for chills, fatigue, fever, unexpected weight gain and unexpected weight loss.    HENT:  Congestion: Nasal.    Respiratory:  Positive for shortness of breath. Negative for apnea, cough and wheezing.         Negative for Hemoptysis     Cardiovascular:  Negative for chest pain, palpitations and leg swelling.   Skin:         Negative for cyanosis      Sleep: Negative for Excessive daytime sleepiness  Negative for morning headaches  Negative for Snoring    Family History   Problem Relation Age of Onset    Cancer Mother         Breast    Hypertension Mother     Diabetes Maternal Grandmother         Passed away    Colon cancer Neg Hx     Malig Hyperthermia Neg Hx         Social History     Socioeconomic History    Marital status:    Tobacco Use    Smoking status: Former     Current packs/day: 0.00     Average packs/day: 0.5 packs/day for 2.1 years (1.1 ttl pk-yrs)     Types: Cigarettes     Start date: 3/5/1991     Quit date: 1993     Years since quittin.1     Passive exposure: Never    Smokeless tobacco: Never    Tobacco comments:     When i was 16 right after my daughter was born   Vaping Use    Vaping status: Never Used   Substance and Sexual Activity    Alcohol use: Never    Drug use: Never    Sexual activity: Not Currently     Partners: Male     Birth control/protection: Depo-provera, Same-sex partner, Injection        Past Medical History:   Diagnosis Date    Anesthesia     B/P bottomed out/UNSURE ON THE DATE    Anxiety     Asthma     seasonal/NO INHALERS    Atelectasis of right lung     PER CT SCAN 2024    Colon cancer 2023    DDD (degenerative disc disease), cervical     DDD (degenerative disc disease), lumbar     Depression     Diverticulitis of colon     Scope was done in [facility]    GERD (gastroesophageal reflux disease)     Hypertension     Kidney stones     Melanoma     removed    Palpitation     FOLLOWS W/DR. ANTONIO  Q6 MONTHS, NO CP OR SOA    Prolapse of female bladder         Immunization History   Administered Date(s) Administered    COVID-19 (PFIZER)  Purple Cap Monovalent 08/19/2021, 09/09/2021    Tdap 02/01/2018         Allergies   Allergen Reactions    Hydromorphone Hives, Itching and GI Intolerance    Morphine Hives, Itching and Nausea And Vomiting    Oxybutynin Swelling and Angioedema           Oxycodone-Acetaminophen Itching and Nausea And Vomiting     Can not take any higher than 7.5    Penicillins Rash    Promethazine Nausea And Vomiting    Tylenol With Codeine #3 [Acetaminophen-Codeine] Itching and Nausea And Vomiting    Oxybutynin Chloride Angioedema          Current Outpatient Medications:     albuterol (ACCUNEB) 0.63 MG/3ML nebulizer solution, , Disp: , Rfl:     allopurinol (ZYLOPRIM) 100 MG tablet, Take 1 tablet by mouth Daily., Disp: , Rfl:     ALPRAZolam (XANAX) 0.5 MG tablet, Take 1 tablet by mouth At Night As Needed., Disp: , Rfl:     amLODIPine (NORVASC) 10 MG tablet, Take 1 tablet by mouth Every Night., Disp: , Rfl:     cefdinir (OMNICEF) 300 MG capsule, Take 1 capsule by mouth Daily. DUE TO LUNG ATELECTASIS (Patient not taking: Reported on 3/4/2024), Disp: , Rfl:     cyclobenzaprine (FLEXERIL) 10 MG tablet, 2 (Two) Times a Day As Needed., Disp: , Rfl:     Diclofenac Sodium (VOLTAREN) 1 % gel gel, Apply 4 g topically to the appropriate area as directed 4 (Four) Times a Day As Needed., Disp: , Rfl:     docusate sodium 100 MG capsule, Take 1 capsule by mouth Daily., Disp: , Rfl:     furosemide (LASIX) 20 MG tablet, Take 1 tablet by mouth Daily., Disp: , Rfl:     gabapentin (NEURONTIN) 300 MG capsule, TAKE 1 CAPSULE BY MOUTH TWICE A DAY (Patient not taking: Reported on 4/16/2024), Disp: 60 capsule, Rfl: 2    gabapentin (NEURONTIN) 600 MG tablet, Take 1 tablet by mouth Every 12 (Twelve) Hours., Disp: , Rfl:     HYDROcodone-acetaminophen (NORCO) 7.5-325 MG per tablet, Take 1 tablet by mouth Every 8 (Eight) Hours As Needed., Disp: , Rfl:     Hydrocortisone, Perianal, (ANUSOL-HC) 2.5 % rectal cream, , Disp: , Rfl:     hyoscyamine (ANASPAZ,LEVSIN)  0.125 MG tablet, TAKE ONE TABLET BY MOUTH FOUR TIMES A DAY WITH MEALS AND AT BEDTIME., Disp: 120 tablet, Rfl: 1    ketorolac (TORADOL) 10 MG tablet, Take 1 tablet by mouth Every 6 (Six) Hours As Needed., Disp: , Rfl:     lamoTRIgine (LaMICtal) 25 MG tablet, Take 1 tablet by mouth 2 (Two) Times a Day. Only takes at night, Disp: , Rfl:     lidocaine-prilocaine (EMLA) 2.5-2.5 % cream, Apply 1 application topically to the appropriate area as directed Every 2 (Two) Hours As Needed for Mild Pain., Disp: 30 g, Rfl: 1    loperamide (Imodium A-D) 2 MG tablet, Take 1 tablet by mouth 4 (Four) Times a Day As Needed for Diarrhea. Imodium - take 4 mg first dose, followed by 2 mg every 2-4 hours after each stool (MAX of 16 mg in 24 hour period), Disp: 60 tablet, Rfl: 1    losartan (COZAAR) 100 MG tablet, Take 1 tablet by mouth Every Night., Disp: , Rfl:     medroxyPROGESTERone (DEPO-PROVERA) 150 MG/ML injection, Inject 1 mL into the appropriate muscle as directed by prescriber Every 3 (Three) Months., Disp: , Rfl:     metoclopramide (REGLAN) 10 MG tablet, Take 1 tablet by mouth 3 (Three) Times a Day., Disp: , Rfl:     metoprolol succinate XL (TOPROL-XL) 25 MG 24 hr tablet, Take 1 tablet by mouth Daily., Disp: , Rfl:     Multiple Vitamins-Minerals (b complex-C-E-zinc) tablet, Take 1 tablet by mouth Daily., Disp: , Rfl:     Myrbetriq 25 MG tablet sustained-release 24 hour 24 hr tablet, , Disp: , Rfl:     Myrbetriq 50 MG tablet sustained-release 24 hour 24 hr tablet, Take 50 mg by mouth Every Night., Disp: , Rfl:     naloxone (NARCAN) 4 MG/0.1ML nasal spray, Take 1 spray every day by nasal route as needed., Disp: , Rfl:     ondansetron (ZOFRAN) 8 MG tablet, Take 1 tablet by mouth 3 (Three) Times a Day As Needed for Nausea or Vomiting., Disp: 30 tablet, Rfl: 5    pantoprazole (PROTONIX) 40 MG EC tablet, TAKE 1 TABLET BY MOUTH TWICE A DAY, Disp: 60 tablet, Rfl: 1    Probiotic Product (ScanSafe PO), Take  by mouth Daily.,  "Disp: , Rfl:     prochlorperazine (COMPAZINE) 10 MG tablet, Take 1 tablet by mouth Every 6 (Six) Hours As Needed for Nausea or Vomiting., Disp: 30 tablet, Rfl: 5    rOPINIRole (REQUIP) 1 MG tablet, Take 1 tablet by mouth Every Night., Disp: , Rfl:     sertraline (ZOLOFT) 100 MG tablet, Take 0.5 tablets by mouth Every Night., Disp: , Rfl:     sodium chloride (Ocean Nasal Spray) 0.65 % nasal spray, 1 spray into the nostril(s) as directed by provider 2 (Two) Times a Day As Needed for Congestion., Disp: 480 mL, Rfl: 4    spironolactone (ALDACTONE) 25 MG tablet, Take 1 tablet by mouth Daily., Disp: , Rfl:      Objective   Physical Exam  Constitutional:       General: She is not in acute distress.     Appearance: Normal appearance. She is obese.   HENT:      Right Ear: Hearing normal.      Left Ear: Hearing normal.      Nose: No nasal tenderness or congestion.      Mouth/Throat:      Mouth: Mucous membranes are moist. No oral lesions.   Eyes:      Extraocular Movements: Extraocular movements intact.      Pupils: Pupils are equal, round, and reactive to light.   Cardiovascular:      Rate and Rhythm: Normal rate and regular rhythm.      Pulses: Normal pulses.      Heart sounds: Normal heart sounds. No murmur heard.  Pulmonary:      Effort: Pulmonary effort is normal.      Breath sounds: Normal breath sounds. No wheezing, rhonchi or rales.   Musculoskeletal:      Right lower leg: No edema.      Left lower leg: No edema.   Skin:     General: Skin is warm and dry.      Findings: No lesion or rash.   Neurological:      General: No focal deficit present.      Mental Status: She is alert and oriented to person, place, and time.   Psychiatric:         Mood and Affect: Affect normal. Mood is not anxious or depressed.         Vital Signs:   /71 (BP Location: Left arm, Patient Position: Sitting, Cuff Size: Large Adult) Comment (BP Location): lower arm  Pulse 90   Temp 97.6 °F (36.4 °C) (Oral)   Resp 16   Ht 170.2 cm (67\")  "  Wt 131 kg (288 lb)   SpO2 98% Comment: RA  BMI 45.11 kg/m²        Result Review :   The following data was reviewed by: CHANTELLE Grant on 05/28/2024:  CMP          1/29/2024    13:25 5/15/2024    11:23 5/16/2024    12:11   CMP   Glucose 91   85    BUN 11   14    Creatinine 0.78  0.90  0.74    EGFR 93.8  79.0  99.9    Sodium 140   140    Potassium 3.8   4.1    Chloride 105   104    Calcium 10.3   10.1    Total Protein 7.8   7.8    Albumin 4.5   4.3    Globulin 3.3   3.5    Total Bilirubin 0.6   0.4    Alkaline Phosphatase 111   123    AST (SGOT) 24   17    ALT (SGPT) 18   15    Albumin/Globulin Ratio 1.4   1.2    BUN/Creatinine Ratio 14.1   18.9    Anion Gap 11.2   12.1      CBC w/diff          1/4/2024    10:03 1/29/2024    13:25 5/16/2024    12:11   CBC w/Diff   WBC 7.21  7.21  8.50    RBC 4.08  4.62  5.14    Hemoglobin 13.2  14.9  15.3    Hematocrit 38.3  42.8  45.1    MCV 93.9  92.6  87.7    MCH 32.4  32.3  29.8    MCHC 34.5  34.8  33.9    RDW 13.2  12.4  11.7    Platelets 156  182  243    Neutrophil Rel % 47.2  50.3  51.9    Immature Granulocyte Rel % 1.4  0.4  0.7    Lymphocyte Rel % 36.2  37.6  37.3    Monocyte Rel % 13.2  9.2  8.8    Eosinophil Rel % 1.4  1.7  0.9    Basophil Rel % 0.6  0.8  0.4      Data reviewed : Radiologic studies chest CT 5/15/2024, sniff test 5/20/2024, pulmonary function test 3/25/2024 and my last office note    Procedures        Assessment and Plan    Diagnoses and all orders for this visit:    1. SOB (shortness of breath) (Primary)  Comments:  continue albuterol    2. Elevated hemidiaphragm  Comments:  Sniff test ruled out paralysis    3. Class 3 severe obesity without serious comorbidity with body mass index (BMI) of 45.0 to 49.9 in adult, unspecified obesity type          Follow Up   Return if symptoms worsen or fail to improve.  Patient was given instructions and counseling regarding her condition or for health maintenance advice. Please see specific information pulled  into the AVS if appropriate.

## 2024-05-28 NOTE — TELEPHONE ENCOUNTER
Caller: Lilian Pelaez    Relationship: Self    Best call back number: [phone]     What is the best time to reach you: ASAP    Who are you requesting to speak with (clinical staff, provider,  specific staff member): SCHEDULING    What was the call regarding: PATIENT HAS HER TEST RESULTS BACK AND IS ASKING TO SCHEDULE HER FOLLOW UP WITH DR GERMAIN SOONER THAN 6/20, PLEASE CALL PATIENT TO ADVISE SOONEST THAT SHE CAN BE SCHEDULED.

## 2024-05-31 RX ORDER — HYOSCYAMINE SULFATE 0.125 MG
TABLET ORAL
Qty: 120 TABLET | Refills: 1 | OUTPATIENT
Start: 2024-05-31

## 2024-06-03 NOTE — TELEPHONE ENCOUNTER
Attempted to contact patient to make aware. No answer. Left detailed VM okay per GREGOR and call back number for questions

## 2024-06-18 ENCOUNTER — TELEPHONE (OUTPATIENT)
Dept: ONCOLOGY | Facility: HOSPITAL | Age: 49
End: 2024-06-18

## 2024-06-18 NOTE — TELEPHONE ENCOUNTER
Caller: PelaezLilian    Relationship: Self    Best call back number: [phone]    What is the best time to reach you: ANY    Who are you requesting to speak with (clinical staff, provider,  specific staff member): CLINICAL     What was the call regarding: LILIAN IS AT [facility] the [facility] NOW SHE IS NEEDING A LAB ORDER FAXED ASAP        [phone]

## 2024-06-19 NOTE — PROGRESS NOTES
Chief Complaint/Care Team   Malignant neoplasm of descending colon    Shelly Cash MD Stephens, Cassandra, MD    History of Present Illness     Diagnosis: Colon Adenocarcinoma S/p Left colectomy on 5/5/23, stage after resection [id number], stage III    Current Treatment: Active Surveillance    Previous Treatment: c-scope on 4/2023 biopsy revealed colon adenocarcinoma  -FOLFOX IV K3medpx x 6 months, cycle 1 on 6/14/2023  Treatment delay secondary to pneumoperitoneum which developed in June 2023, patient cleared to resume chemotherapy in August 2023, pt completed this therapy on 12/2023    Lilian Pelaez is a 48 y.o. female who presents to [facility]EMATOLOGY [facility]Y for discussion of newly diagnosed colon adenocarcinoma seen on biopsy of colon mass in the descending colon during colonoscopy performed in April 2023.     Staging scans revealed on 4/11/2023:  CT abdomen/pelvis without any metastatic disease in abdomen/pelvis  CT chest no evidence of metastatic disease in chest.    Patient underwent robotic resection of descending colon and splenic flexure by Dr. Rolando Liu on 5/5/2023.    Patient is a former smoker.   Patient reports family history of several relatives with other forms of cancer cancer on her mother side of the family, but denies any known history of colon cancer in her family.    Patient reports noticing some blood mixed in with stool prior to cancer diagnosis.      Patient received cycle 1 on 6/14/2023, cycle 2 was delayed secondary to development of pneumoperitoneum after cycle 1, patient was mated to the hospital very by her surgeon Dr. Liu who recommended delaying further cycles of chemotherapy until August 1, 2023.    Interval history: Patient here for follow up after completing 12 cycles of chemotherapy with FOLFOX in 12/2023. She is again without report of fever, chills, SOA, melena, or blood loss. She reports irritation for chemoport and thus had it removed. She  is here to discuss the NM bone scan imaging to assess for cancer recurrence.     Review of Systems   Constitutional:  Positive for activity change, appetite change and fatigue. Negative for diaphoresis, fever, unexpected weight gain and unexpected weight loss.   HENT:  Negative for congestion, hearing loss, mouth sores, nosebleeds, sore throat, swollen glands, trouble swallowing and voice change.    Eyes:  Negative for blurred vision.   Respiratory:  Negative for cough, shortness of breath and wheezing.    Cardiovascular:  Negative for chest pain and palpitations.   Gastrointestinal:  Positive for nausea. Negative for abdominal pain, blood in stool, constipation, diarrhea and vomiting.   Endocrine: Positive for cold intolerance. Negative for heat intolerance.   Genitourinary:  Negative for difficulty urinating, dysuria, frequency, hematuria and urinary incontinence.   Musculoskeletal:  Positive for back pain. Negative for arthralgias and myalgias.   Skin:  Negative for rash, skin lesions and wound.   Neurological:  Positive for dizziness. Negative for seizures, weakness, numbness and headache.   Hematological:  Does not bruise/bleed easily.   Psychiatric/Behavioral:  Negative for depressed mood. The patient is nervous/anxious.    All other systems reviewed and are negative.       Oncology/Hematology History   Primary adenocarcinoma of descending colon   4/11/2023 Initial Diagnosis    Primary adenocarcinoma of descending colon     9/27/2023 -  Chemotherapy    OP SUPPORTIVE HYDRATION + ANTIEMETICS     Malignant neoplasm of descending colon   5/6/2023 Initial Diagnosis    Malignant neoplasm of descending colon     5/31/2023 Cancer Staged    Staging form: Colon And Rectum, AJCC 8th Edition  - Pathologic: Stage IIIB (pT3, pN1b, cM0) - Signed by Brian Marroquin MD on 5/31/2023 6/14/2023 -  Chemotherapy    OP COLON [id number] OXALIplatin / Leucovorin / Fluorouracil         Objective     Vitals:    06/20/24 1018   BP:  "115/79   Pulse: 87   Resp: 18   Temp: 97.7 °F (36.5 °C)   TempSrc: Temporal   SpO2: 97%   Weight: (!) 136 kg (300 lb 9.6 oz)   Height: 170.2 cm (67\")   PainSc:   4   PainLoc: Back         ECOG score: 0         PHQ-9 Total Score:         Physical Exam  Vitals reviewed. Exam conducted with a chaperone present.   Constitutional:       General: She is not in acute distress.     Appearance: Normal appearance.   HENT:      Head: Normocephalic and atraumatic.      Mouth/Throat:      Mouth: Mucous membranes are dry.   Eyes:      Extraocular Movements: Extraocular movements intact.      Conjunctiva/sclera: Conjunctivae normal.   Pulmonary:      Effort: Pulmonary effort is normal.   Musculoskeletal:      Cervical back: Normal range of motion and neck supple.   Skin:     General: Skin is warm and dry.      Findings: No bruising.   Neurological:      Mental Status: She is oriented to person, place, and time.           Past Medical History     Past Medical History:   Diagnosis Date    Anesthesia     B/P bottomed out/UNSURE ON THE DATE    Anxiety     Asthma 1996    seasonal/NO INHALERS    Atelectasis of right lung     PER CT SCAN 1/2024    Colon cancer 04/17/2023    DDD (degenerative disc disease), cervical     DDD (degenerative disc disease), lumbar     Depression     Diverticulitis of colon 2005    Scope was done in [facility]    GERD (gastroesophageal reflux disease)     Hypertension     Kidney stones     Melanoma     removed    Palpitation     FOLLOWS W/DR. ANTONIO  Q6 MONTHS, NO CP OR SOA    Prolapse of female bladder      Current Outpatient Medications on File Prior to Visit   Medication Sig Dispense Refill    albuterol (ACCUNEB) 0.63 MG/3ML nebulizer solution       allopurinol (ZYLOPRIM) 100 MG tablet Take 1 tablet by mouth Daily.      ALPRAZolam (XANAX) 0.5 MG tablet Take 1 tablet by mouth At Night As Needed.      amLODIPine (NORVASC) 10 MG tablet Take 1 tablet by mouth Every Night.      cyclobenzaprine (FLEXERIL) 10 MG " tablet 2 (Two) Times a Day As Needed.      Diclofenac Sodium (VOLTAREN) 1 % gel gel Apply 4 g topically to the appropriate area as directed 4 (Four) Times a Day As Needed.      docusate sodium 100 MG capsule Take 1 capsule by mouth Daily.      furosemide (LASIX) 20 MG tablet Take 1 tablet by mouth Daily.      HYDROcodone-acetaminophen (NORCO) 7.5-325 MG per tablet Take 1 tablet by mouth Every 8 (Eight) Hours As Needed.      hyoscyamine (ANASPAZ,LEVSIN) 0.125 MG tablet TAKE ONE TABLET BY MOUTH FOUR TIMES A DAY WITH MEALS AND AT BEDTIME. 120 tablet 1    ketorolac (TORADOL) 10 MG tablet Take 1 tablet by mouth Every 6 (Six) Hours As Needed.      lamoTRIgine (LaMICtal) 25 MG tablet Take 1 tablet by mouth 2 (Two) Times a Day. Only takes at night      loperamide (Imodium A-D) 2 MG tablet Take 1 tablet by mouth 4 (Four) Times a Day As Needed for Diarrhea. Imodium - take 4 mg first dose, followed by 2 mg every 2-4 hours after each stool (MAX of 16 mg in 24 hour period) 60 tablet 1    losartan (COZAAR) 100 MG tablet Take 1 tablet by mouth Every Night.      medroxyPROGESTERone (DEPO-PROVERA) 150 MG/ML injection Inject 1 mL into the appropriate muscle as directed by prescriber Every 3 (Three) Months.      metoclopramide (REGLAN) 10 MG tablet Take 1 tablet by mouth 3 (Three) Times a Day.      metoprolol succinate XL (TOPROL-XL) 25 MG 24 hr tablet Take 1 tablet by mouth Daily.      Multiple Vitamins-Minerals (b complex-C-E-zinc) tablet Take 1 tablet by mouth Daily.      Myrbetriq 50 MG tablet sustained-release 24 hour 24 hr tablet Take 50 mg by mouth Every Night.      ondansetron (ZOFRAN) 8 MG tablet Take 1 tablet by mouth 3 (Three) Times a Day As Needed for Nausea or Vomiting. 30 tablet 5    pantoprazole (PROTONIX) 40 MG EC tablet TAKE 1 TABLET BY MOUTH TWICE A DAY 60 tablet 1    Probiotic Product ([employer] PO) Take  by mouth Daily.      prochlorperazine (COMPAZINE) 10 MG tablet Take 1 tablet by mouth Every 6 (Six)  Hours As Needed for Nausea or Vomiting. 30 tablet 5    rOPINIRole (REQUIP) 1 MG tablet Take 1 tablet by mouth Every Night.      sertraline (ZOLOFT) 100 MG tablet Take 0.5 tablets by mouth Every Night.      sodium chloride (Ocean Nasal Spray) 0.65 % nasal spray 1 spray into the nostril(s) as directed by provider 2 (Two) Times a Day As Needed for Congestion. 480 mL 4    [DISCONTINUED] gabapentin (NEURONTIN) 300 MG capsule TAKE 1 CAPSULE BY MOUTH TWICE A DAY 60 capsule 2    [DISCONTINUED] gabapentin (NEURONTIN) 600 MG tablet Take 1 tablet by mouth Every 12 (Twelve) Hours.      cefdinir (OMNICEF) 300 MG capsule Take 1 capsule by mouth Daily. DUE TO LUNG ATELECTASIS (Patient not taking: Reported on 3/4/2024)      Hydrocortisone, Perianal, (ANUSOL-HC) 2.5 % rectal cream  (Patient not taking: Reported on 6/20/2024)      lidocaine-prilocaine (EMLA) 2.5-2.5 % cream Apply 1 application topically to the appropriate area as directed Every 2 (Two) Hours As Needed for Mild Pain. (Patient not taking: Reported on 6/20/2024) 30 g 1    Myrbetriq 25 MG tablet sustained-release 24 hour 24 hr tablet  (Patient not taking: Reported on 4/16/2024)      naloxone (NARCAN) 4 MG/0.1ML nasal spray Take 1 spray every day by nasal route as needed. (Patient not taking: Reported on 6/20/2024)      spironolactone (ALDACTONE) 25 MG tablet Take 1 tablet by mouth Daily. (Patient not taking: Reported on 6/20/2024)       No current facility-administered medications on file prior to visit.      Allergies   Allergen Reactions    Hydromorphone Hives, Itching and GI Intolerance    Morphine Hives, Itching and Nausea And Vomiting    Oxybutynin Swelling and Angioedema           Oxycodone-Acetaminophen Itching and Nausea And Vomiting     Can not take any higher than 7.5    Penicillins Rash    Promethazine Nausea And Vomiting    Tylenol With Codeine #3 [Acetaminophen-Codeine] Itching and Nausea And Vomiting    Oxybutynin Chloride Angioedema     Past Surgical  History:   Procedure Laterality Date    BREAST SURGERY  2016    Removed 8lbs of tissue, 2016    CHOLECYSTECTOMY      COLON RESECTION Left 05/05/2023    Procedure: RESECTION OF DESCENDING COLON AND SPLENIC FLEXURE TAKEDOWN WITH DAVINCI ROBOT;  Surgeon: Rolando Liu MD;  [facility] [place] [facility];  Service: Robotics - DaVinci;  Laterality: Left;    COLONOSCOPY N/A 04/04/2023    Procedure: COLONOSCOPY WITH POLYPECTOMY/SNARE/BIOPSIES/TATTOOING DESCENDING COLON MASS;  Surgeon: Deniz Dowd MD;  Location: [facility] ENDOSCOPY;  Service: Gastroenterology;  Laterality: N/A;  INCOMPLETE COLONOSCOPY  POOR BOWEL PREP  COLON POLYP  COLON MASS  DIVERTICULOSIS    COLONOSCOPY N/A 04/12/2023    Procedure: COLONOSCOPY with cold snare;  Surgeon: Deniz Dowd MD;  Location: [facility] ENDOSCOPY;  Service: Gastroenterology;  Laterality: N/A;  colon polyps, diverticulosis, colon mass, hemorrhoids      CYSTOSCOPY W/ URETERAL STENT PLACEMENT Left 05/05/2023    Procedure: Cystoscopy, left ureteral injection,;  Surgeon: Manjula Randolph MD;  [facility] [place] [facility];  Service: Urology;  Laterality: Left;    FRACTURE SURGERY  2001    Left knee    KNEE SURGERY Left     x4    LAPAROSCOPIC TUBAL LIGATION  1995    LAPAROSCOPIC TUBAL LIGATION      Reversed    REDUCTION MAMMAPLASTY  2009    8lbs of tissue was removed    TONSILLECTOMY      UPPER GASTROINTESTINAL ENDOSCOPY      VENOUS ACCESS DEVICE (PORT) INSERTION N/A 6/5/2023    Procedure: INSERTION VENOUS ACCESS DEVICE;  Surgeon: Rolando Liu MD;  [facility] [place] [facility];  Service: General;  Laterality: N/A;    VENOUS ACCESS DEVICE (PORT) REMOVAL N/A 2/8/2024    Procedure: REMOVAL VENOUS ACCESS DEVICE;  Surgeon: Rolando Liu MD;  Location: [facility] MAIN OR;  Service: General;  Laterality: N/A;     Social History     Socioeconomic History    Marital status:    Tobacco Use    Smoking status: Former     Current packs/day: 0.00     Average packs/day: 0.5 packs/day for 2.1  years (1.1 ttl pk-yrs)     Types: Cigarettes     Start date: 3/5/1991     Quit date: 1993     Years since quittin.1     Passive exposure: Never    Smokeless tobacco: Never    Tobacco comments:     When i was 16 right after my daughter was born   Vaping Use    Vaping status: Never Used   Substance and Sexual Activity    Alcohol use: Never    Drug use: Never    Sexual activity: Not Currently     Partners: Male     Birth control/protection: Depo-provera, Same-sex partner, Injection     Family History   Problem Relation Age of Onset    Cancer Mother         Breast    Hypertension Mother     Diabetes Maternal Grandmother         Passed away    Colon cancer Neg Hx     Malig Hyperthermia Neg Hx        Results     Result Review   The following data was reviewed by: Brian Marroquin MD on 2023:  Lab Results   Component Value Date    HGB 15.3 2024    HCT 45.1 2024    MCV 87.7 2024     2024    WBC 8.50 2024    NEUTROABS 4.41 2024    LYMPHSABS 3.17 (H) 2024    MONOSABS 0.75 2024    EOSABS 0.08 2024    BASOSABS 0.03 2024     Lab Results   Component Value Date    GLUCOSE 85 2024    BUN 14 2024    CREATININE 0.74 2024     2024    K 4.1 2024     2024    CO2 23.9 2024    CALCIUM 10.1 2024    PROTEINTOT 7.8 2024    ALBUMIN 4.3 2024    BILITOT 0.4 2024    ALKPHOS 123 (H) 2024    AST 17 2024    ALT 15 2024     Lab Results   Component Value Date    MG 2.1 2024    PHOS 3.8 2024       Case Report   Surgical Pathology Report                         Case: [id number]                                   Authorizing Provider:  Deniz Dowd MD    Collected:           2023 09:41 AM           Ordering Location:     [facility] Received:            2023 11:41 AM                                  SUITES                                                                         Pathologist:           Niesha Torres DO                                                        Specimens:   1) - Large Intestine, Cecum, cecal polyp cold snare                                                  2) - Large Intestine, Sigmoid Colon, sigmoid colon polyp cold snare                        Clinical Information    Mass of colon   Final Diagnosis   1. Cecum polyp, biopsy:                            - Tubular adenoma        2. Sigmoid colon polyp, biopsy:                            - Tubular adenoma    Electronically signed by Niesha Torres DO on 4/13/2023 at 0840     Surgical Pathology Report                         Case: [id number]                                   Authorizing Provider:  Deniz Dowd MD    Collected:           04/04/2023 11:15 AM           Ordering Location:     [facility] Received:            04/04/2023 11:59 AM                                  SUITES                                                                        Pathologist:           Jennifer Mike MD                                                      Specimens:   1) - Large Intestine, Transverse Colon, TRANSVERSE COLON POLYP                                       2) - Large Intestine, Left / Descending Colon, DESCENDING COLON BIOPSIES                   Clinical Information    Constipation, unspecified constipation type   Final Diagnosis   1. Transverse colon polyp, biopsy:               - Fragments of tubular adenoma        2.  Descending colon, biopsy:               -Adenocarcinoma, moderately to poorly differentiated     Comment: Per policy, reflex testing has been ordered, and the results will be separately reported.     REMARKS: The above positive (malignant) diagnosis was called to April in Dr. Dowd's office at 09:09 EDT on 4/5/2023 by [facility].          Electronically signed by Jennifer Mike MD on 4/5/2023 at 0935         Surgical  Pathology Report                         Case: [id number]                                   Authorizing Provider:  Rolando Liu MD        Collected:           05/05/2023 03:06 PM           Ordering Location:     [facility]  Received:            05/08/2023 06:48 AM                                  OR                                                                            Pathologist:           Jt Florence MD                                                             Specimen:    Large Intestine, Left / Descending Colon, left colon                                       Clinical Information    Malignant neoplasm of descending colon   Final Diagnosis   Left colon, resection:               - Adenocarcinoma               - Three of thirty-three lymph nodes positive for carcinoma (3/33)               - See synoptic checklist   Electronically signed by Jt Florence MD on 5/11/2023 at 1508   Synoptic Checklist   COLON AND RECTUM: Resection, Including Transanal Disk Excision of Rectal Neoplasms  8th Edition - Protocol posted: 6/22/2022  COLON AND RECTUM: RESECTION - All Specimens  SPECIMEN   Procedure  Descending colon resection    TUMOR   Tumor Site  Descending colon    Histologic Type  Adenocarcinoma    Histologic Grade  G3, poorly differentiated    Tumor Size  Greatest dimension (Centimeters): 3.5 cm   Tumor Extent  Invades through muscularis propria into the pericolonic or perirectal tissue    Macroscopic Tumor Perforation  Not identified    Lymphovascular Invasion  Small vessel    Perineural Invasion  Present    Treatment Effect  No known presurgical therapy    MARGINS   Margin Status for Invasive Carcinoma  All margins negative for invasive carcinoma    Closest Margin(s) to Invasive Carcinoma  Radial (circumferential) or mesenteric    Distance from Invasive Carcinoma to Closest Margin  3.5 cm   Margin Status for Non-Invasive Tumor  All margins negative for high-grade dysplasia / intramucosal  carcinoma and low-grade dysplasia    REGIONAL LYMPH NODES   Regional Lymph Node Status  Tumor present in regional lymph node(s)    Number of Lymph Nodes with Tumor  3    Number of Lymph Nodes Examined  33    Tumor Deposits  Present    Number of Tumor Deposits  1    PATHOLOGIC STAGE CLASSIFICATION (pTNM, AJCC 8th Edition)   Reporting of pT, pN, and (when applicable) pM categories is based on information available to the pathologist at the time the report is issued. As per the AJCC (Chapter 1, 8th Ed.) it is the managing physician's responsibility to establish the final pathologic stage based upon all pertinent information, including but potentially not limited to this pathology report.   pT Category  pT3    pN Category  pN1b    ADDITIONAL FINDINGS   Additional Findings  None identified    .          NM Bone Scan Whole Body    Result Date: 5/23/2024  Impression: No scintigraphic evidence of skeletal metastatic disease. Sclerotic focus in the T8 vertebral body demonstrated on chest CT does not demonstrate abnormal activity   Electronically Signed By-Rashid Mcmillan On:5/23/2024 4:43 PM       Assessment & Plan     Diagnoses and all orders for this visit:    1. Malignant neoplasm of descending colon (Primary)  -     CT chest w contrast; Future  -     CT abdomen pelvis w contrast; Future  -     CBC & Differential; Future  -     Comprehensive Metabolic Panel; Future  -     CEA; Future  -     Ambulatory Referral to General Surgery  -     gabapentin (NEURONTIN) 300 MG capsule; Take 1 capsule by mouth 2 (Two) Times a Day.  Dispense: 60 capsule; Refill: 2    2. Chemotherapy-induced neuropathy  -     gabapentin (NEURONTIN) 300 MG capsule; Take 1 capsule by mouth 2 (Two) Times a Day.  Dispense: 60 capsule; Refill: 2    Other orders  -     LABS SCANNED            Lilian Pelaez is a 48 y.o. female who presents to [facility] HEMATOLOGY & ONCOLOGY evaluation prior to systemic therapy for stage III colon cancer,  patient status post resection of descending colon splenic flexure by Dr. Rolando Liu on 5/5/2023.     Based on NCCN guidelines for her stage III colon cancer, discussed high risk features seen on pathology report and discussed CapeOx for 3 months versus FOLFOX for 3 to 6 months with patient and  today.  Patient agreed to plan to undergo FOLFOX IV chemotherapy every 2 weeks for 6 months.    Patient received cycle 1 on June 14, 2023, she developed pneumoperitoneum which her surgeon suspect was secondary to severe constipation from iron tablets, but patient was cleared to resume chemotherapy after 8/1/2023, she completed 12 cycles of chemotherapy on 12/27/2024.    Restaging imaging scans obtained on 1/29/2023 which was without evidence of disease recurrence or metastatic disease, but did reveal new elevation of right hemidiaphragm with secondary lower lobe atelectasis, new ground glass infiltrates in the upper lobes, likely infectious including possible viral pneumonia.    Restaging imaging from 5/15/2024 revealed an indeterminate approximately 1 cm sclerotic focus in T8 vertebral body, since osteoblastic metastasis cannot be excluded, refer patient for bone scan for further assessment, will recommend biopsy if suspicious on bone scan, otherwise no evidence of intrathoracic or abdominal pelvic metastatic disease, interval resolution of pulmonary groundglass infiltrates and diaphragm elevation.  -NM bone scan from 5/22/2024 was negative for metastatic disease    -Pt requested chemoport removal, referred pt to general surgeon for chemport removal.     CEA from 6/18/2024 was stable at 2, on 5/16/24 was 1.99, close to last value in January which was 2.01,   -6/18/2024 showed CBC was normal, CMP showed mild elevation of alkaline phosphatase and mild elevation of K but otherwise was normal.    Since NM bone scan from 5/22/2024 was negative for osseous metastatic disease. CT CAP from 5/16/24 negative for metastatic  disease. CEA stable. plan for pt follow up in 4 months with repeat CT CAP to reassess disease status and for repeat CBC, CMP and CEA.        Patient verbalized understanding and agreement plan.    Please note that portions of this note were completed with a voice recognition program.      Electronically signed by Brian Marroquin MD, 06/20/24, 1:54 PM EDT.        Follow Up     I spent 30 minutes caring for Lilian on this date of service. This time includes time spent by me in the following activities:preparing for the visit, reviewing tests, obtaining and/or reviewing a separately obtained history, performing a medically appropriate examination and/or evaluation , counseling and educating the patient/family/caregiver, ordering medications, tests, or procedures, documenting information in the medical record and care coordination.    This is an acute or chronic illness that poses a threat to life or bodily function. The above treatment plan involves a high risk of complications and/or mortality of patient management.    The patient was seen and examined. Work by the provider also included review and/or ordering of lab tests, review and/or ordering of radiology tests, review and/or ordering of medicine tests, discussion with other physicians or providers, independent review of data, obtaining old records, review/summation of old records, and/or other review.    I have reviewed the family history, social history, and past medical history for this patient. Previous information and data has been reviewed and updated as needed. I have reviewed and verified the chief complaint, history, and other documentation. The patient was interviewed and examined in the clinic and the chart reviewed. The previous observations, recommendations, and conclusions were reviewed including those of other providers.     The plan was discussed with the patient and/or family. The patient was given time to ask questions and these questions were  answered. At the conclusion of their visit they had no additional questions or concerns and all questions were answered to their satisfaction.    Patient was given instructions and counseling regarding her condition or for health maintenance advice. Please see specific information pulled into the AVS if appropriate.

## 2024-06-20 ENCOUNTER — OFFICE VISIT (OUTPATIENT)
Dept: ONCOLOGY | Facility: HOSPITAL | Age: 49
End: 2024-06-20
Payer: COMMERCIAL

## 2024-06-20 VITALS
HEIGHT: 67 IN | OXYGEN SATURATION: 97 % | WEIGHT: 293 LBS | SYSTOLIC BLOOD PRESSURE: 115 MMHG | TEMPERATURE: 97.7 F | DIASTOLIC BLOOD PRESSURE: 79 MMHG | RESPIRATION RATE: 18 BRPM | HEART RATE: 87 BPM | BODY MASS INDEX: 45.99 KG/M2

## 2024-06-20 DIAGNOSIS — T45.1X5A CHEMOTHERAPY-INDUCED NEUROPATHY: ICD-10-CM

## 2024-06-20 DIAGNOSIS — G62.0 CHEMOTHERAPY-INDUCED NEUROPATHY: ICD-10-CM

## 2024-06-20 DIAGNOSIS — C18.6 MALIGNANT NEOPLASM OF DESCENDING COLON: Primary | ICD-10-CM

## 2024-06-20 PROCEDURE — 3078F DIAST BP <80 MM HG: CPT | Performed by: INTERNAL MEDICINE

## 2024-06-20 PROCEDURE — 1125F AMNT PAIN NOTED PAIN PRSNT: CPT | Performed by: INTERNAL MEDICINE

## 2024-06-20 PROCEDURE — 3074F SYST BP LT 130 MM HG: CPT | Performed by: INTERNAL MEDICINE

## 2024-06-20 PROCEDURE — 99214 OFFICE O/P EST MOD 30 MIN: CPT | Performed by: INTERNAL MEDICINE

## 2024-06-20 RX ORDER — GABAPENTIN 300 MG/1
300 CAPSULE ORAL 2 TIMES DAILY
Qty: 60 CAPSULE | Refills: 2 | Status: SHIPPED | OUTPATIENT
Start: 2024-06-20

## 2024-07-22 ENCOUNTER — OFFICE VISIT (OUTPATIENT)
Dept: SURGERY | Facility: CLINIC | Age: 49
End: 2024-07-22
Payer: COMMERCIAL

## 2024-07-22 ENCOUNTER — PREP FOR SURGERY (OUTPATIENT)
Dept: OTHER | Facility: HOSPITAL | Age: 49
End: 2024-07-22
Payer: COMMERCIAL

## 2024-07-22 VITALS
BODY MASS INDEX: 45.99 KG/M2 | DIASTOLIC BLOOD PRESSURE: 89 MMHG | RESPIRATION RATE: 16 BRPM | HEIGHT: 67 IN | SYSTOLIC BLOOD PRESSURE: 150 MMHG | WEIGHT: 293 LBS | HEART RATE: 89 BPM

## 2024-07-22 DIAGNOSIS — Z85.038 PERSONAL HISTORY OF COLON CANCER, STAGE III: Primary | ICD-10-CM

## 2024-07-22 PROCEDURE — 1159F MED LIST DOCD IN RCRD: CPT | Performed by: SURGERY

## 2024-07-22 PROCEDURE — 3077F SYST BP >= 140 MM HG: CPT | Performed by: SURGERY

## 2024-07-22 PROCEDURE — 1160F RVW MEDS BY RX/DR IN RCRD: CPT | Performed by: SURGERY

## 2024-07-22 PROCEDURE — 99212 OFFICE O/P EST SF 10 MIN: CPT | Performed by: SURGERY

## 2024-07-22 PROCEDURE — 3079F DIAST BP 80-89 MM HG: CPT | Performed by: SURGERY

## 2024-07-22 NOTE — PROGRESS NOTES
Chief Complaint:  MALIGNANT NEOPLASM OF DESCENDING COLON    Primary Care Provider: Shelly Cash MD    Referring Provider: No ref. provider found    History of Present Illness  Lilian Pelaez is a 48 y.o. female referred by Dr. Marroquin to have a colonoscopy.      Patient had robotic resection of descending colon and splenic flexure by me on 5/5/2023 for colon cancer.  The cancer was diagnosed when patient had a colonoscopy on 4/4/23 by another physician.    The cancer is [id number], stage III.  Patient received IV therapy.  Follow-up testing has soon no evidence of cancer.      Patient was referred to have her first postop cancer resection colonoscopy.      Allergies: Hydromorphone, Morphine, Oxybutynin, Oxycodone-acetaminophen, Penicillins, Promethazine, Tylenol with codeine #3 [acetaminophen-codeine], and Oxybutynin chloride    Outpatient Medications Marked as Taking for the 7/22/24 encounter (Office Visit) with Rolando Liu MD   Medication Sig Dispense Refill    albuterol (ACCUNEB) 0.63 MG/3ML nebulizer solution       allopurinol (ZYLOPRIM) 100 MG tablet Take 1 tablet by mouth Daily.      ALPRAZolam (XANAX) 0.5 MG tablet Take 1 tablet by mouth At Night As Needed.      amLODIPine (NORVASC) 10 MG tablet Take 1 tablet by mouth Every Night.      cyclobenzaprine (FLEXERIL) 10 MG tablet 2 (Two) Times a Day As Needed.      Diclofenac Sodium (VOLTAREN) 1 % gel gel Apply 4 g topically to the appropriate area as directed 4 (Four) Times a Day As Needed.      docusate sodium 100 MG capsule Take 1 capsule by mouth Daily.      furosemide (LASIX) 20 MG tablet Take 1 tablet by mouth Daily.      gabapentin (NEURONTIN) 300 MG capsule Take 1 capsule by mouth 2 (Two) Times a Day. 60 capsule 2    HYDROcodone-acetaminophen (NORCO) 7.5-325 MG per tablet Take 1 tablet by mouth Every 8 (Eight) Hours As Needed.      hyoscyamine (ANASPAZ,LEVSIN) 0.125 MG tablet TAKE ONE TABLET BY MOUTH FOUR TIMES A DAY WITH MEALS AND AT BEDTIME. 120  tablet 1    ketorolac (TORADOL) 10 MG tablet Take 1 tablet by mouth Every 6 (Six) Hours As Needed.      lamoTRIgine (LaMICtal) 25 MG tablet Take 1 tablet by mouth 2 (Two) Times a Day. Only takes at night      loperamide (Imodium A-D) 2 MG tablet Take 1 tablet by mouth 4 (Four) Times a Day As Needed for Diarrhea. Imodium - take 4 mg first dose, followed by 2 mg every 2-4 hours after each stool (MAX of 16 mg in 24 hour period) 60 tablet 1    losartan (COZAAR) 100 MG tablet Take 1 tablet by mouth Every Night.      medroxyPROGESTERone (DEPO-PROVERA) 150 MG/ML injection Inject 1 mL into the appropriate muscle as directed by prescriber Every 3 (Three) Months.      metoclopramide (REGLAN) 10 MG tablet Take 1 tablet by mouth 3 (Three) Times a Day.      metoprolol succinate XL (TOPROL-XL) 25 MG 24 hr tablet Take 1 tablet by mouth Daily.      Multiple Vitamins-Minerals (b complex-C-E-zinc) tablet Take 1 tablet by mouth Daily.      Myrbetriq 50 MG tablet sustained-release 24 hour 24 hr tablet Take 50 mg by mouth Every Night.      ondansetron (ZOFRAN) 8 MG tablet Take 1 tablet by mouth 3 (Three) Times a Day As Needed for Nausea or Vomiting. 30 tablet 5    pantoprazole (PROTONIX) 40 MG EC tablet TAKE 1 TABLET BY MOUTH TWICE A DAY 60 tablet 1    Probiotic Product (Thing5 PO) Take  by mouth Daily.      prochlorperazine (COMPAZINE) 10 MG tablet Take 1 tablet by mouth Every 6 (Six) Hours As Needed for Nausea or Vomiting. 30 tablet 5    rOPINIRole (REQUIP) 1 MG tablet Take 1 tablet by mouth Every Night.      sertraline (ZOLOFT) 100 MG tablet Take 0.5 tablets by mouth Every Night.      sodium chloride (Ocean Nasal Spray) 0.65 % nasal spray 1 spray into the nostril(s) as directed by provider 2 (Two) Times a Day As Needed for Congestion. 480 mL 4       Past Medical History:    Anesthesia    B/P bottomed out/UNSURE ON THE DATE    Anxiety    Asthma    seasonal/NO INHALERS    Atelectasis of right lung    PER CT SCAN 1/2024     Colon cancer    DDD (degenerative disc disease), cervical    DDD (degenerative disc disease), lumbar    Depression    Diverticulitis of colon    Scope was done in [facility]    GERD (gastroesophageal reflux disease)    Hypertension    Kidney stones    Melanoma    removed    Palpitation    FOLLOWS W/DR. ANTONIO  Q6 MONTHS, NO CP OR SOA    Prolapse of female bladder        Past Surgical History:    BREAST SURGERY    Removed 8lbs of tissue, 2016    CHOLECYSTECTOMY    COLON RESECTION    Procedure: RESECTION OF DESCENDING COLON AND SPLENIC FLEXURE TAKEDOWN WITH DAVINCI ROBOT;  Surgeon: Rolando Liu MD;  [place] [facility] [place] [facility] [place]  Service: Robotics - DaVinci;  Laterality: Left;    COLONOSCOPY    Procedure: COLONOSCOPY WITH POLYPECTOMY/SNARE/BIOPSIES/TATTOOING DESCENDING COLON MASS;  Surgeon: Deniz Dowd MD;  Location: [facility] ENDOSCOPY;  Service: Gastroenterology;  Laterality: N/A;  INCOMPLETE COLONOSCOPY  POOR BOWEL PREP  COLON POLYP  COLON MASS  DIVERTICULOSIS    COLONOSCOPY    Procedure: COLONOSCOPY with cold snare;  Surgeon: Deniz Dowd MD;  Location: [facility] ENDOSCOPY;  Service: Gastroenterology;  Laterality: N/A;  colon polyps, diverticulosis, colon mass, hemorrhoids      CYSTOSCOPY W/ URETERAL STENT PLACEMENT    Procedure: Cystoscopy, left ureteral injection,;  Surgeon: Manjula Randolph MD;  [place] [facility] [place] [facility] [place]  Service: Urology;  Laterality: Left;    FRACTURE SURGERY    Left knee    KNEE SURGERY    x4    LAPAROSCOPIC TUBAL LIGATION    LAPAROSCOPIC TUBAL LIGATION    Reversed    REDUCTION MAMMAPLASTY    8lbs of tissue was removed    TONSILLECTOMY    UPPER GASTROINTESTINAL ENDOSCOPY    VENOUS ACCESS DEVICE (PORT) INSERTION    Procedure: INSERTION VENOUS ACCESS DEVICE;  Surgeon: Rolando Liu MD;  [place] [facility] [place] [facility] [place]  Service: General;  Laterality: N/A;    VENOUS ACCESS DEVICE (PORT) REMOVAL    Procedure: REMOVAL VENOUS ACCESS DEVICE;  Surgeon: Rolando Liu MD;  Location: [facility]  "MAIN OR;  Service: General;  Laterality: N/A;       Family History:   Family History   Problem Relation Age of Onset    Cancer Mother         Breast    Hypertension Mother     Arthritis Mother         Passed away     Diabetes Maternal Grandmother         Passed away    Heart disease Maternal Grandmother         Passed away heart attack    Heart disease Maternal Grandfather         Passed away heart attack    Depression Brother     Colon cancer Neg Hx     Malig Hyperthermia Neg Hx         Social History:  Social History     Tobacco Use    Smoking status: Former     Current packs/day: 0.00     Average packs/day: 0.5 packs/day for 2.1 years (1.1 ttl pk-yrs)     Types: Cigarettes     Start date: 3/5/1991     Quit date: 1993     Years since quittin.2     Passive exposure: Never    Smokeless tobacco: Never    Tobacco comments:     When i was 16 right after my daughter was born   Substance Use Topics    Alcohol use: Never       Objective     Vital Signs:  /89 (BP Location: Right arm, Patient Position: Sitting, Cuff Size: Adult) Comment (BP Location): LOWER ARM  Pulse 89   Resp 16   Ht 170.2 cm (67\")   Wt (!) 140 kg (308 lb)   BMI 48.24 kg/m²   Respiratory:  breathing not labored, respiratory effort appears normal  Cardiovascular:  heart regular rate  Musculoskeletal: moving all extremities symmetrically and purposefully  Neurologic:  no obvious motor or sensory deficits, speech clear  Psychiatric:  judgment and insight intact      Assessment:  Diagnoses and all orders for this visit:    1. Personal history of colon cancer, stage III (Primary)        Plan:  Colonoscopy    Discussion: Indications, options, risks, benefits, and expected outcomes of planned surgery were discussed with the patient and she agrees to proceed.    Rolando Liu MD  2024    Electronically signed by Rolando Liu MD, 24, 2:22 PM EDT.        "

## 2024-07-22 NOTE — H&P (VIEW-ONLY)
Chief Complaint:  MALIGNANT NEOPLASM OF DESCENDING COLON    Primary Care Provider: Shelly Cash MD    Referring Provider: No ref. provider found    History of Present Illness  Lilian Pelaez is a 48 y.o. female referred by Dr. Marroquin to have a colonoscopy.      Patient had robotic resection of descending colon and splenic flexure by me on 5/5/2023 for colon cancer.  The cancer was diagnosed when patient had a colonoscopy on 4/4/23 by another physician.    The cancer is [id number], stage III.  Patient received IV therapy.  Follow-up testing has soon no evidence of cancer.      Patient was referred to have her first postop cancer resection colonoscopy.      Allergies: Hydromorphone, Morphine, Oxybutynin, Oxycodone-acetaminophen, Penicillins, Promethazine, Tylenol with codeine #3 [acetaminophen-codeine], and Oxybutynin chloride    Outpatient Medications Marked as Taking for the 7/22/24 encounter (Office Visit) with Rolando Liu MD   Medication Sig Dispense Refill    albuterol (ACCUNEB) 0.63 MG/3ML nebulizer solution       allopurinol (ZYLOPRIM) 100 MG tablet Take 1 tablet by mouth Daily.      ALPRAZolam (XANAX) 0.5 MG tablet Take 1 tablet by mouth At Night As Needed.      amLODIPine (NORVASC) 10 MG tablet Take 1 tablet by mouth Every Night.      cyclobenzaprine (FLEXERIL) 10 MG tablet 2 (Two) Times a Day As Needed.      Diclofenac Sodium (VOLTAREN) 1 % gel gel Apply 4 g topically to the appropriate area as directed 4 (Four) Times a Day As Needed.      docusate sodium 100 MG capsule Take 1 capsule by mouth Daily.      furosemide (LASIX) 20 MG tablet Take 1 tablet by mouth Daily.      gabapentin (NEURONTIN) 300 MG capsule Take 1 capsule by mouth 2 (Two) Times a Day. 60 capsule 2    HYDROcodone-acetaminophen (NORCO) 7.5-325 MG per tablet Take 1 tablet by mouth Every 8 (Eight) Hours As Needed.      hyoscyamine (ANASPAZ,LEVSIN) 0.125 MG tablet TAKE ONE TABLET BY MOUTH FOUR TIMES A DAY WITH MEALS AND AT BEDTIME. 120  tablet 1    ketorolac (TORADOL) 10 MG tablet Take 1 tablet by mouth Every 6 (Six) Hours As Needed.      lamoTRIgine (LaMICtal) 25 MG tablet Take 1 tablet by mouth 2 (Two) Times a Day. Only takes at night      loperamide (Imodium A-D) 2 MG tablet Take 1 tablet by mouth 4 (Four) Times a Day As Needed for Diarrhea. Imodium - take 4 mg first dose, followed by 2 mg every 2-4 hours after each stool (MAX of 16 mg in 24 hour period) 60 tablet 1    losartan (COZAAR) 100 MG tablet Take 1 tablet by mouth Every Night.      medroxyPROGESTERone (DEPO-PROVERA) 150 MG/ML injection Inject 1 mL into the appropriate muscle as directed by prescriber Every 3 (Three) Months.      metoclopramide (REGLAN) 10 MG tablet Take 1 tablet by mouth 3 (Three) Times a Day.      metoprolol succinate XL (TOPROL-XL) 25 MG 24 hr tablet Take 1 tablet by mouth Daily.      Multiple Vitamins-Minerals (b complex-C-E-zinc) tablet Take 1 tablet by mouth Daily.      Myrbetriq 50 MG tablet sustained-release 24 hour 24 hr tablet Take 50 mg by mouth Every Night.      ondansetron (ZOFRAN) 8 MG tablet Take 1 tablet by mouth 3 (Three) Times a Day As Needed for Nausea or Vomiting. 30 tablet 5    pantoprazole (PROTONIX) 40 MG EC tablet TAKE 1 TABLET BY MOUTH TWICE A DAY 60 tablet 1    Probiotic Product (Tongda PO) Take  by mouth Daily.      prochlorperazine (COMPAZINE) 10 MG tablet Take 1 tablet by mouth Every 6 (Six) Hours As Needed for Nausea or Vomiting. 30 tablet 5    rOPINIRole (REQUIP) 1 MG tablet Take 1 tablet by mouth Every Night.      sertraline (ZOLOFT) 100 MG tablet Take 0.5 tablets by mouth Every Night.      sodium chloride (Ocean Nasal Spray) 0.65 % nasal spray 1 spray into the nostril(s) as directed by provider 2 (Two) Times a Day As Needed for Congestion. 480 mL 4       Past Medical History:    Anesthesia    B/P bottomed out/UNSURE ON THE DATE    Anxiety    Asthma    seasonal/NO INHALERS    Atelectasis of right lung    PER CT SCAN 1/2024     Colon cancer    DDD (degenerative disc disease), cervical    DDD (degenerative disc disease), lumbar    Depression    Diverticulitis of colon    Scope was done in [facility]    GERD (gastroesophageal reflux disease)    Hypertension    Kidney stones    Melanoma    removed    Palpitation    FOLLOWS W/DR. ANTONIO  Q6 MONTHS, NO CP OR SOA    Prolapse of female bladder        Past Surgical History:    BREAST SURGERY    Removed 8lbs of tissue, 2016    CHOLECYSTECTOMY    COLON RESECTION    Procedure: RESECTION OF DESCENDING COLON AND SPLENIC FLEXURE TAKEDOWN WITH DAVINCI ROBOT;  Surgeon: Rolando Liu MD;  [place] [facility] [place] [facility] [place]  Service: Robotics - DaVinci;  Laterality: Left;    COLONOSCOPY    Procedure: COLONOSCOPY WITH POLYPECTOMY/SNARE/BIOPSIES/TATTOOING DESCENDING COLON MASS;  Surgeon: Deniz Dowd MD;  Location: [facility] ENDOSCOPY;  Service: Gastroenterology;  Laterality: N/A;  INCOMPLETE COLONOSCOPY  POOR BOWEL PREP  COLON POLYP  COLON MASS  DIVERTICULOSIS    COLONOSCOPY    Procedure: COLONOSCOPY with cold snare;  Surgeon: Deniz Dowd MD;  Location: [facility] ENDOSCOPY;  Service: Gastroenterology;  Laterality: N/A;  colon polyps, diverticulosis, colon mass, hemorrhoids      CYSTOSCOPY W/ URETERAL STENT PLACEMENT    Procedure: Cystoscopy, left ureteral injection,;  Surgeon: Manjula Randolph MD;  Location: [facility] [place] [facility] [place];  Service: Urology;  Laterality: Left;    FRACTURE SURGERY    Left knee    KNEE SURGERY    x4    LAPAROSCOPIC TUBAL LIGATION    LAPAROSCOPIC TUBAL LIGATION    Reversed    REDUCTION MAMMAPLASTY    8lbs of tissue was removed    TONSILLECTOMY    UPPER GASTROINTESTINAL ENDOSCOPY    VENOUS ACCESS DEVICE (PORT) INSERTION    Procedure: INSERTION VENOUS ACCESS DEVICE;  Surgeon: Rolando Liu MD;  [place]: [facility] [place] [facility] [place];  Service: General;  Laterality: N/A;    VENOUS ACCESS DEVICE (PORT) REMOVAL    Procedure: REMOVAL VENOUS ACCESS DEVICE;  Surgeon: Rolando Liu MD;  Location: [facility]  "MAIN OR;  Service: General;  Laterality: N/A;       Family History:   Family History   Problem Relation Age of Onset    Cancer Mother         Breast    Hypertension Mother     Arthritis Mother         Passed away     Diabetes Maternal Grandmother         Passed away    Heart disease Maternal Grandmother         Passed away heart attack    Heart disease Maternal Grandfather         Passed away heart attack    Depression Brother     Colon cancer Neg Hx     Malig Hyperthermia Neg Hx         Social History:  Social History     Tobacco Use    Smoking status: Former     Current packs/day: 0.00     Average packs/day: 0.5 packs/day for 2.1 years (1.1 ttl pk-yrs)     Types: Cigarettes     Start date: 3/5/1991     Quit date: 1993     Years since quittin.2     Passive exposure: Never    Smokeless tobacco: Never    Tobacco comments:     When i was 16 right after my daughter was born   Substance Use Topics    Alcohol use: Never       Objective     Vital Signs:  /89 (BP Location: Right arm, Patient Position: Sitting, Cuff Size: Adult) Comment (BP Location): LOWER ARM  Pulse 89   Resp 16   Ht 170.2 cm (67\")   Wt (!) 140 kg (308 lb)   BMI 48.24 kg/m²   Respiratory:  breathing not labored, respiratory effort appears normal  Cardiovascular:  heart regular rate  Musculoskeletal: moving all extremities symmetrically and purposefully  Neurologic:  no obvious motor or sensory deficits, speech clear  Psychiatric:  judgment and insight intact      Assessment:  Diagnoses and all orders for this visit:    1. Personal history of colon cancer, stage III (Primary)        Plan:  Colonoscopy    Discussion: Indications, options, risks, benefits, and expected outcomes of planned surgery were discussed with the patient and she agrees to proceed.    Rolando Liu MD  2024    Electronically signed by Rolando Liu MD, 24, 2:22 PM EDT.        "

## 2024-07-23 ENCOUNTER — ANESTHESIA EVENT (OUTPATIENT)
Dept: GASTROENTEROLOGY | Facility: HOSPITAL | Age: 49
End: 2024-07-23
Payer: COMMERCIAL

## 2024-07-23 NOTE — ANESTHESIA PREPROCEDURE EVALUATION
Anesthesia Evaluation     Patient summary reviewed, Nursing notes reviewed and pregnancy test completed                Airway   Mallampati: III  TM distance: >3 FB  Neck ROM: full  Possible difficult intubation and Large neck circumference  Dental - normal exam     Pulmonary     breath sounds clear to auscultation  (+) a smoker Former, asthma (nebs prn),shortness of breath    ROS comment: Previous right lung pleural effusion Dec 2023   Cardiovascular - normal exam  Exercise tolerance: good (4-7 METS)    Patient on routine beta blocker  Rhythm: regular  Rate: normal    (+) hypertension well controlled      Neuro/Psych  (+) headaches (MIGRAINES), dizziness/light headedness, numbness, psychiatric history Anxiety, Depression and PTSD  GI/Hepatic/Renal/Endo    (+) obesity, morbid obesity, GERD, renal disease- stones    Musculoskeletal     (+) chronic pain, neck pain  Abdominal   (+) obese    Abdomen: soft.   Substance History      OB/GYN      Comment: HX TUBAL        Other   arthritis,   history of cancer (COLON CA--RESECTION 2023) remission    ROS/Med Hx Other: BMI 48    EKG APRIL 2023   ABNORMAL ECG -  Sinus rhythm  Inferior infarct, old  Consider anterior infarct  Nonspecific T abnormalities, lateral leads  No previous ECG available for comparison  Electronically Signed By: Leonardo Helton ([facility]) 16-May-2023 20:03:15  Date and Time of Study: 2023-04-26 10:50:33    Hx colon CA with resection 2023     HCG pending                 Anesthesia Plan    ASA 3     general and MAC   total IV anesthesia  (Patient understands anesthesia not responsible for dental damage.)  intravenous induction     Anesthetic plan, risks, benefits, and alternatives have been provided, discussed and informed consent has been obtained with: patient.  Pre-procedure education provided  Plan discussed with CRNA.    CODE STATUS:

## 2024-07-24 ENCOUNTER — HOSPITAL ENCOUNTER (OUTPATIENT)
Facility: HOSPITAL | Age: 49
Setting detail: HOSPITAL OUTPATIENT SURGERY
Discharge: HOME | End: 2024-07-24
Attending: SURGERY | Admitting: SURGERY
Payer: COMMERCIAL

## 2024-07-24 ENCOUNTER — ANESTHESIA (OUTPATIENT)
Dept: GASTROENTEROLOGY | Facility: HOSPITAL | Age: 49
End: 2024-07-24
Payer: COMMERCIAL

## 2024-07-24 VITALS
WEIGHT: 293 LBS | DIASTOLIC BLOOD PRESSURE: 73 MMHG | HEART RATE: 81 BPM | SYSTOLIC BLOOD PRESSURE: 117 MMHG | OXYGEN SATURATION: 99 % | RESPIRATION RATE: 17 BRPM | BODY MASS INDEX: 47.44 KG/M2 | TEMPERATURE: 97.2 F

## 2024-07-24 DIAGNOSIS — Z85.038 PERSONAL HISTORY OF COLON CANCER, STAGE III: ICD-10-CM

## 2024-07-24 LAB — B-HCG UR QL: NEGATIVE

## 2024-07-24 PROCEDURE — 25810000003 LACTATED RINGERS PER 1000 ML

## 2024-07-24 PROCEDURE — 25010000002 PROPOFOL 10 MG/ML EMULSION: Performed by: NURSE ANESTHETIST, CERTIFIED REGISTERED

## 2024-07-24 PROCEDURE — 88305 TISSUE EXAM BY PATHOLOGIST: CPT | Performed by: SURGERY

## 2024-07-24 PROCEDURE — 81025 URINE PREGNANCY TEST: CPT

## 2024-07-24 RX ORDER — SODIUM CHLORIDE, SODIUM LACTATE, POTASSIUM CHLORIDE, CALCIUM CHLORIDE 600; 310; 30; 20 MG/100ML; MG/100ML; MG/100ML; MG/100ML
30 INJECTION, SOLUTION INTRAVENOUS CONTINUOUS
Status: DISCONTINUED | OUTPATIENT
Start: 2024-07-24 | End: 2024-07-24 | Stop reason: HOSPADM

## 2024-07-24 RX ORDER — LIDOCAINE HYDROCHLORIDE 20 MG/ML
INJECTION, SOLUTION EPIDURAL; INFILTRATION; INTRACAUDAL; PERINEURAL AS NEEDED
Status: DISCONTINUED | OUTPATIENT
Start: 2024-07-24 | End: 2024-07-24 | Stop reason: SURG

## 2024-07-24 RX ORDER — PROPOFOL 10 MG/ML
VIAL (ML) INTRAVENOUS AS NEEDED
Status: DISCONTINUED | OUTPATIENT
Start: 2024-07-24 | End: 2024-07-24 | Stop reason: SURG

## 2024-07-24 RX ADMIN — PROPOFOL 200 MCG/KG/MIN: 10 INJECTION, EMULSION INTRAVENOUS at 08:28

## 2024-07-24 RX ADMIN — SODIUM CHLORIDE, POTASSIUM CHLORIDE, SODIUM LACTATE AND CALCIUM CHLORIDE 30 ML/HR: 600; 310; 30; 20 INJECTION, SOLUTION INTRAVENOUS at 08:21

## 2024-07-24 RX ADMIN — LIDOCAINE HYDROCHLORIDE 40 MG: 20 INJECTION, SOLUTION INTRAVENOUS at 08:28

## 2024-07-24 RX ADMIN — PROPOFOL 50 MG: 10 INJECTION, EMULSION INTRAVENOUS at 08:28

## 2024-07-24 NOTE — ANESTHESIA POSTPROCEDURE EVALUATION
Patient: Lilian Pelaez    Procedure Summary       Date: 07/24/24 Room / Location: [facility] ENDOSCOPY 1 / [facility] ENDOSCOPY    Anesthesia Start: 0827 Anesthesia Stop: 0850    Procedure: COLONOSCOPY COLDSNARE POLYPECTOMY Diagnosis:       Personal history of colon cancer, stage III      (Personal history of colon cancer, stage III [Z85.038])    Surgeons: Rolando Liu MD Provider: Barbra Craft CRNA    Anesthesia Type: general, MAC ASA Status: 3            Anesthesia Type: general, MAC    Vitals  Vitals Value Taken Time   /73 07/24/24 0904   Temp 36.2 °C (97.2 °F) 07/24/24 0904   Pulse 81 07/24/24 0904   Resp 17 07/24/24 0904   SpO2 99 % 07/24/24 0904           Post Anesthesia Care and Evaluation    Patient location during evaluation: bedside  Patient participation: complete - patient participated  Level of consciousness: awake  Pain management: adequate    Airway patency: patent  Anesthetic complications: No anesthetic complications  PONV Status: controlled  Cardiovascular status: acceptable and stable  Respiratory status: acceptable

## 2024-07-25 LAB
CYTO UR: NORMAL
LAB AP CASE REPORT: NORMAL
LAB AP CLINICAL INFORMATION: NORMAL
PATH REPORT.FINAL DX SPEC: NORMAL
PATH REPORT.GROSS SPEC: NORMAL

## 2024-07-26 NOTE — PROGRESS NOTES
-Blood pressure controlled today  -Continue Coreg and Norvasc--tolerating higher doses without difficulty     Chief Complaint/Care Team   Primary adenocarcinoma of descending colon    Shelly Cash MD Stephens, Cassandra, MD    History of Present Illness     Diagnosis: Colon Adenocarcinoma S/p Left colectomy on 5/5/23, stage after resection [id number], stage III    Current Treatment: FOLFOX IV [id number] x 6 months, cycle 1 on 6/14/2023  Treatment delay secondary to pneumoperitoneum which developed in June 2023, patient cleared to resume chemotherapy in August 2023.    Previous Treatment: c-scope on 4/2023 biopsy revealed colon adenocarcinoma    Lilian Pelaez is a 48 y.o. female who presents to [facility] for discussion of newly diagnosed colon adenocarcinoma seen on biopsy of colon mass in the descending colon during colonoscopy performed in April 2023.     Staging scans revealed on 4/11/2023:  CT abdomen/pelvis without any metastatic disease in abdomen/pelvis  CT chest no evidence of metastatic disease in chest.    Patient underwent robotic resection of descending colon and splenic flexure by Dr. Rolando Liu on 5/5/2023.    Patient is a former smoker.   Patient reports family history of several relatives with other forms of cancer cancer on her mother side of the family, but denies any known history of colon cancer in her family.    Patient reports noticing some blood mixed in with stool prior to cancer diagnosis.      Patient received cycle 1 on 6/14/2023, cycle 2 was delayed secondary to development of pneumoperitoneum after cycle 1, patient was mated to the hospital very by her surgeon Dr. Liu who recommended delaying further cycles of chemotherapy until August 1, 2023.    Interval history: Patient here prior to cycle 8 of chemotherapy with FOLFOX.  Patient cleared by Dr. Rolando Liu to resume chemotherapy in August 2023 after developing pneumoperitoneum in June 2023.  She without report of fever, chills, SOA, recent infections, melena, or v/d. She continues to report  increased dry mouth, loss of taste since beginning chemo, post nasal drip, along with fatigue. Pt attributes nasal congestion to allergies, reports some minor nosebleeds and reports both have resolved today. No report of other blood loss today. Pt with chemo induced nausea, vomiting and diarrhea well controlled with prescribed medications. Pt reports peripheral neuropathy in her fingertips, and feet, that do not impede her ability to perform ADLs or IADLs.     Review of Systems   Constitutional:  Positive for activity change, appetite change and fatigue. Negative for diaphoresis and fever.   HENT:  Positive for congestion, nosebleeds (PT had nosebleeds after treatment) and trouble swallowing (PT experiencing dry mouth which is causing her to not be able to eat or drink). Negative for hearing loss, mouth sores, sore throat, swollen glands and voice change.    Eyes:  Negative for blurred vision.   Respiratory:  Negative for cough, shortness of breath and wheezing.    Cardiovascular:  Negative for chest pain and palpitations.   Gastrointestinal:  Positive for nausea. Negative for abdominal pain, blood in stool, constipation, diarrhea and vomiting.   Endocrine: Positive for cold intolerance and heat intolerance.   Genitourinary:  Negative for difficulty urinating, dysuria, frequency, hematuria and urinary incontinence.   Musculoskeletal:  Negative for arthralgias, back pain and myalgias.   Skin:  Negative for rash, skin lesions and wound.   Neurological:  Negative for dizziness, seizures, weakness, numbness and headache.   Hematological:  Does not bruise/bleed easily.   Psychiatric/Behavioral:  Negative for depressed mood. The patient is nervous/anxious.    All other systems reviewed and are negative.       Oncology/Hematology History   Primary adenocarcinoma of descending colon   4/11/2023 Initial Diagnosis    Primary adenocarcinoma of descending colon     9/27/2023 -  Chemotherapy    OP SUPPORTIVE HYDRATION +  ANTIEMETICS     Malignant neoplasm of descending colon   5/6/2023 Initial Diagnosis    Malignant neoplasm of descending colon     5/31/2023 Cancer Staged    Staging form: Colon And Rectum, AJCC 8th Edition  - Pathologic: Stage IIIB (pT3, pN1b, cM0) - Signed by Brian Marroquin MD on 5/31/2023 6/14/2023 -  Chemotherapy    OP COLON [id number] OXALIplatin / Leucovorin / Fluorouracil         Objective     Vitals:    10/25/23 0918   BP: 135/87   Pulse: 83   Resp: 18   Temp: 97.4 °F (36.3 °C)   TempSrc: Temporal   SpO2: 100%   Weight: 125 kg (275 lb 9.2 oz)   PainSc: 0-No pain         ECOG score: 0         PHQ-9 Total Score:         Physical Exam  Vitals reviewed. Exam conducted with a chaperone present.   Constitutional:       General: She is not in acute distress.     Appearance: Normal appearance.   HENT:      Head: Normocephalic and atraumatic.      Mouth/Throat:      Mouth: Mucous membranes are dry.   Eyes:      Extraocular Movements: Extraocular movements intact.      Conjunctiva/sclera: Conjunctivae normal.   Pulmonary:      Effort: Pulmonary effort is normal.   Musculoskeletal:      Cervical back: Normal range of motion and neck supple.   Skin:     General: Skin is warm and dry.      Findings: No bruising.   Neurological:      Mental Status: She is oriented to person, place, and time.           Past Medical History     Past Medical History:   Diagnosis Date    Anesthesia     B/P bottomed out/UNSURE ON THE DATE    Anxiety     Asthma 1996    seasonal/NO INHALERS    Colon cancer 04/17/2023    DDD (degenerative disc disease), cervical     DDD (degenerative disc disease), lumbar     Depression     Diverticulitis of colon 2005    Scope was done in [facility]    GERD (gastroesophageal reflux disease)     Hypertension     Kidney stones     Melanoma     removed    Palpitation     FOLLOWS W/DR. ANTONIO  Q6 MONTHS, NO CP OR SOA    Prolapse of female bladder      Current Outpatient Medications on File Prior to Visit    Medication Sig Dispense Refill    allopurinol (ZYLOPRIM) 100 MG tablet Take 1 tablet by mouth Daily.      ALPRAZolam (XANAX) 0.5 MG tablet Take 1 tablet by mouth At Night As Needed.      amLODIPine (NORVASC) 10 MG tablet Take 1 tablet by mouth Every Night.      aspirin 81 MG EC tablet Take 1 tablet every day by oral route.      Diclofenac Sodium (VOLTAREN) 1 % gel gel Apply 4 g topically to the appropriate area as directed 4 (Four) Times a Day As Needed.      docusate sodium 100 MG capsule Take 1 capsule by mouth Daily.      fluorouracil (EFUDEX) 5 % cream Apply 1 application  topically to the appropriate area as directed 2 (Two) Times a Day.      furosemide (LASIX) 20 MG tablet Take 1 tablet by mouth Daily.      hydroCHLOROthiazide (HYDRODIURIL) 25 MG tablet Take 1 tablet every day by oral route for 90 days.      HYDROcodone-acetaminophen (NORCO) 7.5-325 MG per tablet Take 1 tablet by mouth Every 8 (Eight) Hours As Needed.      Hydrocortisone, Perianal, (ANUSOL-HC) 2.5 % rectal cream       hyoscyamine (ANASPAZ,LEVSIN) 0.125 MG tablet TAKE ONE TABLET BY MOUTH FOUR TIMES A DAY WITH MEALS AND AT BEDTIME. 120 tablet 1    ketorolac (TORADOL) 10 MG tablet Take 1 tablet by mouth Every 6 (Six) Hours As Needed.      lamoTRIgine (LaMICtal) 25 MG tablet Take 1 tablet by mouth 2 (Two) Times a Day. Only takes at night      leucovorin 5 MG tablet Take  by mouth Every 6 (Six) Hours.      levoFLOXacin (Levaquin) 750 MG tablet Take 1 tablet by mouth Daily. 7 tablet 0    Lidocaine 4 % patch Apply 1 patch topically Daily.      lidocaine-prilocaine (EMLA) 2.5-2.5 % cream Apply 1 application topically to the appropriate area as directed Every 2 (Two) Hours As Needed for Mild Pain. 30 g 1    linaclotide (LINZESS) 290 MCG capsule capsule Take 1 capsule by mouth As Needed.      loperamide (Imodium A-D) 2 MG tablet Take 1 tablet by mouth 4 (Four) Times a Day As Needed for Diarrhea. Imodium - take 4 mg first dose, followed by 2 mg every  2-4 hours after each stool (MAX of 16 mg in 24 hour period) 60 tablet 1    loperamide (IMODIUM) 2 MG capsule       losartan (COZAAR) 100 MG tablet Take 1 tablet by mouth Every Night.      medroxyPROGESTERone (DEPO-PROVERA) 150 MG/ML injection Inject 1 mL into the appropriate muscle as directed by prescriber Every 3 (Three) Months.      metoclopramide (REGLAN) 10 MG tablet Take 1 tablet by mouth 3 (Three) Times a Day.      metoprolol succinate XL (TOPROL-XL) 25 MG 24 hr tablet Take 1 tablet by mouth Daily.      Multiple Vitamins-Minerals (b complex-C-E-zinc) tablet Take 1 tablet by mouth Daily.      Myrbetriq 50 MG tablet sustained-release 24 hour 24 hr tablet       naloxone (NARCAN) 4 MG/0.1ML nasal spray Take 1 spray every day by nasal route as needed.      nystatin (MYCOSTATIN) 100,000 unit/mL suspension Swish and swallow 5 mL 4 (Four) Times a Day. 473 mL 1    ondansetron (ZOFRAN) 8 MG tablet Take 1 tablet by mouth 3 (Three) Times a Day As Needed for Nausea or Vomiting. 30 tablet 5    OXALIPLATIN IV Infuse  into a venous catheter.      pantoprazole (PROTONIX) 40 MG EC tablet Take 1 tablet by mouth 2 (Two) Times a Day.      polyethylene glycol (GaviLyte-G) 236 g solution       prochlorperazine (COMPAZINE) 10 MG tablet Take 1 tablet by mouth Every 6 (Six) Hours As Needed for Nausea or Vomiting. 30 tablet 5    rOPINIRole (REQUIP) 1 MG tablet Take 1 tablet by mouth Every Night.      sertraline (ZOLOFT) 100 MG tablet Take 1 tablet by mouth Daily.      sucralfate (CARAFATE) 1 g tablet Take 1 tablet by mouth 2 (Two) Times a Day.      tetracycline (ACHROMYCIN,SUMYCIN) 250 MG capsule       traMADol (Ultram) 50 MG tablet Take 1 tablet by mouth Every 6 (Six) Hours As Needed for Severe Pain or Moderate Pain. 5 tablet 0    vitamin D3 125 MCG (5000 UT) capsule capsule Take 2,000 Units by mouth Daily.       Current Facility-Administered Medications on File Prior to Visit   Medication Dose Route Frequency Provider Last Rate Last  Admin    [COMPLETED] dexAMETHasone (DECADRON) IVPB 12 mg  12 mg Intravenous Once Brian Marroquin MD   Stopped at 10/25/23 1128    [COMPLETED] dextrose (D5W) 5 % infusion 250 mL  250 mL Intravenous Once Brian Marroquin MD 20 mL/hr at 10/25/23 1133 250 mL at 10/25/23 1133    fluorouracil (ADRUCIL) 5,740 mg in sodium chloride 0.9 % 138 mL chemo infusion - FOR HOME USE  2,400 mg/m2 (Treatment Plan Recorded) Intravenous Once Brian Marroquin MD        [COMPLETED] fluorouracil (ADRUCIL) chemo injection 950 mg (19 mL)  950 mg Intravenous Once Brian Marroquin MD   950 mg at 10/25/23 1351    heparin injection 500 Units  500 Units Intravenous PRN Brian Marroquin MD        [COMPLETED] leucovorin 950 mg in dextrose (D5W) 5 % 322.5 mL IVPB  950 mg Intravenous Once Brian Marroquin MD   950 mg at 10/25/23 1147    [COMPLETED] OXALIplatin (ELOXATIN) 200 mg in dextrose (D5W) 5 % 315 mL chemo IVPB  200 mg Intravenous Once Brian Marroquin MD   200 mg at 10/25/23 1147    [COMPLETED] palonosetron (ALOXI) injection 0.25 mg  0.25 mg Intravenous Once Brian Marroquin MD   0.25 mg at 10/25/23 1131    [COMPLETED] sodium chloride 0.9 % bolus 1,000 mL  1,000 mL Intravenous Once Brian Marroquin MD   Stopped at 10/25/23 1131    sodium chloride 0.9 % flush 20 mL  20 mL Intravenous PRN Brian Marroquin MD          Allergies   Allergen Reactions    Hydromorphone Hives, Itching and GI Intolerance    Morphine Hives, Itching and Nausea And Vomiting    Oxybutynin Swelling and Angioedema           Oxycodone-Acetaminophen Itching and Nausea And Vomiting     Can not take any higher than 7.5    Penicillins Rash    Promethazine Nausea And Vomiting    Tylenol With Codeine #3 [Acetaminophen-Codeine] Itching and Nausea And Vomiting    Oxybutynin Chloride Angioedema     Past Surgical History:   Procedure Laterality Date    BREAST SURGERY  2016    Removed 8lbs of tissue, 2016    CHOLECYSTECTOMY      COLON RESECTION Left 05/05/2023    Procedure: RESECTION OF DESCENDING COLON  AND SPLENIC FLEXURE TAKEDOWN WITH DAVINCI ROBOT;  Surgeon: Rolando Liu MD;  Location: [facility];  Service: Robotics - DaVinci;  Laterality: Left;    COLONOSCOPY N/A 04/04/2023    Procedure: COLONOSCOPY WITH POLYPECTOMY/SNARE/BIOPSIES/TATTOOING DESCENDING COLON MASS;  Surgeon: Deniz Dowd MD;  Location: [facility] ENDOSCOPY;  Service: Gastroenterology;  Laterality: N/A;  INCOMPLETE COLONOSCOPY  POOR BOWEL PREP  COLON POLYP  COLON MASS  DIVERTICULOSIS    COLONOSCOPY N/A 04/12/2023    Procedure: COLONOSCOPY with cold snare;  Surgeon: Deniz Dowd MD;  Location: [facility] ENDOSCOPY;  Service: Gastroenterology;  Laterality: N/A;  colon polyps, diverticulosis, colon mass, hemorrhoids      CYSTOSCOPY W/ URETERAL STENT PLACEMENT Left 05/05/2023    Procedure: Cystoscopy, left ureteral injection,;  Surgeon: Manjula Randolph MD;  Location: [facility];  Service: Urology;  Laterality: Left;    FRACTURE SURGERY  2001    Left knee    KNEE SURGERY Left     x4    LAPAROSCOPIC TUBAL LIGATION  1995    LAPAROSCOPIC TUBAL LIGATION      Reversed    REDUCTION MAMMAPLASTY  2009    8lbs of tissue was removed    TONSILLECTOMY      UPPER GASTROINTESTINAL ENDOSCOPY      VENOUS ACCESS DEVICE (PORT) INSERTION N/A 6/5/2023    Procedure: INSERTION VENOUS ACCESS DEVICE;  Surgeon: Rolando Liu MD;  Location: [facility];  Service: General;  Laterality: N/A;     Social History     Socioeconomic History    Marital status:    Tobacco Use    Smoking status: Never     Passive exposure: Never    Smokeless tobacco: Never   Vaping Use    Vaping Use: Never used   Substance and Sexual Activity    Alcohol use: Never    Drug use: Never    Sexual activity: Defer     Family History   Problem Relation Age of Onset    Colon cancer Neg Hx        Results     Result Review   The following data was reviewed by: Brian Marroquin MD on 04/13/2023:  Lab Results   Component Value Date    HGB 12.4 10/25/2023    HCT 36.0 10/25/2023    MCV  93.3 10/25/2023     10/25/2023    WBC 4.26 10/25/2023    NEUTROABS 1.48 (L) 10/25/2023    LYMPHSABS 1.79 10/25/2023    MONOSABS 0.90 10/25/2023    EOSABS 0.04 10/25/2023    BASOSABS 0.04 10/25/2023     Lab Results   Component Value Date    GLUCOSE 108 (H) 10/25/2023    BUN 10 10/25/2023    CREATININE 0.77 10/25/2023     10/25/2023    K 3.7 10/25/2023     (H) 10/25/2023    CO2 26.3 10/25/2023    CALCIUM 9.3 10/25/2023    PROTEINTOT 6.7 10/25/2023    ALBUMIN 4.3 10/25/2023    BILITOT 0.6 10/25/2023    ALKPHOS 113 10/25/2023    AST 31 10/25/2023    ALT 24 10/25/2023     Lab Results   Component Value Date    MG 2.1 10/25/2023    PHOS 3.3 06/18/2023       Case Report   Surgical Pathology Report                         Case: [id number]                                   Authorizing Provider:  Deniz Dowd MD    Collected:           04/12/2023 09:41 AM           Ordering Location:     [facility] Received:            04/12/2023 11:41 AM                                  SUITES                                                                        Pathologist:           Niesha Torres DO                                                        Specimens:   1) - Large Intestine, Cecum, cecal polyp cold snare                                                  2) - Large Intestine, Sigmoid Colon, sigmoid colon polyp cold snare                        Clinical Information    Mass of colon   Final Diagnosis   1. Cecum polyp, biopsy:                            - Tubular adenoma        2. Sigmoid colon polyp, biopsy:                            - Tubular adenoma    Electronically signed by Niesha Torres DO on 4/13/2023 at 0840     Surgical Pathology Report                         Case: [id number]                                   Authorizing Provider:  Deniz Dowd MD    Collected:           04/04/2023 11:15 AM           Ordering Location:     [facility]  Received:            04/04/2023 11:59 AM                                  SUITES                                                                        Pathologist:           Jennifer Mike MD                                                      Specimens:   1) - Large Intestine, Transverse Colon, TRANSVERSE COLON POLYP                                       2) - Large Intestine, Left / Descending Colon, DESCENDING COLON BIOPSIES                   Clinical Information    Constipation, unspecified constipation type   Final Diagnosis   1. Transverse colon polyp, biopsy:               - Fragments of tubular adenoma        2.  Descending colon, biopsy:               -Adenocarcinoma, moderately to poorly differentiated     Comment: Per policy, reflex testing has been ordered, and the results will be separately reported.     REMARKS: The above positive (malignant) diagnosis was called to April in Dr. Dowd's office at 09:09 EDT on 4/5/2023 by s.          Electronically signed by Jennifer Mike MD on 4/5/2023 at 0935         Surgical Pathology Report                         Case: [id number]                                   Authorizing Provider:  Rolando Liu MD        Collected:           05/05/2023 03:06 PM           Ordering Location:     [facility]  Received:            05/08/2023 06:48 AM                                  OR                                                                            Pathologist:           Jt Florence MD                                                             Specimen:    Large Intestine, Left / Descending Colon, left colon                                       Clinical Information    Malignant neoplasm of descending colon   Final Diagnosis   Left colon, resection:               - Adenocarcinoma               - Three of thirty-three lymph nodes positive for carcinoma (3/33)               - See synoptic checklist   Electronically signed by Naman  MD Jt on 5/11/2023 at 1508   Synoptic Checklist   COLON AND RECTUM: Resection, Including Transanal Disk Excision of Rectal Neoplasms  8th Edition - Protocol posted: 6/22/2022  COLON AND RECTUM: RESECTION - All Specimens  SPECIMEN   Procedure  Descending colon resection    TUMOR   Tumor Site  Descending colon    Histologic Type  Adenocarcinoma    Histologic Grade  G3, poorly differentiated    Tumor Size  Greatest dimension (Centimeters): 3.5 cm   Tumor Extent  Invades through muscularis propria into the pericolonic or perirectal tissue    Macroscopic Tumor Perforation  Not identified    Lymphovascular Invasion  Small vessel    Perineural Invasion  Present    Treatment Effect  No known presurgical therapy    MARGINS   Margin Status for Invasive Carcinoma  All margins negative for invasive carcinoma    Closest Margin(s) to Invasive Carcinoma  Radial (circumferential) or mesenteric    Distance from Invasive Carcinoma to Closest Margin  3.5 cm   Margin Status for Non-Invasive Tumor  All margins negative for high-grade dysplasia / intramucosal carcinoma and low-grade dysplasia    REGIONAL LYMPH NODES   Regional Lymph Node Status  Tumor present in regional lymph node(s)    Number of Lymph Nodes with Tumor  3    Number of Lymph Nodes Examined  33    Tumor Deposits  Present    Number of Tumor Deposits  1    PATHOLOGIC STAGE CLASSIFICATION (pTNM, AJCC 8th Edition)   Reporting of pT, pN, and (when applicable) pM categories is based on information available to the pathologist at the time the report is issued. As per the AJCC (Chapter 1, 8th Ed.) it is the managing physician's responsibility to establish the final pathologic stage based upon all pertinent information, including but potentially not limited to this pathology report.   pT Category  pT3    pN Category  pN1b    ADDITIONAL FINDINGS   Additional Findings  None identified    .          CT Abdomen Pelvis With Contrast    Result Date: 10/10/2023  Impression:  No  evidence of abdominopelvic metastatic disease     NKECHI GARCIA MD       Electronically Signed and Approved By: NKECHI GARCIA MD on 10/10/2023 at 7:37             CT Chest With Contrast Diagnostic    Result Date: 10/10/2023  Impression:  No evidence of thoracic metastatic disease     NKECHI GARCIA MD       Electronically Signed and Approved By: NKECHI GARCIA MD on 10/10/2023 at 7:30              Assessment & Plan     Diagnoses and all orders for this visit:    1. Primary adenocarcinoma of descending colon (Primary)  -     ONCBCN INFUSION APPOINTMENT REQUEST 01; Future  -     Magnesium; Future  -     sodium chloride (Ocean Nasal Spray) 0.65 % nasal spray; 1 spray into the nostril(s) as directed by provider 2 (Two) Times a Day As Needed for Congestion.  Dispense: 480 mL; Refill: 4  -     OK To Treat    2. Nasal dryness  -     sodium chloride (Ocean Nasal Spray) 0.65 % nasal spray; 1 spray into the nostril(s) as directed by provider 2 (Two) Times a Day As Needed for Congestion.  Dispense: 480 mL; Refill: 4    3. Dehydration  -     ONCBCN INFUSION APPOINTMENT REQUEST 01; Future    Other orders  -     Cancel: sodium chloride 0.9 % bolus 1,000 mL  -     Cancel: dextrose (D5W) 5 % infusion 250 mL  -     Cancel: palonosetron (ALOXI) injection 0.25 mg  -     Cancel: dexAMETHasone (DECADRON) 12 mg in sodium chloride 0.9 % IVPB  -     Cancel: OXALIplatin (ELOXATIN) 205 mg in dextrose (D5W) 5 % 291 mL chemo IVPB  -     Cancel: leucovorin 960 mg in dextrose (D5W) 5 % 346 mL IVPB  -     Cancel: fluorouracil (ADRUCIL) chemo injection 960 mg  -     Cancel: fluorouracil (ADRUCIL) 5,740 mg in sodium chloride 0.9 % 114.8 mL chemo infusion - FOR HOME USE  -     sodium chloride 0.9 % bolus 1,000 mL  -     Hydrocortisone Sod Suc (PF) (Solu-CORTEF) injection 100 mg  -     diphenhydrAMINE (BENADRYL) injection 50 mg  -     famotidine (PEPCID) injection 20 mg                Lilian Pelaez is a 48 y.o. female who presents to [facility] evaluation prior to systemic therapy for stage III colon cancer, patient status post resection of descending colon splenic flexure by Dr. Rolando Liu on 5/5/2023.     Reviewed NCCN guidelines for her stage III cancer, discussed high risk features seen on pathology report and discussed CapeOx for 3 months versus FOLFOX for 3 to 6 months with patient and  today.  Patient agreed to plan to undergo FOLFOX IV chemotherapy every 2 weeks for 6 months.    Discussed restaging imaging scans obtained on 10/9/2023 which was without evidence of disease recurrence or metastatic disease.     Patient received cycle 1 on June 14, 2023, she developed pneumoperitoneum which her surgeon suspect was secondary to severe constipation from iron tablets, but patient was cleared to resume chemotherapy after 8/1/2023.    Iron deficiency anemia from GI blood loss  -Evidence of this on lab work from May 2023  - We will continue to monitor and will offer IV iron therapy in the future if she becomes iron deficient secondary to pneumoperitoneum that developed in June 2023 from severe constipation.    Chemotherapy-induced nausea  - Currently well controlled with Zofran, but prescribe Compazine as a backup option  - Patient also taking Reglan and was counseled regarding not taking Reglan with Zofran and Compazine together but rather patient to take other antiemetics at least 4 to 6 hours after Reglan dose.    Chemotherapy induced diarrhea  - Patient prescribed Imodium  -We will prescribe Lomotil if Imodium fails to work.    Muscle cramps  -checked Magnesium level which was normal  -also K level 3.7, calcium level normal  -suspect from dehydration, will administer 1L of NS with treatment today  -will continue to monitor    Pt with loss of taste, mouth with some evidence of thrush- ordered nystatin swish and spit, recommend pt continue    Recommend nasal ocean spray since pt with symptoms of dry nasal  passages and nasal congestion from seasonal allergies, provided prescription for this today.    Ordered IVFs as suspect pt is dehydrated from decrease in oral intake for today's chemotherapy infusion and each subsequent chemo infusion.    Labs reviewed and plan to proceed with cycle 8 day 1 on today.    Plan patient follow-up in the next 2 weeks for cycle 9 day 1 of chemotherapy.    Plan to re-scan pt after she completes chemotherapy in 3 months.    Patient verbalized understanding and agreement plan.    Please note that portions of this note were completed with a voice recognition program.    Electronically signed by Brian Marroquin MD, 10/25/23, 1:59 PM EDT.      Follow Up     I spent 30 minutes caring for Lilian on this date of service. This time includes time spent by me in the following activities:preparing for the visit, reviewing tests, obtaining and/or reviewing a separately obtained history, performing a medically appropriate examination and/or evaluation , counseling and educating the patient/family/caregiver, ordering medications, tests, or procedures, documenting information in the medical record and care coordination.    This is an acute or chronic illness that poses a threat to life or bodily function. The above treatment plan involves a high risk of complications and/or mortality of patient management.    The patient was seen and examined. Work by the provider also included review and/or ordering of lab tests, review and/or ordering of radiology tests, review and/or ordering of medicine tests, discussion with other physicians or providers, independent review of data, obtaining old records, review/summation of old records, and/or other review.    I have reviewed the family history, social history, and past medical history for this patient. Previous information and data has been reviewed and updated as needed. I have reviewed and verified the chief complaint, history, and other documentation. The patient  was interviewed and examined in the clinic and the chart reviewed. The previous observations, recommendations, and conclusions were reviewed including those of other providers.     The plan was discussed with the patient and/or family. The patient was given time to ask questions and these questions were answered. At the conclusion of their visit they had no additional questions or concerns and all questions were answered to their satisfaction.    Patient was given instructions and counseling regarding her condition or for health maintenance advice. Please see specific information pulled into the AVS if appropriate.

## 2024-08-06 DIAGNOSIS — K21.9 GASTRIC REFLUX: ICD-10-CM

## 2024-08-06 RX ORDER — PANTOPRAZOLE SODIUM 40 MG/1
40 TABLET, DELAYED RELEASE ORAL 2 TIMES DAILY
Qty: 60 TABLET | Refills: 1 | Status: SHIPPED | OUTPATIENT
Start: 2024-08-06

## 2024-10-17 NOTE — OP NOTE
Im unsure what to do with this prescription rx, patient was informed Vera is no longer in the office    Operative Report    Patient Name:  Lilian Pelaez  YOB: 1975    Date of Surgery:  5/5/2023     Pre-op Diagnosis:   Malignant neoplasm of descending colon [C18.6]       Post-op Diagnosis:   Post-Op Diagnosis Codes:     * Malignant neoplasm of descending colon [C18.6]    Procedure(s):  RESECTION OF DESCENDING COLON and SPLENIC FLEXURE TAKEDOWN WITH DAVINCI ROBOT  Cystoscopy, left ureteral injection (see Dr. Leyva's separate note)    Staff:  Surgeon(s):  Rolando Liu MD O'Bryan, Brittany, MD    Assistant: Ike Peraza CSA    Anesthesia: General    Estimated Blood Loss: 50 mL    Complications:  None    Drains:  None    Packing:  None    Implants:    Implant Name Type Inv. Item Serial No.  Lot No. LRB No. Used Action   DEV CLS WND VLOC/180 ELLA ABS 1/2CIR SZ3/0 17MM 15CM GRN - [id number] Implant DEV CLS WND VLOC/180 ELLA ABS 1/2CIR SZ3/0 17MM 15CM GRN  COVID[employer]EN [id number] Left 2 Implanted   RELOAD STPLR SUREFORM 60 DAVINCI/X/XI 6ROW 3.5 MORENITA 1P/U - [id number] Implant RELOAD STPLR SUREFORM 60 DAVINCI/X/XI 6ROW 3.5 MORENITA 1P/U  [employer]UIT[employer] [id number] Left 2 Implanted   RELOAD STPLR SUREFORM 60 DAVINCI/X/XI 6ROW 3.5 MORENITA 1P/U - [id number] Implant RELOAD STPLR SUREFORM 60 DAVINCI/X/XI 6ROW 3.5 MORENITA 1P/U  INTUITIVE SURGICAL [id number] Left 1 Implanted       Specimen:          Specimens     ID Source Type Tests Collected By Collected At Frozen?    A Large Intestine, Left / Descending Colon Tissue · TISSUE PATHOLOGY EXAM   Rolando Liu MD 5/5/23 5559     Description: left colon          Colon Resection  Operation performed with curative intent Yes   Tumor Location (select all that apply) Descending colon   Extent of colon and vascular resection  (select all that apply) Splenic flexure resection - middle and ascending left colic        Indications: 47-year-old female who recently had a colonoscopy and colon cancer was identified at the descending colon.  The colon was tattooed just  distal to the cancer.  Metastatic work-up was negative.     Findings: Tattooed area of the colon was at about the mid descending colon.  Splenic flexure takedown was required to complete the procedure.  Partial colectomy was performed with appropriate margins proximal and distal to the tattooed area.  Mesentery was divided at the left branch of the middle colic artery and at ascending left colic artery.    Side-to-side antiperistaltic stapled anastomosis performed with the common enterotomy closed with staples.     Description of Procedure: The patient was taken the operating placed supine on the procedure table.  Timeout was performed.  General esthesia was administered.  The abdomen and perineum was prepped and draped in usual fashion.  Dr. Randolph injected ICG into the left ureter.  See her separate note for details.  A Figueroa catheter was placed.  A Veress needle was used at the left upper quadrant to establish pneumoperitoneum and then three 8 mm robotic cannulas, 1 12mm robotic cannula, and one 8 mm accessory cannula was placed in a line at the right abdomen.    Robotic arms were docked but there was difficulty rotating the boom and targeting the robot.  Ultimately, a phone call was made to the [employer] rep and the situation was troubleshooting over the telephone without any complication but this did delay the procedure by about 45 minutes.  Once the aforementioned issue was resolved, the robotic arms were docked and the robotic instruments were inserted after the patient was positioned head down rotated toward the right side.  The small bowel was mobilized to the patient's left side.  The tattooed area of the colon was identified at approximately the sigmoid colon.    There was a large amount of intra-abdominal fat and the greater omentum was very large in a technically difficult surgery.    The transverse colon was lifted toward the anterior abdominal wall and then a retroperitoneal plane was developed under  the inferior mesenteric vein at the ligament of Treitz.  The inferior mesenteric vein was divided with the vessel sealer and then the plane was further developed working superiorly and staying anterior to the pancreas until the lesser sac was reached.  Plane was then developed laterally toward the splenic flexure and the descending colon.  I then identified the takeoff of the inferior mesenteric artery and the colon mesentery and developed a retroperitoneal plane here working laterally and superiorly until the ascending branch of the left colic artery was identified and it was divided at its takeoff using the vessel sealer.  ICG green was used multiple times to look for the ureter.  It was identified and protected from harm.  The retroperitoneal plane continued until I reached the previously dissected area superiorly that was developed under the IMV.  The greater omentum was divided at an area chosen for proximal division and this was at about the transverse colon.  I then divided the gastrocolic attachments entering the lesser sac from above the transverse colon and then working toward the splenic flexure releasing the attachments of the splenic flexure of the colon.  I then divided the distal mesentery the descending colon just distal to the tattoo as the area for distal division of the colon.  I then took down the lateral attachments of the descending colon working superiorly and then took down additional attachments to the splenic flexure.  This was very difficult all throughout the procedure because of the large amount of intra-abdominal fat and difficulty keeping the small bowel mobilized to the right side of the abdomen.  Once the attachments of the targeted segment of the colon and the mesentery of the targeted segment of the colon were divided, ICG green was used to check for blood cyst flow at the nose and proximal and distal margins and blood flow was sufficient and a robotic stapler was then used to  divide the colon proximally at the mid transverse colon and distally at the descending colon.  There were few additional attachments of the targeted segment of colon which I released and then the specimen was completely freed and moved to the right lower quadrant.  I then released the greater omentum from the transverse colon beginning at the staple line and approximately about 10 cm to provide additional length for the transverse colon staple line to reach to the mid descending colon staple line for anastomosis.  I also freed some lateral attachments of the main descending colon to further mobilize the distal staple line.  After this was done, the proximal and distal staple lines reached without undue tension and the blood supply was noted to be adequate.  This was checked again with ICG.  A side-to-side antiperistaltic anastomosis was then created using the stapler to create the anastomosis and 2 layers of absorbable V-Loc suture to close the common enterotomy.  There was minimal blood loss during the procedure.  I checked to ensure hemostasis.  Sponge needle and instrument counts were verified as correct x2.  The robotic arms were undocked and the robot was moved away from the operating table.  Pneumoperitoneum was released.  The 12 mm robotic cannula site was then extended to accommodate an Diego wound retractor.  The Diego wound retractor was placed and then the specimen was removed from the peritoneal cavity and sent to pathology.  The fascial incision at the specimen extraction site was closed with running PDS suture.  The skin incisions were all then closed with buried absorbable suture followed by appropriate dressings.  Patient did very well during the procedure.  She was transported to the recovery area in stable condition.  There were no complications.  Sponge, needl,e and instrument counts were verified as correct again at the end of the procedure.    Assistant: Ike Peraza CSA  was  responsible for performing the following activities: Closing and Placing Dressing, docking robot and exchanging robotic instruments, and his skilled assistance was necessary for the success of this case.    Rolando Liu MD     Date: 5/5/2023  Time: 17:25 EDT    Electronically signed by Rolando Liu MD, 05/05/23, 5:25 PM EDT.

## 2024-10-21 ENCOUNTER — HOSPITAL ENCOUNTER (OUTPATIENT)
Dept: CT IMAGING | Facility: HOSPITAL | Age: 49
Discharge: HOME | End: 2024-10-21
Admitting: INTERNAL MEDICINE
Payer: COMMERCIAL

## 2024-10-21 DIAGNOSIS — C18.6 MALIGNANT NEOPLASM OF DESCENDING COLON: ICD-10-CM

## 2024-10-21 LAB
ALBUMIN SERPL-MCNC: 4.4 G/DL (ref 3.5–5.2)
ALBUMIN/GLOB SERPL: 1.3 G/DL
ALP SERPL-CCNC: 125 U/L (ref 39–117)
ALT SERPL W P-5'-P-CCNC: 18 U/L (ref 1–33)
ANION GAP SERPL CALCULATED.3IONS-SCNC: 11.5 MMOL/L (ref 5–15)
AST SERPL-CCNC: 22 U/L (ref 1–32)
BASOPHILS # BLD AUTO: 0.06 10*3/MM3 (ref 0–0.2)
BASOPHILS NFR BLD AUTO: 0.7 % (ref 0–1.5)
BILIRUB SERPL-MCNC: 0.5 MG/DL (ref 0–1.2)
BUN SERPL-MCNC: 11 MG/DL (ref 6–20)
BUN/CREAT SERPL: 13.4 (ref 7–25)
CALCIUM SPEC-SCNC: 9.4 MG/DL (ref 8.6–10.5)
CEA SERPL-MCNC: 1.8 NG/ML
CHLORIDE SERPL-SCNC: 103 MMOL/L (ref 98–107)
CO2 SERPL-SCNC: 24.5 MMOL/L (ref 22–29)
CREAT SERPL-MCNC: 0.82 MG/DL (ref 0.57–1)
DEPRECATED RDW RBC AUTO: 38.1 FL (ref 37–54)
EGFRCR SERPLBLD CKD-EPI 2021: 87.8 ML/MIN/1.73
EOSINOPHIL # BLD AUTO: 0.1 10*3/MM3 (ref 0–0.4)
EOSINOPHIL NFR BLD AUTO: 1.2 % (ref 0.3–6.2)
ERYTHROCYTE [DISTWIDTH] IN BLOOD BY AUTOMATED COUNT: 12 % (ref 12.3–15.4)
GLOBULIN UR ELPH-MCNC: 3.3 GM/DL
GLUCOSE SERPL-MCNC: 95 MG/DL (ref 65–99)
HCT VFR BLD AUTO: 46.4 % (ref 34–46.6)
HGB BLD-MCNC: 15.7 G/DL (ref 12–15.9)
IMM GRANULOCYTES # BLD AUTO: 0.04 10*3/MM3 (ref 0–0.05)
IMM GRANULOCYTES NFR BLD AUTO: 0.5 % (ref 0–0.5)
LYMPHOCYTES # BLD AUTO: 2.62 10*3/MM3 (ref 0.7–3.1)
LYMPHOCYTES NFR BLD AUTO: 31.6 % (ref 19.6–45.3)
MCH RBC QN AUTO: 29.3 PG (ref 26.6–33)
MCHC RBC AUTO-ENTMCNC: 33.8 G/DL (ref 31.5–35.7)
MCV RBC AUTO: 86.6 FL (ref 79–97)
MONOCYTES # BLD AUTO: 0.57 10*3/MM3 (ref 0.1–0.9)
MONOCYTES NFR BLD AUTO: 6.9 % (ref 5–12)
NEUTROPHILS NFR BLD AUTO: 4.9 10*3/MM3 (ref 1.7–7)
NEUTROPHILS NFR BLD AUTO: 59.1 % (ref 42.7–76)
NRBC BLD AUTO-RTO: 0 /100 WBC (ref 0–0.2)
PLATELET # BLD AUTO: 233 10*3/MM3 (ref 140–450)
PMV BLD AUTO: 10.3 FL (ref 6–12)
POTASSIUM SERPL-SCNC: 4 MMOL/L (ref 3.5–5.2)
PROT SERPL-MCNC: 7.7 G/DL (ref 6–8.5)
RBC # BLD AUTO: 5.36 10*6/MM3 (ref 3.77–5.28)
SODIUM SERPL-SCNC: 139 MMOL/L (ref 136–145)
WBC NRBC COR # BLD AUTO: 8.29 10*3/MM3 (ref 3.4–10.8)

## 2024-10-21 PROCEDURE — 80053 COMPREHEN METABOLIC PANEL: CPT | Performed by: INTERNAL MEDICINE

## 2024-10-21 PROCEDURE — 82378 CARCINOEMBRYONIC ANTIGEN: CPT | Performed by: INTERNAL MEDICINE

## 2024-10-21 PROCEDURE — 85025 COMPLETE CBC W/AUTO DIFF WBC: CPT | Performed by: INTERNAL MEDICINE

## 2024-10-21 PROCEDURE — 74177 CT ABD & PELVIS W/CONTRAST: CPT

## 2024-10-21 PROCEDURE — 71260 CT THORAX DX C+: CPT

## 2024-10-21 PROCEDURE — 25510000001 IOPAMIDOL PER 1 ML: Performed by: INTERNAL MEDICINE

## 2024-10-21 RX ORDER — IOPAMIDOL 755 MG/ML
100 INJECTION, SOLUTION INTRAVASCULAR
Status: COMPLETED | OUTPATIENT
Start: 2024-10-21 | End: 2024-10-21

## 2024-10-21 RX ADMIN — IOPAMIDOL 100 ML: 755 INJECTION, SOLUTION INTRAVENOUS at 11:05

## 2024-10-21 NOTE — PROGRESS NOTES
Chief Complaint/Care Team   Malignant neoplasm of descending colon    Shelly Cash MD [place]eter, Laurence Hardy, CHANTELLE    History of Present Illness     Diagnosis: Colon Adenocarcinoma S/p Left colectomy on 5/5/23, stage after resection [id number], stage III    Current Treatment: Active Surveillance    Previous Treatment: c-scope on 4/2023 biopsy revealed colon adenocarcinoma  -FOLFOX IV [id number] x 6 months, cycle 1 on 6/14/2023  Treatment delay secondary to pneumoperitoneum which developed in June 2023, patient cleared to resume chemotherapy in August 2023, pt completed this therapy on 12/2023    Lilian Pelaez is a 49 y.o. female who presents to [facility] HEMATOLOGY & ONCOLOGY for discussion of newly diagnosed colon adenocarcinoma seen on biopsy of colon mass in the descending colon during colonoscopy performed in April 2023.     Staging scans revealed on 4/11/2023:  CT abdomen/pelvis without any metastatic disease in abdomen/pelvis  CT chest no evidence of metastatic disease in chest.    Patient underwent robotic resection of descending colon and splenic flexure by Dr. Rolando Liu on 5/5/2023.    Patient is a former smoker.   Patient reports family history of several relatives with other forms of cancer cancer on her mother side of the family, but denies any known history of colon cancer in her family.    Patient reports noticing some blood mixed in with stool prior to cancer diagnosis.      Patient received cycle 1 on 6/14/2023, cycle 2 was delayed secondary to development of pneumoperitoneum after cycle 1, patient was mated to the hospital very by her surgeon Dr. Liu who recommended delaying further cycles of chemotherapy until August 1, 2023.    Interval history: Patient here for follow up after completing 12 cycles of chemotherapy with FOLFOX in 12/2023. She is is fever, chills, SOA, melena, or blood loss or other infectious symptoms. She reports irritation for chemoport and thus had  it removed. She is here to discuss restaging imaging and labs for continued surveillance. Pt reports chronic lower back pain and abdominal pain, but denies any n/v/d.     Review of Systems   Constitutional:  Positive for activity change, appetite change and fatigue. Negative for diaphoresis, fever, unexpected weight gain and unexpected weight loss.   HENT:  Negative for congestion, hearing loss, mouth sores, nosebleeds, sore throat, swollen glands, trouble swallowing and voice change.    Eyes:  Negative for blurred vision.   Respiratory:  Negative for cough, shortness of breath and wheezing.    Cardiovascular:  Negative for chest pain and palpitations.   Gastrointestinal:  Positive for nausea. Negative for abdominal pain, blood in stool, constipation, diarrhea and vomiting.   Endocrine: Positive for cold intolerance. Negative for heat intolerance.   Genitourinary:  Negative for difficulty urinating, dysuria, frequency, hematuria and urinary incontinence.   Musculoskeletal:  Positive for back pain. Negative for arthralgias and myalgias.   Skin:  Negative for rash, skin lesions and wound.   Neurological:  Positive for dizziness. Negative for seizures, weakness, numbness and headache.   Hematological:  Does not bruise/bleed easily.   Psychiatric/Behavioral:  Negative for depressed mood. The patient is nervous/anxious.    All other systems reviewed and are negative.       Oncology/Hematology History   Primary adenocarcinoma of descending colon   4/11/2023 Initial Diagnosis    Primary adenocarcinoma of descending colon     9/27/2023 -  Chemotherapy    OP SUPPORTIVE HYDRATION + ANTIEMETICS     Malignant neoplasm of descending colon   5/6/2023 Initial Diagnosis    Malignant neoplasm of descending colon     5/31/2023 Cancer Staged    Staging form: Colon And Rectum, AJCC 8th Edition  - Pathologic: Stage IIIB (pT3, pN1b, cM0) - Signed by Brian Marroquin MD on 5/31/2023 6/14/2023 -  Chemotherapy    OP COLON [id number]  "OXALIplatin / Leucovorin / Fluorouracil         Objective     Vitals:    10/22/24 1149   BP: 135/93   Pulse: 99   Resp: 18   Temp: 98.7 °F (37.1 °C)   TempSrc: Temporal   SpO2: 97%   Weight: (!) 145 kg (320 lb 6.4 oz)   Height: 170.2 cm (67\")   PainSc:   5   PainLoc: Back           ECOG score: 0         PHQ-9 Total Score:         Physical Exam  Vitals reviewed. Exam conducted with a chaperone present.   Constitutional:       General: She is not in acute distress.     Appearance: Normal appearance.   HENT:      Head: Normocephalic and atraumatic.      Mouth/Throat:      Mouth: Mucous membranes are dry.   Eyes:      Extraocular Movements: Extraocular movements intact.      Conjunctiva/sclera: Conjunctivae normal.   Cardiovascular:      Pulses: Normal pulses.      Heart sounds: Normal heart sounds.   Pulmonary:      Effort: Pulmonary effort is normal.      Breath sounds: Normal breath sounds. No rhonchi or rales.   Abdominal:      General: Bowel sounds are normal. There is no distension.      Palpations: Abdomen is soft. There is no mass.      Comments: Well healed surgical incisions on abdomen   Musculoskeletal:      Cervical back: Normal range of motion and neck supple.   Skin:     General: Skin is warm and dry.      Findings: No bruising.   Neurological:      Mental Status: She is oriented to person, place, and time.           Past Medical History     Past Medical History:   Diagnosis Date    Anesthesia     B/P bottomed out/UNSURE ON THE DATE    Anxiety     Asthma 1996    seasonal/NO INHALERS    Atelectasis of right lung     PER CT SCAN 1/2024    Colon cancer 04/17/2023    ADENOCARCINOMA STAGE 3    DDD (degenerative disc disease), cervical     DDD (degenerative disc disease), lumbar     Depression     Diverticulitis of colon 2005    Scope was done in [facility]    GERD (gastroesophageal reflux disease)     Hypertension     Kidney stones     Melanoma     removed    Palpitation     FOLLOWS W/DR. ANTONIO  Q6 MONTHS, NO " CP OR SOA    Prolapse of female bladder      Current Outpatient Medications on File Prior to Visit   Medication Sig Dispense Refill    albuterol (ACCUNEB) 0.63 MG/3ML nebulizer solution       allopurinol (ZYLOPRIM) 100 MG tablet Take 1 tablet by mouth Daily.      ALPRAZolam (XANAX) 0.5 MG tablet Take 1 tablet by mouth At Night As Needed.      amLODIPine (NORVASC) 10 MG tablet Take 1 tablet by mouth Every Night.      cyclobenzaprine (FLEXERIL) 10 MG tablet 2 (Two) Times a Day As Needed.      Diclofenac Sodium (VOLTAREN) 1 % gel gel Apply 4 g topically to the appropriate area as directed 4 (Four) Times a Day As Needed.      docusate sodium 100 MG capsule Take 1 capsule by mouth Daily.      furosemide (LASIX) 20 MG tablet Take 1 tablet by mouth Daily.      gabapentin (NEURONTIN) 300 MG capsule Take 1 capsule by mouth 2 (Two) Times a Day. 60 capsule 2    HYDROcodone-acetaminophen (NORCO) 7.5-325 MG per tablet Take 1 tablet by mouth Every 8 (Eight) Hours As Needed.      hyoscyamine (ANASPAZ,LEVSIN) 0.125 MG tablet TAKE ONE TABLET BY MOUTH FOUR TIMES A DAY WITH MEALS AND AT BEDTIME. 120 tablet 1    ketorolac (TORADOL) 10 MG tablet Take 1 tablet by mouth Every 6 (Six) Hours As Needed.      lamoTRIgine (LaMICtal) 25 MG tablet Take 1 tablet by mouth 2 (Two) Times a Day. Only takes at night      loperamide (Imodium A-D) 2 MG tablet Take 1 tablet by mouth 4 (Four) Times a Day As Needed for Diarrhea. Imodium - take 4 mg first dose, followed by 2 mg every 2-4 hours after each stool (MAX of 16 mg in 24 hour period) 60 tablet 1    losartan (COZAAR) 100 MG tablet Take 1 tablet by mouth Every Night.      medroxyPROGESTERone (DEPO-PROVERA) 150 MG/ML injection Inject 1 mL into the appropriate muscle as directed by prescriber Every 3 (Three) Months.      metoclopramide (REGLAN) 10 MG tablet Take 1 tablet by mouth 3 (Three) Times a Day.      metoprolol succinate XL (TOPROL-XL) 25 MG 24 hr tablet Take 1 tablet by mouth Daily.       Multiple Vitamins-Minerals (b complex-C-E-zinc) tablet Take 1 tablet by mouth Daily.      Myrbetriq 50 MG tablet sustained-release 24 hour 24 hr tablet Take 50 mg by mouth Every Night.      ondansetron (ZOFRAN) 8 MG tablet Take 1 tablet by mouth 3 (Three) Times a Day As Needed for Nausea or Vomiting. 30 tablet 5    pantoprazole (PROTONIX) 40 MG EC tablet TAKE 1 TABLET BY MOUTH TWICE A DAY 60 tablet 1    Probiotic Product ([employer] PO) Take  by mouth Daily.      prochlorperazine (COMPAZINE) 10 MG tablet Take 1 tablet by mouth Every 6 (Six) Hours As Needed for Nausea or Vomiting. 30 tablet 5    rOPINIRole (REQUIP) 1 MG tablet Take 1 tablet by mouth Every Night.      sertraline (ZOLOFT) 100 MG tablet Take 0.5 tablets by mouth Every Night.      naloxone (NARCAN) 4 MG/0.1ML nasal spray  (Patient not taking: Reported on 10/22/2024)      ondansetron ODT (ZOFRAN-ODT) 8 MG disintegrating tablet Every 8 (Eight) Hours. (Patient not taking: Reported on 10/22/2024)      phentermine (ADIPEX-P) 37.5 MG tablet Daily. (Patient not taking: Reported on 10/22/2024)      sodium chloride (Ocean Nasal Spray) 0.65 % nasal spray 1 spray into the nostril(s) as directed by provider 2 (Two) Times a Day As Needed for Congestion. (Patient not taking: Reported on 10/22/2024) 480 mL 4     No current facility-administered medications on file prior to visit.      Allergies   Allergen Reactions    Hydromorphone Hives, Itching and GI Intolerance    Morphine Hives, Itching, Nausea And Vomiting and Mental Status Change    Oxycodone-Acetaminophen Itching and Nausea And Vomiting     Can not take any higher than 7.5    Promethazine Nausea And Vomiting    Tylenol With Codeine #3 [Acetaminophen-Codeine] Itching and Nausea And Vomiting    Oxybutynin Swelling     SWELLING IN LEGS        Penicillins Rash     Past Surgical History:   Procedure Laterality Date    BREAST SURGERY  2016    Removed 8lbs of tissue, 2016    CHOLECYSTECTOMY      COLON  RESECTION Left 05/05/2023    Procedure: RESECTION OF DESCENDING COLON AND SPLENIC FLEXURE TAKEDOWN WITH DAVINCI ROBOT;  Surgeon: Rolando Liu MD;  [place] [facility] [place] [facility] [place]  Service: Robotics - DaVinci;  Laterality: Left;    COLONOSCOPY N/A 04/04/2023    Procedure: COLONOSCOPY WITH POLYPECTOMY/SNARE/BIOPSIES/TATTOOING DESCENDING COLON MASS;  Surgeon: Deniz Dowd MD;  Location: [facility] ENDOSCOPY;  Service: Gastroenterology;  Laterality: N/A;  INCOMPLETE COLONOSCOPY  POOR BOWEL PREP  COLON POLYP  COLON MASS  DIVERTICULOSIS    COLONOSCOPY N/A 04/12/2023    Procedure: COLONOSCOPY with cold snare;  Surgeon: Deniz Dowd MD;  Location: [facility] ENDOSCOPY;  Service: Gastroenterology;  Laterality: N/A;  colon polyps, diverticulosis, colon mass, hemorrhoids      COLONOSCOPY N/A 7/24/2024    Procedure: COLONOSCOPY COLDSNARE POLYPECTOMY;  Surgeon: Rolando Liu MD;  Location: [facility] ENDOSCOPY;  Service: General;  Laterality: N/A;  COLON POLYP    CYSTOSCOPY W/ URETERAL STENT PLACEMENT Left 05/05/2023    Procedure: Cystoscopy, left ureteral injection,;  Surgeon: Manjula Randolph MD;  [place] [facility] [place] [facility] [place]  Service: Urology;  Laterality: Left;    FRACTURE SURGERY  2001    Left knee    KNEE SURGERY Left     x4    LAPAROSCOPIC TUBAL LIGATION  1995    LAPAROSCOPIC TUBAL LIGATION      Reversed    REDUCTION MAMMAPLASTY  2009    8lbs of tissue was removed    TONSILLECTOMY      UPPER GASTROINTESTINAL ENDOSCOPY      VENOUS ACCESS DEVICE (PORT) INSERTION N/A 06/05/2023    Procedure: INSERTION VENOUS ACCESS DEVICE;  Surgeon: Rolando Liu MD;  [place] [facility] [place] [facility] [place]  Service: General;  Laterality: N/A;    VENOUS ACCESS DEVICE (PORT) REMOVAL N/A 02/08/2024    Procedure: REMOVAL VENOUS ACCESS DEVICE;  Surgeon: Rolando Liu MD;  Location: [facility]IN OR;  Service: General;  Laterality: N/A;     Social History     Socioeconomic History    Marital status:    Tobacco Use    Smoking status: Former      Current packs/day: 0.00     Average packs/day: 0.5 packs/day for 2.1 years (1.1 ttl pk-yrs)     Types: Cigarettes     Start date: 3/5/1991     Quit date: 1993     Years since quittin.5     Passive exposure: Never    Smokeless tobacco: Never    Tobacco comments:     When i was 16 right after my daughter was born   Vaping Use    Vaping status: Never Used   Substance and Sexual Activity    Alcohol use: Never    Drug use: Never    Sexual activity: Not Currently     Partners: Male     Birth control/protection: Depo-provera, Injection     Family History   Problem Relation Age of Onset    Cancer Mother         Breast    Hypertension Mother     Arthritis Mother         Passed away     Diabetes Maternal Grandmother         Passed away    Heart disease Maternal Grandmother         Passed away heart attack    Heart disease Maternal Grandfather         Passed away heart attack    Depression Brother     Colon cancer Neg Hx     Malig Hyperthermia Neg Hx        Results     Result Review   The following data was reviewed by: Brian Marroquin MD on 2023:  Lab Results   Component Value Date    HGB 15.7 10/21/2024    HCT 46.4 10/21/2024    MCV 86.6 10/21/2024     10/21/2024    WBC 8.29 10/21/2024    NEUTROABS 4.90 10/21/2024    LYMPHSABS 2.62 10/21/2024    MONOSABS 0.57 10/21/2024    EOSABS 0.10 10/21/2024    BASOSABS 0.06 10/21/2024     Lab Results   Component Value Date    GLUCOSE 95 10/21/2024    BUN 11 10/21/2024    CREATININE 0.82 10/21/2024     10/21/2024    K 4.0 10/21/2024     10/21/2024    CO2 24.5 10/21/2024    CALCIUM 9.4 10/21/2024    PROTEINTOT 7.7 10/21/2024    ALBUMIN 4.4 10/21/2024    BILITOT 0.5 10/21/2024    ALKPHOS 125 (H) 10/21/2024    AST 22 10/21/2024    ALT 18 10/21/2024     Lab Results   Component Value Date    MG 2.1 2024    PHOS 3.8 2024       Case Report   Surgical Pathology Report                         Case: [id number]                                    Authorizing Provider:  Deniz Dowd MD    Collected:           04/12/2023 09:41 AM           Ordering Location:     [facility] Received:            04/12/2023 11:41 AM                                  SUITES                                                                        Pathologist:           Niesha Torres DO                                                        Specimens:   1) - Large Intestine, Cecum, cecal polyp cold snare                                                  2) - Large Intestine, Sigmoid Colon, sigmoid colon polyp cold snare                        Clinical Information    Mass of colon   Final Diagnosis   1. Cecum polyp, biopsy:                            - Tubular adenoma        2. Sigmoid colon polyp, biopsy:                            - Tubular adenoma    Electronically signed by Niesha Torres DO on 4/13/2023 at 0840     Surgical Pathology Report                         Case: [id number]                                   Authorizing Provider:  Deniz Dowd MD    Collected:           04/04/2023 11:15 AM           Ordering Location:     [facility] Received:            04/04/2023 11:59 AM                                  SUITES                                                                        Pathologist:           Jennifer Mike MD                                                      Specimens:   1) - Large Intestine, Transverse Colon, TRANSVERSE COLON POLYP                                       2) - Large Intestine, Left / Descending Colon, DESCENDING COLON BIOPSIES                   Clinical Information    Constipation, unspecified constipation type   Final Diagnosis   1. Transverse colon polyp, biopsy:               - Fragments of tubular adenoma        2.  Descending colon, biopsy:               -Adenocarcinoma, moderately to poorly differentiated     Comment: Per policy, reflex testing has been ordered, and the  results will be separately reported.     REMARKS: The above positive (malignant) diagnosis was called to April in Dr. Dowd's office at 09:09 EDT on 4/5/2023 by diane.          Electronically signed by Jennifer Mike MD on 4/5/2023 at 0906         Surgical Pathology Report                         Case: [id number]                                   Authorizing Provider:  Rolando Liu MD        Collected:           05/05/2023 03:06 PM           Ordering Location:     [facility]  Received:            05/08/2023 06:48 AM                                  OR                                                                            Pathologist:           Jt [facility] MD                                                             Specimen:    Large Intestine, Left / Descending Colon, left colon                                       Clinical Information    Malignant neoplasm of descending colon   Final Diagnosis   Left colon, resection:               - Adenocarcinoma               - Three of thirty-three lymph nodes positive for carcinoma (3/33)               - See synoptic checklist   Electronically signed by Jt [place] MD on 5/11/2023 at 1508   Synoptic Checklist   COLON AND RECTUM: Resection, Including Transanal Disk Excision of Rectal Neoplasms  8th Edition - Protocol posted: 6/22/2022  COLON AND RECTUM: RESECTION - All Specimens  SPECIMEN   Procedure  Descending colon resection    TUMOR   Tumor Site  Descending colon    Histologic Type  Adenocarcinoma    Histologic Grade  G3, poorly differentiated    Tumor Size  Greatest dimension (Centimeters): 3.5 cm   Tumor Extent  Invades through muscularis propria into the pericolonic or perirectal tissue    Macroscopic Tumor Perforation  Not identified    Lymphovascular Invasion  Small vessel    Perineural Invasion  Present    Treatment Effect  No known presurgical therapy    MARGINS   Margin Status for Invasive Carcinoma  All margins negative for  invasive carcinoma    Closest Margin(s) to Invasive Carcinoma  Radial (circumferential) or mesenteric    Distance from Invasive Carcinoma to Closest Margin  3.5 cm   Margin Status for Non-Invasive Tumor  All margins negative for high-grade dysplasia / intramucosal carcinoma and low-grade dysplasia    REGIONAL LYMPH NODES   Regional Lymph Node Status  Tumor present in regional lymph node(s)    Number of Lymph Nodes with Tumor  3    Number of Lymph Nodes Examined  33    Tumor Deposits  Present    Number of Tumor Deposits  1    PATHOLOGIC STAGE CLASSIFICATION (pTNM, AJCC 8th Edition)   Reporting of pT, pN, and (when applicable) pM categories is based on information available to the pathologist at the time the report is issued. As per the AJCC (Chapter 1, 8th Ed.) it is the managing physician's responsibility to establish the final pathologic stage based upon all pertinent information, including but potentially not limited to this pathology report.   pT Category  pT3    pN Category  pN1b    ADDITIONAL FINDINGS   Additional Findings  None identified    .          CT Chest With Contrast Diagnostic    Result Date: 10/22/2024  Impression: 1. Grossly stable appearance of sclerotic focus within the T8 vertebral body. This did not demonstrate increased uptake on comparison bone scan from 5/22/2024. Recommend continued follow-up per patient's oncologic protocol 2. No acute abnormality appreciated within the chest. 3. No definite evidence of metastatic disease within the abdomen or pelvis. 4. Areas of stranding within the mesentery favored represent developing areas of fat necrosis. No suspicious nodular component. Recommend continued attention on subsequent imaging per patient's oncologic protocol. 5. Hepatic steatosis. Please correlate with liver function test 6. Other findings as above Electronically Signed: Yordy Lion MD  10/22/2024 9:01 AM EDT  Workstation ID: [id number]    CT Abdomen Pelvis With Contrast    Result  Date: 10/22/2024  Impression: 1. Grossly stable appearance of sclerotic focus within the T8 vertebral body. This did not demonstrate increased uptake on comparison bone scan from 5/22/2024. Recommend continued follow-up per patient's oncologic protocol 2. No acute abnormality appreciated within the chest. 3. No definite evidence of metastatic disease within the abdomen or pelvis. 4. Areas of stranding within the mesentery favored represent developing areas of fat necrosis. No suspicious nodular component. Recommend continued attention on subsequent imaging per patient's oncologic protocol. 5. Hepatic steatosis. Please correlate with liver function test 6. Other findings as above Electronically Signed: Yordy Lion MD  10/22/2024 9:01 AM EDT  Workstation ID: [id number]     Assessment & Plan     Diagnoses and all orders for this visit:    1. Malignant neoplasm of descending colon (Primary)  -     CBC & Differential; Future  -     Comprehensive Metabolic Panel; Future  -     CEA; Future  -     CT chest w contrast; Future  -     CT abdomen pelvis w contrast; Future  -     Cancel: NM Bone Scan Whole Body; Future  -     NM Bone Scan Whole Body; Future    2. Back pain, unspecified back location, unspecified back pain laterality, unspecified chronicity  -     Cancel: NM Bone Scan Whole Body; Future  -     NM Bone Scan Whole Body; Future    3. Abnormal CT scan  -     Cancel: NM Bone Scan Whole Body; Future  -     NM Bone Scan Whole Body; Future              Lilian Pelaez is a 49 y.o. female who [facility] HEMATOLOGY & ONCOLOGY evaluation prior to systemic therapy for stage III colon cancer, patient status post resection of descending colon splenic flexure by Dr. Rolando Liu on 5/5/2023.     Based on NCCN guidelines for her stage III colon cancer, discussed high risk features seen on pathology report and discussed CapeOx for 3 months versus FOLFOX for 3 to 6 months with patient and   today.  Patient agreed to plan to undergo FOLFOX IV chemotherapy every 2 weeks for 6 months.    Patient received cycle 1 on June 14, 2023, she developed pneumoperitoneum which her surgeon suspect was secondary to severe constipation from iron tablets, but patient was cleared to resume chemotherapy after 8/1/2023, she completed 12 cycles of chemotherapy on 12/27/2024.    Restaging imaging scans obtained on 1/29/2023 which was without evidence of disease recurrence or metastatic disease, but did reveal new elevation of right hemidiaphragm with secondary lower lobe atelectasis, new ground glass infiltrates in the upper lobes, likely infectious including possible viral pneumonia.    Restaging imaging from 5/15/2024 revealed an indeterminate approximately 1 cm sclerotic focus in T8 vertebral body, since osteoblastic metastasis cannot be excluded, refer patient for bone scan for further assessment, will recommend biopsy if suspicious on bone scan, otherwise no evidence of intrathoracic or abdominal pelvic metastatic disease, interval resolution of pulmonary groundglass infiltrates and diaphragm elevation.  -NM bone scan from 5/22/2024 was negative for metastatic disease    -Pt requested chemoport removal, referred pt to general surgeon for chemport removal.     Since NM bone scan from 5/22/2024 was negative for osseous metastatic disease. CT CAP from 5/16/24 negative for metastatic disease.   10/21/2024: CT CAP negative for disease progression, CEA remains low, pt with abdominal pain and findings suggestive of fat necrosis thus referred pt back to her general surgeon for evaluation, pt will call general surgeon to discuss this further, plan for pt follow up in 4 months with repeat CT CAP and NM bone scan to reassess disease status and for repeat CBC, CMP and CEA.        Patient verbalized understanding and agreement plan.    Please note that portions of this note were completed with a voice recognition  program.      Electronically signed by Brian Marroquin MD, 10/22/24, 5:12 PM EDT.      Follow Up     I spent 30 minutes caring for Lilian on this date of service. This time includes time spent by me in the following activities:preparing for the visit, reviewing tests, obtaining and/or reviewing a separately obtained history, performing a medically appropriate examination and/or evaluation , counseling and educating the patient/family/caregiver, ordering medications, tests, or procedures, documenting information in the medical record and care coordination.    This is an acute or chronic illness that poses a threat to life or bodily function. The above treatment plan involves a high risk of complications and/or mortality of patient management.    The patient was seen and examined. Work by the provider also included review and/or ordering of lab tests, review and/or ordering of radiology tests, review and/or ordering of medicine tests, discussion with other physicians or providers, independent review of data, obtaining old records, review/summation of old records, and/or other review.    I have reviewed the family history, social history, and past medical history for this patient. Previous information and data has been reviewed and updated as needed. I have reviewed and verified the chief complaint, history, and other documentation. The patient was interviewed and examined in the clinic and the chart reviewed. The previous observations, recommendations, and conclusions were reviewed including those of other providers.     The plan was discussed with the patient and/or family. The patient was given time to ask questions and these questions were answered. At the conclusion of their visit they had no additional questions or concerns and all questions were answered to their satisfaction.    Patient was given instructions and counseling regarding her condition or for health maintenance advice. Please see specific information  pulled into the AVS if appropriate.

## 2024-10-22 ENCOUNTER — OFFICE VISIT (OUTPATIENT)
Dept: ONCOLOGY | Facility: HOSPITAL | Age: 49
End: 2024-10-22
Payer: COMMERCIAL

## 2024-10-22 VITALS
TEMPERATURE: 98.7 F | SYSTOLIC BLOOD PRESSURE: 135 MMHG | OXYGEN SATURATION: 97 % | HEART RATE: 99 BPM | HEIGHT: 67 IN | RESPIRATION RATE: 18 BRPM | DIASTOLIC BLOOD PRESSURE: 93 MMHG | BODY MASS INDEX: 45.99 KG/M2 | WEIGHT: 293 LBS

## 2024-10-22 DIAGNOSIS — R93.89 ABNORMAL CT SCAN: ICD-10-CM

## 2024-10-22 DIAGNOSIS — C18.6 MALIGNANT NEOPLASM OF DESCENDING COLON: Primary | ICD-10-CM

## 2024-10-22 DIAGNOSIS — M54.9 BACK PAIN, UNSPECIFIED BACK LOCATION, UNSPECIFIED BACK PAIN LATERALITY, UNSPECIFIED CHRONICITY: ICD-10-CM

## 2024-10-22 PROCEDURE — G0463 HOSPITAL OUTPT CLINIC VISIT: HCPCS | Performed by: INTERNAL MEDICINE

## 2024-10-22 PROCEDURE — 1125F AMNT PAIN NOTED PAIN PRSNT: CPT | Performed by: INTERNAL MEDICINE

## 2024-10-22 PROCEDURE — 3075F SYST BP GE 130 - 139MM HG: CPT | Performed by: INTERNAL MEDICINE

## 2024-10-22 PROCEDURE — 3080F DIAST BP >= 90 MM HG: CPT | Performed by: INTERNAL MEDICINE

## 2024-10-22 PROCEDURE — 99214 OFFICE O/P EST MOD 30 MIN: CPT | Performed by: INTERNAL MEDICINE

## 2024-10-22 RX ORDER — ONDANSETRON 8 MG/1
TABLET, ORALLY DISINTEGRATING ORAL EVERY 8 HOURS SCHEDULED
COMMUNITY

## 2024-10-22 RX ORDER — PHENTERMINE HYDROCHLORIDE 37.5 MG/1
TABLET ORAL EVERY 24 HOURS
COMMUNITY
Start: 2024-09-10

## 2024-10-24 NOTE — PROGRESS NOTES
"Chief Complaint:  dead fatty tissue around liver    Primary Care Provider: Laurence Cardenas APRN    Referring Provider:  Dr. Marroquin    History of Present Illness  Lilian Pelaez is a 49 y.o. female referred by Dr. Marroquin and abnormal finding on a recent CT scan done for follow-up for patient's known colon cancer.  Patient had surgery for colon cancer in May 2023.  See below for details.  CT a/p was done on 10/21/24, and the finding relevant for today's office visit is \"focal areas of stranding within the mesentery centered in the right lower quadrant suggest areas of fat necrosis no suspicious nodular component noted.\"    Patient has been having pain at her epigastric area.  The pain is not significantly impacting the patient's quality of life.  She does not have any pain in her right lower quadrant area.  I reviewed the CT scan images myself and it is difficult for me to determine the areas of stranding of the mesentery as the stranding must be very mild.    Patient had robotic resection of descending colon and splenic flexure by me on 5/5/2023 for colon cancer.  The cancer was diagnosed when patient had a colonoscopy on 4/4/23 by another physician.     The cancer is [id number], stage III.  Patient received IV therapy.  Follow-up testing has soon no evidence of cancer.         Allergies: Hydromorphone, Morphine, Oxycodone-acetaminophen, Promethazine, Tylenol with codeine #3 [acetaminophen-codeine], Oxybutynin, and Penicillins    Outpatient Medications Marked as Taking for the 10/25/24 encounter (Office Visit) with Rolando Liu MD   Medication Sig Dispense Refill    albuterol (ACCUNEB) 0.63 MG/3ML nebulizer solution       allopurinol (ZYLOPRIM) 100 MG tablet Take 1 tablet by mouth Daily.      ALPRAZolam (XANAX) 0.5 MG tablet Take 1 tablet by mouth At Night As Needed.      amLODIPine (NORVASC) 10 MG tablet Take 1 tablet by mouth Every Night.      cyclobenzaprine (FLEXERIL) 10 MG tablet 2 (Two) Times a Day As " Needed.      Diclofenac Sodium (VOLTAREN) 1 % gel gel Apply 4 g topically to the appropriate area as directed 4 (Four) Times a Day As Needed.      docusate sodium 100 MG capsule Take 1 capsule by mouth Daily.      furosemide (LASIX) 20 MG tablet Take 1 tablet by mouth Daily.      gabapentin (NEURONTIN) 300 MG capsule Take 1 capsule by mouth 2 (Two) Times a Day. 60 capsule 2    HYDROcodone-acetaminophen (NORCO) 7.5-325 MG per tablet Take 1 tablet by mouth Every 8 (Eight) Hours As Needed.      hyoscyamine (ANASPAZ,LEVSIN) 0.125 MG tablet TAKE ONE TABLET BY MOUTH FOUR TIMES A DAY WITH MEALS AND AT BEDTIME. 120 tablet 1    ketorolac (TORADOL) 10 MG tablet Take 1 tablet by mouth Every 6 (Six) Hours As Needed.      lamoTRIgine (LaMICtal) 25 MG tablet Take 1 tablet by mouth 2 (Two) Times a Day. Only takes at night      loperamide (Imodium A-D) 2 MG tablet Take 1 tablet by mouth 4 (Four) Times a Day As Needed for Diarrhea. Imodium - take 4 mg first dose, followed by 2 mg every 2-4 hours after each stool (MAX of 16 mg in 24 hour period) 60 tablet 1    losartan (COZAAR) 100 MG tablet Take 1 tablet by mouth Every Night.      medroxyPROGESTERone (DEPO-PROVERA) 150 MG/ML injection Inject 1 mL into the appropriate muscle as directed by prescriber Every 3 (Three) Months.      metoclopramide (REGLAN) 10 MG tablet Take 1 tablet by mouth 3 (Three) Times a Day.      metoprolol succinate XL (TOPROL-XL) 25 MG 24 hr tablet Take 1 tablet by mouth Daily.      Multiple Vitamins-Minerals (b complex-C-E-zinc) tablet Take 1 tablet by mouth Daily.      Myrbetriq 50 MG tablet sustained-release 24 hour 24 hr tablet Take 50 mg by mouth Every Night.      ondansetron (ZOFRAN) 8 MG tablet Take 1 tablet by mouth 3 (Three) Times a Day As Needed for Nausea or Vomiting. 30 tablet 5    pantoprazole (PROTONIX) 40 MG EC tablet TAKE 1 TABLET BY MOUTH TWICE A DAY 60 tablet 1    Probiotic Product (Ikro PO) Take  by mouth Daily.       prochlorperazine (COMPAZINE) 10 MG tablet Take 1 tablet by mouth Every 6 (Six) Hours As Needed for Nausea or Vomiting. 30 tablet 5    rOPINIRole (REQUIP) 1 MG tablet Take 1 tablet by mouth Every Night.      sertraline (ZOLOFT) 100 MG tablet Take 0.5 tablets by mouth Every Night.         Past Medical History:    Anesthesia    B/P bottomed out/UNSURE ON THE DATE    Anxiety    Asthma    seasonal/NO INHALERS    Atelectasis of right lung    PER CT SCAN 1/2024    Colon cancer    ADENOCARCINOMA STAGE 3    DDD (degenerative disc disease), cervical    DDD (degenerative disc disease), lumbar    Depression    Diverticulitis of colon    Scope was done in [facility]    GERD (gastroesophageal reflux disease)    Hypertension    Kidney stones    Melanoma    removed    Palpitation    FOLLOWS W/DR. ANTONIO  Q6 MONTHS, NO CP OR SOA    Prolapse of female bladder        Past Surgical History:    BREAST SURGERY    Removed 8lbs of tissue, 2016    CHOLECYSTECTOMY    COLON RESECTION    Procedure: RESECTION OF DESCENDING COLON AND SPLENIC FLEXURE TAKEDOWN WITH [employer] ROBOT;  Surgeon: Rolando Liu MD;  [place] [facility] [place] [facility] [place]  Service: Robotics - [employer];  Laterality: Left;    COLONOSCOPY    Procedure: COLONOSCOPY WITH POLYPECTOMY/SNARE/BIOPSIES/TATTOOING DESCENDING COLON MASS;  Surgeon: Deniz Dowd MD;  Location: [facility] ENDOSCOPY;  Service: Gastroenterology;  Laterality: N/A;  INCOMPLETE COLONOSCOPY  POOR BOWEL PREP  COLON POLYP  COLON MASS  DIVERTICULOSIS    COLONOSCOPY    Procedure: COLONOSCOPY with cold snare;  Surgeon: Deniz Dowd MD;  Location: [facility] ENDOSCOPY;  Service: Gastroenterology;  Laterality: N/A;  colon polyps, diverticulosis, colon mass, hemorrhoids      COLONOSCOPY    Procedure: COLONOSCOPY COLDSNARE POLYPECTOMY;  Surgeon: Rolando Liu MD;  Location: [facility] ENDOSCOPY;  Service: General;  Laterality: N/A;  COLON POLYP    CYSTOSCOPY W/ URETERAL STENT PLACEMENT    Procedure: Cystoscopy, left ureteral  "injection,;  Surgeon: Manjula Randolph MD;  [place] [facility] [place] [facility] [place]  Service: Urology;  Laterality: Left;    FRACTURE SURGERY    Left knee    KNEE SURGERY    x4    LAPAROSCOPIC TUBAL LIGATION    LAPAROSCOPIC TUBAL LIGATION    Reversed    REDUCTION MAMMAPLASTY    8lbs of tissue was removed    TONSILLECTOMY    UPPER GASTROINTESTINAL ENDOSCOPY    VENOUS ACCESS DEVICE (PORT) INSERTION    Procedure: INSERTION VENOUS ACCESS DEVICE;  Surgeon: Rolando Liu MD;  [place]: [facility] [place] [facility] [place];  Service: General;  Laterality: N/A;    VENOUS ACCESS DEVICE (PORT) REMOVAL    Procedure: REMOVAL VENOUS ACCESS DEVICE;  Surgeon: Rolando Liu MD;  Location: [facility] MAIN OR;  Service: General;  Laterality: N/A;       Family History:   Family History   Problem Relation Age of Onset    Cancer Mother         Breast    Hypertension Mother     Arthritis Mother         Passed away     Diabetes Maternal Grandmother         Passed away    Heart disease Maternal Grandmother         Passed away heart attack    Heart disease Maternal Grandfather         Passed away heart attack    Depression Brother     Colon cancer Neg Hx     Malig Hyperthermia Neg Hx         Social History:  Social History     Tobacco Use    Smoking status: Former     Current packs/day: 0.00     Average packs/day: 0.5 packs/day for 2.1 years (1.1 ttl pk-yrs)     Types: Cigarettes     Start date: 3/5/1991     Quit date: 1993     Years since quittin.5     Passive exposure: Never    Smokeless tobacco: Never    Tobacco comments:     When i was 16 right after my daughter was born   Substance Use Topics    Alcohol use: Never       Objective     Vital Signs:  Resp 18   Ht 170.2 cm (67.01\")   Wt (!) 145 kg (318 lb 9.6 oz)   BMI 49.89 kg/m²   Respiratory:  breathing not labored, respiratory effort appears normal  Cardiovascular:  heart regular rate  Abdomen:  soft, obese, nontender  Musculoskeletal: moving all extremities symmetrically and purposefully  Neurologic:  " no obvious motor or sensory deficits, speech clear      Assessment:  Fat necrosis of mesentery    Plan:  The mild stranding in the mesentery seen on the CT scan likely has no clinical relevance.  Recommend no further treatment or evaluation other than re-evaluation with CT scan at the normal time interval for routine follow-up for patient's colon cancer.    Rolando Liu MD  10/25/2024    Electronically signed by Rolando Liu MD, 10/25/24, 4:09 PM EDT.

## 2024-10-25 ENCOUNTER — OFFICE VISIT (OUTPATIENT)
Dept: SURGERY | Facility: CLINIC | Age: 49
End: 2024-10-25
Payer: COMMERCIAL

## 2024-10-25 VITALS — HEIGHT: 67 IN | WEIGHT: 293 LBS | RESPIRATION RATE: 18 BRPM | BODY MASS INDEX: 45.99 KG/M2

## 2024-10-25 DIAGNOSIS — K65.4: Primary | ICD-10-CM

## 2024-10-25 NOTE — LETTER
"October 25, 2024     Brian Marroquin MD  [address]  [place] [address]    Patient: Lilian Pelaez   YOB: 1975   Date of Visit: 10/25/2024     Hi Dr. Marroquin.    Thank you for referring Lilian Pelaez to me for evaluation.  I saw the patient in my office today.  Please see the assessment and plan section at the bottom of my note included below for feedback.  Feel free to call me on my cell phone at [phone] with any questions.  Thank you.    Sincerely,  Rolando Liu          CC: No Recipients    Rolando Liu MD  10/25/24 5724  Sign when Signing Visit  Chief Complaint:  dead fatty tissue around liver    Primary Care Provider: Laurence Cardenas APRN    Referring Provider:  Dr. Marroquin    History of Present Illness  Lilian Pelaez is a 49 y.o. female referred by Dr. Marroquin and abnormal finding on a recent CT scan done for follow-up for patient's known colon cancer.  Patient had surgery for colon cancer in May 2023.  See below for details.  CT a/p was done on 10/21/24, and the finding relevant for today's office visit is \"focal areas of stranding within the mesentery centered in the right lower quadrant suggest areas of fat necrosis no suspicious nodular component noted.\"    Patient has been having pain at her epigastric area.  The pain is not significantly impacting the patient's quality of life.  She does not have any pain in her right lower quadrant area.  I reviewed the CT scan images myself and it is difficult for me to determine the areas of stranding of the mesentery as the stranding must be very mild.    Patient had robotic resection of descending colon and splenic flexure by me on 5/5/2023 for colon cancer.  The cancer was diagnosed when patient had a colonoscopy on 4/4/23 by another physician.     The cancer is [id number], stage III.  Patient received IV therapy.  Follow-up testing has soon no evidence of cancer.         Allergies: Hydromorphone, Morphine, " Oxycodone-acetaminophen, Promethazine, Tylenol with codeine #3 [acetaminophen-codeine], Oxybutynin, and Penicillins    Outpatient Medications Marked as Taking for the 10/25/24 encounter (Office Visit) with Rolando Liu MD   Medication Sig Dispense Refill   • albuterol (ACCUNEB) 0.63 MG/3ML nebulizer solution      • allopurinol (ZYLOPRIM) 100 MG tablet Take 1 tablet by mouth Daily.     • ALPRAZolam (XANAX) 0.5 MG tablet Take 1 tablet by mouth At Night As Needed.     • amLODIPine (NORVASC) 10 MG tablet Take 1 tablet by mouth Every Night.     • cyclobenzaprine (FLEXERIL) 10 MG tablet 2 (Two) Times a Day As Needed.     • Diclofenac Sodium (VOLTAREN) 1 % gel gel Apply 4 g topically to the appropriate area as directed 4 (Four) Times a Day As Needed.     • docusate sodium 100 MG capsule Take 1 capsule by mouth Daily.     • furosemide (LASIX) 20 MG tablet Take 1 tablet by mouth Daily.     • gabapentin (NEURONTIN) 300 MG capsule Take 1 capsule by mouth 2 (Two) Times a Day. 60 capsule 2   • HYDROcodone-acetaminophen (NORCO) 7.5-325 MG per tablet Take 1 tablet by mouth Every 8 (Eight) Hours As Needed.     • hyoscyamine (ANASPAZ,LEVSIN) 0.125 MG tablet TAKE ONE TABLET BY MOUTH FOUR TIMES A DAY WITH MEALS AND AT BEDTIME. 120 tablet 1   • ketorolac (TORADOL) 10 MG tablet Take 1 tablet by mouth Every 6 (Six) Hours As Needed.     • lamoTRIgine (LaMICtal) 25 MG tablet Take 1 tablet by mouth 2 (Two) Times a Day. Only takes at night     • loperamide (Imodium A-D) 2 MG tablet Take 1 tablet by mouth 4 (Four) Times a Day As Needed for Diarrhea. Imodium - take 4 mg first dose, followed by 2 mg every 2-4 hours after each stool (MAX of 16 mg in 24 hour period) 60 tablet 1   • losartan (COZAAR) 100 MG tablet Take 1 tablet by mouth Every Night.     • medroxyPROGESTERone (DEPO-PROVERA) 150 MG/ML injection Inject 1 mL into the appropriate muscle as directed by prescriber Every 3 (Three) Months.     • metoclopramide (REGLAN) 10 MG tablet  Take 1 tablet by mouth 3 (Three) Times a Day.     • metoprolol succinate XL (TOPROL-XL) 25 MG 24 hr tablet Take 1 tablet by mouth Daily.     • Multiple Vitamins-Minerals (b complex-C-E-zinc) tablet Take 1 tablet by mouth Daily.     • Myrbetriq 50 MG tablet sustained-release 24 hour 24 hr tablet Take 50 mg by mouth Every Night.     • ondansetron (ZOFRAN) 8 MG tablet Take 1 tablet by mouth 3 (Three) Times a Day As Needed for Nausea or Vomiting. 30 tablet 5   • pantoprazole (PROTONIX) 40 MG EC tablet TAKE 1 TABLET BY MOUTH TWICE A DAY 60 tablet 1   • Probiotic Product (Trendy Mondays PO) Take  by mouth Daily.     • prochlorperazine (COMPAZINE) 10 MG tablet Take 1 tablet by mouth Every 6 (Six) Hours As Needed for Nausea or Vomiting. 30 tablet 5   • rOPINIRole (REQUIP) 1 MG tablet Take 1 tablet by mouth Every Night.     • sertraline (ZOLOFT) 100 MG tablet Take 0.5 tablets by mouth Every Night.         Past Medical History:   • Anesthesia    B/P bottomed out/UNSURE ON THE DATE   • Anxiety   • Asthma    seasonal/NO INHALERS   • Atelectasis of right lung    PER CT SCAN 1/2024   • Colon cancer    ADENOCARCINOMA STAGE 3   • DDD (degenerative disc disease), cervical   • DDD (degenerative disc disease), lumbar   • Depression   • Diverticulitis of colon    Scope was done in [facility]   • GERD (gastroesophageal reflux disease)   • Hypertension   • Kidney stones   • Melanoma    removed   • Palpitation    FOLLOWS W/DR. ANTONIO  Q6 MONTHS, NO CP OR SOA   • Prolapse of female bladder        Past Surgical History:   • BREAST SURGERY    Removed 8lbs of tissue, 2016   • CHOLECYSTECTOMY   • COLON RESECTION    Procedure: RESECTION OF DESCENDING COLON AND SPLENIC FLEXURE TAKEDOWN WITH [employer] ROBOT;  Surgeon: Rolando Liu MD;  [place] [facility] [place] [facility] [place]  Service: Robotics - [employer];  Laterality: Left;   • COLONOSCOPY    Procedure: COLONOSCOPY WITH POLYPECTOMY/SNARE/BIOPSIES/TATTOOING DESCENDING COLON MASS;  Surgeon: Deniz Dowd  MD Devin;  Location: [facility] ENDOSCOPY;  Service: Gastroenterology;  Laterality: N/A;  INCOMPLETE COLONOSCOPY  POOR BOWEL PREP  COLON POLYP  COLON MASS  DIVERTICULOSIS   • COLONOSCOPY    Procedure: COLONOSCOPY with cold snare;  Surgeon: Deniz Dowd MD;  Location: [facility] ENDOSCOPY;  Service: Gastroenterology;  Laterality: N/A;  colon polyps, diverticulosis, colon mass, hemorrhoids     • COLONOSCOPY    Procedure: COLONOSCOPY COLDSNARE POLYPECTOMY;  Surgeon: Rolando Liu MD;  Location: [facility] ENDOSCOPY;  Service: General;  Laterality: N/A;  COLON POLYP   • CYSTOSCOPY W/ URETERAL STENT PLACEMENT    Procedure: Cystoscopy, left ureteral injection,;  Surgeon: Manjula Randolph MD;  Location: [facility] OR [facility] [place];  Service: Urology;  Laterality: Left;   • FRACTURE SURGERY    Left knee   • KNEE SURGERY    x4   • LAPAROSCOPIC TUBAL LIGATION   • LAPAROSCOPIC TUBAL LIGATION    Reversed   • REDUCTION MAMMAPLASTY    8lbs of tissue was removed   • TONSILLECTOMY   • UPPER GASTROINTESTINAL ENDOSCOPY   • VENOUS ACCESS DEVICE (PORT) INSERTION    Procedure: INSERTION VENOUS ACCESS DEVICE;  Surgeon: Rolando Liu MD;  Location: [facility] OR [facility] [place];  Service: General;  Laterality: N/A;   • VENOUS ACCESS DEVICE (PORT) REMOVAL    Procedure: REMOVAL VENOUS ACCESS DEVICE;  Surgeon: Rolando Liu MD;  Location: [facility] MAIN OR;  Service: General;  Laterality: N/A;       Family History:   Family History   Problem Relation Age of Onset   • Cancer Mother         Breast   • Hypertension Mother    • Arthritis Mother         Passed away 2022   • Diabetes Maternal Grandmother         Passed away   • Heart disease Maternal Grandmother         Passed away heart attack   • Heart disease Maternal Grandfather         Passed away heart attack   • Depression Brother    • Colon cancer Neg Hx    • Malig Hyperthermia Neg Hx         Social History:  Social History     Tobacco Use   • Smoking status: Former     Current packs/day: 0.00     Average  "packs/day: 0.5 packs/day for 2.1 years (1.1 ttl pk-yrs)     Types: Cigarettes     Start date: 3/5/1991     Quit date: 1993     Years since quittin.5     Passive exposure: Never   • Smokeless tobacco: Never   • Tobacco comments:     When i was 16 right after my daughter was born   Substance Use Topics   • Alcohol use: Never       Objective    Vital Signs:  Resp 18   Ht 170.2 cm (67.01\")   Wt (!) 145 kg (318 lb 9.6 oz)   BMI 49.89 kg/m²   Respiratory:  breathing not labored, respiratory effort appears normal  Cardiovascular:  heart regular rate  Abdomen:  soft, obese, nontender  Musculoskeletal: moving all extremities symmetrically and purposefully  Neurologic:  no obvious motor or sensory deficits, speech clear      Assessment:  Fat necrosis of mesentery    Plan:  The mild stranding in the mesentery seen on the CT scan likely has no clinical relevance.  Recommend no further treatment or evaluation other than re-evaluation with CT scan at the normal time interval for routine follow-up for patient's colon cancer.    Rolando Liu MD  10/25/2024    Electronically signed by Rolando Liu MD, 10/25/24, 4:09 PM EDT.        "

## 2024-11-19 ENCOUNTER — HOSPITAL ENCOUNTER (OUTPATIENT)
Dept: NUCLEAR MEDICINE | Facility: HOSPITAL | Age: 49
Discharge: HOME | End: 2024-11-19
Payer: COMMERCIAL

## 2024-11-19 DIAGNOSIS — M54.9 BACK PAIN, UNSPECIFIED BACK LOCATION, UNSPECIFIED BACK PAIN LATERALITY, UNSPECIFIED CHRONICITY: ICD-10-CM

## 2024-11-19 DIAGNOSIS — C18.6 MALIGNANT NEOPLASM OF DESCENDING COLON: ICD-10-CM

## 2024-11-19 DIAGNOSIS — R93.89 ABNORMAL CT SCAN: ICD-10-CM

## 2024-11-19 PROCEDURE — 78306 BONE IMAGING WHOLE BODY: CPT

## 2024-11-19 PROCEDURE — A9503 TC99M MEDRONATE: HCPCS | Performed by: INTERNAL MEDICINE

## 2024-11-19 PROCEDURE — 34310000005 TECHNETIUM MEDRONATE KIT: Performed by: INTERNAL MEDICINE

## 2024-11-19 RX ORDER — TC 99M MEDRONATE 20 MG/10ML
21.4 INJECTION, POWDER, LYOPHILIZED, FOR SOLUTION INTRAVENOUS
Status: COMPLETED | OUTPATIENT
Start: 2024-11-19 | End: 2024-11-19

## 2024-11-19 RX ADMIN — TC 99M MEDRONATE 21.4 MILLICURIE: 20 INJECTION, POWDER, LYOPHILIZED, FOR SOLUTION INTRAVENOUS at 12:00

## 2024-11-21 ENCOUNTER — OFFICE VISIT (OUTPATIENT)
Dept: ONCOLOGY | Facility: HOSPITAL | Age: 49
End: 2024-11-21
Payer: COMMERCIAL

## 2024-11-21 VITALS
BODY MASS INDEX: 45.99 KG/M2 | TEMPERATURE: 97.8 F | RESPIRATION RATE: 18 BRPM | HEIGHT: 67 IN | DIASTOLIC BLOOD PRESSURE: 80 MMHG | SYSTOLIC BLOOD PRESSURE: 126 MMHG | OXYGEN SATURATION: 95 % | HEART RATE: 99 BPM | WEIGHT: 293 LBS

## 2024-11-21 DIAGNOSIS — C18.6 MALIGNANT NEOPLASM OF DESCENDING COLON: Primary | ICD-10-CM

## 2024-11-21 DIAGNOSIS — M54.9 BACK PAIN, UNSPECIFIED BACK LOCATION, UNSPECIFIED BACK PAIN LATERALITY, UNSPECIFIED CHRONICITY: ICD-10-CM

## 2024-11-21 NOTE — PROGRESS NOTES
Chief Complaint/Care Team   Malignant neoplasm of descending colon    Laurence Cardenas*  Laurence Cardenasn, APRN    History of Present Illness     Diagnosis: Colon Adenocarcinoma S/p Left colectomy on 5/5/23, stage after resection [id number], stage III    Current Treatment: Active Surveillance    Previous Treatment: c-scope on 4/2023 biopsy revealed colon adenocarcinoma  -FOLFOX IV [id number] x 6 months, cycle 1 on 6/14/2023  Treatment delay secondary to pneumoperitoneum which developed in June 2023, patient cleared to resume chemotherapy in August 2023, pt completed this therapy on 12/2023    Lilian Pelaez is a 49 y.o. female who presents to [facility] HEMATOLOGY & ONCOLOGY for discussion of newly diagnosed colon adenocarcinoma seen on biopsy of colon mass in the descending colon during colonoscopy performed in April 2023.     Staging scans revealed on 4/11/2023:  CT abdomen/pelvis without any metastatic disease in abdomen/pelvis  CT chest no evidence of metastatic disease in chest.    Patient underwent robotic resection of descending colon and splenic flexure by Dr. Rolando Liu on 5/5/2023.    Patient is a former smoker.   Patient reports family history of several relatives with other forms of cancer cancer on her mother side of the family, but denies any known history of colon cancer in her family.    Patient reports noticing some blood mixed in with stool prior to cancer diagnosis.      Patient received cycle 1 on 6/14/2023, cycle 2 was delayed secondary to development of pneumoperitoneum after cycle 1, patient was mated to the hospital very by her surgeon Dr. Liu who recommended delaying further cycles of chemotherapy until August 1, 2023.    Interval history: Patient here for follow up after completing 12 cycles of chemotherapy with FOLFOX in 12/2023. No report of any fever, chills, SOA, melena, or blood loss or other infectious symptoms. She reports irritation for chemoport and  thus had it removed. She is here to discuss nuclear medicine bone scan obtained due to chronic lower back pain.  She reports her primary care provider has ordered bone density testing. Pt reports chronic lower back pain and abdominal pain, but denies any n/v/d.     Review of Systems   Constitutional:  Positive for activity change, appetite change and fatigue. Negative for diaphoresis, fever, unexpected weight gain and unexpected weight loss.   HENT:  Negative for congestion, hearing loss, mouth sores, nosebleeds, sore throat, swollen glands, trouble swallowing and voice change.    Eyes:  Negative for blurred vision.   Respiratory:  Negative for cough, shortness of breath and wheezing.    Cardiovascular:  Negative for chest pain and palpitations.   Gastrointestinal:  Positive for nausea. Negative for abdominal pain, blood in stool, constipation, diarrhea and vomiting.   Endocrine: Positive for cold intolerance. Negative for heat intolerance.   Genitourinary:  Negative for difficulty urinating, dysuria, frequency, hematuria and urinary incontinence.   Musculoskeletal:  Positive for back pain. Negative for arthralgias and myalgias.   Skin:  Negative for rash, skin lesions and wound.   Neurological:  Positive for dizziness. Negative for seizures, weakness, numbness and headache.   Hematological:  Does not bruise/bleed easily.   Psychiatric/Behavioral:  Negative for depressed mood. The patient is nervous/anxious.    All other systems reviewed and are negative.       Oncology/Hematology History   Primary adenocarcinoma of descending colon   4/11/2023 Initial Diagnosis    Primary adenocarcinoma of descending colon     9/27/2023 -  Chemotherapy    OP SUPPORTIVE HYDRATION + ANTIEMETICS     Malignant neoplasm of descending colon   5/6/2023 Initial Diagnosis    Malignant neoplasm of descending colon     5/31/2023 Cancer Staged    Staging form: Colon And Rectum, AJCC 8th Edition  - Pathologic: Stage IIIB (pT3, pN1b, cM0) -  "Signed by Brian Marroquin MD on 5/31/2023 6/14/2023 -  Chemotherapy    OP COLON [id number] OXALIplatin / Leucovorin / Fluorouracil         Objective     Vitals:    11/21/24 1050   BP: 126/80   Pulse: 99   Resp: 18   Temp: 97.8 °F (36.6 °C)   TempSrc: Temporal   SpO2: 95%   Weight: (!) 146 kg (322 lb 12.8 oz)   Height: 170.2 cm (67.01\")   PainSc:   5   PainLoc: Back                PHQ-9 Total Score:         Physical Exam  Vitals reviewed. Exam conducted with a chaperone present.   Constitutional:       General: She is not in acute distress.     Appearance: Normal appearance.   HENT:      Head: Normocephalic and atraumatic.      Mouth/Throat:      Mouth: Mucous membranes are dry.   Eyes:      Extraocular Movements: Extraocular movements intact.      Conjunctiva/sclera: Conjunctivae normal.   Cardiovascular:      Pulses: Normal pulses.      Heart sounds: Normal heart sounds.   Pulmonary:      Effort: Pulmonary effort is normal.      Breath sounds: Normal breath sounds. No rhonchi or rales.   Abdominal:      General: Bowel sounds are normal. There is no distension.      Palpations: Abdomen is soft. There is no mass.      Comments: Well healed surgical incisions on abdomen   Musculoskeletal:      Cervical back: Normal range of motion and neck supple.   Skin:     General: Skin is warm and dry.      Findings: No bruising.   Neurological:      Mental Status: She is oriented to person, place, and time.           Past Medical History     Past Medical History:   Diagnosis Date    Anesthesia     B/P bottomed out/UNSURE ON THE DATE    Anxiety     Asthma 1996    seasonal/NO INHALERS    Atelectasis of right lung     PER CT SCAN 1/2024    Colon cancer 04/17/2023    ADENOCARCINOMA STAGE 3    DDD (degenerative disc disease), cervical     DDD (degenerative disc disease), lumbar     Depression     Diverticulitis of colon 2005    Scope was done in [facility]    GERD (gastroesophageal reflux disease)     Hypertension     Kidney stones  "    Melanoma     removed    Palpitation     FOLLOWS W/DR. ANTONIO  Q6 MONTHS, NO CP OR SOA    Prolapse of female bladder      Current Outpatient Medications on File Prior to Visit   Medication Sig Dispense Refill    albuterol (ACCUNEB) 0.63 MG/3ML nebulizer solution       allopurinol (ZYLOPRIM) 100 MG tablet Take 1 tablet by mouth Daily.      ALPRAZolam (XANAX) 0.5 MG tablet Take 1 tablet by mouth At Night As Needed.      amLODIPine (NORVASC) 10 MG tablet Take 1 tablet by mouth Every Night.      cyclobenzaprine (FLEXERIL) 10 MG tablet 2 (Two) Times a Day As Needed.      Diclofenac Sodium (VOLTAREN) 1 % gel gel Apply 4 g topically to the appropriate area as directed 4 (Four) Times a Day As Needed.      docusate sodium 100 MG capsule Take 1 capsule by mouth Daily.      furosemide (LASIX) 20 MG tablet Take 1 tablet by mouth Daily.      gabapentin (NEURONTIN) 300 MG capsule Take 1 capsule by mouth 2 (Two) Times a Day. 60 capsule 2    HYDROcodone-acetaminophen (NORCO) 7.5-325 MG per tablet Take 1 tablet by mouth Every 8 (Eight) Hours As Needed.      hyoscyamine (ANASPAZ,LEVSIN) 0.125 MG tablet TAKE ONE TABLET BY MOUTH FOUR TIMES A DAY WITH MEALS AND AT BEDTIME. 120 tablet 1    ketorolac (TORADOL) 10 MG tablet Take 1 tablet by mouth Every 6 (Six) Hours As Needed.      lamoTRIgine (LaMICtal) 25 MG tablet Take 1 tablet by mouth 2 (Two) Times a Day. Only takes at night      loperamide (Imodium A-D) 2 MG tablet Take 1 tablet by mouth 4 (Four) Times a Day As Needed for Diarrhea. Imodium - take 4 mg first dose, followed by 2 mg every 2-4 hours after each stool (MAX of 16 mg in 24 hour period) 60 tablet 1    losartan (COZAAR) 100 MG tablet Take 1 tablet by mouth Every Night.      metoclopramide (REGLAN) 10 MG tablet Take 1 tablet by mouth 3 (Three) Times a Day.      metoprolol succinate XL (TOPROL-XL) 25 MG 24 hr tablet Take 1 tablet by mouth Daily.      Multiple Vitamins-Minerals (b complex-C-E-zinc) tablet Take 1 tablet by mouth  Daily.      Myrbetriq 50 MG tablet sustained-release 24 hour 24 hr tablet Take 50 mg by mouth Every Night.      ondansetron (ZOFRAN) 8 MG tablet Take 1 tablet by mouth 3 (Three) Times a Day As Needed for Nausea or Vomiting. 30 tablet 5    pantoprazole (PROTONIX) 40 MG EC tablet TAKE 1 TABLET BY MOUTH TWICE A DAY 60 tablet 1    Probiotic Product ([employer] PO) Take  by mouth Daily.      prochlorperazine (COMPAZINE) 10 MG tablet Take 1 tablet by mouth Every 6 (Six) Hours As Needed for Nausea or Vomiting. 30 tablet 5    rOPINIRole (REQUIP) 1 MG tablet Take 1 tablet by mouth Every Night.      sertraline (ZOLOFT) 100 MG tablet Take 0.5 tablets by mouth Every Night.      medroxyPROGESTERone (DEPO-PROVERA) 150 MG/ML injection Inject 1 mL into the appropriate muscle as directed by prescriber Every 3 (Three) Months. (Patient not taking: Reported on 11/21/2024)      naloxone (NARCAN) 4 MG/0.1ML nasal spray  (Patient not taking: Reported on 10/22/2024)      ondansetron ODT (ZOFRAN-ODT) 8 MG disintegrating tablet Every 8 (Eight) Hours. (Patient not taking: Reported on 10/22/2024)      phentermine (ADIPEX-P) 37.5 MG tablet Daily. (Patient not taking: Reported on 10/22/2024)      sodium chloride (Ocean Nasal Spray) 0.65 % nasal spray 1 spray into the nostril(s) as directed by provider 2 (Two) Times a Day As Needed for Congestion. (Patient not taking: Reported on 10/22/2024) 480 mL 4     No current facility-administered medications on file prior to visit.      Allergies   Allergen Reactions    Hydromorphone Hives, Itching and GI Intolerance    Morphine Hives, Itching, Nausea And Vomiting and Mental Status Change    Oxycodone-Acetaminophen Itching and Nausea And Vomiting     Can not take any higher than 7.5    Promethazine Nausea And Vomiting    Tylenol With Codeine #3 [Acetaminophen-Codeine] Itching and Nausea And Vomiting    Oxybutynin Swelling     SWELLING IN LEGS        Penicillins Rash     Past Surgical History:    Procedure Laterality Date    BREAST SURGERY  2016    Removed 8lbs of tissue, 2016    CHOLECYSTECTOMY      COLON RESECTION Left 05/05/2023    Procedure: RESECTION OF DESCENDING COLON AND SPLENIC FLEXURE TAKEDOWN WITH DAVINCI ROBOT;  Surgeon: Rolando Liu MD;  [place] [facility] [place] [facility] [place]  Service: Robotics - DaVinci;  Laterality: Left;    COLONOSCOPY N/A 04/04/2023    Procedure: COLONOSCOPY WITH POLYPECTOMY/SNARE/BIOPSIES/TATTOOING DESCENDING COLON MASS;  Surgeon: Deniz Dowd MD;  Location: [facility] ENDOSCOPY;  Service: Gastroenterology;  Laterality: N/A;  INCOMPLETE COLONOSCOPY  POOR BOWEL PREP  COLON POLYP  COLON MASS  DIVERTICULOSIS    COLONOSCOPY N/A 04/12/2023    Procedure: COLONOSCOPY with cold snare;  Surgeon: Deniz Dowd MD;  Location: [facility] ENDOSCOPY;  Service: Gastroenterology;  Laterality: N/A;  colon polyps, diverticulosis, colon mass, hemorrhoids      COLONOSCOPY N/A 7/24/2024    Procedure: COLONOSCOPY COLDSNARE POLYPECTOMY;  Surgeon: Rolando Liu MD;  Location: [facility] ENDOSCOPY;  Service: General;  Laterality: N/A;  COLON POLYP    CYSTOSCOPY W/ URETERAL STENT PLACEMENT Left 05/05/2023    Procedure: Cystoscopy, left ureteral injection,;  Surgeon: Manjula Randolph MD;  [place] [facility] [place] [facility] [place]  Service: Urology;  Laterality: Left;    FRACTURE SURGERY  2001    Left knee    KNEE SURGERY Left     x4    LAPAROSCOPIC TUBAL LIGATION  1995    LAPAROSCOPIC TUBAL LIGATION      Reversed    REDUCTION MAMMAPLASTY  2009    8lbs of tissue was removed    TONSILLECTOMY      UPPER GASTROINTESTINAL ENDOSCOPY      VENOUS ACCESS DEVICE (PORT) INSERTION N/A 06/05/2023    Procedure: INSERTION VENOUS ACCESS DEVICE;  Surgeon: Rolando Liu MD;  [place] [facility] [place] [facility] [place]  Service: General;  Laterality: N/A;    VENOUS ACCESS DEVICE (PORT) REMOVAL N/A 02/08/2024    Procedure: REMOVAL VENOUS ACCESS DEVICE;  Surgeon: Rolando Liu MD;  Location: [facility] MAIN OR;  Service: General;  Laterality: N/A;      Social History     Socioeconomic History    Marital status:    Tobacco Use    Smoking status: Former     Current packs/day: 0.00     Average packs/day: 0.5 packs/day for 2.1 years (1.1 ttl pk-yrs)     Types: Cigarettes     Start date: 3/5/1991     Quit date: 1993     Years since quittin.5     Passive exposure: Never    Smokeless tobacco: Never    Tobacco comments:     When i was 16 right after my daughter was born   Vaping Use    Vaping status: Never Used   Substance and Sexual Activity    Alcohol use: Never    Drug use: Never    Sexual activity: Not Currently     Partners: Male     Birth control/protection: Depo-provera, Injection     Family History   Problem Relation Age of Onset    Cancer Mother         Breast    Hypertension Mother     Arthritis Mother         Passed away     Diabetes Maternal Grandmother         Passed away    Heart disease Maternal Grandmother         Passed away heart attack    Heart disease Maternal Grandfather         Passed away heart attack    Depression Brother     Colon cancer Neg Hx     Malig Hyperthermia Neg Hx        Results     Result Review   The following data was reviewed by: Brian Marroquin MD on 2023:  Lab Results   Component Value Date    HGB 15.7 10/21/2024    HCT 46.4 10/21/2024    MCV 86.6 10/21/2024     10/21/2024    WBC 8.29 10/21/2024    NEUTROABS 4.90 10/21/2024    LYMPHSABS 2.62 10/21/2024    MONOSABS 0.57 10/21/2024    EOSABS 0.10 10/21/2024    BASOSABS 0.06 10/21/2024     Lab Results   Component Value Date    GLUCOSE 95 10/21/2024    BUN 11 10/21/2024    CREATININE 0.82 10/21/2024     10/21/2024    K 4.0 10/21/2024     10/21/2024    CO2 24.5 10/21/2024    CALCIUM 9.4 10/21/2024    PROTEINTOT 7.7 10/21/2024    ALBUMIN 4.4 10/21/2024    BILITOT 0.5 10/21/2024    ALKPHOS 125 (H) 10/21/2024    AST 22 10/21/2024    ALT 18 10/21/2024     Lab Results   Component Value Date    MG 2.1 2024    PHOS 3.8 2024       Case  Report   Surgical Pathology Report                         Case: [id number]                                   Authorizing Provider:  Deniz Dowd MD    Collected:           04/12/2023 09:41 AM           Ordering Location:     [facility] Received:            04/12/2023 11:41 AM                                  SUITES                                                                        Pathologist:           Niesha Torres DO                                                        Specimens:   1) - Large Intestine, Cecum, cecal polyp cold snare                                                  2) - Large Intestine, Sigmoid Colon, sigmoid colon polyp cold snare                        Clinical Information    Mass of colon   Final Diagnosis   1. Cecum polyp, biopsy:                            - Tubular adenoma        2. Sigmoid colon polyp, biopsy:                            - Tubular adenoma    Electronically signed by Niesha Torres DO on 4/13/2023 at 0840     Surgical Pathology Report                         Case: [id number]                                   Authorizing Provider:  Deniz Dowd MD    Collected:           04/04/2023 11:15 AM           Ordering Location:     [facility] Received:            04/04/2023 11:59 AM                                  SUITES                                                                        Pathologist:           Jennifer Mike MD                                                      Specimens:   1) - Large Intestine, Transverse Colon, TRANSVERSE COLON POLYP                                       2) - Large Intestine, Left / Descending Colon, DESCENDING COLON BIOPSIES                   Clinical Information    Constipation, unspecified constipation type   Final Diagnosis   1. Transverse colon polyp, biopsy:               - Fragments of tubular adenoma        2.  Descending colon, biopsy:                -Adenocarcinoma, moderately to poorly differentiated     Comment: Per policy, reflex testing has been ordered, and the results will be separately reported.     REMARKS: The above positive (malignant) diagnosis was called to April in Dr. Dowd's office at 09:09 EDT on 4/5/2023 by bjs.          Electronically signed by Jennifer Mike MD on 4/5/2023 at 0933         Surgical Pathology Report                         Case: [id number]                                   Authorizing Provider:  Rolando Liu MD        Collected:           05/05/2023 03:06 PM           Ordering Location:     [facility]  Received:            05/08/2023 06:48 AM                                  OR                                                                            Pathologist:           Jt Florence MD                                                             Specimen:    Large Intestine, Left / Descending Colon, left colon                                       Clinical Information    Malignant neoplasm of descending colon   Final Diagnosis   Left colon, resection:               - Adenocarcinoma               - Three of thirty-three lymph nodes positive for carcinoma (3/33)               - See synoptic checklist   Electronically signed by Jt Florence MD on 5/11/2023 at 1508   Synoptic Checklist   COLON AND RECTUM: Resection, Including Transanal Disk Excision of Rectal Neoplasms  8th Edition - Protocol posted: 6/22/2022  COLON AND RECTUM: RESECTION - All Specimens  SPECIMEN   Procedure  Descending colon resection    TUMOR   Tumor Site  Descending colon    Histologic Type  Adenocarcinoma    Histologic Grade  G3, poorly differentiated    Tumor Size  Greatest dimension (Centimeters): 3.5 cm   Tumor Extent  Invades through muscularis propria into the pericolonic or perirectal tissue    Macroscopic Tumor Perforation  Not identified    Lymphovascular Invasion  Small vessel    Perineural Invasion  Present     Treatment Effect  No known presurgical therapy    MARGINS   Margin Status for Invasive Carcinoma  All margins negative for invasive carcinoma    Closest Margin(s) to Invasive Carcinoma  Radial (circumferential) or mesenteric    Distance from Invasive Carcinoma to Closest Margin  3.5 cm   Margin Status for Non-Invasive Tumor  All margins negative for high-grade dysplasia / intramucosal carcinoma and low-grade dysplasia    REGIONAL LYMPH NODES   Regional Lymph Node Status  Tumor present in regional lymph node(s)    Number of Lymph Nodes with Tumor  3    Number of Lymph Nodes Examined  33    Tumor Deposits  Present    Number of Tumor Deposits  1    PATHOLOGIC STAGE CLASSIFICATION (pTNM, AJCC 8th Edition)   Reporting of pT, pN, and (when applicable) pM categories is based on information available to the pathologist at the time the report is issued. As per the AJCC (Chapter 1, 8th Ed.) it is the managing physician's responsibility to establish the final pathologic stage based upon all pertinent information, including but potentially not limited to this pathology report.   pT Category  pT3    pN Category  pN1b    ADDITIONAL FINDINGS   Additional Findings  None identified    .          NM Bone Scan Whole Body    Result Date: 11/21/2024  Impression: 1. Grossly stable uptake within the skeletal system as above compatible with degenerative change. 2. No scintigraphic evidence of metastatic disease noted. Electronically Signed: Yordy Lion MD  11/21/2024 8:42 AM EST  Workstation ID: [id number]     Assessment & Plan     Diagnoses and all orders for this visit:    1. Malignant neoplasm of descending colon (Primary)        Lilian Pelaez is a 49 y.o. female who presents to [facility] HEMATOLOGY [facility] ONCOLOGY evaluation prior to systemic therapy for stage III colon cancer, patient status post resection of descending colon splenic flexure by Dr. Rolando Liu on 5/5/2023.     Based on NCCN guidelines for her stage  III colon cancer, discussed high risk features seen on pathology report and discussed CapeOx for 3 months versus FOLFOX for 3 to 6 months with patient and  today.  Patient agreed to plan to undergo FOLFOX IV chemotherapy every 2 weeks for 6 months.    Patient received cycle 1 on June 14, 2023, she developed pneumoperitoneum which her surgeon suspect was secondary to severe constipation from iron tablets, but patient was cleared to resume chemotherapy after 8/1/2023, she completed 12 cycles of chemotherapy on 12/27/2024.    Restaging imaging scans obtained on 1/29/2023 which was without evidence of disease recurrence or metastatic disease, but did reveal new elevation of right hemidiaphragm with secondary lower lobe atelectasis, new ground glass infiltrates in the upper lobes, likely infectious including possible viral pneumonia.    Restaging imaging from 5/15/2024 revealed an indeterminate approximately 1 cm sclerotic focus in T8 vertebral body, since osteoblastic metastasis cannot be excluded, refer patient for bone scan for further assessment, will recommend biopsy if suspicious on bone scan, otherwise no evidence of intrathoracic or abdominal pelvic metastatic disease, interval resolution of pulmonary groundglass infiltrates and diaphragm elevation.  -NM bone scan from 5/22/2024 was negative for metastatic disease    -Pt requested chemoport removal, referred pt to general surgeon for chemport removal.     NM bone scan from 5/22/2024 was negative for osseous metastatic disease. CT CAP from 5/16/24 negative for metastatic disease.   10/21/2024: CT CAP negative for disease progression, CEA remains low, pt with abdominal pain and findings suggestive of fat necrosis thus referred pt back to her general surgeon for evaluation who did not recommend any intervention at this time.    nuclear medicine bone scan from 11/19/2024 was negative for metastatic disease.     plan for pt follow up in 4 months with repeat CT  CAP and NM bone scan to reassess disease status and for repeat CBC, CMP and CEA.    Patient verbalized understanding and agreement plan.    Please note that portions of this note were completed with a voice recognition program.    Electronically signed by Brian Marroquin MD, 11/21/24, 12:59 PM EST.      Follow Up     I spent 30 minutes caring for Lilian on this date of service. This time includes time spent by me in the following activities:preparing for the visit, reviewing tests, obtaining and/or reviewing a separately obtained history, performing a medically appropriate examination and/or evaluation , counseling and educating the patient/family/caregiver, ordering medications, tests, or procedures, documenting information in the medical record and care coordination.    This is an acute or chronic illness that poses a threat to life or bodily function. The above treatment plan involves a high risk of complications and/or mortality of patient management.    The patient was seen and examined. Work by the provider also included review and/or ordering of lab tests, review and/or ordering of radiology tests, review and/or ordering of medicine tests, discussion with other physicians or providers, independent review of data, obtaining old records, review/summation of old records, and/or other review.    I have reviewed the family history, social history, and past medical history for this patient. Previous information and data has been reviewed and updated as needed. I have reviewed and verified the chief complaint, history, and other documentation. The patient was interviewed and examined in the clinic and the chart reviewed. The previous observations, recommendations, and conclusions were reviewed including those of other providers.     The plan was discussed with the patient and/or family. The patient was given time to ask questions and these questions were answered. At the conclusion of their visit they had no additional  questions or concerns and all questions were answered to their satisfaction.    Patient was given instructions and counseling regarding her condition or for health maintenance advice. Please see specific information pulled into the AVS if appropriate.

## 2025-01-07 DIAGNOSIS — K21.9 GASTRIC REFLUX: ICD-10-CM

## 2025-01-08 RX ORDER — PANTOPRAZOLE SODIUM 40 MG/1
40 TABLET, DELAYED RELEASE ORAL 2 TIMES DAILY
Qty: 60 TABLET | Refills: 1 | Status: SHIPPED | OUTPATIENT
Start: 2025-01-08

## 2025-02-06 DIAGNOSIS — R93.89 ABNORMAL THYROID ULTRASOUND: Primary | ICD-10-CM

## 2025-02-14 ENCOUNTER — TELEPHONE (OUTPATIENT)
Dept: ONCOLOGY | Facility: HOSPITAL | Age: 50
End: 2025-02-14

## 2025-02-14 NOTE — TELEPHONE ENCOUNTER
Caller: Lilian Pelaez    Relationship: Self    Best call back number: [phone]     What is the best time to reach you: ANYTIME    Who are you requesting to speak with (clinical staff, provider,  specific staff member): CLINICAL    What was the call regarding: PATIENT CALLING FOR AN UPDATE ON ENT REFERRAL BEING SENT/APPT BEING MADE. [facility] ENT REFERRAL WAS SENT BACK TO DR GERMAIN.    PLEASE CALL TO ADVISE.

## 2025-02-25 ENCOUNTER — HOSPITAL ENCOUNTER (OUTPATIENT)
Dept: NUCLEAR MEDICINE | Facility: HOSPITAL | Age: 50
Discharge: HOME | End: 2025-02-25
Payer: COMMERCIAL

## 2025-02-25 ENCOUNTER — HOSPITAL ENCOUNTER (OUTPATIENT)
Dept: CT IMAGING | Facility: HOSPITAL | Age: 50
Discharge: HOME | End: 2025-02-25
Payer: COMMERCIAL

## 2025-02-25 DIAGNOSIS — C18.6 MALIGNANT NEOPLASM OF DESCENDING COLON: ICD-10-CM

## 2025-02-25 DIAGNOSIS — M54.9 BACK PAIN, UNSPECIFIED BACK LOCATION, UNSPECIFIED BACK PAIN LATERALITY, UNSPECIFIED CHRONICITY: ICD-10-CM

## 2025-02-25 LAB
ALBUMIN SERPL-MCNC: 4.4 G/DL (ref 3.5–5.2)
ALBUMIN/GLOB SERPL: 1.3 G/DL
ALP SERPL-CCNC: 135 U/L (ref 39–117)
ALT SERPL W P-5'-P-CCNC: 23 U/L (ref 1–33)
ANION GAP SERPL CALCULATED.3IONS-SCNC: 9.4 MMOL/L (ref 5–15)
AST SERPL-CCNC: 23 U/L (ref 1–32)
BASOPHILS # BLD AUTO: 0.05 10*3/MM3 (ref 0–0.2)
BASOPHILS NFR BLD AUTO: 0.5 % (ref 0–1.5)
BILIRUB SERPL-MCNC: 0.6 MG/DL (ref 0–1.2)
BUN SERPL-MCNC: 12 MG/DL (ref 6–20)
BUN/CREAT SERPL: 14 (ref 7–25)
CALCIUM SPEC-SCNC: 9.9 MG/DL (ref 8.6–10.5)
CEA SERPL-MCNC: 1.7 NG/ML
CHLORIDE SERPL-SCNC: 103 MMOL/L (ref 98–107)
CO2 SERPL-SCNC: 26.6 MMOL/L (ref 22–29)
CREAT SERPL-MCNC: 0.86 MG/DL (ref 0.57–1)
DEPRECATED RDW RBC AUTO: 37.1 FL (ref 37–54)
EGFRCR SERPLBLD CKD-EPI 2021: 82.9 ML/MIN/1.73
EOSINOPHIL # BLD AUTO: 0.09 10*3/MM3 (ref 0–0.4)
EOSINOPHIL NFR BLD AUTO: 1 % (ref 0.3–6.2)
ERYTHROCYTE [DISTWIDTH] IN BLOOD BY AUTOMATED COUNT: 12.2 % (ref 12.3–15.4)
GLOBULIN UR ELPH-MCNC: 3.5 GM/DL
GLUCOSE SERPL-MCNC: 99 MG/DL (ref 65–99)
HCT VFR BLD AUTO: 46.2 % (ref 34–46.6)
HGB BLD-MCNC: 15.7 G/DL (ref 12–15.9)
IMM GRANULOCYTES # BLD AUTO: 0.04 10*3/MM3 (ref 0–0.05)
IMM GRANULOCYTES NFR BLD AUTO: 0.4 % (ref 0–0.5)
LYMPHOCYTES # BLD AUTO: 2.91 10*3/MM3 (ref 0.7–3.1)
LYMPHOCYTES NFR BLD AUTO: 31.6 % (ref 19.6–45.3)
MCH RBC QN AUTO: 28.9 PG (ref 26.6–33)
MCHC RBC AUTO-ENTMCNC: 34 G/DL (ref 31.5–35.7)
MCV RBC AUTO: 84.9 FL (ref 79–97)
MONOCYTES # BLD AUTO: 0.68 10*3/MM3 (ref 0.1–0.9)
MONOCYTES NFR BLD AUTO: 7.4 % (ref 5–12)
NEUTROPHILS NFR BLD AUTO: 5.45 10*3/MM3 (ref 1.7–7)
NEUTROPHILS NFR BLD AUTO: 59.1 % (ref 42.7–76)
NRBC BLD AUTO-RTO: 0 /100 WBC (ref 0–0.2)
PLATELET # BLD AUTO: 229 10*3/MM3 (ref 140–450)
PMV BLD AUTO: 9.6 FL (ref 6–12)
POTASSIUM SERPL-SCNC: 4 MMOL/L (ref 3.5–5.2)
PROT SERPL-MCNC: 7.9 G/DL (ref 6–8.5)
RBC # BLD AUTO: 5.44 10*6/MM3 (ref 3.77–5.28)
SODIUM SERPL-SCNC: 139 MMOL/L (ref 136–145)
WBC NRBC COR # BLD AUTO: 9.22 10*3/MM3 (ref 3.4–10.8)

## 2025-02-25 PROCEDURE — 34310000005 TECHNETIUM MEDRONATE KIT: Performed by: INTERNAL MEDICINE

## 2025-02-25 PROCEDURE — 78306 BONE IMAGING WHOLE BODY: CPT

## 2025-02-25 PROCEDURE — A9503 TC99M MEDRONATE: HCPCS | Performed by: INTERNAL MEDICINE

## 2025-02-25 PROCEDURE — 71260 CT THORAX DX C+: CPT

## 2025-02-25 PROCEDURE — 80053 COMPREHEN METABOLIC PANEL: CPT | Performed by: INTERNAL MEDICINE

## 2025-02-25 PROCEDURE — 25510000001 IOPAMIDOL PER 1 ML: Performed by: INTERNAL MEDICINE

## 2025-02-25 PROCEDURE — 82378 CARCINOEMBRYONIC ANTIGEN: CPT | Performed by: INTERNAL MEDICINE

## 2025-02-25 PROCEDURE — 85025 COMPLETE CBC W/AUTO DIFF WBC: CPT | Performed by: INTERNAL MEDICINE

## 2025-02-25 PROCEDURE — 74177 CT ABD & PELVIS W/CONTRAST: CPT

## 2025-02-25 RX ORDER — TC 99M MEDRONATE 20 MG/10ML
21.4 INJECTION, POWDER, LYOPHILIZED, FOR SOLUTION INTRAVENOUS
Status: COMPLETED | OUTPATIENT
Start: 2025-02-25 | End: 2025-02-25

## 2025-02-25 RX ORDER — IOPAMIDOL 755 MG/ML
100 INJECTION, SOLUTION INTRAVASCULAR
Status: COMPLETED | OUTPATIENT
Start: 2025-02-25 | End: 2025-02-25

## 2025-02-25 RX ADMIN — IOPAMIDOL 100 ML: 755 INJECTION, SOLUTION INTRAVENOUS at 11:50

## 2025-02-25 RX ADMIN — TC 99M MEDRONATE 21.4 MILLICURIE: 20 INJECTION, POWDER, LYOPHILIZED, FOR SOLUTION INTRAVENOUS at 12:05

## 2025-03-05 ENCOUNTER — OFFICE VISIT (OUTPATIENT)
Dept: ONCOLOGY | Facility: HOSPITAL | Age: 50
End: 2025-03-05
Payer: COMMERCIAL

## 2025-03-05 VITALS
TEMPERATURE: 97.5 F | HEART RATE: 94 BPM | OXYGEN SATURATION: 94 % | WEIGHT: 293 LBS | DIASTOLIC BLOOD PRESSURE: 98 MMHG | SYSTOLIC BLOOD PRESSURE: 155 MMHG | HEIGHT: 67 IN | BODY MASS INDEX: 45.99 KG/M2 | RESPIRATION RATE: 20 BRPM

## 2025-03-05 DIAGNOSIS — E07.9 THYROID DYSFUNCTION: ICD-10-CM

## 2025-03-05 DIAGNOSIS — C18.6 MALIGNANT NEOPLASM OF DESCENDING COLON: Primary | ICD-10-CM

## 2025-03-05 PROCEDURE — G0463 HOSPITAL OUTPT CLINIC VISIT: HCPCS | Performed by: INTERNAL MEDICINE

## 2025-03-05 RX ORDER — SEMAGLUTIDE 0.25 MG/.5ML
INJECTION, SOLUTION SUBCUTANEOUS
COMMUNITY
Start: 2024-10-24

## 2025-03-05 NOTE — PROGRESS NOTES
Chief Complaint/Care Team   Malignant neoplasm of descending colon    Shelly Cash MD [place]eter, Laurence Hardy, CHANTELLE    History of Present Illness     Diagnosis: Colon Adenocarcinoma S/p Left colectomy on 5/5/23, stage after resection [id number], stage III    Current Treatment: Active Surveillance    Previous Treatment: c-scope on 4/2023 biopsy revealed colon adenocarcinoma  -FOLFOX IV [id number] x 6 months, cycle 1 on 6/14/2023  Treatment delay secondary to pneumoperitoneum which developed in June 2023, patient cleared to resume chemotherapy in August 2023, pt completed this therapy on 12/2023    Lilian Pelaez is a 49 y.o. female who presents to [facility]Y for discussion of newly diagnosed colon adenocarcinoma seen on biopsy of colon mass in the descending colon during colonoscopy performed in April 2023.     Staging scans revealed on 4/11/2023:  CT abdomen/pelvis without any metastatic disease in abdomen/pelvis  CT chest no evidence of metastatic disease in chest.    Patient underwent robotic resection of descending colon and splenic flexure by Dr. Rolando Liu on 5/5/2023.    Patient is a former smoker.   Patient reports family history of several relatives with other forms of cancer cancer on her mother side of the family, but denies any known history of colon cancer in her family.    Patient reports noticing some blood mixed in with stool prior to cancer diagnosis.      Patient received cycle 1 on 6/14/2023, cycle 2 was delayed secondary to development of pneumoperitoneum after cycle 1, patient was mated to the hospital very by her surgeon Dr. Liu who recommended delaying further cycles of chemotherapy until August 1, 2023.    Interval history: Patient here for follow up after completing 12 cycles of chemotherapy with FOLFOX in 12/2023. Again, no report of any fever, chills, SOA, melena, or blood loss or other infectious symptoms. She reports irritation for chemoport  and thus had it removed. She is here to discuss nuclear medicine bone scan obtained due to chronic lower back pain as well as restaging imaging with CT CAP. She reports her primary care provider has referred her to ENT to assess for thyroid dysfunction, pt concerned about continual weight gain despite her best efforts to lose weight.     Review of Systems   Constitutional:  Positive for activity change, appetite change and fatigue. Negative for diaphoresis, fever, unexpected weight gain and unexpected weight loss.   HENT:  Negative for congestion, hearing loss, mouth sores, nosebleeds, sore throat, swollen glands, trouble swallowing and voice change.    Eyes:  Negative for blurred vision.   Respiratory:  Negative for cough, shortness of breath and wheezing.    Cardiovascular:  Negative for chest pain and palpitations.   Gastrointestinal:  Positive for nausea. Negative for abdominal pain, blood in stool, constipation, diarrhea and vomiting.   Endocrine: Positive for cold intolerance. Negative for heat intolerance.   Genitourinary:  Negative for difficulty urinating, dysuria, frequency, hematuria and urinary incontinence.   Musculoskeletal:  Positive for back pain. Negative for arthralgias and myalgias.   Skin:  Negative for rash, skin lesions and wound.   Neurological:  Positive for dizziness. Negative for seizures, weakness, numbness and headache.   Hematological:  Does not bruise/bleed easily.   Psychiatric/Behavioral:  Negative for depressed mood. The patient is nervous/anxious.    All other systems reviewed and are negative.       Oncology/Hematology History   Primary adenocarcinoma of descending colon   4/11/2023 Initial Diagnosis    Primary adenocarcinoma of descending colon     9/27/2023 -  Chemotherapy    OP SUPPORTIVE HYDRATION + ANTIEMETICS     Malignant neoplasm of descending colon   5/6/2023 Initial Diagnosis    Malignant neoplasm of descending colon     5/31/2023 Cancer Staged    Staging form: Colon And  "Rectum, AJCC 8th Edition  - Pathologic: Stage IIIB (pT3, pN1b, cM0) - Signed by Brian Marroquin MD on 5/31/2023 6/14/2023 -  Chemotherapy    OP COLON mFOLFOX6 OXALIplatin / Leucovorin / Fluorouracil         Objective     Vitals:    03/05/25 1141   BP: 155/98   Pulse: 94   Resp: 20   Temp: 97.5 °F (36.4 °C)   TempSrc: Temporal   SpO2: 94%   Weight: (!) 150 kg (331 lb 3.2 oz)   Height: 170.2 cm (67.01\")   PainSc: 3    PainLoc: Generalized         ECOG score: 0        PHQ-9 Total Score:         Physical Exam  Vitals reviewed. Exam conducted with a chaperone present.   Constitutional:       General: She is not in acute distress.     Appearance: Normal appearance.   HENT:      Head: Normocephalic and atraumatic.      Mouth/Throat:      Mouth: Mucous membranes are dry.   Eyes:      Extraocular Movements: Extraocular movements intact.      Conjunctiva/sclera: Conjunctivae normal.   Cardiovascular:      Pulses: Normal pulses.      Heart sounds: Normal heart sounds.   Pulmonary:      Effort: Pulmonary effort is normal.      Breath sounds: Normal breath sounds. No rhonchi or rales.   Abdominal:      General: Bowel sounds are normal. There is no distension.      Palpations: Abdomen is soft. There is no mass.      Comments: Well healed surgical incisions on abdomen   Musculoskeletal:      Cervical back: Normal range of motion and neck supple.   Skin:     General: Skin is warm and dry.      Findings: No bruising.   Neurological:      Mental Status: She is oriented to person, place, and time.           Past Medical History     Past Medical History:   Diagnosis Date    Anesthesia     B/P bottomed out/UNSURE ON THE DATE    Anxiety     Asthma 1996    seasonal/NO INHALERS    Atelectasis of right lung     PER CT SCAN 1/2024    Colon cancer 04/17/2023    ADENOCARCINOMA STAGE 3    DDD (degenerative disc disease), cervical     DDD (degenerative disc disease), lumbar     Depression     Diverticulitis of colon 2005    Scope was done in " [place]    GERD (gastroesophageal reflux disease)     Hypertension     Kidney stones     Melanoma     removed    Palpitation     FOLLOWS W/DR. ANTONIO  Q6 MONTHS, NO CP OR SOA    Prolapse of female bladder      Current Outpatient Medications on File Prior to Visit   Medication Sig Dispense Refill    albuterol (ACCUNEB) 0.63 MG/3ML nebulizer solution       allopurinol (ZYLOPRIM) 100 MG tablet Take 1 tablet by mouth Daily.      ALPRAZolam (XANAX) 0.5 MG tablet Take 1 tablet by mouth At Night As Needed.      amLODIPine (NORVASC) 10 MG tablet Take 1 tablet by mouth Every Night.      cyclobenzaprine (FLEXERIL) 10 MG tablet 2 (Two) Times a Day As Needed.      Diclofenac Sodium (VOLTAREN) 1 % gel gel Apply 4 g topically to the appropriate area as directed 4 (Four) Times a Day As Needed.      docusate sodium 100 MG capsule Take 1 capsule by mouth Daily.      furosemide (LASIX) 20 MG tablet Take 1 tablet by mouth Daily.      gabapentin (NEURONTIN) 300 MG capsule Take 1 capsule by mouth 2 (Two) Times a Day. 60 capsule 2    HYDROcodone-acetaminophen (NORCO) 7.5-325 MG per tablet Take 1 tablet by mouth Every 8 (Eight) Hours As Needed.      hyoscyamine (ANASPAZ,LEVSIN) 0.125 MG tablet TAKE ONE TABLET BY MOUTH FOUR TIMES A DAY WITH MEALS AND AT BEDTIME. 120 tablet 1    ketorolac (TORADOL) 10 MG tablet Take 1 tablet by mouth Every 6 (Six) Hours As Needed.      lamoTRIgine (LaMICtal) 25 MG tablet Take 1 tablet by mouth 2 (Two) Times a Day. Only takes at night      losartan (COZAAR) 100 MG tablet Take 1 tablet by mouth Every Night.      metoclopramide (REGLAN) 10 MG tablet Take 1 tablet by mouth 3 (Three) Times a Day.      metoprolol succinate XL (TOPROL-XL) 25 MG 24 hr tablet Take 1 tablet by mouth Daily.      Multiple Vitamins-Minerals (b complex-C-E-zinc) tablet Take 1 tablet by mouth Daily.      Myrbetriq 50 MG tablet sustained-release 24 hour 24 hr tablet Take 50 mg by mouth Every Night.      ondansetron (ZOFRAN) 8 MG tablet  Take 1 tablet by mouth 3 (Three) Times a Day As Needed for Nausea or Vomiting. 30 tablet 5    pantoprazole (PROTONIX) 40 MG EC tablet TAKE 1 TABLET BY MOUTH 2 TIMES A DAY 60 tablet 1    Probiotic Product (SoundRoadie PO) Take  by mouth Daily.      prochlorperazine (COMPAZINE) 10 MG tablet Take 1 tablet by mouth Every 6 (Six) Hours As Needed for Nausea or Vomiting. 30 tablet 5    rOPINIRole (REQUIP) 1 MG tablet Take 1 tablet by mouth Every Night.      sertraline (ZOLOFT) 100 MG tablet Take 0.5 tablets by mouth Every Night.      loperamide (Imodium A-D) 2 MG tablet Take 1 tablet by mouth 4 (Four) Times a Day As Needed for Diarrhea. Imodium - take 4 mg first dose, followed by 2 mg every 2-4 hours after each stool (MAX of 16 mg in 24 hour period) (Patient not taking: Reported on 3/5/2025) 60 tablet 1    medroxyPROGESTERone (DEPO-PROVERA) 150 MG/ML injection Inject 1 mL into the appropriate muscle as directed by prescriber Every 3 (Three) Months. (Patient not taking: Reported on 11/21/2024)      naloxone (NARCAN) 4 MG/0.1ML nasal spray  (Patient not taking: Reported on 3/5/2025)      ondansetron ODT (ZOFRAN-ODT) 8 MG disintegrating tablet Every 8 (Eight) Hours. (Patient not taking: Reported on 3/5/2025)      phentermine (ADIPEX-P) 37.5 MG tablet Daily. (Patient not taking: Reported on 3/5/2025)      Semaglutide-Weight Management (Wegovy) 0.25 MG/0.5ML solution auto-injector 0.5 mL Subcutaneous weekly for 30 days (Patient not taking: Reported on 3/5/2025)      sodium chloride (Ocean Nasal Spray) 0.65 % nasal spray 1 spray into the nostril(s) as directed by provider 2 (Two) Times a Day As Needed for Congestion. (Patient not taking: Reported on 3/5/2025) 480 mL 4     No current facility-administered medications on file prior to visit.      Allergies   Allergen Reactions    Hydromorphone Hives, Itching and GI Intolerance    Morphine Hives, Itching, Nausea And Vomiting and Mental Status Change     Oxycodone-Acetaminophen Itching and Nausea And Vomiting     Can not take any higher than 7.5    Promethazine Nausea And Vomiting    Tylenol With Codeine #3 [Acetaminophen-Codeine] Itching and Nausea And Vomiting    Oxybutynin Swelling     SWELLING IN LEGS        Penicillins Rash     Past Surgical History:   Procedure Laterality Date    BREAST SURGERY  2016    Removed 8lbs of tissue, 2016    CHOLECYSTECTOMY      COLON RESECTION Left 05/05/2023    Procedure: RESECTION OF DESCENDING COLON AND SPLENIC FLEXURE TAKEDOWN WITH DAVINCI ROBOT;  Surgeon: Rolando Liu MD;  [place] [facility] [place] [facility] [place]  Service: Robotics - DaVinci;  Laterality: Left;    COLONOSCOPY N/A 04/04/2023    Procedure: COLONOSCOPY WITH POLYPECTOMY/SNARE/BIOPSIES/TATTOOING DESCENDING COLON MASS;  Surgeon: Deniz Dowd MD;  Location: [facility] ENDOSCOPY;  Service: Gastroenterology;  Laterality: N/A;  INCOMPLETE COLONOSCOPY  POOR BOWEL PREP  COLON POLYP  COLON MASS  DIVERTICULOSIS    COLONOSCOPY N/A 04/12/2023    Procedure: COLONOSCOPY with cold snare;  Surgeon: Deniz Dowd MD;  Location: [facility] ENDOSCOPY;  Service: Gastroenterology;  Laterality: N/A;  colon polyps, diverticulosis, colon mass, hemorrhoids      COLONOSCOPY N/A 7/24/2024    Procedure: COLONOSCOPY COLDSNARE POLYPECTOMY;  Surgeon: Rolando Liu MD;  Location: [facility] ENDOSCOPY;  Service: General;  Laterality: N/A;  COLON POLYP    CYSTOSCOPY W/ URETERAL STENT PLACEMENT Left 05/05/2023    Procedure: Cystoscopy, left ureteral injection,;  Surgeon: Manjula Randolph MD;  [place] [facility] [place] [facility] [place]  Service: Urology;  Laterality: Left;    FRACTURE SURGERY  2001    Left knee    KNEE SURGERY Left     x4    LAPAROSCOPIC TUBAL LIGATION  1995    LAPAROSCOPIC TUBAL LIGATION      Reversed    REDUCTION MAMMAPLASTY  2009    8lbs of tissue was removed    TONSILLECTOMY      UPPER GASTROINTESTINAL ENDOSCOPY      VENOUS ACCESS DEVICE (PORT) INSERTION N/A 06/05/2023    Procedure: INSERTION VENOUS  ACCESS DEVICE;  Surgeon: Rolando Liu MD;  Location: [facility];  Service: General;  Laterality: N/A;    VENOUS ACCESS DEVICE (PORT) REMOVAL N/A 2024    Procedure: REMOVAL VENOUS ACCESS DEVICE;  Surgeon: Rolando Liu MD;  Location: [facility] MAIN OR;  Service: General;  Laterality: N/A;     Social History     Socioeconomic History    Marital status:    Tobacco Use    Smoking status: Former     Current packs/day: 0.00     Average packs/day: 0.5 packs/day for 2.1 years (1.1 ttl pk-yrs)     Types: Cigarettes     Start date: 3/5/1991     Quit date: 1993     Years since quittin.8     Passive exposure: Never    Smokeless tobacco: Never    Tobacco comments:     When i was 16 right after my daughter was born   Vaping Use    Vaping status: Never Used   Substance and Sexual Activity    Alcohol use: Never    Drug use: Never    Sexual activity: Not Currently     Partners: Male     Birth control/protection: Depo-provera, Injection     Family History   Problem Relation Age of Onset    Cancer Mother         Breast    Hypertension Mother     Arthritis Mother         Passed away     Diabetes Maternal Grandmother         Passed away    Heart disease Maternal Grandmother         Passed away heart attack    Heart disease Maternal Grandfather         Passed away heart attack    Depression Brother     Colon cancer Neg Hx     Malig Hyperthermia Neg Hx        Results     Result Review   The following data was reviewed by: Brian Marroquin MD on 2023:  Lab Results   Component Value Date    HGB 15.7 2025    HCT 46.2 2025    MCV 84.9 2025     2025    WBC 9.22 2025    NEUTROABS 5.45 2025    LYMPHSABS 2.91 2025    MONOSABS 0.68 2025    EOSABS 0.09 2025    BASOSABS 0.05 2025     Lab Results   Component Value Date    GLUCOSE 99 2025    BUN 12 2025    CREATININE 0.86 2025     2025    K 4.0 2025      02/25/2025    CO2 26.6 02/25/2025    CALCIUM 9.9 02/25/2025    PROTEINTOT 7.9 02/25/2025    ALBUMIN 4.4 02/25/2025    BILITOT 0.6 02/25/2025    ALKPHOS 135 (H) 02/25/2025    AST 23 02/25/2025    ALT 23 02/25/2025     Lab Results   Component Value Date    MG 2.1 01/29/2024    PHOS 3.8 01/29/2024       Case Report   Surgical Pathology Report                         Case: [id number]                                   Authorizing Provider:  Deniz Dowd MD    Collected:           04/12/2023 09:41 AM           Ordering Location:     [facility] Received:            04/12/2023 11:41 AM                                  SUITES                                                                        Pathologist:           Niesha Torres DO                                                        Specimens:   1) - Large Intestine, Cecum, cecal polyp cold snare                                                  2) - Large Intestine, Sigmoid Colon, sigmoid colon polyp cold snare                        Clinical Information    Mass of colon   Final Diagnosis   1. Cecum polyp, biopsy:                            - Tubular adenoma        2. Sigmoid colon polyp, biopsy:                            - Tubular adenoma    Electronically signed by Niesha Torres DO on 4/13/2023 at 0840     Surgical Pathology Report                         Case: [id number]                                   Authorizing Provider:  Deniz Dowd MD    Collected:           04/04/2023 11:15 AM           Ordering Location:     [facility] Received:            04/04/2023 11:59 AM                                  SUITES                                                                        Pathologist:           Jennifer Mike MD                                                      Specimens:   1) - Large Intestine, Transverse Colon, TRANSVERSE COLON POLYP                                       2) - Large  Intestine, Left / Descending Colon, DESCENDING COLON BIOPSIES                   Clinical Information    Constipation, unspecified constipation type   Final Diagnosis   1. Transverse colon polyp, biopsy:               - Fragments of tubular adenoma        2.  Descending colon, biopsy:               -Adenocarcinoma, moderately to poorly differentiated     Comment: Per policy, reflex testing has been ordered, and the results will be separately reported.     REMARKS: The above positive (malignant) diagnosis was called to April in Dr. Dowd's office at 09:09 EDT on 4/5/2023 by bjs.          Electronically signed by Jennifer Mike MD on 4/5/2023 at 0916         Surgical Pathology Report                         Case: [id number]                                   Authorizing Provider:  Rolando Liu MD        Collected:           05/05/2023 03:06 PM           Ordering Location:     [facility]  Received:            05/08/2023 06:48 AM                                  OR                                                                            Pathologist:           Jt Florence MD                                                             Specimen:    Large Intestine, Left / Descending Colon, left colon                                       Clinical Information    Malignant neoplasm of descending colon   Final Diagnosis   Left colon, resection:               - Adenocarcinoma               - Three of thirty-three lymph nodes positive for carcinoma (3/33)               - See synoptic checklist   Electronically signed by Jt Florence MD on 5/11/2023 at 1508   Synoptic Checklist   COLON AND RECTUM: Resection, Including Transanal Disk Excision of Rectal Neoplasms  8th Edition - Protocol posted: 6/22/2022  COLON AND RECTUM: RESECTION - All Specimens  SPECIMEN   Procedure  Descending colon resection    TUMOR   Tumor Site  Descending colon    Histologic Type  Adenocarcinoma    Histologic Grade  G3,  poorly differentiated    Tumor Size  Greatest dimension (Centimeters): 3.5 cm   Tumor Extent  Invades through muscularis propria into the pericolonic or perirectal tissue    Macroscopic Tumor Perforation  Not identified    Lymphovascular Invasion  Small vessel    Perineural Invasion  Present    Treatment Effect  No known presurgical therapy    MARGINS   Margin Status for Invasive Carcinoma  All margins negative for invasive carcinoma    Closest Margin(s) to Invasive Carcinoma  Radial (circumferential) or mesenteric    Distance from Invasive Carcinoma to Closest Margin  3.5 cm   Margin Status for Non-Invasive Tumor  All margins negative for high-grade dysplasia / intramucosal carcinoma and low-grade dysplasia    REGIONAL LYMPH NODES   Regional Lymph Node Status  Tumor present in regional lymph node(s)    Number of Lymph Nodes with Tumor  3    Number of Lymph Nodes Examined  33    Tumor Deposits  Present    Number of Tumor Deposits  1    PATHOLOGIC STAGE CLASSIFICATION (pTNM, AJCC 8th Edition)   Reporting of pT, pN, and (when applicable) pM categories is based on information available to the pathologist at the time the report is issued. As per the AJCC (Chapter 1, 8th Ed.) it is the managing physician's responsibility to establish the final pathologic stage based upon all pertinent information, including but potentially not limited to this pathology report.   pT Category  pT3    pN Category  pN1b    ADDITIONAL FINDINGS   Additional Findings  None identified    .          NM Bone Scan Whole Body    Result Date: 2/27/2025  Impression: 1. No interval change from the previous bone scan. There is no evidence of osseous metastatic disease. 2. No abnormal uptake in the region of the T8 vertebral body. The subtle sclerotic lesion on the CT scan is likely benign. 3. Degenerative changes. Electronically Signed: Aakash Gould MD  2/27/2025 12:40 PM EST  Workstation ID: [id number]    CT Chest With Contrast Diagnostic    Result Date:  2/26/2025  Impression: Impression: 1.No evidence of metastatic disease within the chest, abdomen or pelvis. 2.Postsurgical changes in the left colon. 3.Additional findings as given above. Electronically Signed: Az Ledesma MD  2/26/2025 3:40 PM EST  Workstation ID: [id number]    CT Abdomen Pelvis With Contrast    Result Date: 2/26/2025  Impression: Impression: 1.No evidence of metastatic disease within the chest, abdomen or pelvis. 2.Postsurgical changes in the left colon. 3.Additional findings as given above. Electronically Signed: Az Ledesma MD  2/26/2025 3:40 PM EST  Workstation ID: [id number]     Assessment & Plan     Diagnoses and all orders for this visit:    1. Malignant neoplasm of descending colon (Primary)  -     CT chest w contrast; Future  -     CT abdomen pelvis w contrast; Future  -     NM Bone Scan Whole Body; Future  -     CBC & Differential; Future  -     Comprehensive Metabolic Panel; Future  -     CEA; Future    2. Thyroid dysfunction  -     Ambulatory Referral to Endocrinology    Other orders  -     IMAGING SCANNED          Lilian Pelaez is a 49 y.o. female who presents to [facility] HEMATOLOGY & ONCOLOGY evaluation prior to systemic therapy for stage III colon cancer, patient status post resection of descending colon splenic flexure by Dr. Rolando Liu on 5/5/2023.     Based on NCCN guidelines for her stage III colon cancer, discussed high risk features seen on pathology report and discussed CapeOx for 3 months versus FOLFOX for 3 to 6 months with patient and  today.  Patient agreed to plan to undergo FOLFOX IV chemotherapy every 2 weeks for 6 months.    Patient received cycle 1 on June 14, 2023, she developed pneumoperitoneum which her surgeon suspect was secondary to severe constipation from iron tablets, but patient was cleared to resume chemotherapy after 8/1/2023, she completed 12 cycles of chemotherapy on 12/27/2024.    Restaging imaging scans obtained on 1/29/2023  which was without evidence of disease recurrence or metastatic disease, but did reveal new elevation of right hemidiaphragm with secondary lower lobe atelectasis, new ground glass infiltrates in the upper lobes, likely infectious including possible viral pneumonia.    Restaging imaging from 5/15/2024 revealed an indeterminate approximately 1 cm sclerotic focus in T8 vertebral body, since osteoblastic metastasis cannot be excluded, refer patient for bone scan for further assessment, will recommend biopsy if suspicious on bone scan, otherwise no evidence of intrathoracic or abdominal pelvic metastatic disease, interval resolution of pulmonary groundglass infiltrates and diaphragm elevation.  -NM bone scan from 5/22/2024 was negative for metastatic disease    -Pt requested chemoport removal, referred pt to general surgeon for chemport removal.     3/5/2025: Discussed results of restaging imaging   CT CAP was negative, NM bone scan was negative for osseous metastatic disease, CEA low and other counts are stable    Recommend pt follow up with PCP for all age appropriate cancer screenings such as pap smear, mammogram and c-scope    Weight gain  -pt shared she insurance did not cover GLP1 medication  -pt pending follow up with ENT for assessment for thyroid dysfunction  -recommend evaluation by PCP for [place] and evaluation by endocrinologist, recommend she also discuss evaluation for bariatric surgery with her PCP    plan for pt follow up in 6 months with repeat CT CAP and NM bone scan to reassess disease status and for repeat CBC, CMP and CEA.    Patient verbalized understanding and agreement plan.    Please note that portions of this note were completed with a voice recognition program.    Electronically signed by Brian Marroquin MD, 03/05/25, 12:43 PM EST.      Follow Up     I spent 30 minutes caring for Lilian on this date of service. This time includes time spent by me in the following activities:preparing for the  visit, reviewing tests, obtaining and/or reviewing a separately obtained history, performing a medically appropriate examination and/or evaluation , counseling and educating the patient/family/caregiver, ordering medications, tests, or procedures, documenting information in the medical record and care coordination.    This is an acute or chronic illness that poses a threat to life or bodily function. The above treatment plan involves a high risk of complications and/or mortality of patient management.    The patient was seen and examined. Work by the provider also included review and/or ordering of lab tests, review and/or ordering of radiology tests, review and/or ordering of medicine tests, discussion with other physicians or providers, independent review of data, obtaining old records, review/summation of old records, and/or other review.    I have reviewed the family history, social history, and past medical history for this patient. Previous information and data has been reviewed and updated as needed. I have reviewed and verified the chief complaint, history, and other documentation. The patient was interviewed and examined in the clinic and the chart reviewed. The previous observations, recommendations, and conclusions were reviewed including those of other providers.     The plan was discussed with the patient and/or family. The patient was given time to ask questions and these questions were answered. At the conclusion of their visit they had no additional questions or concerns and all questions were answered to their satisfaction.    Patient was given instructions and counseling regarding her condition or for health maintenance advice. Please see specific information pulled into the AVS if appropriate.

## 2025-06-25 DIAGNOSIS — K21.9 GASTRIC REFLUX: ICD-10-CM

## 2025-06-26 RX ORDER — PANTOPRAZOLE SODIUM 40 MG/1
40 TABLET, DELAYED RELEASE ORAL 2 TIMES DAILY
Qty: 60 TABLET | Refills: 1 | Status: SHIPPED | OUTPATIENT
Start: 2025-06-26

## 2025-07-14 ENCOUNTER — TELEPHONE (OUTPATIENT)
Dept: ONCOLOGY | Facility: HOSPITAL | Age: 50
End: 2025-07-14
Payer: COMMERCIAL

## 2025-08-28 ENCOUNTER — LAB (OUTPATIENT)
Dept: LAB | Facility: HOSPITAL | Age: 50
End: 2025-08-28
Payer: COMMERCIAL

## 2025-08-28 ENCOUNTER — HOSPITAL ENCOUNTER (OUTPATIENT)
Dept: NUCLEAR MEDICINE | Facility: HOSPITAL | Age: 50
Discharge: HOME | End: 2025-08-28
Payer: COMMERCIAL

## 2025-08-28 ENCOUNTER — HOSPITAL ENCOUNTER (OUTPATIENT)
Dept: CT IMAGING | Facility: HOSPITAL | Age: 50
Discharge: HOME | End: 2025-08-28
Payer: COMMERCIAL

## 2025-08-28 DIAGNOSIS — C18.6 MALIGNANT NEOPLASM OF DESCENDING COLON: ICD-10-CM

## 2025-08-28 LAB
ALBUMIN SERPL-MCNC: 4.4 G/DL (ref 3.5–5.2)
ALBUMIN/GLOB SERPL: 1.5 G/DL
ALP SERPL-CCNC: 98 U/L (ref 39–117)
ALT SERPL W P-5'-P-CCNC: 19 U/L (ref 1–33)
ANION GAP SERPL CALCULATED.3IONS-SCNC: 10.1 MMOL/L (ref 5–15)
AST SERPL-CCNC: 20 U/L (ref 1–32)
BASOPHILS # BLD AUTO: 0.04 10*3/MM3 (ref 0–0.2)
BASOPHILS NFR BLD AUTO: 0.5 % (ref 0–1.5)
BILIRUB SERPL-MCNC: 0.7 MG/DL (ref 0–1.2)
BUN SERPL-MCNC: 9.4 MG/DL (ref 6–20)
BUN/CREAT SERPL: 10.6 (ref 7–25)
CALCIUM SPEC-SCNC: 9.6 MG/DL (ref 8.6–10.5)
CEA SERPL-MCNC: 1.99 NG/ML
CHLORIDE SERPL-SCNC: 104 MMOL/L (ref 98–107)
CO2 SERPL-SCNC: 25.9 MMOL/L (ref 22–29)
CREAT BLDA-MCNC: 1 MG/DL (ref 0.6–1.3)
CREAT SERPL-MCNC: 0.89 MG/DL (ref 0.57–1)
DEPRECATED RDW RBC AUTO: 38.9 FL (ref 37–54)
EGFRCR SERPLBLD CKD-EPI 2021: 69.2 ML/MIN/1.73
EGFRCR SERPLBLD CKD-EPI 2021: 79.6 ML/MIN/1.73
EOSINOPHIL # BLD AUTO: 0.09 10*3/MM3 (ref 0–0.4)
EOSINOPHIL NFR BLD AUTO: 1.1 % (ref 0.3–6.2)
ERYTHROCYTE [DISTWIDTH] IN BLOOD BY AUTOMATED COUNT: 12.6 % (ref 12.3–15.4)
GLOBULIN UR ELPH-MCNC: 2.9 GM/DL
GLUCOSE SERPL-MCNC: 102 MG/DL (ref 65–99)
HCT VFR BLD AUTO: 46.8 % (ref 34–46.6)
HGB BLD-MCNC: 16 G/DL (ref 12–15.9)
IMM GRANULOCYTES # BLD AUTO: 0.03 10*3/MM3 (ref 0–0.05)
IMM GRANULOCYTES NFR BLD AUTO: 0.4 % (ref 0–0.5)
LYMPHOCYTES # BLD AUTO: 2.33 10*3/MM3 (ref 0.7–3.1)
LYMPHOCYTES NFR BLD AUTO: 28.2 % (ref 19.6–45.3)
MCH RBC QN AUTO: 29.2 PG (ref 26.6–33)
MCHC RBC AUTO-ENTMCNC: 34.2 G/DL (ref 31.5–35.7)
MCV RBC AUTO: 85.4 FL (ref 79–97)
MONOCYTES # BLD AUTO: 0.57 10*3/MM3 (ref 0.1–0.9)
MONOCYTES NFR BLD AUTO: 6.9 % (ref 5–12)
NEUTROPHILS NFR BLD AUTO: 5.21 10*3/MM3 (ref 1.7–7)
NEUTROPHILS NFR BLD AUTO: 62.9 % (ref 42.7–76)
NRBC BLD AUTO-RTO: 0 /100 WBC (ref 0–0.2)
PLATELET # BLD AUTO: 223 10*3/MM3 (ref 140–450)
PMV BLD AUTO: 9.7 FL (ref 6–12)
POTASSIUM SERPL-SCNC: 4.4 MMOL/L (ref 3.5–5.2)
PROT SERPL-MCNC: 7.3 G/DL (ref 6–8.5)
RBC # BLD AUTO: 5.48 10*6/MM3 (ref 3.77–5.28)
SODIUM SERPL-SCNC: 140 MMOL/L (ref 136–145)
WBC NRBC COR # BLD AUTO: 8.27 10*3/MM3 (ref 3.4–10.8)

## 2025-08-28 PROCEDURE — 34310000005 TECHNETIUM MEDRONATE KIT: Performed by: INTERNAL MEDICINE

## 2025-08-28 PROCEDURE — 25510000001 IOPAMIDOL PER 1 ML: Performed by: INTERNAL MEDICINE

## 2025-08-28 PROCEDURE — 74177 CT ABD & PELVIS W/CONTRAST: CPT

## 2025-08-28 PROCEDURE — 36415 COLL VENOUS BLD VENIPUNCTURE: CPT

## 2025-08-28 PROCEDURE — 82378 CARCINOEMBRYONIC ANTIGEN: CPT

## 2025-08-28 PROCEDURE — A9503 TC99M MEDRONATE: HCPCS | Performed by: INTERNAL MEDICINE

## 2025-08-28 PROCEDURE — 82565 ASSAY OF CREATININE: CPT

## 2025-08-28 PROCEDURE — 78306 BONE IMAGING WHOLE BODY: CPT

## 2025-08-28 PROCEDURE — 71260 CT THORAX DX C+: CPT

## 2025-08-28 PROCEDURE — 85025 COMPLETE CBC W/AUTO DIFF WBC: CPT

## 2025-08-28 PROCEDURE — 80053 COMPREHEN METABOLIC PANEL: CPT

## 2025-08-28 RX ORDER — TC 99M MEDRONATE 20 MG/10ML
23.1 INJECTION, POWDER, LYOPHILIZED, FOR SOLUTION INTRAVENOUS
Status: COMPLETED | OUTPATIENT
Start: 2025-08-28 | End: 2025-08-28

## 2025-08-28 RX ORDER — IOPAMIDOL 755 MG/ML
100 INJECTION, SOLUTION INTRAVASCULAR
Status: COMPLETED | OUTPATIENT
Start: 2025-08-28 | End: 2025-08-28

## 2025-08-28 RX ADMIN — TC 99M MEDRONATE 23.1 MILLICURIE: 20 INJECTION, POWDER, LYOPHILIZED, FOR SOLUTION INTRAVENOUS at 09:47

## 2025-08-28 RX ADMIN — IOPAMIDOL 95 ML: 755 INJECTION, SOLUTION INTRAVENOUS at 09:05

## (undated) DEVICE — SOL IRR NACL 0.9PCT BT 1000ML

## (undated) DEVICE — Device: Brand: SPOT EX ENDOSCOPIC TATTOO

## (undated) DEVICE — TOTAL TRAY, 16FR 10ML SIL FOLEY, URN: Brand: MEDLINE

## (undated) DEVICE — SOL IRRG H2O PL/BG 1000ML STRL

## (undated) DEVICE — LAPAROVUE VISIBILITY SYSTEM LAPAROSCOPIC SOLUTIONS: Brand: LAPAROVUE

## (undated) DEVICE — SOL NACL 0.9PCT 1000ML

## (undated) DEVICE — SUT MNCRYL 4/0 PS2 18 IN

## (undated) DEVICE — MEDI-VAC NON-CONDUCTIVE SUCTION TUBING: Brand: CARDINAL HEALTH

## (undated) DEVICE — TOWEL,OR,DSP,ST,BLUE,STD,4/PK,20PK/CS: Brand: MEDLINE

## (undated) DEVICE — LINER SURG CANSTR SXN S/RIGD 1500CC

## (undated) DEVICE — VESSEL SEALER EXTEND: Brand: ENDOWRIST

## (undated) DEVICE — ADHS SKIN PREMIERPRO EXOFIN TOPICAL HI/VISC .5ML

## (undated) DEVICE — LAPAROSCOPIC ACCESS SYSTEM: Brand: ALEXIS LAPAROSCOPIC SYSTEM WITH KII FIOS FIRST ENTRY

## (undated) DEVICE — PENCL E/S SMOKEEVAC W/TELESCP CANN

## (undated) DEVICE — CANNULA SEAL

## (undated) DEVICE — INTENDED FOR TISSUE SEPARATION, AND OTHER PROCEDURES THAT REQUIRE A SHARP SURGICAL BLADE TO PUNCTURE OR CUT.: Brand: BARD-PARKER ® CARBON RIB-BACK BLADES

## (undated) DEVICE — 3 RING SUTURE PASSER - 16 CM: Brand: 3 RING SUTURE PASSER - 16 CM

## (undated) DEVICE — TISSUE RETRIEVAL SYSTEM: Brand: INZII RETRIEVAL SYSTEM

## (undated) DEVICE — GLV SURG BIOGEL LTX PF 7 1/2

## (undated) DEVICE — CATH URETRL FLXITP POLLACK STD 5F 70CM

## (undated) DEVICE — SYR LL TP 10ML STRL

## (undated) DEVICE — THE SINGLE USE ETRAP – POLYP TRAP IS USED FOR SUCTION RETRIEVAL OF ENDOSCOPICALLY REMOVED POLYPS.: Brand: ETRAP

## (undated) DEVICE — CVR PROB ULTRASND CIVFLEX GEN/PURP TELESCP/FOLD 5.5X58IN LF

## (undated) DEVICE — 2, DISPOSABLE SUCTION/IRRIGATOR WITHOUT DISPOSABLE TIP: Brand: STRYKEFLOW

## (undated) DEVICE — STAPLER 60: Brand: SUREFORM

## (undated) DEVICE — ADHS SKIN SURG TISS VISC PREMIERPRO EXOFIN HI/VISC FAST/DRY

## (undated) DEVICE — DRSNG WND GZ CURAD OIL EMULSION 3X3IN STRL

## (undated) DEVICE — SKIN PREP TRAY W/CHG: Brand: MEDLINE INDUSTRIES, INC.

## (undated) DEVICE — ANTIBACTERIAL VIOLET BRAIDED (POLYGLACTIN 910), SYNTHETIC ABSORBABLE SUTURE: Brand: COATED VICRYL

## (undated) DEVICE — CONN JET HYDRA H20 AUXILIARY DISP

## (undated) DEVICE — ENDOPATH XCEL BLADELESS TROCARS WITH STABILITY SLEEVES: Brand: ENDOPATH XCEL

## (undated) DEVICE — LAPAROSCOPIC SMOKE EVACUATION SYSTEM ACTIVE AND PASSIVE: Brand: VALLEYLAB

## (undated) DEVICE — ENDOPATH PNEUMONEEDLE INSUFFLATION NEEDLES WITH LUER LOCK CONNECTORS 120MM: Brand: ENDOPATH

## (undated) DEVICE — GLV SURG SENSICARE SLT PF LF 7 STRL

## (undated) DEVICE — HYBRID CO2 TUBING/CAP SET FOR OLYMPUS® SCOPES & CO2 SOURCE: Brand: ERBE

## (undated) DEVICE — Device: Brand: DEFENDO AIR/WATER/SUCTION AND BIOPSY VALVE

## (undated) DEVICE — DAVINCI-LF: Brand: MEDLINE INDUSTRIES, INC.

## (undated) DEVICE — 40583 XL ADVANCED TRENDELENBURG POSITIONING KIT: Brand: 40583 XL ADVANCED TRENDELENBURG POSITIONING KIT

## (undated) DEVICE — TRAY,IRRIGATION,BULBSYRINGE,60ML,CSR,PVP: Brand: MEDLINE

## (undated) DEVICE — TP SXN YANKR BULB STRL

## (undated) DEVICE — Device: Brand: SINGLE USE INJECTOR NM600/610

## (undated) DEVICE — ARM DRAPE

## (undated) DEVICE — Device

## (undated) DEVICE — GOWN,REINFORCE,POLY,SIRUS,BREATH SLV,XLG: Brand: MEDLINE

## (undated) DEVICE — BLD SCLPL RIBBACK C/SS SZ15

## (undated) DEVICE — SLV SCD KN/LEN ADJ EXPRSS BLENDED MD 1P/U

## (undated) DEVICE — CYSTO/BLADDER IRRIGATION SET, REGULATING CLAMP

## (undated) DEVICE — Device: Brand: ENDO CARRY-ON PROCEDURE KIT

## (undated) DEVICE — SINGLE-USE BIOPSY FORCEPS: Brand: RADIAL JAW 4

## (undated) DEVICE — ELECTRD BLD EDGE COAT 3IN

## (undated) DEVICE — SEAL

## (undated) DEVICE — SOLIDIFIER LIQLOC PLS 1500CC BT

## (undated) DEVICE — SOL IRR H2O BTL 250ML STRL

## (undated) DEVICE — LAP PORT CLOSURE GUIDES 5MM AND 10/12MM: Brand: LAP PORT CLOSURE GUIDES 5MM AND 10/12MM

## (undated) DEVICE — SHEET,DRAPE,70X85,STERILE: Brand: MEDLINE

## (undated) DEVICE — SOL IRRG H2O BG 3000ML STRL

## (undated) DEVICE — SUT VIC PLS CTD BR 0 TIE 18IN VIL

## (undated) DEVICE — PK MAJ CUST HAR

## (undated) DEVICE — SUT PDS 1 CT1 36IN Z347H

## (undated) DEVICE — SNAR E/S POLYP SNAREMASTER OVL/10MM 2.8X2300MM YEL

## (undated) DEVICE — VASCULAR ACCESS-LF: Brand: MEDLINE INDUSTRIES, INC.

## (undated) DEVICE — CYSTO PACK: Brand: MEDLINE INDUSTRIES, INC.

## (undated) DEVICE — REDUCER: Brand: ENDOWRIST

## (undated) DEVICE — TIP COVER ACCESSORY

## (undated) DEVICE — PENCL E/S HNDSWCH ROCKR CB

## (undated) DEVICE — SUT VIC PLS CTD BR 3/0 SH 27IN VIL